# Patient Record
Sex: FEMALE | Race: WHITE | Employment: OTHER | URBAN - METROPOLITAN AREA
[De-identification: names, ages, dates, MRNs, and addresses within clinical notes are randomized per-mention and may not be internally consistent; named-entity substitution may affect disease eponyms.]

---

## 2017-04-25 ENCOUNTER — HOSPITAL ENCOUNTER (OUTPATIENT)
Dept: GENERAL RADIOLOGY | Age: 52
Discharge: HOME | End: 2017-04-25
Attending: FAMILY MEDICINE | Admitting: FAMILY MEDICINE

## 2017-04-25 DIAGNOSIS — R13.10 DYSPHAGIA, UNSPECIFIED TYPE: ICD-10-CM

## 2017-04-25 DIAGNOSIS — R13.10 DYSPHAGIA: ICD-10-CM

## 2017-08-03 ENCOUNTER — HOSPITAL ENCOUNTER (OUTPATIENT)
Dept: SPEECH THERAPY | Age: 52
Discharge: HOME | End: 2017-08-03
Attending: FAMILY MEDICINE | Admitting: FAMILY MEDICINE

## 2017-08-03 DIAGNOSIS — Z00.6 CLINICAL TRIAL EXAM: ICD-10-CM

## 2017-08-03 DIAGNOSIS — R13.10 DYSPHAGIA, UNSPECIFIED TYPE: ICD-10-CM

## 2018-02-26 RX ORDER — PETROLATUM 430 MG/G
OINTMENT TOPICAL
Qty: 1 TUBE | Refills: 5 | Status: ON HOLD | OUTPATIENT
Start: 2018-02-26 | End: 2022-04-08

## 2019-03-31 ENCOUNTER — APPOINTMENT (OUTPATIENT)
Dept: CT IMAGING | Age: 54
End: 2019-03-31
Payer: MEDICARE

## 2019-03-31 ENCOUNTER — HOSPITAL ENCOUNTER (EMERGENCY)
Age: 54
Discharge: HOME | End: 2019-03-31
Attending: EMERGENCY MEDICINE
Payer: MEDICARE

## 2019-03-31 VITALS
SYSTOLIC BLOOD PRESSURE: 93 MMHG | RESPIRATION RATE: 18 BRPM | HEART RATE: 70 BPM | DIASTOLIC BLOOD PRESSURE: 62 MMHG | HEIGHT: 58 IN | TEMPERATURE: 97.4 F | BODY MASS INDEX: 29.39 KG/M2 | WEIGHT: 140 LBS | OXYGEN SATURATION: 99 %

## 2019-03-31 DIAGNOSIS — K62.5 RECTAL BLEEDING: Primary | ICD-10-CM

## 2019-03-31 DIAGNOSIS — K62.3 RECTAL PROLAPSE: ICD-10-CM

## 2019-03-31 LAB
ANION GAP SERPL CALCULATED.3IONS-SCNC: 9 MMOL/L (ref 3–16)
APTT: 28.3 SEC (ref 26–36)
BASOPHILS ABSOLUTE: 0 K/UL (ref 0–0.2)
BASOPHILS RELATIVE PERCENT: 0.4 %
BUN BLDV-MCNC: 24 MG/DL (ref 7–20)
CALCIUM SERPL-MCNC: 9 MG/DL (ref 8.3–10.6)
CHLORIDE BLD-SCNC: 102 MMOL/L (ref 99–110)
CO2: 29 MMOL/L (ref 21–32)
CREAT SERPL-MCNC: 0.8 MG/DL (ref 0.6–1.1)
EOSINOPHILS ABSOLUTE: 0 K/UL (ref 0–0.6)
EOSINOPHILS RELATIVE PERCENT: 0.4 %
GFR AFRICAN AMERICAN: >60
GFR NON-AFRICAN AMERICAN: >60
GLUCOSE BLD-MCNC: 91 MG/DL (ref 70–99)
GONADOTROPIN, CHORIONIC (HCG) QUANT: <5 MIU/ML
HCT VFR BLD CALC: 41.7 % (ref 36–48)
HEMOGLOBIN: 14.1 G/DL (ref 12–16)
INR BLD: 0.93 (ref 0.86–1.14)
LYMPHOCYTES ABSOLUTE: 1.3 K/UL (ref 1–5.1)
LYMPHOCYTES RELATIVE PERCENT: 14.3 %
MCH RBC QN AUTO: 34.8 PG (ref 26–34)
MCHC RBC AUTO-ENTMCNC: 33.9 G/DL (ref 31–36)
MCV RBC AUTO: 102.6 FL (ref 80–100)
MONOCYTES ABSOLUTE: 0.8 K/UL (ref 0–1.3)
MONOCYTES RELATIVE PERCENT: 8.8 %
NEUTROPHILS ABSOLUTE: 6.8 K/UL (ref 1.7–7.7)
NEUTROPHILS RELATIVE PERCENT: 76.1 %
PDW BLD-RTO: 12.2 % (ref 12.4–15.4)
PLATELET # BLD: 162 K/UL (ref 135–450)
PMV BLD AUTO: 10.2 FL (ref 5–10.5)
POTASSIUM SERPL-SCNC: 4.6 MMOL/L (ref 3.5–5.1)
PROTHROMBIN TIME: 10.6 SEC (ref 9.8–13)
RBC # BLD: 4.07 M/UL (ref 4–5.2)
REASON FOR REJECTION: NORMAL
REJECTED TEST: NORMAL
SODIUM BLD-SCNC: 140 MMOL/L (ref 136–145)
WBC # BLD: 9 K/UL (ref 4–11)

## 2019-03-31 PROCEDURE — 99283 EMERGENCY DEPT VISIT LOW MDM: CPT

## 2019-03-31 PROCEDURE — 85610 PROTHROMBIN TIME: CPT

## 2019-03-31 PROCEDURE — 84702 CHORIONIC GONADOTROPIN TEST: CPT

## 2019-03-31 PROCEDURE — 80048 BASIC METABOLIC PNL TOTAL CA: CPT

## 2019-03-31 PROCEDURE — 74176 CT ABD & PELVIS W/O CONTRAST: CPT

## 2019-03-31 PROCEDURE — 85025 COMPLETE CBC W/AUTO DIFF WBC: CPT

## 2019-03-31 PROCEDURE — 85730 THROMBOPLASTIN TIME PARTIAL: CPT

## 2019-03-31 NOTE — ED NOTES
Bed: 25  Expected date:   Expected time:   Means of arrival:   Comments:  [address], RN  03/31/19 1526

## 2019-03-31 NOTE — ED NOTES
Pt alert and oriented to person. Resting quietly in bed. Caregiver at bedside.      Samuel Mark RN  03/31/19 8552

## 2019-03-31 NOTE — ED TRIAGE NOTES
Caregiver forgot pts home medications, allergies, and medical/surgical history. Pt non verbal unable to answer screening questions.

## 2019-04-01 NOTE — ED PROVIDER NOTES
[facility] Emergency Department      Pt Name: Brianna White  MRN: [id number]  [place] 1965  Date of evaluation: 3/31/2019  Provider: Boyd Gilmore MD  CHIEF COMPLAINT  Chief Complaint   Patient presents with    Rectal Bleeding     pt from [facility]. rectal bleeding from her prolapse     HPI  Brianna White is a 52 y.o. female who presents because of rectal bleeding. She is accompanied by caregivers who know her well. She has a rectal prolapse that is chronic and today when one of them went to change her diaper, she noted normal appearing stool with a smear of blood on the diaper. The rectums had prolapsed. This caregiver did not perform reduction but they brought the patient in for evaluation. The patient is nonverbal, lives in a group home, and is unable to provide history. She is at her baseline behavior per caregivers. Appetite is normal without vomiting. REVIEW OF SYSTEMS:  See HPI for further details. Remainder of review of systems limited by patient ability to provide history. Nursing notes reviewed. PAST MEDICAL HISTORY:  Past Medical History:   Diagnosis Date    Anemia     Anorexia     Autistic disorder     Convulsions ([address]     Down syndrome     Dysphagia     Hypothyroid     [facility]     Parkinson disease ([facility]     UTI (urinary tract infection)     Weakness      SURGICAL HISTORY  Past Surgical History:   Procedure Laterality Date    GASTROSTOMY TUBE PLACEMENT      GASTROSTOMY TUBE PLACEMENT  2/18/14     MEDICATIONS:  No current facility-administered medications on file prior to encounter. Current Outpatient Medications on File Prior to Encounter   Medication Sig Dispense Refill    Skin Protectants, Misc. (ALOE VESTA PROTECTIVE) OINT ointment APPLY TO AFFECTED AREA(S) TWICE DAILY AS NEEDED 1 Tube 5    Trihexyphenidyl HCl 0.4 MG/ML ELIX Take 2 mg by mouth      zinc oxide 20 % ointment Apply topically 3 times daily Apply topically as needed.  Nutritional Supplements (ENSURE COMPLETE PO) Take by mouth      levothyroxine (SYNTHROID) 125 MCG tablet Take 125 mcg by mouth daily      docusate sodium (COLACE) 100 MG capsule Take 100 mg by mouth daily      acetaminophen (TYLENOL) 325 MG tablet Take 650 mg by mouth every 6 hours as needed for Pain.  calcium carbonate (TUMS) 500 MG chewable tablet Take 1 tablet by mouth 2 times daily.  magnesium hydroxide (MILK OF MAGNESIA) 400 MG/5ML suspension Take  by mouth daily as needed for Constipation.  carbidopa-levodopa (SINEMET)  MG per tablet Take 1.5 tablets by mouth 3 times daily Indications: Take one and a half tablets by mouth three times per day       sertraline (ZOLOFT) 100 MG tablet Take 100 mg by mouth nightly.  Multiple Vitamins-Minerals (THERAPEUTIC MULTIVITAMIN-MINERALS) tablet Take 1 tablet by mouth daily.  omeprazole (PRILOSEC) 20 MG capsule Take 20 mg by mouth daily.  valproic acid (DEPAKENE) 250 MG capsule Take 250 mg by mouth See Admin Instructions Take 1 cap by mouth in morning, in afternoon, and 2 capsules by mouth at bedtime       ALLERGIES  Caffeine and Iodine  FAMILY HISTORY:  No family history on file.   SOCIAL HISTORY:  Social History     Tobacco Use    Smoking status: Never Smoker    Smokeless tobacco: Never Used   Substance Use Topics    Alcohol use: No    Drug use: No     IMMUNIZATIONS:  Noncontributory    PHYSICAL EXAM  VITAL SIGNS:  BP 93/62   Pulse 70   Temp 97.4 °F (36.3 °C) (Infrared)   Resp 18   Ht 4' 10\" (1.473 m)   Wt 140 lb (63.5 kg)   SpO2 99%   BMI 29.26 kg/m²   Constitutional:  52 y.o. female cooperative, facies consistent with MRDD  HENT:  Atraumatic, mucous membranes moist  Eyes:   Conjunctiva clear, no icterus  Neck:  Supple, no adenopathy  Cardiovascular:  Regular, no rubs, no discernible murmur  Thorax & Lungs:  No accessory muscle usage, clear  Abdomen:  Soft, non distended, bowel sounds present, palpable reducible hernia, no tenderness  Back:  No deformity  Genitalia:  Deferred  Rectal:  Externally appears normal, no bleeding or prolapse present during exam  Extremities:  No cyanosis, no edema  Skin:  Warm, dry  Neurologic:  Alert, nonverbal, uses a wheelchair but can assist with transfer  Psychiatric:  Affect flat, otherwise unassessable    DIAGNOSTIC RESULTS:  Labs resulted at the time of this note reviewed.    Labs Reviewed   CBC WITH AUTO DIFFERENTIAL - Abnormal; Notable for the following components:       Result Value    .6 (*)     MCH 34.8 (*)     RDW 12.2 (*)     All other components within normal limits    Narrative:     CALL  [facility] tel. [phone],  Rejected Test Name BMP, HCGQ/Called to:DESIREE Santos, 03/31/2019 16:38,  by [facility]  Performed at:  [facility] [address], [place], [address] [facility]   Phone [phone]   BASIC METABOLIC PANEL - Abnormal; Notable for the following components:    BUN 24 (*)     All other components within normal limits    Narrative:     Performed at:  [facility] [address], [place], [address]   Phone [phone]   PROTIME-INR    Narrative:     [address],  Rejected Test Name BMP, HCGQ/Called to:DESIREE Santos, 03/31/2019 16:38,  by [facility]  Performed at:  [facility] [address], [place], [address]   Phone [phone]   APTT    Narrative:     Nate Andres  [facility] tel. [phone],  Rejected Test Name BMP, HCGQ/Called to:DESIREE Santos, 03/31/2019 16:38,  by [facility]  Performed at:  [facility] [address], [place], [address]   Phone [phone]    Narrative:     Nate Andres  [place]. [phone],  Rejected Test Name BMP, HCGQ/Called to:DESIREE Santos, 03/31/2019 16:38,  by [facility]  Performed at:  [facility]  [address] [place] [address] [place], [place]   Phone [phone]   HCG, QUANTITATIVE, PREGNANCY    Narrative:     Performed at:  [facility]  [address],  [place], [place]   Phone [phone]     RADIOLOGY:    CT ABDOMEN PELVIS WO CONTRAST Additional Contrast? None   Final Result   Motion limited study. No convincing evidence for acute intra-abdominal or intrapelvic pathology. No bowel obstruction or inflammation. No free intraperitoneal air or fluid. Bilateral pars defects at L4 resulting grade 1-2 anterolisthesis of L4   relative to L5            ED COURSE:  Uneventful     PROCEDURES:  None    CRITICAL CARE:  None    CONSULTATIONS:  None    MEDICAL DECISION MAKING: Brock Mcdaniel is a 52 y.o. female who presented because of rectal bleeding noted when her rectum was prolapsed. No finding of continued bleeding or inflammatory change. She is clinically well in appearance, at her baseline according to caregivers. Caregiver was given appropriate discharge instructions, verbal and written. We encouraged the caregiver to return the patient to the ED promptly if she develops worsening symptoms. Referral given for follow up care. Differential Diagnosis: incarcerated prolapse, hemorrhage, obstruction, perforated viscus, intestinal ischemia, dissection, other    FOLLOW UP:    Lin Burton MD  [address] [address] [address]   [phone]    Schedule an appointment as soon as possible for a visit       [facility] Emergency Department  [address]  Go to   If symptoms worsen    FINAL IMPRESSION:    1. Rectal bleeding    2. Rectal prolapse, self reduced      (Please note that I used voice recognition software to generate this note.   Occasionally words are mistranscribed despite my efforts to edit errors.)        Bryan Rosas MD  03/31/19 2137

## 2019-08-06 ENCOUNTER — HOSPITAL ENCOUNTER (OUTPATIENT)
Dept: WOMENS IMAGING | Age: 54
Discharge: HOME | End: 2019-08-06
Payer: MEDICARE

## 2019-08-06 DIAGNOSIS — Z12.39 BREAST CANCER SCREENING: ICD-10-CM

## 2021-07-01 ENCOUNTER — HOSPITAL ENCOUNTER (EMERGENCY)
Age: 56
Discharge: HOME | End: 2021-07-02
Attending: EMERGENCY MEDICINE
Payer: MEDICARE

## 2021-07-01 ENCOUNTER — APPOINTMENT (OUTPATIENT)
Dept: CT IMAGING | Age: 56
End: 2021-07-01
Payer: MEDICARE

## 2021-07-01 ENCOUNTER — APPOINTMENT (OUTPATIENT)
Dept: GENERAL RADIOLOGY | Age: 56
End: 2021-07-01
Payer: MEDICARE

## 2021-07-01 VITALS
WEIGHT: 76 LBS | OXYGEN SATURATION: 98 % | HEART RATE: 98 BPM | HEIGHT: 59 IN | RESPIRATION RATE: 18 BRPM | TEMPERATURE: 96.9 F | BODY MASS INDEX: 15.32 KG/M2

## 2021-07-01 DIAGNOSIS — R74.8 ELEVATED LIPASE: ICD-10-CM

## 2021-07-01 DIAGNOSIS — R11.2 NAUSEA VOMITING AND DIARRHEA: Primary | ICD-10-CM

## 2021-07-01 DIAGNOSIS — R19.7 NAUSEA VOMITING AND DIARRHEA: Primary | ICD-10-CM

## 2021-07-01 DIAGNOSIS — K31.89 GASTRIC DISTENTION: ICD-10-CM

## 2021-07-01 LAB
A/G RATIO: 1 (ref 1.1–2.2)
ALBUMIN SERPL-MCNC: 4.3 G/DL (ref 3.4–5)
ALP BLD-CCNC: 85 U/L (ref 40–129)
ALT SERPL-CCNC: 19 U/L (ref 10–40)
ANION GAP SERPL CALCULATED.3IONS-SCNC: 9 MMOL/L (ref 3–16)
AST SERPL-CCNC: 35 U/L (ref 15–37)
BASOPHILS ABSOLUTE: 0 K/UL (ref 0–0.2)
BASOPHILS RELATIVE PERCENT: 0.2 %
BILIRUB SERPL-MCNC: <0.2 MG/DL (ref 0–1)
BUN BLDV-MCNC: 38 MG/DL (ref 7–20)
CALCIUM SERPL-MCNC: 10.4 MG/DL (ref 8.3–10.6)
CHLORIDE BLD-SCNC: 103 MMOL/L (ref 99–110)
CO2: 31 MMOL/L (ref 21–32)
CREAT SERPL-MCNC: 0.6 MG/DL (ref 0.6–1.1)
EOSINOPHILS ABSOLUTE: 0 K/UL (ref 0–0.6)
EOSINOPHILS RELATIVE PERCENT: 0.1 %
GFR AFRICAN AMERICAN: >60
GFR NON-AFRICAN AMERICAN: >60
GLOBULIN: 4.1 G/DL
GLUCOSE BLD-MCNC: 80 MG/DL (ref 70–99)
HCT VFR BLD CALC: 47.5 % (ref 36–48)
HEMOGLOBIN: 15.9 G/DL (ref 12–16)
LIPASE: 136 U/L (ref 13–60)
LYMPHOCYTES ABSOLUTE: 1 K/UL (ref 1–5.1)
LYMPHOCYTES RELATIVE PERCENT: 8 %
MCH RBC QN AUTO: 34.6 PG (ref 26–34)
MCHC RBC AUTO-ENTMCNC: 33.5 G/DL (ref 31–36)
MCV RBC AUTO: 103.3 FL (ref 80–100)
MONOCYTES ABSOLUTE: 0.8 K/UL (ref 0–1.3)
MONOCYTES RELATIVE PERCENT: 5.8 %
NEUTROPHILS ABSOLUTE: 11.2 K/UL (ref 1.7–7.7)
NEUTROPHILS RELATIVE PERCENT: 85.9 %
PDW BLD-RTO: 13.1 % (ref 12.4–15.4)
PLATELET # BLD: 178 K/UL (ref 135–450)
PMV BLD AUTO: 9.8 FL (ref 5–10.5)
POTASSIUM SERPL-SCNC: 4.5 MMOL/L (ref 3.5–5.1)
RBC # BLD: 4.6 M/UL (ref 4–5.2)
SODIUM BLD-SCNC: 143 MMOL/L (ref 136–145)
TOTAL PROTEIN: 8.4 G/DL (ref 6.4–8.2)
WBC # BLD: 13 K/UL (ref 4–11)

## 2021-07-01 PROCEDURE — 74176 CT ABD & PELVIS W/O CONTRAST: CPT

## 2021-07-01 PROCEDURE — 36415 COLL VENOUS BLD VENIPUNCTURE: CPT

## 2021-07-01 PROCEDURE — 85025 COMPLETE CBC W/AUTO DIFF WBC: CPT

## 2021-07-01 PROCEDURE — 6360000002 HC RX W HCPCS: Performed by: PHYSICIAN ASSISTANT

## 2021-07-01 PROCEDURE — 96374 THER/PROPH/DIAG INJ IV PUSH: CPT

## 2021-07-01 PROCEDURE — 83690 ASSAY OF LIPASE: CPT

## 2021-07-01 PROCEDURE — 71045 X-RAY EXAM CHEST 1 VIEW: CPT

## 2021-07-01 PROCEDURE — 80053 COMPREHEN METABOLIC PANEL: CPT

## 2021-07-01 PROCEDURE — 99282 EMERGENCY DEPT VISIT SF MDM: CPT

## 2021-07-01 RX ORDER — ONDANSETRON 2 MG/ML
4 INJECTION INTRAMUSCULAR; INTRAVENOUS ONCE
Status: COMPLETED | OUTPATIENT
Start: 2021-07-01 | End: 2021-07-01

## 2021-07-01 RX ADMIN — ONDANSETRON 4 MG: 2 INJECTION INTRAMUSCULAR; INTRAVENOUS at 22:35

## 2021-07-01 ASSESSMENT — ENCOUNTER SYMPTOMS
DIARRHEA: 1
VOMITING: 1

## 2021-07-02 NOTE — ED NOTES
Bed: 08  Expected date:   Expected time:   Means of arrival:   Comments:  carl Bryan RN  07/01/21 2056

## 2021-07-02 NOTE — ED PROVIDER NOTES
[facility]        Pt Name: Christian Smith  MRN: [id number]  [place] 1965  Date of evaluation: 7/1/2021  Provider: Arlene Sawyer PA-C  PCP: Sanchez Hay MD  Note Started: 9:56 PM EDT        I have seen and evaluated this patient with my supervising physician [address] [place] [address].    CHIEF COMPLAINT       No chief complaint on file. HISTORY OF PRESENT ILLNESS   (Location, Timing/Onset, Context/Setting, Quality, Duration, Modifying Factors, Severity, Associated Signs and Symptoms)  Note limiting factors. Chief Complaint: N/V/D     Christian Smith is a 60 y.o. female who presents for evaluation of vomiting and diarrhea that started right after eating dinner this evening. Caregiver at bedside reports 2-3 episodes. States that she has since been acting appropriate at baseline but is wanted to get her checked out. Patient has history of Down syndrome, autism, profound mental retardation and is nonverbal at baseline. No additional information is able to obtained at this time. Nursing Notes were all reviewed and agreed with or any disagreements were addressed in the HPI. REVIEW OF SYSTEMS    (2-9 systems for level 4, 10 or more for level 5)     Review of Systems   Unable to perform ROS: Patient nonverbal   Gastrointestinal: Positive for diarrhea and vomiting. Positives and Pertinent negatives as per HPI. Except as noted above in the ROS, all other systems were reviewed and negative.        PAST MEDICAL HISTORY     Past Medical History:   Diagnosis Date    Anemia     Anorexia     Autistic disorder     Convulsions ([address]     Down syndrome     Dysphagia     Hypothyroid     Malnutrition ([address]     Parkinson disease ([address]     UTI (urinary tract infection)     Weakness          SURGICAL HISTORY     Past Surgical History:   Procedure Laterality Date    GASTROSTOMY TUBE PLACEMENT      GASTROSTOMY TUBE PLACEMENT 2/18/14         CURRENTMEDICATIONS       Previous Medications    ACETAMINOPHEN (TYLENOL) 325 MG TABLET    Take 650 mg by mouth every 6 hours as needed for Pain. CALCIUM CARBONATE (TUMS) 500 MG CHEWABLE TABLET    Take 1 tablet by mouth 2 times daily. CARBIDOPA-LEVODOPA (SINEMET)  MG PER TABLET    Take 1.5 tablets by mouth 3 times daily Indications: Take one and a half tablets by mouth three times per day     DOCUSATE SODIUM (COLACE) 100 MG CAPSULE    Take 100 mg by mouth daily    LEVOTHYROXINE (SYNTHROID) 125 MCG TABLET    Take 125 mcg by mouth daily    MAGNESIUM HYDROXIDE (MILK OF MAGNESIA) 400 MG/5ML SUSPENSION    Take  by mouth daily as needed for Constipation. MULTIPLE VITAMINS-MINERALS (THERAPEUTIC MULTIVITAMIN-MINERALS) TABLET    Take 1 tablet by mouth daily. NUTRITIONAL SUPPLEMENTS (ENSURE COMPLETE PO)    Take by mouth    OMEPRAZOLE (PRILOSEC) 20 MG CAPSULE    Take 20 mg by mouth daily. SERTRALINE (ZOLOFT) 100 MG TABLET    Take 100 mg by mouth nightly. SKIN PROTECTANTS, MISC. (ALOE VESTA PROTECTIVE) OINT OINTMENT    APPLY TO AFFECTED AREA(S) TWICE DAILY AS NEEDED    TRIHEXYPHENIDYL HCL 0.4 MG/ML ELIX    Take 2 mg by mouth    VALPROIC ACID (DEPAKENE) 250 MG CAPSULE    Take 250 mg by mouth See Admin Instructions Take 1 cap by mouth in morning, in afternoon, and 2 capsules by mouth at bedtime    ZINC OXIDE 20 % OINTMENT    Apply topically 3 times daily Apply topically as needed. ALLERGIES     Caffeine and Iodine    FAMILYHISTORY     History reviewed. No pertinent family history.        SOCIAL HISTORY       Social History     Tobacco Use    Smoking status: Never Smoker    Smokeless tobacco: Never Used   Substance Use Topics    Alcohol use: No    Drug use: No       SCREENINGS             PHYSICAL EXAM    (up to 7 for level 4, 8 or more for level 5)     ED Triage Vitals [07/01/21 2105]   BP Temp Temp Source Pulse Resp SpO2 Height Weight   -- 96.9 °F (36.1 °C) Axillary 98 18 100 % 4' 11\" (1.499 m) 76 lb (34.5 kg)       Physical Exam  Vitals and nursing note reviewed. Constitutional:       General: She is not in acute distress. Appearance: She is well-developed. She is not ill-appearing, toxic-appearing or diaphoretic. HENT:      Head: Normocephalic and atraumatic. Right Ear: External ear normal.      Left Ear: External ear normal.      Nose: Nose normal.   Eyes:      General:         Right eye: No discharge. Left eye: No discharge. Cardiovascular:      Rate and Rhythm: Normal rate and regular rhythm. Heart sounds: Normal heart sounds. Pulmonary:      Effort: Pulmonary effort is normal. No respiratory distress. Breath sounds: Normal breath sounds. Abdominal:      General: Abdomen is flat. Bowel sounds are normal. There is no distension. Palpations: Abdomen is soft. Tenderness: There is no abdominal tenderness. There is no guarding or rebound. Musculoskeletal:         General: Normal range of motion. Cervical back: Normal range of motion and neck supple. Skin:     General: Skin is warm and dry. Neurological:      Mental Status: She is alert and oriented to person, place, and time.    Psychiatric:         Behavior: Behavior normal.         DIAGNOSTIC RESULTS   LABS:    Labs Reviewed   CBC WITH AUTO DIFFERENTIAL - Abnormal; Notable for the following components:       Result Value    WBC 13.0 (*)     .3 (*)     MCH 34.6 (*)     Neutrophils Absolute 11.2 (*)     All other components within normal limits    Narrative:     Performed at:  [facility]  [address] [facility], [address]   Phone [phone]   COMPREHENSIVE METABOLIC PANEL - Abnormal; Notable for the following components:    BUN 38 (*)     Total Protein 8.4 (*)     Albumin/Globulin Ratio 1.0 (*)     All other components within normal limits    Narrative:     Performed at:  [facility]  [address] [facility], [place]   Phone [phone]   LIPASE - Abnormal; Notable for the following components:    Lipase 136.0 (*)     All other components within normal limits    Narrative:     Performed at:  [facility] [address] [place] [address]   Phone [phone]       When ordered only abnormal lab results are displayed. All other labs were within normal range or not returned as of this dictation. EKG: When ordered, EKG's are interpreted by the Emergency Department Physician in the absence of a cardiologist.  Please see their note for interpretation of EKG. RADIOLOGY:   Non-plain film images such as CT, Ultrasound and MRI are read by the radiologist. Plain radiographic images are visualized and preliminarily interpreted by the ED Provider with the below findings:        Interpretation per the Radiologist below, if available at the time of this note:    CT ABDOMEN PELVIS WO CONTRAST Additional Contrast? None   Final Result   1. Gastric distention could be due to recent meal, gastroparesis, or outlet   obstruction. 2. Otherwise essentially nondiagnostic study. XR CHEST PORTABLE   Final Result   Clear lungs. No results found. PROCEDURES   Unless otherwise noted below, none     Procedures    CRITICAL CARE TIME   N/A    CONSULTS:  None      EMERGENCY DEPARTMENT COURSE and DIFFERENTIAL DIAGNOSIS/MDM:   Vitals:    Vitals:    07/01/21 2245 07/01/21 2300 07/01/21 2315 07/01/21 2330   Pulse:       Resp:       Temp:       TempSrc:       SpO2: 99% 95% 95% 98%   Weight:       Height:           Patient was given the following medications:  Medications   ondansetron (ZOFRAN) injection 4 mg (4 mg Intravenous Given 7/1/21 2235)           Patient presents for evaluation of vomiting and diarrhea. On exam, she is resting comfortably in bed no acute distress and nontoxic. Vitals are stable and she is afebrile. Lungs are clear to auscultation bilaterally. Abdomen is soft, nontender. Normoactive bowel sounds. No peritoneal signs. She was given Zofran for symptomatic relief and will be reevaluated and p.o. challenged. CBC and CMP are remarkable for leukocytosis of 13.0, likely reactive. Lipase 136. CT the abdomen pelvis was obtained to further evaluate for intra-abdominal pathology. It shows gastric distention which could be due to recent meal, gastroparesis or gastric obstruction. Probably due to recent meal as caregiver does report that she had eaten a large meal and was given Ensure just prior to onset of vomiting. She can follow-up outpatient. Strict return precautions discussed such as any new or worsening symptoms or signs of gastric outlet obstruction with persistent intractable nausea vomiting. I see nothing that would suggest an acute abdomen at this time. Based on history, physical exam, risk factors, and tests my suspicion for bowel obstruction, incarcerated hernia, acute pancreatitis, intra-abdominal abscess, perforated viscus, diverticulitis, cholecystitis, appendicitis, PID, ovarian torsion, ectopic pregnancy and tubo-ovarian abscess is very low. There is no evidence of peritonitis, sepsis or toxicity at this time. I feel the patient can be managed as an outpatient with follow-up with her family doctor in 24-48 hours. Instructions have been given for the patient to return to the ED for worsening of the pain, high fevers, intractable vomiting, or bleeding. Caregiver is agreeable to this plan and the patient is stable for discharge at this time. FINAL IMPRESSION      1. Nausea vomiting and diarrhea    2. Elevated lipase    3.  Gastric distention          DISPOSITION/PLAN   DISPOSITION        PATIENT REFERRED TO:  Dian Leal MD  [address] [address] [address]  [phone]    Call   For a re-check in  2-3  days    [facility] Emergency Department  [address]  Go to   If symptoms worsen      DISCHARGE MEDICATIONS:  New Prescriptions    No medications on file       DISCONTINUED MEDICATIONS:  Discontinued Medications    No medications on file              (Please note that portions of this note were completed with a voice recognition program.  Efforts were made to edit the dictations but occasionally words are mis-transcribed.)    Cammy Zavala PA-C (electronically signed)            Indy Lees PA-C  07/01/21 5301 [place], [place]  07/02/21 0007

## 2021-07-02 NOTE — ED PROVIDER NOTES
I independently performed a history and physical on Miriam Sosa. All diagnostic, treatment, and disposition decisions were made by myself in conjunction with the advanced practice provider. Briefly, this is a 60 y.o. female here for vomiting which occurred after eating a meal.  Patient with history of developmental delay, she is nonverbal at baseline. Per nursing home staff patient ate lunch which is supplemented by Ensure, about 2 hours later she was also fed dinner after which she had 2-3 episodes of nonbloody vomiting. Symptoms resolved spontaneously. Caregiver present at bedside states patient seems to be back to her normal self at this time. .    On exam, patient afebrile and nontoxic. No distress. Heart RRR. Lungs diminished at bases, no wheezes, no crackles. Abdomen soft, nondistended, no distress elicited with deep palpation in all quadrants. Screenings            MDM    Patient afebrile and nontoxic. She exhibits no outward evidence of distress. Abdominal exam is benign, no findings to suggest acute cholecystitis or appendicitis. I have very low suspicion for obstruction. Patient does have mildly elevated lipase, given this abnormal finding did proceed with CT imaging which showed no evidence of acute pancreatitis. There is gastric distention, questionable gastric outlet obstruction versus food distention. Given history food distention is far more likely, patient has had no abdominal tenderness or recurrent episodes of vomiting to suggest obstruction. Patient currently acting normally per caregiver. Patient is felt safe for discharge back to her nursing home setting where she will be closely monitored. She is to return to the emergency department should her symptoms recur.     Patient Referrals:  Gloria Black MD  [address] [place] [address] Drive [address] [phone]    Call   For a re-check in  2-3  days    [facility] Emergency Department  [address] [address] [address] [address] [phone]  Go to   If symptoms worsen      Discharge Medications:  Discharge Medication List as of 7/2/2021  1:49 AM          FINAL IMPRESSION  1. Nausea vomiting and diarrhea    2. Elevated lipase    3. Gastric distention        Pulse 98, temperature 96.9 °F (36.1 °C), temperature source Axillary, resp. rate 18, height 4' 11\" (1.499 m), weight 76 lb (34.5 kg), SpO2 98 %. For further details of [facility] emergency department encounter, please see documentation by advanced practice provider, [facility].     Joey Perez DO (electronically signed)  Attending Emergency Physician       Joey Perez DO  07/02/21 8948

## 2021-07-02 NOTE — ED TRIAGE NOTES
Pt non verbal , autistic , MS and Parkinsons. caretaker w/ pt , pt ate a full lunch plus an Ensure plus  Then 2 hrs later ate dinner and a Ensue plus . Then started vomitting and diarrhea.  ABCs intact

## 2021-07-02 NOTE — ED NOTES
Pt sleeping , no emesis noted , no diarrhea.  All questions from caregiver answered      Ginny Dickey RN  07/02/21 7315

## 2021-10-19 ENCOUNTER — HOSPITAL ENCOUNTER (EMERGENCY)
Age: 56
Discharge: HOME | End: 2021-10-19
Payer: MEDICARE

## 2021-10-19 VITALS
DIASTOLIC BLOOD PRESSURE: 95 MMHG | OXYGEN SATURATION: 100 % | HEIGHT: 59 IN | RESPIRATION RATE: 18 BRPM | WEIGHT: 76 LBS | HEART RATE: 70 BPM | TEMPERATURE: 96.5 F | SYSTOLIC BLOOD PRESSURE: 126 MMHG | BODY MASS INDEX: 15.32 KG/M2

## 2021-10-19 DIAGNOSIS — K62.3 RECTAL PROLAPSE: Primary | ICD-10-CM

## 2021-10-19 PROCEDURE — 99283 EMERGENCY DEPT VISIT LOW MDM: CPT

## 2021-10-19 NOTE — ED PROVIDER NOTES
[facility] [facility]        Pt Name: Kenny Garsia  MRN: [id number]  [place] 1965  Date of evaluation: 10/19/2021  Provider: VICKY Hoffmann  PCP: Julia Shelton MD  Note Started: 9:16 AM EDT       SILVERIO. I have evaluated this patient. My supervising physician was available for consultation. CHIEF COMPLAINT       Chief Complaint   Patient presents with    Other     Pt. in per [facility] with report of pt. coming from some type of group home that has a new Nurse and was unaware the pt. had a known rectal prolapse. HISTORY OF PRESENT ILLNESS   (Location, Timing/Onset, Context/Setting, Quality, Duration, Modifying Factors, Severity, Associated Signs and Symptoms)  Note limiting factors. Chief Complaint: Rectal prolapse. Kenny Garsia is a 60 y.o. female with past medical history of autism, Down syndrome, Parkinson's who presents the ED with complaint of a rectal prolapse. Patient arrived by EMS after 911 was called from group home for rectal prolapse. Patient very poor historian. Apparently has known history of rectal prolapse but has new care provider who did not know patient had this and called 911. Staff did arrive at bedside and staff states patient has had known rectal prolapse for numerous years. States normally when patient has rectal prolapse they normally just push the prolapse back in and patient is fine. Today apparently patient's care provider is new and has never seen her before and became concerned and called 911. Nursing Notes were all reviewed and agreed with or any disagreements were addressed in the HPI. REVIEW OF SYSTEMS    (2-9 systems for level 4, 10 or more for level 5)     Review of Systems   Unable to perform ROS: Other       Positives and Pertinent negatives as per HPI. Except as noted above in the ROS, all other systems were reviewed and negative.        PAST MEDICAL HISTORY     Past Medical History:   Diagnosis Date    Anemia     Anorexia     Autistic disorder     [facility]     Down syndrome     Dysphagia     Hypothyroid     Malnutrition ([facility]     Parkinson disease ([facility]     UTI (urinary tract infection)     Weakness          SURGICAL HISTORY     Past Surgical History:   Procedure Laterality Date    GASTROSTOMY TUBE PLACEMENT      GASTROSTOMY TUBE PLACEMENT  2/18/14         CURRENTMEDICATIONS       Previous Medications    ACETAMINOPHEN (TYLENOL) 325 MG TABLET    Take 650 mg by mouth every 6 hours as needed for Pain. CALCIUM CARBONATE (TUMS) 500 MG CHEWABLE TABLET    Take 1 tablet by mouth 2 times daily. CARBIDOPA-LEVODOPA (SINEMET)  MG PER TABLET    Take 1.5 tablets by mouth 3 times daily Indications: Take one and a half tablets by mouth three times per day     DOCUSATE SODIUM (COLACE) 100 MG CAPSULE    Take 100 mg by mouth daily    LEVOTHYROXINE (SYNTHROID) 125 MCG TABLET    Take 125 mcg by mouth daily    MAGNESIUM HYDROXIDE (MILK OF MAGNESIA) 400 MG/5ML SUSPENSION    Take  by mouth daily as needed for Constipation. MULTIPLE VITAMINS-MINERALS (THERAPEUTIC MULTIVITAMIN-MINERALS) TABLET    Take 1 tablet by mouth daily. NUTRITIONAL SUPPLEMENTS (ENSURE COMPLETE PO)    Take by mouth    OMEPRAZOLE (PRILOSEC) 20 MG CAPSULE    Take 20 mg by mouth daily. SERTRALINE (ZOLOFT) 100 MG TABLET    Take 100 mg by mouth nightly. SKIN PROTECTANTS, MISC. (ALOE VESTA PROTECTIVE) OINT OINTMENT    APPLY TO AFFECTED AREA(S) TWICE DAILY AS NEEDED    TRIHEXYPHENIDYL HCL 0.4 MG/ML ELIX    Take 2 mg by mouth    VALPROIC ACID (DEPAKENE) 250 MG CAPSULE    Take 250 mg by mouth See Admin Instructions Take 1 cap by mouth in morning, in afternoon, and 2 capsules by mouth at bedtime    ZINC OXIDE 20 % OINTMENT    Apply topically 3 times daily Apply topically as needed. ALLERGIES     Caffeine and Iodine    FAMILYHISTORY     History reviewed. No pertinent family history. SOCIAL HISTORY       Social History     Tobacco Use    Smoking status: Never Smoker    Smokeless tobacco: Never Used   Substance Use Topics    Alcohol use: No    Drug use: No       SCREENINGS             PHYSICAL EXAM    (up to 7 for level 4, 8 or more for level 5)     ED Triage Vitals [10/19/21 0836]   BP Temp Temp src Pulse Resp SpO2 Height Weight   (!) 126/95 -- -- 70 18 100 % 4' 11\" (1.499 m) 76 lb (34.5 kg)       Physical Exam  Constitutional:       General: She is not in acute distress. Appearance: Normal appearance. She is well-developed. She is not ill-appearing, toxic-appearing or diaphoretic. HENT:      Head: Normocephalic and atraumatic. Right Ear: External ear normal.      Left Ear: External ear normal.   Eyes:      General:         Right eye: No discharge. Left eye: No discharge. Cardiovascular:      Rate and Rhythm: Normal rate and regular rhythm. Pulses: Normal pulses. Heart sounds: Normal heart sounds. No murmur heard. No friction rub. No gallop. Pulmonary:      Effort: Pulmonary effort is normal. No respiratory distress. Breath sounds: Normal breath sounds. No stridor. No wheezing, rhonchi or rales. Chest:      Chest wall: No tenderness. Abdominal:      General: Abdomen is flat. Bowel sounds are normal. There is no distension. Palpations: Abdomen is soft. There is no mass. Tenderness: There is no abdominal tenderness. There is no right CVA tenderness, left CVA tenderness, guarding or rebound. Hernia: No hernia is present. Genitourinary:     Comments: Rectal exam performed by myself with chaperone at bedside. Rectal prolapse noted easily reduced by myself. No difficulty with reduction of rectal prolapse. Patient tolerated procedure well. Musculoskeletal:         General: Normal range of motion. Cervical back: Normal range of motion and neck supple. Skin:     General: Skin is warm and dry.       Coloration: Skin is not pale.      Findings: No erythema or rash. Neurological:      Mental Status: She is alert and oriented to person, place, and time. Psychiatric:         Behavior: Behavior normal.         DIAGNOSTIC RESULTS   LABS:    Labs Reviewed - No data to display    When ordered only abnormal lab results are displayed. All other labs were within normal range or not returned as of this dictation. EKG: When ordered, EKG's are interpreted by the Emergency Department Physician in the absence of a cardiologist.  Please see their note for interpretation of EKG. RADIOLOGY:   Non-plain film images such as CT, Ultrasound and MRI are read by the radiologist. Plain radiographic images are visualized and preliminarily interpreted by the ED Provider with the below findings:        Interpretation per the Radiologist below, if available at the time of this note:    No orders to display     No results found. PROCEDURES   Unless otherwise noted below, none     Procedures    CRITICAL CARE TIME   N/A    CONSULTS:  None      EMERGENCY DEPARTMENT COURSE and DIFFERENTIAL DIAGNOSIS/MDM:   Vitals:    Vitals:    10/19/21 0836   BP: (!) 126/95   Pulse: 70   Resp: 18   SpO2: 100%   Weight: 76 lb (34.5 kg)   Height: 4' 11\" (1.499 m)       Patient was given the following medications:  Medications - No data to display        Patient is a 27-year-old female who presents to the ED with complaint of rectal prolapse. Known history of rectal prolapse intermittently for numerous years. Apparently has new care provider who is never seen this before and called EMS. Staff at bedside states that they have known about it for quite some time. Normally after bowel movements patient has rectal prolapse and they are able to push it back in. New care provider was concerned about this and called EMS. Rectal prolapse easily reduced by myself. Patient tolerated procedure well. There is no other complaints. Staff states at baseline mental status. Likely suffering from episode of her known rectal prolapse that was easily reduced here in the ED. Discharge home with outpatient follow-up. Return the ED for new or worsening symptoms. Low suspicion for persistent prolapse or inability to reduce. Low suspicion for other acute abnormality at this time. FINAL IMPRESSION      1. Rectal prolapse          DISPOSITION/PLAN   DISPOSITION Decision To Discharge 10/19/2021 09:04:53 AM      PATIENT REFERRED TO:  Piedad Vera MD  [address] [address] [address]   [phone]    Schedule an appointment as soon as possible for a visit   For a Re-check in   5-7   days.     [facility] Emergency Department  [facility]  [address] [address]  [phone]  Go to   As needed, If symptoms worsen      DISCHARGE MEDICATIONS:  New Prescriptions    No medications on file       DISCONTINUED MEDICATIONS:  Discontinued Medications    No medications on file              (Please note that portions of this note were completed with a voice recognition program.  Efforts were made to edit the dictations but occasionally words are mis-transcribed.)    VICKY Ashley (electronically signed)          VICKY Foy  10/19/21 6980

## 2021-10-19 NOTE — ED NOTES
Pt. Care taker assisted with placing pt. In car. Pt. Taken back to group home.      Adria Shirley RN  10/19/21 5759

## 2022-03-24 ENCOUNTER — HOSPITAL ENCOUNTER (EMERGENCY)
Age: 57
Discharge: HOME | End: 2022-03-24
Payer: MEDICARE

## 2022-03-24 ENCOUNTER — APPOINTMENT (OUTPATIENT)
Dept: GENERAL RADIOLOGY | Age: 57
End: 2022-03-24
Payer: MEDICARE

## 2022-03-24 VITALS
WEIGHT: 100 LBS | BODY MASS INDEX: 20.16 KG/M2 | TEMPERATURE: 98.5 F | OXYGEN SATURATION: 94 % | DIASTOLIC BLOOD PRESSURE: 92 MMHG | RESPIRATION RATE: 18 BRPM | SYSTOLIC BLOOD PRESSURE: 160 MMHG | HEART RATE: 68 BPM | HEIGHT: 59 IN

## 2022-03-24 DIAGNOSIS — Z20.822 PERSON UNDER INVESTIGATION FOR COVID-19: ICD-10-CM

## 2022-03-24 DIAGNOSIS — J18.9 PNEUMONIA DUE TO INFECTIOUS ORGANISM, UNSPECIFIED LATERALITY, UNSPECIFIED PART OF LUNG: Primary | ICD-10-CM

## 2022-03-24 PROCEDURE — U0005 INFEC AGEN DETEC AMPLI PROBE: HCPCS

## 2022-03-24 PROCEDURE — 99282 EMERGENCY DEPT VISIT SF MDM: CPT

## 2022-03-24 PROCEDURE — 71045 X-RAY EXAM CHEST 1 VIEW: CPT

## 2022-03-24 PROCEDURE — U0003 INFECTIOUS AGENT DETECTION BY NUCLEIC ACID (DNA OR RNA); SEVERE ACUTE RESPIRATORY SYNDROME CORONAVIRUS 2 (SARS-COV-2) (CORONAVIRUS DISEASE [COVID-19]), AMPLIFIED PROBE TECHNIQUE, MAKING USE OF HIGH THROUGHPUT TECHNOLOGIES AS DESCRIBED BY CMS-2020-01-R: HCPCS

## 2022-03-24 RX ORDER — LEVOFLOXACIN 500 MG/1
500 TABLET, FILM COATED ORAL DAILY
Qty: 10 TABLET | Refills: 0 | Status: SHIPPED | OUTPATIENT
Start: 2022-03-24 | End: 2022-04-03

## 2022-03-24 ASSESSMENT — ENCOUNTER SYMPTOMS
COUGH: 1
ABDOMINAL DISTENTION: 0
DIARRHEA: 0
EYE REDNESS: 0
COLOR CHANGE: 0
VOMITING: 0
CHOKING: 0
WHEEZING: 0

## 2022-03-24 NOTE — ED NOTES
Discharge instructions reviewed with caregiver. No further questions at this time.       Manjula Barreto RN  03/24/22 4646

## 2022-03-24 NOTE — ED PROVIDER NOTES
[facility]        Pt Name: Tarah Ordaz  MRN: [id number]  [place] 1965  Date of evaluation: 3/24/2022  Provider: Elizabet Macias PA-C  PCP: Chacho Pathak MD  Note Started: 5:11 PM EDT       SILVERIO. I have evaluated this patient. My supervising physician was available for consultation. CHIEF COMPLAINT       Chief Complaint   Patient presents with    Cough     cough x2 weeks. Pt MRDD nonverbal; brought in by home care transportation. HISTORY OF PRESENT ILLNESS   (Location, Timing/Onset, Context/Setting, Quality, Duration, Modifying Factors, Severity, Associated Signs and Symptoms)  Note limiting factors. Chief Complaint: Cough    Tarah Ordaz is a 60 y.o. female who presents to the emergency department with a caregiver at bedside who states that patient has had cough for 2 weeks. The group home that she resides at is concerned about potential pneumonia. They would like for her to get a chest x-ray. She has been fully Covid vaccinated. She has not had fever or chills. She is nonverbal MRDD. Therefore, she is not able to provide any history. Caregiver at bedside states that she is acting appropriately to her baseline without any acute mentation or behavioral changes. She is wheelchair bound. Nursing Notes were all reviewed and agreed with or any disagreements were addressed in the HPI. REVIEW OF SYSTEMS    (2-9 systems for level 4, 10 or more for level 5)     Review of Systems   Constitutional: Negative for activity change, appetite change, chills and fever. HENT: Positive for congestion. Negative for drooling. Eyes: Negative for redness. Respiratory: Positive for cough. Negative for choking and wheezing. Cardiovascular: Negative for leg swelling. Gastrointestinal: Negative for abdominal distention, diarrhea and vomiting. Musculoskeletal: Negative for joint swelling.    Skin: Negative for color change, pallor, rash and wound. Neurological: Negative for syncope. Psychiatric/Behavioral: Negative for agitation and behavioral problems. history obtained from caregiver. Patient unable to provide ROS. Positives and Pertinent negatives as per HPI. Except as noted above in the ROS, all other systems were reviewed and negative. PAST MEDICAL HISTORY     Past Medical History:   Diagnosis Date    Anemia     Anorexia     Autistic disorder     Convulsions ([facility] [facility]     Down syndrome     Dysphagia     Hypothyroid     Malnutrition ([facility]     Parkinson disease ([facility] [facility]     UTI (urinary tract infection)     Weakness          SURGICAL HISTORY     Past Surgical History:   Procedure Laterality Date    GASTROSTOMY TUBE PLACEMENT      GASTROSTOMY TUBE PLACEMENT  2/18/14         CURRENTMEDICATIONS       Previous Medications    ACETAMINOPHEN (TYLENOL) 325 MG TABLET    Take 650 mg by mouth every 6 hours as needed for Pain. CALCIUM CARBONATE (TUMS) 500 MG CHEWABLE TABLET    Take 1 tablet by mouth 2 times daily. CARBIDOPA-LEVODOPA (SINEMET)  MG PER TABLET    Take 1.5 tablets by mouth 3 times daily Indications: Take one and a half tablets by mouth three times per day     DOCUSATE SODIUM (COLACE) 100 MG CAPSULE    Take 100 mg by mouth daily    LEVOTHYROXINE (SYNTHROID) 125 MCG TABLET    Take 125 mcg by mouth daily    MAGNESIUM HYDROXIDE (MILK OF MAGNESIA) 400 MG/5ML SUSPENSION    Take  by mouth daily as needed for Constipation. MULTIPLE VITAMINS-MINERALS (THERAPEUTIC MULTIVITAMIN-MINERALS) TABLET    Take 1 tablet by mouth daily. NUTRITIONAL SUPPLEMENTS (ENSURE COMPLETE PO)    Take by mouth    OMEPRAZOLE (PRILOSEC) 20 MG CAPSULE    Take 20 mg by mouth daily. SERTRALINE (ZOLOFT) 100 MG TABLET    Take 100 mg by mouth nightly.     SKIN PROTECTANTS, MISC. (ALOE VESTA PROTECTIVE) OINT OINTMENT    APPLY TO AFFECTED AREA(S) TWICE DAILY AS NEEDED    TRIHEXYPHENIDYL HCL 0.4 MG/ML ELIX    Take 2 mg by mouth    VALPROIC ACID (DEPAKENE) 250 MG CAPSULE    Take 250 mg by mouth See Admin Instructions Take 1 cap by mouth in morning, in afternoon, and 2 capsules by mouth at bedtime    ZINC OXIDE 20 % OINTMENT    Apply topically 3 times daily Apply topically as needed. ALLERGIES     Caffeine and Iodine    FAMILYHISTORY     History reviewed. No pertinent family history. SOCIAL HISTORY       Social History     Tobacco Use    Smoking status: Never Smoker    Smokeless tobacco: Never Used   Substance Use Topics    Alcohol use: No    Drug use: No       SCREENINGS             PHYSICAL EXAM    (up to 7 for level 4, 8 or more for level 5)     ED Triage Vitals [03/24/22 1652]   BP Temp Temp Source Pulse Resp SpO2 Height Weight   (!) 160/92 98.5 °F (36.9 °C) Oral 68 18 94 % 4' 11\" (1.499 m) 100 lb (45.4 kg)       Physical Exam  Vitals and nursing note reviewed. Constitutional:       Appearance: Normal appearance. She is well-developed. She is not toxic-appearing or diaphoretic. HENT:      Head: Normocephalic and atraumatic. Right Ear: Tympanic membrane, ear canal and external ear normal.      Left Ear: Tympanic membrane, ear canal and external ear normal.      Nose: Nose normal.      Mouth/Throat:      Lips: Pink. Mouth: Mucous membranes are moist.      Dentition: No dental tenderness or dental abscesses. Pharynx: Oropharynx is clear. Uvula midline. No uvula swelling. Eyes:      General: No scleral icterus. Right eye: No discharge. Left eye: No discharge. Conjunctiva/sclera: Conjunctivae normal.   Cardiovascular:      Rate and Rhythm: Normal rate. Pulmonary:      Effort: Pulmonary effort is normal.      Breath sounds: No stridor. No rhonchi. Abdominal:      General: Bowel sounds are normal.      Palpations: Abdomen is soft. Tenderness: There is no abdominal tenderness. Musculoskeletal:         General: Normal range of motion.       Cervical back: Normal range of prescription for antibiotic. She is not hypoxic, tachypneic, tachycardic or febrile at this time. Does not appear to be in any acute distress. Caregiver provided with return precautions. Patient advised to follow-up with PCP for recheck. covid isolation precautions provided while swab results are pending. My suspicion is low for ACS, PE, myocarditis, pericarditis, endocarditis, acute pulmonary edema, pleural effusion, pericardial effusion, cardiac tamponade, cardiomyopathy, CHF exacerbation, thoracic aortic dissection, esophageal rupture, other life-threatening arrhythmia, hypertensive urgency or emergency, hemothorax, pulmonary contusion, subcutaneous emphysema, flail chest, pneumo mediastinum, rib fracture,  pneumothorax,  pertussis, RSV, influenza, ARDS, severe asthma exacerbation, severe COPD exacerbation, sepsis,  or other concerning pathology. FINAL IMPRESSION      1. Pneumonia due to infectious organism, unspecified laterality, unspecified part of lung    2.  Person under investigation for COVID-19          DISPOSITION/PLAN   DISPOSITION Decision To Discharge 03/24/2022 06:03:14 PM      PATIENT REFERRED TO:  Tahira Christian MD  [address] [address]   [phone]    In 3 days      [facility] Emergency Department  [address] [facility]  [phone]    If symptoms worsen      DISCHARGE MEDICATIONS:  New Prescriptions    LEVOFLOXACIN (LEVAQUIN) 500 MG TABLET    Take 1 tablet by mouth daily for 10 days       DISCONTINUED MEDICATIONS:  Discontinued Medications    No medications on file              (Please note that portions of this note were completed with a voice recognition program.  Efforts were made to edit the dictations but occasionally words are mis-transcribed.)    Jessie Bacon PA-C (electronically signed)           Jessie Bacon PA-C  03/24/22 3430

## 2022-03-25 LAB — SARS-COV-2, PCR: NOT DETECTED

## 2022-04-05 ENCOUNTER — APPOINTMENT (OUTPATIENT)
Dept: GENERAL RADIOLOGY | Age: 57
DRG: 871 | End: 2022-04-05
Payer: MEDICARE

## 2022-04-05 ENCOUNTER — APPOINTMENT (OUTPATIENT)
Dept: CT IMAGING | Age: 57
DRG: 871 | End: 2022-04-05
Payer: MEDICARE

## 2022-04-05 ENCOUNTER — HOSPITAL ENCOUNTER (INPATIENT)
Age: 57
LOS: 4 days | Discharge: HOME HEALTH | DRG: 871 | End: 2022-04-09
Attending: EMERGENCY MEDICINE | Admitting: FAMILY MEDICINE
Payer: MEDICARE

## 2022-04-05 DIAGNOSIS — A41.9 SEVERE SEPSIS (HCC): Primary | ICD-10-CM

## 2022-04-05 DIAGNOSIS — R65.20 SEVERE SEPSIS (HCC): Primary | ICD-10-CM

## 2022-04-05 DIAGNOSIS — N17.9 AKI (ACUTE KIDNEY INJURY) (HCC): ICD-10-CM

## 2022-04-05 DIAGNOSIS — R09.02 HYPOXIA: ICD-10-CM

## 2022-04-05 DIAGNOSIS — R77.8 ELEVATED TROPONIN: ICD-10-CM

## 2022-04-05 DIAGNOSIS — E87.0 HYPERNATREMIA: ICD-10-CM

## 2022-04-05 DIAGNOSIS — R79.89 ELEVATED BRAIN NATRIURETIC PEPTIDE (BNP) LEVEL: ICD-10-CM

## 2022-04-05 DIAGNOSIS — J69.0 ASPIRATION PNEUMONIA OF LEFT LOWER LOBE, UNSPECIFIED ASPIRATION PNEUMONIA TYPE (HCC): ICD-10-CM

## 2022-04-05 PROBLEM — J96.00 ACUTE RESPIRATORY FAILURE (HCC): Status: ACTIVE | Noted: 2022-04-05

## 2022-04-05 LAB
A/G RATIO: 0.9 (ref 1.1–2.2)
ALBUMIN SERPL-MCNC: 3.5 G/DL (ref 3.4–5)
ALP BLD-CCNC: 71 U/L (ref 40–129)
ALT SERPL-CCNC: 7 U/L (ref 10–40)
ANION GAP SERPL CALCULATED.3IONS-SCNC: 15 MMOL/L (ref 3–16)
AST SERPL-CCNC: 23 U/L (ref 15–37)
BASE EXCESS VENOUS: 4.2 MMOL/L (ref -3–3)
BASOPHILS ABSOLUTE: 0.1 K/UL (ref 0–0.2)
BASOPHILS RELATIVE PERCENT: 0.4 %
BILIRUB SERPL-MCNC: 0.5 MG/DL (ref 0–1)
BILIRUBIN URINE: NEGATIVE
BLOOD, URINE: ABNORMAL
BUN BLDV-MCNC: 60 MG/DL (ref 7–20)
CALCIUM SERPL-MCNC: 9.6 MG/DL (ref 8.3–10.6)
CARBOXYHEMOGLOBIN: 2.1 % (ref 0–1.5)
CHLORIDE BLD-SCNC: 123 MMOL/L (ref 99–110)
CLARITY: ABNORMAL
CO2: 27 MMOL/L (ref 21–32)
COLOR: YELLOW
CREAT SERPL-MCNC: 1.6 MG/DL (ref 0.6–1.1)
EKG ATRIAL RATE: 121 BPM
EKG DIAGNOSIS: NORMAL
EKG P AXIS: 61 DEGREES
EKG P-R INTERVAL: 118 MS
EKG Q-T INTERVAL: 402 MS
EKG QRS DURATION: 52 MS
EKG QTC CALCULATION (BAZETT): 570 MS
EKG R AXIS: 27 DEGREES
EKG T AXIS: 25 DEGREES
EKG VENTRICULAR RATE: 121 BPM
EOSINOPHILS ABSOLUTE: 0 K/UL (ref 0–0.6)
EOSINOPHILS RELATIVE PERCENT: 0 %
EPITHELIAL CELLS, UA: 1 /HPF (ref 0–5)
GFR AFRICAN AMERICAN: 40
GFR NON-AFRICAN AMERICAN: 33
GLUCOSE BLD-MCNC: 102 MG/DL (ref 70–99)
GLUCOSE URINE: NEGATIVE MG/DL
HCO3 VENOUS: 29.8 MMOL/L (ref 23–29)
HCT VFR BLD CALC: 48.9 % (ref 36–48)
HEMOGLOBIN, VEN, REDUCED: 3 %
HEMOGLOBIN: 15.8 G/DL (ref 12–16)
HYALINE CASTS: 6 /LPF (ref 0–8)
KETONES, URINE: ABNORMAL MG/DL
LACTIC ACID: 3 MMOL/L (ref 0.4–2)
LACTIC ACID: 3.2 MMOL/L (ref 0.4–2)
LEUKOCYTE ESTERASE, URINE: NEGATIVE
LYMPHOCYTES ABSOLUTE: 0.8 K/UL (ref 1–5.1)
LYMPHOCYTES RELATIVE PERCENT: 5.6 %
MCH RBC QN AUTO: 34.3 PG (ref 26–34)
MCHC RBC AUTO-ENTMCNC: 32.3 G/DL (ref 31–36)
MCV RBC AUTO: 106 FL (ref 80–100)
METHEMOGLOBIN VENOUS: 0 %
MICROSCOPIC EXAMINATION: YES
MONOCYTES ABSOLUTE: 0.7 K/UL (ref 0–1.3)
MONOCYTES RELATIVE PERCENT: 5 %
NEUTROPHILS ABSOLUTE: 12.7 K/UL (ref 1.7–7.7)
NEUTROPHILS RELATIVE PERCENT: 89 %
NITRITE, URINE: NEGATIVE
O2 CONTENT, VEN: 22 VOL %
O2 SAT, VEN: 97 %
O2 THERAPY: ABNORMAL
PCO2, VEN: 46.3 MMHG (ref 40–50)
PDW BLD-RTO: 13.6 % (ref 12.4–15.4)
PH UA: 5.5 (ref 5–8)
PH VENOUS: 7.42 (ref 7.35–7.45)
PLATELET # BLD: 148 K/UL (ref 135–450)
PMV BLD AUTO: 11.8 FL (ref 5–10.5)
PO2, VEN: 90.7 MMHG (ref 25–40)
POTASSIUM REFLEX MAGNESIUM: 4.1 MMOL/L (ref 3.5–5.1)
PRO-BNP: 1350 PG/ML (ref 0–124)
PROTEIN UA: 30 MG/DL
RAPID INFLUENZA  B AGN: NEGATIVE
RAPID INFLUENZA A AGN: NEGATIVE
RBC # BLD: 4.61 M/UL (ref 4–5.2)
RBC UA: 26 /HPF (ref 0–4)
SODIUM BLD-SCNC: 165 MMOL/L (ref 136–145)
SPECIFIC GRAVITY UA: >=1.03 (ref 1–1.03)
TCO2 CALC VENOUS: 70 MMOL/L
TOTAL PROTEIN: 7.6 G/DL (ref 6.4–8.2)
TROPONIN: 0.14 NG/ML
URINE REFLEX TO CULTURE: YES
URINE TYPE: ABNORMAL
UROBILINOGEN, URINE: 0.2 E.U./DL
WBC # BLD: 14.3 K/UL (ref 4–11)
WBC UA: 17 /HPF (ref 0–5)

## 2022-04-05 PROCEDURE — 85025 COMPLETE CBC W/AUTO DIFF WBC: CPT

## 2022-04-05 PROCEDURE — 81001 URINALYSIS AUTO W/SCOPE: CPT

## 2022-04-05 PROCEDURE — 2580000003 HC RX 258: Performed by: FAMILY MEDICINE

## 2022-04-05 PROCEDURE — 96366 THER/PROPH/DIAG IV INF ADDON: CPT

## 2022-04-05 PROCEDURE — 87804 INFLUENZA ASSAY W/OPTIC: CPT

## 2022-04-05 PROCEDURE — 82803 BLOOD GASES ANY COMBINATION: CPT

## 2022-04-05 PROCEDURE — 96365 THER/PROPH/DIAG IV INF INIT: CPT

## 2022-04-05 PROCEDURE — 93010 ELECTROCARDIOGRAM REPORT: CPT | Performed by: INTERNAL MEDICINE

## 2022-04-05 PROCEDURE — 6360000002 HC RX W HCPCS: Performed by: NURSE PRACTITIONER

## 2022-04-05 PROCEDURE — 83605 ASSAY OF LACTIC ACID: CPT

## 2022-04-05 PROCEDURE — 71250 CT THORAX DX C-: CPT

## 2022-04-05 PROCEDURE — 2580000003 HC RX 258: Performed by: INTERNAL MEDICINE

## 2022-04-05 PROCEDURE — 83880 ASSAY OF NATRIURETIC PEPTIDE: CPT

## 2022-04-05 PROCEDURE — U0005 INFEC AGEN DETEC AMPLI PROBE: HCPCS

## 2022-04-05 PROCEDURE — 2580000003 HC RX 258: Performed by: NURSE PRACTITIONER

## 2022-04-05 PROCEDURE — 2580000003 HC RX 258: Performed by: EMERGENCY MEDICINE

## 2022-04-05 PROCEDURE — 1200000000 HC SEMI PRIVATE

## 2022-04-05 PROCEDURE — 96367 TX/PROPH/DG ADDL SEQ IV INF: CPT

## 2022-04-05 PROCEDURE — 84484 ASSAY OF TROPONIN QUANT: CPT

## 2022-04-05 PROCEDURE — 80053 COMPREHEN METABOLIC PANEL: CPT

## 2022-04-05 PROCEDURE — 87641 MR-STAPH DNA AMP PROBE: CPT

## 2022-04-05 PROCEDURE — 93005 ELECTROCARDIOGRAM TRACING: CPT | Performed by: NURSE PRACTITIONER

## 2022-04-05 PROCEDURE — 99285 EMERGENCY DEPT VISIT HI MDM: CPT

## 2022-04-05 PROCEDURE — 93005 ELECTROCARDIOGRAM TRACING: CPT | Performed by: EMERGENCY MEDICINE

## 2022-04-05 PROCEDURE — U0003 INFECTIOUS AGENT DETECTION BY NUCLEIC ACID (DNA OR RNA); SEVERE ACUTE RESPIRATORY SYNDROME CORONAVIRUS 2 (SARS-COV-2) (CORONAVIRUS DISEASE [COVID-19]), AMPLIFIED PROBE TECHNIQUE, MAKING USE OF HIGH THROUGHPUT TECHNOLOGIES AS DESCRIBED BY CMS-2020-01-R: HCPCS

## 2022-04-05 PROCEDURE — 71045 X-RAY EXAM CHEST 1 VIEW: CPT

## 2022-04-05 PROCEDURE — 87086 URINE CULTURE/COLONY COUNT: CPT

## 2022-04-05 RX ORDER — 0.9 % SODIUM CHLORIDE 0.9 %
400 INTRAVENOUS SOLUTION INTRAVENOUS ONCE
Status: COMPLETED | OUTPATIENT
Start: 2022-04-05 | End: 2022-04-05

## 2022-04-05 RX ORDER — SODIUM CHLORIDE 9 MG/ML
INJECTION, SOLUTION INTRAVENOUS PRN
Status: DISCONTINUED | OUTPATIENT
Start: 2022-04-05 | End: 2022-04-09 | Stop reason: HOSPADM

## 2022-04-05 RX ORDER — ONDANSETRON 2 MG/ML
4 INJECTION INTRAMUSCULAR; INTRAVENOUS EVERY 6 HOURS PRN
Status: DISCONTINUED | OUTPATIENT
Start: 2022-04-05 | End: 2022-04-09 | Stop reason: HOSPADM

## 2022-04-05 RX ORDER — 0.9 % SODIUM CHLORIDE 0.9 %
1000 INTRAVENOUS SOLUTION INTRAVENOUS ONCE
Status: COMPLETED | OUTPATIENT
Start: 2022-04-05 | End: 2022-04-05

## 2022-04-05 RX ORDER — ACETAMINOPHEN 325 MG/1
650 TABLET ORAL EVERY 6 HOURS PRN
Status: DISCONTINUED | OUTPATIENT
Start: 2022-04-05 | End: 2022-04-09 | Stop reason: HOSPADM

## 2022-04-05 RX ORDER — VALPROIC ACID 250 MG/1
250 CAPSULE, LIQUID FILLED ORAL 2 TIMES DAILY
Status: DISCONTINUED | OUTPATIENT
Start: 2022-04-06 | End: 2022-04-06

## 2022-04-05 RX ORDER — SODIUM CHLORIDE 0.9 % (FLUSH) 0.9 %
5-40 SYRINGE (ML) INJECTION PRN
Status: DISCONTINUED | OUTPATIENT
Start: 2022-04-05 | End: 2022-04-09 | Stop reason: HOSPADM

## 2022-04-05 RX ORDER — SODIUM CHLORIDE 450 MG/100ML
INJECTION, SOLUTION INTRAVENOUS CONTINUOUS
Status: DISCONTINUED | OUTPATIENT
Start: 2022-04-05 | End: 2022-04-06

## 2022-04-05 RX ORDER — PANTOPRAZOLE SODIUM 40 MG/1
40 TABLET, DELAYED RELEASE ORAL
Status: DISCONTINUED | OUTPATIENT
Start: 2022-04-06 | End: 2022-04-09 | Stop reason: HOSPADM

## 2022-04-05 RX ORDER — ONDANSETRON 4 MG/1
4 TABLET, ORALLY DISINTEGRATING ORAL EVERY 8 HOURS PRN
Status: DISCONTINUED | OUTPATIENT
Start: 2022-04-05 | End: 2022-04-09 | Stop reason: HOSPADM

## 2022-04-05 RX ORDER — VALPROIC ACID 250 MG/1
500 CAPSULE, LIQUID FILLED ORAL NIGHTLY
Status: DISCONTINUED | OUTPATIENT
Start: 2022-04-05 | End: 2022-04-06

## 2022-04-05 RX ORDER — OMEPRAZOLE 10 MG/1
20 CAPSULE, DELAYED RELEASE ORAL DAILY
Status: DISCONTINUED | OUTPATIENT
Start: 2022-04-06 | End: 2022-04-05 | Stop reason: CLARIF

## 2022-04-05 RX ORDER — ACETAMINOPHEN 650 MG/1
650 SUPPOSITORY RECTAL EVERY 6 HOURS PRN
Status: DISCONTINUED | OUTPATIENT
Start: 2022-04-05 | End: 2022-04-09 | Stop reason: HOSPADM

## 2022-04-05 RX ORDER — POLYETHYLENE GLYCOL 3350 17 G/17G
17 POWDER, FOR SOLUTION ORAL DAILY PRN
Status: DISCONTINUED | OUTPATIENT
Start: 2022-04-05 | End: 2022-04-09 | Stop reason: HOSPADM

## 2022-04-05 RX ORDER — SODIUM CHLORIDE 0.9 % (FLUSH) 0.9 %
5-40 SYRINGE (ML) INJECTION EVERY 12 HOURS SCHEDULED
Status: DISCONTINUED | OUTPATIENT
Start: 2022-04-05 | End: 2022-04-09 | Stop reason: HOSPADM

## 2022-04-05 RX ORDER — LEVOTHYROXINE SODIUM 0.12 MG/1
125 TABLET ORAL DAILY
Status: DISCONTINUED | OUTPATIENT
Start: 2022-04-06 | End: 2022-04-08

## 2022-04-05 RX ADMIN — VANCOMYCIN HYDROCHLORIDE 1000 MG: 1 INJECTION, POWDER, LYOPHILIZED, FOR SOLUTION INTRAVENOUS at 15:57

## 2022-04-05 RX ADMIN — SODIUM CHLORIDE 400 ML: 9 INJECTION, SOLUTION INTRAVENOUS at 15:27

## 2022-04-05 RX ADMIN — CEFEPIME HYDROCHLORIDE 2000 MG: 2 INJECTION, POWDER, FOR SOLUTION INTRAVENOUS at 15:19

## 2022-04-05 RX ADMIN — Medication 10 ML: at 22:40

## 2022-04-05 RX ADMIN — SODIUM CHLORIDE 1000 ML: 9 INJECTION, SOLUTION INTRAVENOUS at 15:22

## 2022-04-05 RX ADMIN — SODIUM CHLORIDE: 4.5 INJECTION, SOLUTION INTRAVENOUS at 22:37

## 2022-04-05 NOTE — ED PROVIDER NOTES
In addition to the advanced practice provider, I personally saw Gary Gosselin and performed a substantive portion of the visit including all aspects of the medical decision making. Medical Decision Making  Patient presented to the emergency department in respiratory distress with hypoxia. She has had a productive cough and has been undergoing treatment for pneumonia with Levaquin. Patient Monica Hogue for sepsis with tachycardia, tachypnea, and leukocytosis. Lactic acid was elevated and she was initially hypotensive. However, after receiving IV fluids, her map stabilized above 65, so she does not meet criteria for septic shock. Her breathing also stabilized with noninvasive respiratory support. We treated with broad-spectrum antibiotics, sent blood cultures, and will admit her to the hospital with severe sepsis. I personally saw the patient and independently provided 20 minutes of non-concurrent critical care out of the total shared critical care time provided. EKG  The Ekg interpreted by me in the absence of a cardiologist shows. sinus tachycardia, hbih=570  Axis is   Normal  QTc is  normal  Intervals and Durations are unremarkable. No specific ST-T wave changes appreciated. No evidence of acute ischemia. No significant change from prior EKG dated 12/24/2016      Screenings  Is this patient to be included in the SEP-1 Core Measure? Yes   SEP-1 CORE MEASURE DATA      Sepsis Criteria   Severe Sepsis Criteria   Septic Shock Criteria     Must be confirmed or suspected to move forward with diagnosis of sepsis. Must meet 2:    [] Temperature > 100.9 F (38.3 C)        or < 96.8 F (36 C)  [x] HR > 90  [] RR > 20  [x] WBC > 12 or < 4 or 10% bands      AND:      [] Infection Confirmed or        Suspected.      Must meet 1:    [x] Lactate > 2       or   [x] Signs of Organ Dysfunction:    - SBP < 90 or MAP < 65  - Altered mental status  - Creatinine > 2 or increased from      baseline  - Urine Output < 0.5 ml/kg/hr  - Bilirubin > 2  - INR > 1.5 (not anticoagulated)  - Platelets < 254,037  - Acute Respiratory Failure as     evidenced by new need for NIPPV     or mechanical ventilation      [] No criteria met for Severe Sepsis. Must meet 1:    [] Lactate > 4        or   [] SBP < 90 or MAP < 65 for at        least two readings in the first        hour after fluid bolus        administration      [] Vasopressors initiated (if hypotension persists after fluid resuscitation)        [x] No criteria met for Septic Shock. Patient Vitals for the past 6 hrs:   BP Temp Pulse Resp SpO2 Height Weight Weight Method Percent Weight Change   04/05/22 1124 (!) 235/181 98.2 °F (36.8 °C) 115 22 94 % 4' 11\" (1.499 m) 100 lb (45.4 kg) Estimated 0   04/05/22 1147 (!) 243/167 -- 118 23 93 % -- -- -- --   04/05/22 1200 (!) 257/217 -- 116 25 92 % -- -- -- --   04/05/22 1245 94/73 -- 108 25 91 % -- -- -- --   04/05/22 1300 88/61 -- 109 25 92 % -- -- -- --      Recent Labs     04/05/22  1220 04/05/22  1230   WBC 14.3*  --    LACTA  --  3.0*   CREATININE 1.6*  --    BILITOT 0.5  --      --          Sepsis Time Identified: 1315    Fluid Resuscitation Rational: at least 30mL/kg based on entered actual weight at time of triage    Infection Source: Unknown    Repeat lactate level: pending    Reassessment Exam:   Not applicable. Patient does not have septic shock. FINAL IMPRESSION  1. Severe sepsis ([facility]    2. Hypernatremia    3. AVELINO (acute kidney injury) ([facility] [address] [facility]    4. Elevated troponin    5. Hypoxia    6. Elevated brain natriuretic peptide (BNP) level        Blood pressure 88/61, pulse 109, temperature 98.2 °F (36.8 °C), temperature source Oral, resp. rate 25, height 4' 11\" (1.499 m), weight 100 lb (45.4 kg), SpO2 92 %. For further details of [facility] emergency department encounter, please see documentation by advanced practice provider, Erendira Lance CNP.         Jaycee Smith MD  04/18/22 0932

## 2022-04-05 NOTE — ED PROVIDER NOTES
[facility]        Pt Name: Jacy Lora  MRN: [id number]  [place] 1965  Date of evaluation: 4/5/2022  Provider: NICHOLE Palmer - CNP  PCP: Anthony Rodriguez MD  Note Started: 11:55 AM EDT        I have seen and evaluated this patient with my supervising physician Annetta Francis MD.    [address] [place] [address]       Chief Complaint   Patient presents with    Shortness of Breath     [facility], hx pneumonia 10 days ago. EMS reports SPO2 in 70s at [address], 99% on 2L in EMS. HISTORY OF PRESENT ILLNESS   (Location, Timing/Onset, Context/Setting, Quality, Duration, Modifying Factors, Severity, Associated Signs and Symptoms)  Note limiting factors. Chief Complaint: hypoxia    Jacy Lora is a 60 y.o. female with history of anemia, anorexia, convulsions, Down syndrome, hypothyroidism, malnutrition, Parkinson's disease, autism, UTI, pneumonia who presents emergency department with complaints of hypoxia. Patient is brought in by caregiver at bedside. She was seen at [address] [place] [address] for an appointment and noticed to have an oxygen level in the 70s. EMS was called and she was placed on 2 L nasal cannula oxygen came up to 99%. Patient usually does not require supplemental oxygen. Caregiver did note a cough with chest congestion. An ED visit on 3/24/2022 was diagnosed with pneumonia and put on Levaquin. [place] [facility] He has received her COVID-19 vaccine series. HPI is limited due to patient nonverbal MRDD. Nursing Notes were all reviewed and agreed with or any disagreements were addressed in the HPI. REVIEW OF SYSTEMS    (2-9 systems for level 4, 10 or more for level 5)     Review of Systems    Positives and Pertinent negatives as per HPI. Except as noted above in the ROS, all other systems were reviewed and negative.        PAST MEDICAL HISTORY     Past Medical History: Diagnosis Date    Anemia     Anorexia     Autistic disorder     [facility]ulsions ([facility]     Down syndrome     Dysphagia     Hypothyroid     Malnutrition ([facility] [facility]     Parkinson disease ([facility] [address] [facility]     UTI (urinary tract infection)     Weakness          SURGICAL HISTORY     Past Surgical History:   Procedure Laterality Date    GASTROSTOMY TUBE PLACEMENT      GASTROSTOMY TUBE PLACEMENT  2/18/14         CURRENTMEDICATIONS       Previous Medications    ACETAMINOPHEN (TYLENOL) 325 MG TABLET    Take 650 mg by mouth every 6 hours as needed for Pain. CALCIUM CARBONATE (TUMS) 500 MG CHEWABLE TABLET    Take 1 tablet by mouth 2 times daily. CARBIDOPA-LEVODOPA (SINEMET)  MG PER TABLET    Take 1.5 tablets by mouth 3 times daily Indications: Take one and a half tablets by mouth three times per day     DOCUSATE SODIUM (COLACE) 100 MG CAPSULE    Take 100 mg by mouth daily    LEVOTHYROXINE (SYNTHROID) 125 MCG TABLET    Take 125 mcg by mouth daily    MAGNESIUM HYDROXIDE (MILK OF MAGNESIA) 400 MG/5ML SUSPENSION    Take  by mouth daily as needed for Constipation. MULTIPLE VITAMINS-MINERALS (THERAPEUTIC MULTIVITAMIN-MINERALS) TABLET    Take 1 tablet by mouth daily. NUTRITIONAL SUPPLEMENTS (ENSURE COMPLETE PO)    Take by mouth    OMEPRAZOLE (PRILOSEC) 20 MG CAPSULE    Take 20 mg by mouth daily. SERTRALINE (ZOLOFT) 100 MG TABLET    Take 100 mg by mouth nightly. SKIN PROTECTANTS, MISC. (ALOE VESTA PROTECTIVE) OINT OINTMENT    APPLY TO AFFECTED AREA(S) TWICE DAILY AS NEEDED    TRIHEXYPHENIDYL HCL 0.4 MG/ML ELIX    Take 2 mg by mouth    VALPROIC ACID (DEPAKENE) 250 MG CAPSULE    Take 250 mg by mouth See Admin Instructions Take 1 cap by mouth in morning, in afternoon, and 2 capsules by mouth at bedtime    ZINC OXIDE 20 % OINTMENT    Apply topically 3 times daily Apply topically as needed. ALLERGIES     Caffeine and Iodine    FAMILYHISTORY     History reviewed. No pertinent family history.        SOCIAL HISTORY Social History     Tobacco Use    Smoking status: Never Smoker    Smokeless tobacco: Never Used   Substance Use Topics    Alcohol use: No    Drug use: No       SCREENINGS             PHYSICAL EXAM    (up to 7 for level 4, 8 or more for level 5)     ED Triage Vitals [04/05/22 1124]   BP Temp Temp Source Pulse Resp SpO2 Height Weight   (S) (!) 235/181 98.2 °F (36.8 °C) Oral 115 22 94 % 4' 11\" (1.499 m) 100 lb (45.4 kg)       Physical Exam  Vitals and nursing note reviewed. Constitutional:       General: She is awake. Appearance: Normal appearance. She is well-developed. She is ill-appearing (chronically). HENT:      Head: Normocephalic and atraumatic. Nose: Nose normal.   Eyes:      General:         Right eye: No discharge. Left eye: No discharge. Cardiovascular:      Rate and Rhythm: Regular rhythm. Tachycardia present. Heart sounds: Normal heart sounds. Pulmonary:      Effort: Pulmonary effort is normal. Tachypnea present. No respiratory distress. Breath sounds: Rhonchi present. Abdominal:      General: Bowel sounds are normal.      Palpations: Abdomen is soft. Tenderness: There is no abdominal tenderness. Musculoskeletal:         General: Normal range of motion. Cervical back: Normal range of motion. Right lower leg: No edema. Left lower leg: No edema. Skin:     General: Skin is warm and dry. Coloration: Skin is not pale. Neurological:      Mental Status: She is alert. Mental status is at baseline. Motor: Tremor and atrophy present. Psychiatric:         Speech: She is noncommunicative. Behavior: Behavior normal. Behavior is cooperative.       Comments: restless         DIAGNOSTIC RESULTS   LABS:    Labs Reviewed   CBC WITH AUTO DIFFERENTIAL - Abnormal; Notable for the following components:       Result Value    WBC 14.3 (*)     Hematocrit 48.9 (*)     .0 (*)     MCH 34.3 (*)     MPV 11.8 (*)     Neutrophils Absolute 12.7 (*)     Lymphocytes Absolute 0.8 (*)     All other components within normal limits   COMPREHENSIVE METABOLIC PANEL W/ REFLEX TO MG FOR LOW K - Abnormal; Notable for the following components:    Sodium 165 (*)     Chloride 123 (*)     Glucose 102 (*)     BUN 60 (*)     CREATININE 1.6 (*)     GFR Non- 33 (*)     GFR African American 40 (*)     Albumin/Globulin Ratio 0.9 (*)     ALT 7 (*)     All other components within normal limits    Narrative:     Wil Carter  [facility] tel. [phone],  Chemistry results called to and read back by antwan longoria, 04/05/2022  12:49, by ALEME   TROPONIN - Abnormal; Notable for the following components:    Troponin 0.14 (*)     All other components within normal limits    Narrative:     Wil Carter  [facility] tel. [phone],  Chemistry results called to and read back by antwan longoria, 04/05/2022  12:49, by [address] - Abnormal; Notable for the following components:    Pro-BNP 1,350 (*)     All other components within normal limits    Narrative:     IRINEO Costello  [facility] tel. [phone],  Chemistry results called to and read back by antwan longoria, 04/05/2022  12:49, by ALEME   LACTIC ACID - Abnormal; Notable for the following components:    Lactic Acid 3.0 (*)     All other components within normal limits   BLOOD GAS, VENOUS - Abnormal; Notable for the following components:    pO2, Dre 90.7 (*)     HCO3, Venous 29.8 (*)     Base Excess, Dre 4.2 (*)     Carboxyhemoglobin 2.1 (*)     All other components within normal limits   URINALYSIS WITH REFLEX TO CULTURE - Abnormal; Notable for the following components:    Ketones, Urine TRACE (*)     Blood, Urine TRACE-INTACT (*)     Protein, UA 30 (*)     All other components within normal limits   MICROSCOPIC URINALYSIS - Abnormal; Notable for the following components:    WBC, UA 17 (*)     RBC, UA 26 (*)     All other components within normal limits   RAPID INFLUENZA A/B ANTIGENS   CULTURE, URINE   COVID-19 When ordered only abnormal lab results are displayed. All other labs were within normal range or not returned as of this dictation. EKG: When ordered, EKG's are interpreted by the Emergency Department Physician in the absence of a cardiologist.  Please see their note for interpretation of EKG. RADIOLOGY:   Non-plain film images such as CT, Ultrasound and MRI are read by the radiologist. Plain radiographic images are visualized and preliminarily interpreted by the ED Provider with the below findings:        Interpretation per the Radiologist below, if available at the time of this note:    CT CHEST WO CONTRAST   Final Result   Tree-in-bud opacities with consolidation in the left lower lobe, consistent   with bronchopneumonia, most likely aspiration etiology. Secretions are noted   in left lower lobe bronchi. XR CHEST PORTABLE   Final Result   No acute process. Previously described right infrahilar opacity not evident. XR CHEST PORTABLE    Result Date: 4/5/2022  EXAMINATION: ONE XRAY VIEW OF THE CHEST 4/5/2022 11:27 am COMPARISON: 03/24/2022 HISTORY: ORDERING SYSTEM PROVIDED HISTORY: soa TECHNOLOGIST PROVIDED HISTORY: Reason for exam:->soa Reason for Exam: Shortness of Breath (from [address] via EMS cc SOB, hx pneumonia 10 days ago. EMS reports SPO2 in 70s at [address], 99% on 2L in EMS.) FINDINGS: The lungs are without acute focal process. There is no effusion or pneumothorax. The cardiomediastinal silhouette is stable. The osseous structures are stable. No acute process. Previously described right infrahilar opacity not evident.            PROCEDURES   Unless otherwise noted below, none     Procedures    CRITICAL CARE TIME       CONSULTS:  IP CONSULT TO HOSPITALIST      EMERGENCY DEPARTMENT COURSE and DIFFERENTIAL DIAGNOSIS/MDM:   Vitals:    Vitals:    04/05/22 1245 04/05/22 1300 04/05/22 1315 04/05/22 1500   BP: 94/73 88/61 (!) 133/98 91/71 Pulse: 108 109 112 100   Resp: 25 25 25 22   Temp:       TempSrc:       SpO2: 91% 92% (!) 89% 91%   Weight:       Height:           Patient was given the following medications:  Medications   0.9 % sodium chloride bolus (1,000 mLs IntraVENous New Bag 4/5/22 1522)   cefepime (MAXIPIME) 2000 mg IVPB minibag (2,000 mg IntraVENous New Bag 4/5/22 1519)   vancomycin 1000 mg IVPB in 250 mL D5W addavial (has no administration in time range)   0.9 % sodium chloride bolus (400 mLs IntraVENous New Bag 4/5/22 1527)           Care of this patient took place during the COVID-19 pandemic emergency. ED COURSE & MEDICAL DECISION MAKING    - The patient presented to the ER with complaints of  Lucila. Vital signs were reviewed. Exam well-developed, well-nourished female who appears uncomfortable. Peripheral IV placed. Labs, Imaging ordered. - Pertinent Labs & Imaging studies reviewed. (See chart for details)   -  Patient seen and evaluated in the emergency department. -  Triage and nursing notes reviewed and incorporated. -  Old chart records reviewed and incorporated.  -  Dr. Shemar Laurent emergency department attending assessed the patient  -  Differential diagnosis includes: ACS, MI, costochondritis, pleurisy, aneurysm, dissection, bronchitis, pneumonia, pleural effusion, CHF, cardiomegaly, esophageal rupture, endocarditis, pericarditis, PE, pneumothorax, tamponade versus COVID-19  -  Work-up included:  See above  -  ED treatment included:  NS, cardene, vanc, cefepime  -  Results discussed with patient. Bard Doshi is a 57-year-old female who presents from a group home with episode of hypoxia. She was noted to have tachycardia and hypotension and brought into the emergency department. Additional HPI is limited. Caretakers at bedside. Patient does appear acutely ill with tachycardia, hypotension, and hypoxia. Rhonchi in the bases. Abdomen soft nontender.   Episodes of hypertension were initially documented due to unable to obtain a good blood pressure reading. It was then noted to be hypotensive and IV fluids are given. Lab work and imaging is obtained. Informed on results to Dr. Marquise Cash hospitalist and agrees to place admission orders. She request nephrology consulted for hyponatremia. Lane Romero and he did evaluate the patient at bedside. Orders are placed and patient will be admitted inpatient at this time    SEP-1 CORE MEASURE DATA      Sepsis Criteria   Severe Sepsis Criteria   Septic Shock Criteria     Must be confirmed or suspected to move forward with diagnosis of sepsis. Must meet 2:    [] Temperature > 100.9 F (38.3 C)        or < 96.8 F (36 C)  [x] HR > 90  [x] RR > 20  [x] WBC > 12 or < 4 or 10% bands    AND:    [x] Infection Confirmed or        Suspected. OR:    [] Exclude from SEP-1 because:    [] No infection present or suspected  [] Does not have 2+ SIRS criteria but may have an incidental infection that requires treatment  [] May have sepsis, but does not meet criteria for severe sepsis or septic shock  [] Alternative explanation for abnormal labs and/or vitals (see MDM)  [] Viral etiology found or highly suspected (including COVID-19) without concomitant bacterial infection   Must meet 1:    [x] Lactate > 2       or   [] Signs of Organ Dysfunction:    - SBP < 90 or MAP < 65  - Altered mental status  - Creatinine > 2 or increased from      baseline  - Urine Output < 0.5 ml/kg/hr  - Bilirubin > 2  - INR > 1.5 (not anticoagulated)  - Platelets < 590,324  - Acute Respiratory Failure as     evidenced by new need for NIPPV     or mechanical ventilation        [] No criteria met for Severe Sepsis. Must meet 1:    [] Lactate > 4        or   [] SBP < 90 or MAP < 65 for at        least two readings in the first        hour after fluid bolus        administration      [] Vasopressors initiated (if hypotension persists after fluid resuscitation)                [x] No criteria met for Septic Shock. Patient Vitals for the past 6 hrs:   BP Temp Pulse Resp SpO2 Height Weight Weight Method Percent Weight Change   04/05/22 1124 (!) 235/181 98.2 °F (36.8 °C) 115 22 94 % 4' 11\" (1.499 m) 100 lb (45.4 kg) Estimated 0   04/05/22 1147 (!) 243/167 -- 118 23 93 % -- -- -- --   04/05/22 1200 (!) 257/217 -- 116 25 92 % -- -- -- --   04/05/22 1245 94/73 -- 108 25 91 % -- -- -- --   04/05/22 1300 88/61 -- 109 25 92 % -- -- -- --   04/05/22 1315 (!) 133/98 -- 112 25 (!) 89 % -- -- -- --   04/05/22 1500 91/71 -- 100 22 91 % -- -- -- --      Recent Labs     04/05/22  1220 04/05/22  1230   WBC 14.3*  --    LACTA  --  3.0*   CREATININE 1.6*  --    BILITOT 0.5  --      --          Sepsis Time Identified: 1300    Fluid Resuscitation Rational: at least 30mL/kg based on entered actual weight at time of triage    Infection Source: Pulmonary - Community Acquired    Repeat lactate level: pending    Reassessment Exam:   pending, to be completed by inpatient team    CRITICAL CARE TIME   Total Critical Care time was 45 minutes, excluding separately reportable procedures. There was a high probability of clinically significant/life threatening deterioration in the patient's condition which required my urgent intervention. FINAL IMPRESSION      1. Severe sepsis ([facility] [address]    2. Hypernatremia    3. AVELINO (acute kidney injury) ([facility] [address]    4. Elevated troponin    5. Hypoxia    6. Elevated brain natriuretic peptide (BNP) level    7.  Aspiration pneumonia of left lower lobe, unspecified aspiration pneumonia type [facility])          DISPOSITION/PLAN   DISPOSITION    Admission       (Please note that portions of this note were completed with a voice recognition program.  Efforts were made to edit the dictations but occasionally words are mis-transcribed.)    NICHOLE Palmer CNP (electronically signed)            NICHOLE Palmer CNP  04/11/22 2003

## 2022-04-06 LAB
ANION GAP SERPL CALCULATED.3IONS-SCNC: 10 MMOL/L (ref 3–16)
BUN BLDV-MCNC: 48 MG/DL (ref 7–20)
CALCIUM SERPL-MCNC: 8.8 MG/DL (ref 8.3–10.6)
CHLORIDE BLD-SCNC: 128 MMOL/L (ref 99–110)
CO2: 28 MMOL/L (ref 21–32)
CREAT SERPL-MCNC: 0.9 MG/DL (ref 0.6–1.1)
EKG ATRIAL RATE: 109 BPM
EKG DIAGNOSIS: NORMAL
EKG P AXIS: 20 DEGREES
EKG P-R INTERVAL: 118 MS
EKG Q-T INTERVAL: 328 MS
EKG QRS DURATION: 68 MS
EKG QTC CALCULATION (BAZETT): 441 MS
EKG R AXIS: 51 DEGREES
EKG T AXIS: 44 DEGREES
EKG VENTRICULAR RATE: 109 BPM
GFR AFRICAN AMERICAN: >60
GFR NON-AFRICAN AMERICAN: >60
GLUCOSE BLD-MCNC: 81 MG/DL (ref 70–99)
HCT VFR BLD CALC: 41.2 % (ref 36–48)
HEMOGLOBIN: 13.3 G/DL (ref 12–16)
MCH RBC QN AUTO: 34.7 PG (ref 26–34)
MCHC RBC AUTO-ENTMCNC: 32.4 G/DL (ref 31–36)
MCV RBC AUTO: 107.1 FL (ref 80–100)
PDW BLD-RTO: 13.8 % (ref 12.4–15.4)
PLATELET # BLD: 97 K/UL (ref 135–450)
PMV BLD AUTO: 12.2 FL (ref 5–10.5)
POTASSIUM SERPL-SCNC: 3.7 MMOL/L (ref 3.5–5.1)
RBC # BLD: 3.84 M/UL (ref 4–5.2)
SARS-COV-2: NOT DETECTED
SODIUM BLD-SCNC: 152 MMOL/L (ref 136–145)
SODIUM BLD-SCNC: 157 MMOL/L (ref 136–145)
SODIUM BLD-SCNC: 159 MMOL/L (ref 136–145)
SODIUM BLD-SCNC: 159 MMOL/L (ref 136–145)
SODIUM BLD-SCNC: 166 MMOL/L (ref 136–145)
URINE CULTURE, ROUTINE: NORMAL
VANCOMYCIN RANDOM: 8.2 UG/ML
WBC # BLD: 13.9 K/UL (ref 4–11)

## 2022-04-06 PROCEDURE — 80202 ASSAY OF VANCOMYCIN: CPT

## 2022-04-06 PROCEDURE — 84295 ASSAY OF SERUM SODIUM: CPT

## 2022-04-06 PROCEDURE — 6370000000 HC RX 637 (ALT 250 FOR IP): Performed by: FAMILY MEDICINE

## 2022-04-06 PROCEDURE — 2580000003 HC RX 258: Performed by: INTERNAL MEDICINE

## 2022-04-06 PROCEDURE — 93010 ELECTROCARDIOGRAM REPORT: CPT | Performed by: INTERNAL MEDICINE

## 2022-04-06 PROCEDURE — 92610 EVALUATE SWALLOWING FUNCTION: CPT

## 2022-04-06 PROCEDURE — 80048 BASIC METABOLIC PNL TOTAL CA: CPT

## 2022-04-06 PROCEDURE — 85027 COMPLETE CBC AUTOMATED: CPT

## 2022-04-06 PROCEDURE — 1200000000 HC SEMI PRIVATE

## 2022-04-06 PROCEDURE — 6360000002 HC RX W HCPCS: Performed by: FAMILY MEDICINE

## 2022-04-06 PROCEDURE — 92526 ORAL FUNCTION THERAPY: CPT

## 2022-04-06 PROCEDURE — 36415 COLL VENOUS BLD VENIPUNCTURE: CPT

## 2022-04-06 PROCEDURE — 6370000000 HC RX 637 (ALT 250 FOR IP): Performed by: INTERNAL MEDICINE

## 2022-04-06 PROCEDURE — 6360000002 HC RX W HCPCS: Performed by: INTERNAL MEDICINE

## 2022-04-06 PROCEDURE — 2580000003 HC RX 258: Performed by: FAMILY MEDICINE

## 2022-04-06 RX ORDER — LISINOPRIL 10 MG/1
10 TABLET ORAL DAILY
Status: DISCONTINUED | OUTPATIENT
Start: 2022-04-06 | End: 2022-04-08

## 2022-04-06 RX ORDER — VALPROIC ACID 250 MG/5ML
250 SOLUTION ORAL 2 TIMES DAILY
Status: DISCONTINUED | OUTPATIENT
Start: 2022-04-06 | End: 2022-04-09 | Stop reason: HOSPADM

## 2022-04-06 RX ORDER — DEXTROSE MONOHYDRATE 50 MG/ML
INJECTION, SOLUTION INTRAVENOUS CONTINUOUS
Status: DISCONTINUED | OUTPATIENT
Start: 2022-04-06 | End: 2022-04-07

## 2022-04-06 RX ORDER — VALPROIC ACID 250 MG/5ML
500 SOLUTION ORAL NIGHTLY
Status: DISCONTINUED | OUTPATIENT
Start: 2022-04-06 | End: 2022-04-09 | Stop reason: HOSPADM

## 2022-04-06 RX ORDER — HYDRALAZINE HYDROCHLORIDE 20 MG/ML
10 INJECTION INTRAMUSCULAR; INTRAVENOUS EVERY 4 HOURS PRN
Status: DISCONTINUED | OUTPATIENT
Start: 2022-04-06 | End: 2022-04-09 | Stop reason: HOSPADM

## 2022-04-06 RX ADMIN — DEXTROSE MONOHYDRATE: 50 INJECTION, SOLUTION INTRAVENOUS at 10:12

## 2022-04-06 RX ADMIN — HYDRALAZINE HYDROCHLORIDE 10 MG: 20 INJECTION, SOLUTION INTRAMUSCULAR; INTRAVENOUS at 14:43

## 2022-04-06 RX ADMIN — DEXTROSE MONOHYDRATE: 50 INJECTION, SOLUTION INTRAVENOUS at 20:53

## 2022-04-06 RX ADMIN — LISINOPRIL 10 MG: 10 TABLET ORAL at 17:58

## 2022-04-06 RX ADMIN — VANCOMYCIN HYDROCHLORIDE 500 MG: 500 INJECTION, POWDER, LYOPHILIZED, FOR SOLUTION INTRAVENOUS at 20:51

## 2022-04-06 RX ADMIN — SERTRALINE 100 MG: 50 TABLET, FILM COATED ORAL at 20:41

## 2022-04-06 RX ADMIN — HYDRALAZINE HYDROCHLORIDE 10 MG: 20 INJECTION, SOLUTION INTRAMUSCULAR; INTRAVENOUS at 10:07

## 2022-04-06 RX ADMIN — CARBIDOPA AND LEVODOPA 1.5 TABLET: 25; 100 TABLET ORAL at 09:45

## 2022-04-06 RX ADMIN — CARBIDOPA AND LEVODOPA 1.5 TABLET: 25; 100 TABLET ORAL at 20:41

## 2022-04-06 RX ADMIN — Medication 10 ML: at 10:15

## 2022-04-06 RX ADMIN — CARBIDOPA AND LEVODOPA 1.5 TABLET: 25; 100 TABLET ORAL at 15:36

## 2022-04-06 RX ADMIN — VALPROIC ACID 500 MG: 250 SOLUTION ORAL at 20:41

## 2022-04-06 RX ADMIN — CEFEPIME HYDROCHLORIDE 2000 MG: 2 INJECTION, POWDER, FOR SOLUTION INTRAVENOUS at 03:04

## 2022-04-06 RX ADMIN — CEFEPIME HYDROCHLORIDE 2000 MG: 2 INJECTION, POWDER, FOR SOLUTION INTRAVENOUS at 15:37

## 2022-04-06 RX ADMIN — ENOXAPARIN SODIUM 30 MG: 30 INJECTION SUBCUTANEOUS at 09:49

## 2022-04-06 RX ADMIN — VALPROIC ACID 250 MG: 250 SOLUTION ORAL at 15:36

## 2022-04-06 RX ADMIN — VANCOMYCIN HYDROCHLORIDE 500 MG: 500 INJECTION, POWDER, LYOPHILIZED, FOR SOLUTION INTRAVENOUS at 10:11

## 2022-04-06 RX ADMIN — SODIUM CHLORIDE: 9 INJECTION, SOLUTION INTRAVENOUS at 20:49

## 2022-04-06 RX ADMIN — Medication 10 ML: at 20:47

## 2022-04-06 ASSESSMENT — PAIN SCALES - GENERAL
PAINLEVEL_OUTOF10: 0

## 2022-04-06 NOTE — PLAN OF CARE
Nutrition Problem #1: Swallowing difficulty  Intervention: Food and/or Nutrient Delivery: Continue Current Diet,Start Oral Nutrition Supplement  Nutritional Goals: Pt will consume at least 50% of meals and supplements

## 2022-04-06 NOTE — H&P
HOSPITALISTS HISTORY AND PHYSICAL    4/5/2022 10:39 PM    Patient Information:  Tasha Garcia is a 60 y.o. female [id number]  PCP:  Shirin Gomes MD (Tel: [phone] )    Chief complaint:    Chief Complaint   Patient presents with    Shortness of Breath     from [address] via EMS cc SOB, hx pneumonia 10 days ago. EMS reports SPO2 in 70s at [address], 99% on 2L in EMS. History of Present Illness:  Silvia Velazquez is a 60 y.o. female with h/o Down syndrome, Autism, Parkinson's, Seizures, Dysphagia was brought to the ED d/t hypoxia . She was noted by her caregiver sating at hihg 70's . Also was noted to have cough and congestion  She was placed on 2 L o2 . Doesn't use O2 at baseline. She was recently seen at [facility] ED diagnosed with Pneumonia and has been started on Levaquin . She is up todate with COVID vaccination . Labs revealed elevated WBC count 14.3 K . Also showed renal failure with cr 1.6 and hypernatremia  165. Chest CT showed  bronchopneumonia concern for aspiration         History obtained and   Old medical records reviewed        REVIEW OF SYSTEMS:   Unable to assess  Past Medical History:   has a past medical history of Anemia, Anorexia, Autistic disorder, Convulsions ([address] Down syndrome, Dysphagia, Hypothyroid, Malnutrition ([address] Parkinson disease ([address] UTI (urinary tract infection), and Weakness. Past Surgical History:   has a past surgical history that includes Gastrostomy tube placement and Gastrostomy tube placement (2/18/14). Medications:  No current facility-administered medications on file prior to encounter. Current Outpatient Medications on File Prior to Encounter   Medication Sig Dispense Refill    Skin Protectants, Misc.  (ALOE VESTA PROTECTIVE) OINT ointment APPLY TO AFFECTED AREA(S) TWICE DAILY AS NEEDED 1 Tube 5    Trihexyphenidyl HCl 0.4 MG/ML ELIX Take 2 mg by mouth      zinc oxide 20 % ointment Apply topically 3 times daily Apply topically as needed.  Nutritional Supplements (ENSURE COMPLETE PO) Take by mouth      levothyroxine (SYNTHROID) 125 MCG tablet Take 125 mcg by mouth daily      docusate sodium (COLACE) 100 MG capsule Take 100 mg by mouth daily      acetaminophen (TYLENOL) 325 MG tablet Take 650 mg by mouth every 6 hours as needed for Pain.  calcium carbonate (TUMS) 500 MG chewable tablet Take 1 tablet by mouth 2 times daily. (Patient not taking: Reported on 4/5/2022)      magnesium hydroxide (MILK OF MAGNESIA) 400 MG/5ML suspension Take  by mouth daily as needed for Constipation. (Patient not taking: Reported on 4/5/2022)      carbidopa-levodopa (SINEMET)  MG per tablet Take 1.5 tablets by mouth 3 times daily Indications: Take one and a half tablets by mouth three times per day       sertraline (ZOLOFT) 100 MG tablet Take 100 mg by mouth nightly.  Multiple Vitamins-Minerals (THERAPEUTIC MULTIVITAMIN-MINERALS) tablet Take 1 tablet by mouth daily.  omeprazole (PRILOSEC) 20 MG capsule Take 20 mg by mouth daily.       valproic acid (DEPAKENE) 250 MG capsule Take 250 mg by mouth See Admin Instructions Take 1 cap by mouth in morning, in afternoon, and 2 capsules by mouth at bedtime       Current Facility-Administered Medications   Medication Dose Route Frequency Provider Last Rate Last Admin    0.45 % sodium chloride infusion   IntraVENous Continuous Ritche Wesley Baltazar  mL/hr at 04/05/22 2237 New Bag at 04/05/22 2237    [START ON 4/6/2022] cefepime (MAXIPIME) 2000 mg IVPB minibag  2,000 mg IntraVENous Q12H Hiram Cai MD        carbidopa-levodopa (SINEMET)  MG per tablet 1.5 tablet  1.5 tablet Oral TID Hiram Cai MD        sertraline (ZOLOFT) tablet 100 mg  100 mg Oral Nightly Hiram Cai MD        [START ON 4/6/2022] valproic acid (DEPAKENE) capsule 250 mg  250 mg Oral BID Tank Norris MD       [facility] ON 4/6/2022] levothyroxine (SYNTHROID) tablet 125 mcg  125 mcg Oral Daily Tank Norris MD        sodium chloride flush 0.9 % injection 5-40 mL  5-40 mL IntraVENous 2 times per day Tank Norris MD   10 mL at 04/05/22 2240    sodium chloride flush 0.9 % injection 5-40 mL  5-40 mL IntraVENous PRN Tank Norris MD        0.9 % sodium chloride infusion   IntraVENous PRN Tank Norris MD       [facility] ON 4/6/2022] enoxaparin (LOVENOX) injection 30 mg  30 mg SubCUTAneous Daily Tank Norrsi MD        ondansetron (ZOFRAN-ODT) disintegrating tablet 4 mg  4 mg Oral Q8H PRN Tank Norris MD        Or    ondansetron (ZOFRAN) injection 4 mg  4 mg IntraVENous Q6H PRN Tank Norris MD        polyethylene glycol (GLYCOLAX) packet 17 g  17 g Oral Daily PRN Tank Norris MD        acetaminophen (TYLENOL) tablet 650 mg  650 mg Oral Q6H PRN Tank Norris MD        Or   [facility] acetaminophen (TYLENOL) suppository 650 mg  650 mg Rectal Q6H PRN Tank Norris MD       [facility] ON 4/6/2022] vancomycin (VANCOCIN) 750 mg in dextrose 5 % 250 mL IVPB  750 mg IntraVENous Q24H Tank Norris MD       [facility] ON 4/6/2022] pantoprazole (PROTONIX) tablet 40 mg  40 mg Oral QAM AC Erna Shannon MD        valproic acid (DEPAKENE) capsule 500 mg  500 mg Oral Nightly Tank Norris MD           Allergies: Allergies   Allergen Reactions    Caffeine     Iodine         Social History:  Patient Lives    reports that she has never smoked. She has never used smokeless tobacco. She reports that she does not drink alcohol and does not use drugs. Family History:  family history is not on file. , *    Physical Exam:  BP (!) 150/105   Pulse 80   Temp 96.8 °F (36 °C) (Temporal)   Resp 24   Ht 4' 11\" (1.499 m)   Wt 72 lb 6.4 oz (32.8 kg)   SpO2 95%   BMI 14.62 kg/m²     General appearance:  Appears comfortable. Well nourished  Eyes: Sclera clear, pupils equal  ENT: Moist mucus membranes, no thrush.  Trachea poa  With Tachycardia, leucocytosis and lactic acidosis   Cultures pending   Cont on IV Vanc and cefepime  Will keep NPO, SLP eval    MRDD  HTN     DVT prophylaxis   Code status   Diet   IV access   Yuan Catheter    Admit as inpatient. I anticipate hospitalization spanning more than two midnights for investigation and treatment of the above medically necessary diagnoses. Please note that some part of this chart was generated using Dragon dictation software. Although every effort was made to ensure the accuracy of this automated transcription, some errors in transcription may have occurred inadvertently. If you may need any clarification, please do not hesitate to contact me through [facility].        Ben Orellana MD    4/5/2022 10:39 PM

## 2022-04-06 NOTE — PROGRESS NOTES
Perfect Serve sent to MD at [phone]: Eusebiaviktoria Rodriguez 1965 RM: [id number] Patient sodium was critical with morning labs at 166. Response from MD at 0629: defer to renal . Pls call them for orders     Perfect Serve sent to Nephrology at 0558: Patients sodium came back critical at 166. She is on 1/2NS at 50    Response from Nephrology at [phone]: Ok, no change for now.  Thanks

## 2022-04-06 NOTE — PROGRESS NOTES
Hospitalist Progress Note      PCP: April Prater MD    Date of Admission: 4/5/2022    Chief Complaint:   SOB,MRDD,    Hospital Course: George Milan is a 60 y.o. female with h/o Down syndrome, Autism, Parkinson's, Seizures, Dysphagia was brought to the ED d/t hypoxia . She was noted by her caregiver sating at hihg 70's . Also was noted to have cough and congestion  She was placed on 2 L o2 . Doesn't use O2 at baseline. She was recently seen at [facility] ED diagnosed with Pneumonia and has been started on Levaquin . She is up todate with COVID vaccination . Labs revealed elevated WBC count 14.3 K . Also showed renal failure with cr 1.6 and hypernatremia  165. Chest CT showed  bronchopneumonia concern for aspiration        Subjective:   Blood pressure high today.   She keeps moving when trying to get a reading,         Medications:  Reviewed    Infusion Medications    dextrose 80 mL/hr at 04/06/22 1253    sodium chloride       Scheduled Medications    vancomycin  500 mg IntraVENous Q12H    valproic acid  500 mg Oral Nightly    valproic acid  250 mg Oral BID    cefepime  2,000 mg IntraVENous Q12H    carbidopa-levodopa  1.5 tablet Oral TID    sertraline  100 mg Oral Nightly    levothyroxine  125 mcg Oral Daily    sodium chloride flush  5-40 mL IntraVENous 2 times per day    enoxaparin  30 mg SubCUTAneous Daily    pantoprazole  40 mg Oral QAM AC     PRN Meds: hydrALAZINE, sodium chloride flush, sodium chloride, ondansetron **OR** ondansetron, polyethylene glycol, acetaminophen **OR** acetaminophen      Intake/Output Summary (Last 24 hours) at 4/6/2022 1658  Last data filed at 4/6/2022 1209  Gross per 24 hour   Intake 409.25 ml   Output 150 ml   Net 259.25 ml       Physical Exam Performed:    BP (!) 227/188   Pulse 104   Temp 97.9 °F (36.6 °C) (Temporal)   Resp 17   Ht 4' 11\" (1.499 m)   Wt 72 lb 5 oz (32.8 kg)   SpO2 (!) 87%   BMI 14.60 kg/m²     General appearance: No apparent distress, appears stated age and cooperative. She is agitated  HEENT: Pupils equal, round, and reactive to light. Conjunctivae/corneas clear. Neck: Supple, with full range of motion. No jugular venous distention. Trachea midline. Respiratory:  Shallow breaths and not a lot of air movement . Cardiovascular: Regular rate and rhythm with normal S1/S2 without murmurs, rubs or gallops. Abdomen: Soft, non-tender, non-distended with normal bowel sounds. Musculoskeletal: No clubbing, cyanosis or edema bilaterally. Full range of motion without deformity. Skin: Skin color, texture, turgor normal.  No rashes or lesions. Neurologic:  She moves but is somewhat contracted in the upper extremities. [facility]: MRDD  Capillary Refill: Brisk,3 seconds, normal   Peripheral Pulses: +2 palpable, equal bilaterally       Labs:   Recent Labs     04/05/22  1220 04/06/22  0456   WBC 14.3* 13.9*   HGB 15.8 13.3   HCT 48.9* 41.2    97*     Recent Labs     04/05/22  1220 04/06/22  0011 04/06/22  0456 04/06/22  0851 04/06/22  1244   *   < > 166* 159* 157*   K 4.1  --  3.7  --   --    *  --  128*  --   --    CO2 27  --  28  --   --    BUN 60*  --  48*  --   --    CREATININE 1.6*  --  0.9  --   --    CALCIUM 9.6  --  8.8  --   --     < > = values in this interval not displayed. Recent Labs     04/05/22  1220   AST 23   ALT 7*   BILITOT 0.5   ALKPHOS 71     No results for input(s): INR in the last 72 hours. Recent Labs     04/05/22  1220   TROPONINI 0.14*       Urinalysis:      Lab Results   Component Value Date    NITRU Negative 04/05/2022    WBCUA 17 04/05/2022    BACTERIA 4+ 01/23/2018    RBCUA 26 04/05/2022    BLOODU TRACE-INTACT 04/05/2022    SPECGRAV >=1.030 04/05/2022    GLUCOSEU Negative 04/05/2022       Radiology:  CT CHEST WO CONTRAST   Final Result   Tree-in-bud opacities with consolidation in the left lower lobe, consistent   with bronchopneumonia, most likely aspiration etiology.   Secretions are noted   in left lower lobe bronchi. XR CHEST PORTABLE   Final Result   No acute process. Previously described right infrahilar opacity not evident. Assessment/Plan:    Active Hospital Problems    Diagnosis     Acute respiratory failure ([facility] [J96.00]      Hypernatremia   Started on d5 today and nephro is following  Cont to monitor sodium every 4 hrs  Goal is for slow correction    Nephrology has been consulted      Sepsis and aspiration Pneumonia poa  With Tachycardia, leucocytosis and lactic acidosis   Cultures pending   Cont on IV Vanc and cefepime  Will keep NPO, SLP eval     HTN   - I have added     DVT Prophylaxis: lovenox  Diet: ADULT DIET;  Dysphagia - Pureed; Mildly Thick (Nectar)  ADULT ORAL NUTRITION SUPPLEMENT; Lunch, Dinner; Fortified Pudding Oral Supplement  Code Status: Full Code    PT/OT Eval Status:  eval and treat    Dispo - ccc    Barry Kahn MD

## 2022-04-06 NOTE — CONSULTS
[facility]  Consultation Note    Reason for Consult:  AVELINO and Hypernatremia  Requesting Physician:  Dr. Luis Miller:  Shortness of breath    History obtained from records and patient. HISTORY OF PRESENT ILLNESS:                Rosemary Stephenson  is 60 y.o. y.o. female with significant past medical history of Autism, Down Syndrome, Parkinson's Disease, who presents with shortness of breath. Most of the information is from records since unable to verbalize effectively. I am asked to see the patient since crea increased to 1.6 with Na of 165. SBP initially high then decreased. On 7/2021, Na was 143 with crea of 0.6. Not on diuretics. Past Medical History:     has a past medical history of Anemia, Anorexia, Autistic disorder, Convulsions ([address] Down syndrome, Dysphagia, Hypothyroid, Malnutrition ([address] Parkinson disease ([address] UTI (urinary tract infection), and Weakness. Past Surgical History:     has a past surgical history that includes Gastrostomy tube placement and Gastrostomy tube placement (2/18/14).    Current Medications:    Current Facility-Administered Medications: vancomycin (VANCOCIN) 500 mg in dextrose 5 % 100 mL IVPB (mini-bag), 500 mg, IntraVENous, Q12H  dextrose 5 % solution, , IntraVENous, Continuous  hydrALAZINE (APRESOLINE) injection 10 mg, 10 mg, IntraVENous, Q4H PRN  valproic acid (DEPAKENE) 250 MG/5ML oral solution 500 mg, 500 mg, Oral, Nightly  valproic acid (DEPAKENE) 250 MG/5ML oral solution 250 mg, 250 mg, Oral, BID  cefepime (MAXIPIME) 2000 mg IVPB minibag, 2,000 mg, IntraVENous, Q12H  carbidopa-levodopa (SINEMET)  MG per tablet 1.5 tablet, 1.5 tablet, Oral, TID  sertraline (ZOLOFT) tablet 100 mg, 100 mg, Oral, Nightly  levothyroxine (SYNTHROID) tablet 125 mcg, 125 mcg, Oral, Daily  sodium chloride flush 0.9 % injection 5-40 mL, 5-40 mL, IntraVENous, 2 times per day  sodium chloride flush 0.9 % injection 5-40 mL, 5-40 mL, IntraVENous, PRN  0.9 % sodium chloride infusion, , IntraVENous, PRN  enoxaparin (LOVENOX) injection 30 mg, 30 mg, SubCUTAneous, Daily  ondansetron (ZOFRAN-ODT) disintegrating tablet 4 mg, 4 mg, Oral, Q8H PRN **OR** ondansetron (ZOFRAN) injection 4 mg, 4 mg, IntraVENous, Q6H PRN  polyethylene glycol (GLYCOLAX) packet 17 g, 17 g, Oral, Daily PRN  acetaminophen (TYLENOL) tablet 650 mg, 650 mg, Oral, Q6H PRN **OR** acetaminophen (TYLENOL) suppository 650 mg, 650 mg, Rectal, Q6H PRN  pantoprazole (PROTONIX) tablet 40 mg, 40 mg, Oral, QAM AC  Allergies:    Caffeine and Iodine   Social History:     reports that she has never smoked. She has never used smokeless tobacco. She reports that she does not drink alcohol and does not use drugs. Family History:   family history is not on file. REVIEW OF SYSTEMS:    Unable to verbalize effectively    PHYSICAL EXAM:    Vitals:  BP (!) 191/170   Pulse 77   Temp 97 °F (36.1 °C) (Temporal)   Resp 15   Ht 4' 11\" (1.499 m)   Wt 72 lb 5 oz (32.8 kg)   SpO2 94%   BMI 14.60 kg/m²   CONSTITUTIONAL:  Very thin, ,not able to verbalize well. EYES:  Lids and lashes normal, pupils equal, round   NECK:  Supple, symmetrical, trachea midline, no adenopathy, thyroid symmetric, not enlarged and no tenderness, skin normal  HEMATOLOGIC/LYMPHATICS:  no cervical lymphadenopathy  LUNGS:  No increased work of breathing, good air exchange, clear to auscultation bilaterally, no crackles or wheezing  CARDIOVASCULAR:  Normal apical impulse, regular rate and rhythm, normal S1 and S2, no S3 or S4, and no murmur noted  ABDOMEN:  No scars, normal bowel sounds, soft, non-distended, non-tender, no masses palpated, no hepatosplenomegaly  MUSCULOSKELETAL:  There is no redness, warmth, or swelling of the joints. No edema  NEUROLOGIC:  Unable to verbalize well.    SKIN:  no bruising or bleeding    DATA:    CBC:   Lab Results   Component Value Date    WBC 13.9 04/06/2022    RBC 3.84 04/06/2022    HGB 13.3

## 2022-04-06 NOTE — PROGRESS NOTES
Facility/Department: [address]  Initial Assessment  DYSPHAGIA BEDSIDE SWALLOW EVALUATION     Patient: Dennis Hogan   : 1965   MRN: [id number]      Evaluation Date: 2022   Admitting Diagnosis: Acute respiratory failure ([facility] [address] [J96.00]  Hypernatremia [E87.0]  Hypoxia [R09.02]  Elevated troponin [R77.8]  AVELINO (acute kidney injury) ([facility] [address] [N17.9]  Elevated brain natriuretic peptide (BNP) level [R79.89]  Severe sepsis (HCC) [A41.9, R65.20]  Aspiration pneumonia of left lower lobe, unspecified aspiration pneumonia type ([facility] [address] [J69.0]  Pain: Unable to report. No nonverbal indicators of pain this date. H&P: Dennis Hogan is a 60 y.o. female with h/o Down syndrome, Autism, Parkinson's, Seizures, Dysphagia was brought to the ED d/t hypoxia. She was noted by her caregiver sating at hihg 70's . Also was noted to have cough and congestion. She was placed on 2L O2. Doesn't use O2 at baseline. She was recently seen at [facility] ED diagnosed with Pneumonia and has been started on Levaquin. She is up todate with COVID vaccination. Labs revealed elevated WBC count 14.3 K. Also showed renal failure with cr 1.6 and hypernatremia  165. Chest CT showed bronchopneumonia concern for aspiration. Imaging:  Chest X-ray (2022): Impression   No acute process. Previously described right infrahilar opacity not evident. Chest CT Without Contrast (2022): Impression   Tree-in-bud opacities with consolidation in the left lower lobe, consistent   with bronchopneumonia, most likely aspiration etiology.  Secretions are noted   in left lower lobe bronchi. Modified Barium Swallow Study (2017): Modified Barium Swallow evaluation completed on 8/3/2017. Results indicate moderate oropharyngeal dysphagia with x1 episode of trace laryngeal aspiration of thin liquids via straw during the swallow.  Pt assessed with body movements throughout assessment putting pt at further risk of laryngeal aspiration/penetration. Oral phase dysphagia characterized by reduced bolus control, reduced mastication, intermittent oral holding, lingual pumping/rocking, reduced tongue base retraction, and premature loss of bolus to pharynx. Pt with moderately reduced mastication with textures greater than puree resulting in prolonged mastication, oral holding, and multiple swallows to clear. Regular texture solids revealed excessive mastication and piecemeal swallows. Oral holding of nectar thick liquids assessed versus thin liquids. Pharyngeal phase dysphagia characterized by delayed swallow onset, decreased hyolaryngeal excursion, trace pharyngeal residue after the swallow, and overall decreased airway protection. Pt with pooling to the underside of the epiglottis with solid textures. Thin and nectar thick liquids pooled to the pyriforms. Pt with reduced pharyngeal clearing resulting in trace pharyngeal residue with mechanical soft and regular texture solids. Pt with x1 episode of trace laryngeal aspiration of thin liquids via straw during the swallow due to premature bolus loss and delayed swallow initiation. No other episodes of laryngeal aspiration/penetration assessed during this assessment with any textures. Pt is at increased risk for aspiration/penetration if aspiration precautions are not followed (i.e. Small bites/sips, slow rate, and ensuring oral cavity is clear prior to next bite/sip). History/Prior Level of Function:   Living Status: Group home with caregiver   Prior Dysphagia History: Had a choking episode in 2017 and MBSS following that incident (see above). Reason for referral: SLP evaluation orders received due to prior hx of dysphagia, suspected aspiration event, new diagnosis of PNA, and respiratory status. Dysphagia Impressions/Diagnosis: Oropharyngeal Dysphagia   Pt was positioned upright in bed with a slight lean to the left and contracted/crossed legs. Pt was awake and alert.  No verbal communication was produced this date. Pt was unable to follow verbal directions; therefore, an oral mechanism exam was not fully completed. Per observation, pt has reduced lingual, labial, and buccal strength, ROM, and coordination. The pt is currently on room air. PO trials of ice chips, thin liquids via cup/straw, mildly (nectar) thick liquids via cup/straw, and puree were provided to assess swallow function. Oral phase characterized by adequate oral acceptance, poor labial seal with spoon/cup, adequate labial seal with straw, lingual cupping, with no anterior loss. Pt presented with a delayed throat clear and wet breath quality following PO trials of ice chips. O2 saturation dropped to 85% with successive sips of thins via straw with a delayed weak cough reflex. No overt clinical s/s of aspiration were observed with thins via cup, mildly (nectar) thick via cup/straw, or puree. Pt presents with a holding pattern with nectar thick liquids (3-5 seconds). Manual palpation revealed moderately reduced laryngeal elevation and poor timing/coordination. Suspected premature bolus loss with a moderately delayed swallow initiation, pharyngeal pooling, and delayed pharyngeal clearing. Pt demonstrates increased risk for aspiration due to cognitive state, co morbidities, respiratory status, inability to self feed, and her prior hx of dysphagia. Based on today's assessment recommend Dysphagia I Puree with Mildly (nectar) thick liquids (straws okay), and medications crushed as able in puree with use of compensatory swallow strategies (see below). IF a decline in respiratory status occurs, downgrade to NPO and consult SLP for re-evaluation. Recommend completion of Modified Barium Swallow study to further assess pharyngeal phase of swallow. Recommended Diet and Intervention 4/6/2022:  Diet Solids Recommendation:  Dysphagia I Puree  Liquid Consistency Recommendation:  Mildly (nectar) thick liquids.  Straws okay Recommended form of Meds: Crushed as able in puree    **1:1 meal assistance, feed slowly as pt holds food/drink for several seconds before swallowing. Instrumental assessment: Recommend completion of Modified Barium Swallow study to further assess pharyngeal phase of swallow     Compensatory Swallowing Strategies: Alternate solids/liquids, Upright as possible with all PO intake, Small bites/sips, Eat/feed slowly, Total Feed     SHORT TERM DYSPHAGIA GOALS/PLAN OF CARE: Speech therapy for dysphagia tx 3-5 times per week during acute care stay. 1) Pt will functionally tolerate recommended diet with no overt clinical s/s of aspiration. 2) Pt will functionally tolerate ongoing assessment of swallow function with diet to be determined as indicated. 3) Pt/caregiver will demonstrate understanding of aspiration risk and precautions via education/demonstration with occasional prompting. 4) Pt will advance to least restrictive diet as indicated. 5) Pt will participate in Modified Barium swallow study to further assess pharyngeal phase of swallow, assist with diet recommendations and to further direct plan of care. Dysphagia Therapeutic Intervention:  Oral Care, Diet Tolerance Monitoring , Patient/Family Education , Therapeutic Trials with SLP , Instrumental assessment of swallow function (Modified Barium Swallow Study)     Discharge Recommendations: Discharge recommendations to be determined pending ongoing follow-up during acute care stay    Patient Positioning: Upright in bed, slight lean to the left, legs crossed/contracted    Current Diet Level (prior to evaluation): NPO     Respiratory Status: Room Air    Dentition: Edentulous    Baseline Vocal Quality: No speech or vocalizations produced this date. Volitional Cough: Unable to assess due to cognition. Suspect weak/congested. Volitional Swallow: Unable to assess due to cognition. Oral Mechanism Exam: Unable to assess due to cognition.  Suspect reduced buccal, lingual, and labial strength, ROM, and coordination. Oral Phase: []WFL []Mild   [x] Moderate  [x]Severe  []To be assessed   [x]Impaired/Prolonged Mastication:   []Spillage Left:   []Spillage Right:  []Pocketing Left:   []Pocketing Right:   [x]Decreased Anterior to Posterior Transit:   [x]Suspected Premature Bolus Loss:   []Lingual/Palatal Residue:   [x]Other: lingual cupping    Pharyngeal Phase: []WFL []Mild   [x] Moderate  [x]Severe  []To be assessed   [x]Delayed Swallow:   [x]Suspected Pharyngeal Pooling:   [x]Decreased Laryngeal Elevation:   []Absent Swallow:  [x]Wet Vocal Quality:   []Throat Clearing-Immediate:   [x]Throat Clearing-Delayed:    []Cough-Immediate:   [x]Cough-Delayed:  [x]Change in Vital Signs:  [x]Suspected Delayed Pharyngeal Clearing:  []Other:     Eating Assistance:  []Independent  []Setup or clean-up assistance   [] Supervision or touching assistance   [] Partial or moderate assistance   [] Substantial or maximal assistance  [x] Dependent       EDUCATION:   Provided education regarding role of SLP, results of assessment, recommendations and general speech pathology plan of care. [] Pt verbalized understanding and agreement   [] Pt requires ongoing learning   [x] No evidence of comprehension     If patient discharges prior to next visit, this note will serve as discharge. Timed Code Minutes: 0 minutes  Total Treatment Minutes: 29 minutes    Electronically signed by:  Niru Watson M.A., [address] [place] [address]  Speech-Language Pathologist    The speech-language pathologist was present, directed the patient's care, made skilled judgment and was responsible for assessment and treatment.     DEVANG Harrison.  Clinician

## 2022-04-06 NOTE — PROGRESS NOTES
Clinical Pharmacy Note: Pharmacy to Dose Vancomycin    Rosemary Stephenson is a 60 y.o. female started on Vancomycin for pneumonia; consult received from Dr. Mahi Williamson to manage therapy. Also receiving the following antibiotics: cefepime. Goal AUC: 400-600 mg/L*hr    Assessment/Plan:  A 1000 mg loading dose was given on 4/5/22 at ~1600  Initiate vancomycin 750 mg IV every 24 hours. Bayesian modeling predicts an AUC of 516 mg/L*hr and a trough of 16.5 mcg/mL at steady state concentration. A vancomycin random level has been ordered for tomorrow morning  Changes in regimen will be determined based on culture results, renal function, and clinical response. Pharmacy will continue to monitor and adjust regimen as necessary. Allergies:  Caffeine and Iodine     Recent Labs     04/05/22  1220   CREATININE 1.6*       Recent Labs     04/05/22  1220   WBC 14.3*       Ht Readings from Last 1 Encounters:   04/05/22 4' 11\" (1.499 m)        Wt Readings from Last 1 Encounters:   04/05/22 100 lb (45.4 kg)         CrCl cannot be calculated (Unknown ideal weight.).       Thank you for the consult,    Tomasz Batista, PharmD  4/5/2022 9:08 PM

## 2022-04-06 NOTE — PROGRESS NOTES
Renal consult received    Notes and labs reviewed  Full note to follow  Baseline crea of 0.6.   1/2NSS. Goal Na not less than 155 for the next 24 hours. Free water deficit around 1L.        Thank you

## 2022-04-06 NOTE — PROGRESS NOTES
4 Eyes Skin Assessment     NAME:  David Moore  YOB: 1965  MEDICAL RECORD NUMBER:  [id number]    The patient is being assess for  Admission    I agree that 2 RN's have performed a thorough Head to Toe Skin Assessment on the patient. ALL assessment sites listed below have been assessed. Areas assessed by both nurses:    Head, Face, Ears, Shoulders, Back, Chest, Arms, Elbows, Hands, Sacrum. Buttock, Coccyx, Ischium and Legs. Feet and Heels        Does the Patient have a Wound?  No noted wound(s)       Abdirizak Prevention initiated:  Yes   Wound Care Orders initiated:  No    Pressure Injury (Stage 3,4, Unstageable, DTI, NWPT, and Complex wounds) if present place consult order under [de-identified] No    New and Established Ostomies if present place consult order under : No      Nurse 1 eSignature: Electronically signed by Linda Putnam RN on 4/5/22 at 11:03 PM EDT    **SHARE this note so that the co-signing nurse is able to place an eSignature**    Nurse 2 eSignature: Electronically signed by Petra Hein RN on 4/6/22 at 1:01 AM EDT

## 2022-04-06 NOTE — PROGRESS NOTES
Patient up to unit from ED by bed. Patient bathed and purwick placed to help keep skin dry. Admission completed with help from patients caregiver, [facility]. All questions answered at this time. Assessment and 4 eyes completed. MRSA swab completed and sent to lab. VSS. Patient awake, alert, and in bed. Fluids initiated. Safety and isolation precautions maintained. Will continue to monitor.

## 2022-04-06 NOTE — CONSULTS
Pharmacy to change medications to crushable formulations due to aspiration risk:     Patient was receiving the following medications as inpatient:   - Sinemet 25/100 - 1.5 tablets TID - can be crushed  - levothyroxine 125 mcg - 1 tablet daily - can be crushed  - sertraline 100 mg - 1 tablet nightly - can be crushed  - valproic acid capsules - 250 mg BID + 500 mg nightly - product changed to oral solution.        Brigette Dockery, PharmD, [facility]

## 2022-04-06 NOTE — PROGRESS NOTES
Shift assessment completed. Unable to assessment mental status as patient us nonverbal at baseline but is alert at this time. BP is consistently high, as documented in flowsheets. Dr. Sharri Smalls notified. Awaiting new orders. Bed alarm engaged. See flowsheets for further details.

## 2022-04-06 NOTE — PROGRESS NOTES
Clinical Pharmacy Note: Pharmacy to Dose Vancomcyin    Vancomycin Day: 2  Current Dosing Regimen: 750 mg q24  Dosing Method: Bayesian Modeling    Random: 8.2    Recent Labs     04/05/22  1220 04/06/22  0456   BUN 60* 48*       Recent Labs     04/05/22  1220 04/06/22  0456   CREATININE 1.6* 0.9       Recent Labs     04/05/22  1220 04/06/22  0456   WBC 14.3* 13.9*         Intake/Output Summary (Last 24 hours) at 4/6/2022 0723  Last data filed at 4/6/2022 0400  Gross per 24 hour   Intake 10 ml   Output 150 ml   Net -140 ml         Ht Readings from Last 1 Encounters:   04/05/22 4' 11\" (1.499 m)        Wt Readings from Last 1 Encounters:   04/06/22 72 lb 5 oz (32.8 kg)         Body mass index is 14.6 kg/m². CrCl cannot be calculated (Unknown ideal weight. ). Assessment/Plan:  Vancomycin level is subtherapeutic. Level was drawn appropriately in respect to last dose given. Increase vancomycin regimen to 500 mg every 12 hours. Bayesian Modeling predicts an AUC of 429 mg/L*hr and trough of 14.2 mg/L. A vancomycin random level has been ordered on 4/7 at 0600 for follow-up. Changes in regimen will be determined based on culture results, renal function, and clinical response. Pharmacy will continue to monitor and adjust regimen as necessary.     Thank you for the consult,    Aaron Atkins, PharmD, [facility]

## 2022-04-06 NOTE — PROGRESS NOTES
Nutrition Note    RECOMMENDATIONS  1. PO Diet: Diet per SLP  2. ONS: Offer Boost pudding BID  3. Nutrition Support: None      NUTRITION ASSESSMENT   RD triggered to see pt d/t BMI 14.60. Pt with hx Down syndrome and autism and is unable to provide hx. RD unable to see pt in person d/t droplet plus isolation. CBW 72 lbs. No weight hx available for review; unsure if this is a usual weight or if pt has lost weight. Pt was NPO this morning but has been started on a dysphagia pureed diet with mildly (nectar) thick liquids per SLP. Will offer Boost pudding BID given low BMI and monitor for adequate PO intake.  Nutrition Related Findings: Na+ 166. LBM 4/5.  Wounds: None   Nutrition Education:  Education not indicated    Nutrition Goals: Pt will consume at least 50% of meals and supplements     MALNUTRITION ASSESSMENT   Malnutrition Status: Insufficient data    NUTRITION DIAGNOSIS   · Swallowing difficulty related to swallowing difficulty as evidenced by swallow study results    CURRENT NUTRITION THERAPIES  ADULT DIET; Dysphagia - Pureed; Mildly Thick (Nectar)     PO Intake: Unable to assess   PO Supplement Intake:None Ordered    ANTHROPOMETRICS   Current Height: 4' 11\" (149.9 cm)   Current Weight: 72 lb 5 oz (32.8 kg)     Ideal Body Weight (IBW): 95 lbs  (43 kg)        BMI: 14.6      COMPARATIVE STANDARDS  Energy (kcal):  990-1155     Protein (g):  50-66 grams       Fluid (ml/day):  1155 mL    The patient will be monitored per nutrition standards of care. Consult dietitian if additional nutrition interventions are needed prior to RD reassessment.      Courtney Naidu, [address],     Contact: [phone]

## 2022-04-07 ENCOUNTER — APPOINTMENT (OUTPATIENT)
Dept: GENERAL RADIOLOGY | Age: 57
DRG: 871 | End: 2022-04-07
Payer: MEDICARE

## 2022-04-07 LAB
ANION GAP SERPL CALCULATED.3IONS-SCNC: 7 MMOL/L (ref 3–16)
BUN BLDV-MCNC: 33 MG/DL (ref 7–20)
CALCIUM SERPL-MCNC: 8.2 MG/DL (ref 8.3–10.6)
CHLORIDE BLD-SCNC: 115 MMOL/L (ref 99–110)
CO2: 27 MMOL/L (ref 21–32)
CREAT SERPL-MCNC: 0.6 MG/DL (ref 0.6–1.1)
GFR AFRICAN AMERICAN: >60
GFR NON-AFRICAN AMERICAN: >60
GLUCOSE BLD-MCNC: 91 MG/DL (ref 70–99)
HCT VFR BLD CALC: 32.3 % (ref 36–48)
HCT VFR BLD CALC: 35.9 % (ref 36–48)
HEMOGLOBIN: 10.3 G/DL (ref 12–16)
HEMOGLOBIN: 11.7 G/DL (ref 12–16)
MCH RBC QN AUTO: 34.4 PG (ref 26–34)
MCHC RBC AUTO-ENTMCNC: 32.7 G/DL (ref 31–36)
MCV RBC AUTO: 105.2 FL (ref 80–100)
MRSA SCREEN RT-PCR: NORMAL
PDW BLD-RTO: 13.3 % (ref 12.4–15.4)
PLATELET # BLD: 104 K/UL (ref 135–450)
PMV BLD AUTO: 11.3 FL (ref 5–10.5)
POTASSIUM SERPL-SCNC: 3.2 MMOL/L (ref 3.5–5.1)
RBC # BLD: 3.41 M/UL (ref 4–5.2)
SODIUM BLD-SCNC: 143 MMOL/L (ref 136–145)
SODIUM BLD-SCNC: 145 MMOL/L (ref 136–145)
SODIUM BLD-SCNC: 147 MMOL/L (ref 136–145)
SODIUM BLD-SCNC: 147 MMOL/L (ref 136–145)
SODIUM BLD-SCNC: 149 MMOL/L (ref 136–145)
SODIUM BLD-SCNC: 151 MMOL/L (ref 136–145)
VANCOMYCIN RANDOM: 12.5 UG/ML
WBC # BLD: 9.9 K/UL (ref 4–11)

## 2022-04-07 PROCEDURE — 85018 HEMOGLOBIN: CPT

## 2022-04-07 PROCEDURE — 1200000000 HC SEMI PRIVATE

## 2022-04-07 PROCEDURE — 6360000002 HC RX W HCPCS: Performed by: FAMILY MEDICINE

## 2022-04-07 PROCEDURE — 2580000003 HC RX 258: Performed by: FAMILY MEDICINE

## 2022-04-07 PROCEDURE — 74230 X-RAY XM SWLNG FUNCJ C+: CPT

## 2022-04-07 PROCEDURE — 92526 ORAL FUNCTION THERAPY: CPT

## 2022-04-07 PROCEDURE — 84295 ASSAY OF SERUM SODIUM: CPT

## 2022-04-07 PROCEDURE — 36415 COLL VENOUS BLD VENIPUNCTURE: CPT

## 2022-04-07 PROCEDURE — P9047 ALBUMIN (HUMAN), 25%, 50ML: HCPCS | Performed by: NURSE PRACTITIONER

## 2022-04-07 PROCEDURE — 6360000002 HC RX W HCPCS: Performed by: INTERNAL MEDICINE

## 2022-04-07 PROCEDURE — 6370000000 HC RX 637 (ALT 250 FOR IP): Performed by: INTERNAL MEDICINE

## 2022-04-07 PROCEDURE — 92611 MOTION FLUOROSCOPY/SWALLOW: CPT

## 2022-04-07 PROCEDURE — 85014 HEMATOCRIT: CPT

## 2022-04-07 PROCEDURE — 80048 BASIC METABOLIC PNL TOTAL CA: CPT

## 2022-04-07 PROCEDURE — 2580000003 HC RX 258: Performed by: INTERNAL MEDICINE

## 2022-04-07 PROCEDURE — 6370000000 HC RX 637 (ALT 250 FOR IP): Performed by: FAMILY MEDICINE

## 2022-04-07 PROCEDURE — 6360000002 HC RX W HCPCS: Performed by: NURSE PRACTITIONER

## 2022-04-07 PROCEDURE — 80202 ASSAY OF VANCOMYCIN: CPT

## 2022-04-07 PROCEDURE — 2580000003 HC RX 258: Performed by: NURSE PRACTITIONER

## 2022-04-07 PROCEDURE — 85027 COMPLETE CBC AUTOMATED: CPT

## 2022-04-07 RX ORDER — ALBUMIN (HUMAN) 12.5 G/50ML
25 SOLUTION INTRAVENOUS ONCE
Status: COMPLETED | OUTPATIENT
Start: 2022-04-07 | End: 2022-04-08

## 2022-04-07 RX ORDER — SODIUM CHLORIDE, SODIUM LACTATE, POTASSIUM CHLORIDE, CALCIUM CHLORIDE 600; 310; 30; 20 MG/100ML; MG/100ML; MG/100ML; MG/100ML
INJECTION, SOLUTION INTRAVENOUS CONTINUOUS
Status: DISCONTINUED | OUTPATIENT
Start: 2022-04-07 | End: 2022-04-08

## 2022-04-07 RX ORDER — SODIUM CHLORIDE, SODIUM LACTATE, POTASSIUM CHLORIDE, CALCIUM CHLORIDE 600; 310; 30; 20 MG/100ML; MG/100ML; MG/100ML; MG/100ML
500 INJECTION, SOLUTION INTRAVENOUS ONCE
Status: COMPLETED | OUTPATIENT
Start: 2022-04-07 | End: 2022-04-07

## 2022-04-07 RX ORDER — POTASSIUM CHLORIDE 7.45 MG/ML
10 INJECTION INTRAVENOUS ONCE
Status: COMPLETED | OUTPATIENT
Start: 2022-04-07 | End: 2022-04-07

## 2022-04-07 RX ORDER — POTASSIUM CHLORIDE 7.45 MG/ML
10 INJECTION INTRAVENOUS
Status: DISPENSED | OUTPATIENT
Start: 2022-04-07 | End: 2022-04-07

## 2022-04-07 RX ADMIN — POTASSIUM CHLORIDE 10 MEQ: 7.46 INJECTION, SOLUTION INTRAVENOUS at 20:29

## 2022-04-07 RX ADMIN — SERTRALINE 100 MG: 50 TABLET, FILM COATED ORAL at 20:43

## 2022-04-07 RX ADMIN — SODIUM CHLORIDE, POTASSIUM CHLORIDE, SODIUM LACTATE AND CALCIUM CHLORIDE: 600; 310; 30; 20 INJECTION, SOLUTION INTRAVENOUS at 21:40

## 2022-04-07 RX ADMIN — Medication 10 MEQ: at 17:55

## 2022-04-07 RX ADMIN — ENOXAPARIN SODIUM 30 MG: 30 INJECTION SUBCUTANEOUS at 13:38

## 2022-04-07 RX ADMIN — VALPROIC ACID 500 MG: 250 SOLUTION ORAL at 20:43

## 2022-04-07 RX ADMIN — Medication 10 MEQ: at 13:47

## 2022-04-07 RX ADMIN — ALBUMIN (HUMAN) 25 G: 0.25 INJECTION, SOLUTION INTRAVENOUS at 23:33

## 2022-04-07 RX ADMIN — CEFEPIME HYDROCHLORIDE 2000 MG: 2 INJECTION, POWDER, FOR SOLUTION INTRAVENOUS at 15:25

## 2022-04-07 RX ADMIN — CARBIDOPA AND LEVODOPA 1.5 TABLET: 25; 100 TABLET ORAL at 20:43

## 2022-04-07 RX ADMIN — VALPROIC ACID 250 MG: 250 SOLUTION ORAL at 15:31

## 2022-04-07 RX ADMIN — VANCOMYCIN HYDROCHLORIDE 750 MG: 750 INJECTION, POWDER, LYOPHILIZED, FOR SOLUTION INTRAVENOUS at 13:38

## 2022-04-07 RX ADMIN — SODIUM CHLORIDE, POTASSIUM CHLORIDE, SODIUM LACTATE AND CALCIUM CHLORIDE 500 ML: 600; 310; 30; 20 INJECTION, SOLUTION INTRAVENOUS at 20:17

## 2022-04-07 RX ADMIN — CEFEPIME HYDROCHLORIDE 2000 MG: 2 INJECTION, POWDER, FOR SOLUTION INTRAVENOUS at 03:32

## 2022-04-07 RX ADMIN — CARBIDOPA AND LEVODOPA 1.5 TABLET: 25; 100 TABLET ORAL at 15:24

## 2022-04-07 ASSESSMENT — PAIN SCALES - PAIN ASSESSMENT IN ADVANCED DEMENTIA (PAINAD)
CONSOLABILITY: 0
BODYLANGUAGE: 0
TOTALSCORE: 0
NEGVOCALIZATION: 0
FACIALEXPRESSION: 0
BREATHING: 0

## 2022-04-07 ASSESSMENT — PAIN SCALES - GENERAL
PAINLEVEL_OUTOF10: 0

## 2022-04-07 NOTE — PLAN OF CARE
Problem: Skin Integrity:  Goal: Will show no infection signs and symptoms  Description: Will show no infection signs and symptoms  Outcome: Met This Shift  Goal: Absence of new skin breakdown  Description: Absence of new skin breakdown  Outcome: Met This Shift     Problem: Nutrition  Goal: Optimal nutrition therapy  Outcome: Ongoing

## 2022-04-07 NOTE — PROGRESS NOTES
[facility] Note    Patient Active Problem List   Diagnosis    Down syndrome    Down syndrome    Down syndrome    Dysphagia    Autistic disorder    PEG adjustment, replacement, or removal    Hypotension    Sepsis ([facility] [address]    Acute respiratory failure (HCC)       Past Medical History:   has a past medical history of Anemia, Anorexia, Autistic disorder, Convulsions ([address] Down syndrome, Dysphagia, Hypothyroid, Malnutrition ([address] Parkinson disease ([address] UTI (urinary tract infection), and Weakness. Past Social History:   reports that she has never smoked. She has never used smokeless tobacco. She reports that she does not drink alcohol and does not use drugs. Subjective:  Not able to verbalize effectively, appears baseline    Review of Systems not able to cooperate    Objective:      BP (!) 115/91   Pulse 63   Temp 98.1 °F (36.7 °C) (Temporal)   Resp 17   Ht 4' 11\" (1.499 m)   Wt 82 lb 3.2 oz (37.3 kg)   SpO2 92%   BMI 16.60 kg/m²     Wt Readings from Last 3 Encounters:   04/07/22 82 lb 3.2 oz (37.3 kg)   03/24/22 100 lb (45.4 kg)   10/19/21 76 lb (34.5 kg)       BP Readings from Last 3 Encounters:   04/07/22 (!) 115/91   03/24/22 (!) 160/92   10/19/21 (!) 126/95       Chest- clear  Heart-regular  Abd-soft  Ext- no edema    Labs  Hemoglobin   Date Value Ref Range Status   04/07/2022 11.7 (L) 12.0 - 16.0 g/dL Final     Hematocrit   Date Value Ref Range Status   04/07/2022 35.9 (L) 36.0 - 48.0 % Final     WBC   Date Value Ref Range Status   04/07/2022 9.9 4.0 - 11.0 K/uL Final     Platelets   Date Value Ref Range Status   04/07/2022 104 (L) 135 - 450 K/uL Final     Lab Results   Component Value Date    CREATININE 0.6 04/07/2022    BUN 33 (H) 04/07/2022     (H) 04/07/2022    K 3.2 (L) 04/07/2022     (H) 04/07/2022    CO2 27 04/07/2022       Assessment/Plan:  1. Hypernatremia - unclear onset.   May be due to decrease access to free water.  Highest Na 166 4/6/22. Was 165 4/5. Higher rate of correction. Stop D5W. Give K. Currently NPO, which should increase serum Na. No new neurologic symptoms. 2.  AVELINO due to prerenal azotemia. Better. 3.  BP variable-follow for now. Continue with Hydralazine PRN. Lisinopril started  4. H/O Autism  5. H/O Down Syndrome     High risk. Discussed with Pharmacy and nurse.    Chevy Velazquez MD

## 2022-04-07 NOTE — PROGRESS NOTES
Met with Cam RAMÍREZ in [facility] room 15 as requested by Tracie Chandler for bedside report as Cam RAMÍREZ and other documented staff have been unable to obtain a BP on this pt. This RN unable to establish BP on pt other than a reading of 555 systolic right upper arm. It is noted that BP could not be established by multiple RNs on multiple limbs. Discussed with [address] and was also unable to establish BP (right leg) other than a very high BP that is possibly due to pt continuous tremor. Spoke with [place], Clinical admin and discussed events. Unsure how to proceed with Full code patient with elevated BP numbers as listed or unable to be obtained and determine that pt is stable. Clinical states she will contact with further instruction, pt to remain for now. Left contact number with Sridevi RAMÍREZ and returned to 4t.

## 2022-04-07 NOTE — CARE COORDINATION
Discharge Planning:     (CM) attempted to call patient's Caregiver, Rock Tran, but the phone number listed in chart was not a working number. CM called patient's home phone number ([phone]) and spoke with group home staff, [facility]. Aminata Linder stated that she is also the patient's caregiver and that the patient lives at the following group home:    [facility]. [address] [facility], [address] [facility]      Aminata Linder stated that the patient will be returning back to the group home upon discharge. Aminata Linder reported that patient's other caregiver's contact information is as follows:    Rock Tran  [phone]    CM updated this information in chart. Aminata Linder stated for CM Team to Call [facility] upon discharge to transport patient back to group [place].       Jerald GIRON, [place] [address]  Social Work -   [phone]    Electronically signed by MACK Avendano on 4/7/2022 at 11:48 AM

## 2022-04-07 NOTE — PROGRESS NOTES
Clinical Pharmacy Note: Pharmacy to Dose Vancomcyin    Vancomycin Day: 3  Current Dosing Regimen: 500 mg q12  Dosing Method: Bayesian Modeling    Random: 12.5    Recent Labs     04/06/22  0456 04/07/22  0448   BUN 48* 33*       Recent Labs     04/06/22  0456 04/07/22  0448   CREATININE 0.9 0.6       Recent Labs     04/06/22  0456 04/07/22  0448   WBC 13.9* 9.9         Intake/Output Summary (Last 24 hours) at 4/7/2022 1006  Last data filed at 4/6/2022 2000  Gross per 24 hour   Intake 409.25 ml   Output 100 ml   Net 309.25 ml         Ht Readings from Last 1 Encounters:   04/06/22 4' 11\" (1.499 m)        Wt Readings from Last 1 Encounters:   04/07/22 82 lb 3.2 oz (37.3 kg)         Body mass index is 16.6 kg/m². CrCl cannot be calculated (Unknown ideal weight. ). Assessment/Plan:  Vancomycin level is subtherapeutic. Level was drawn appropriately in respect to last dose given. Increase vancomycin regimen to 750 mg every 12 hours. Bayesian Modeling predicts an AUC of 468 mg/L*hr and trough of 14.2 mg/L. A vancomycin random level has been ordered on 4/8 at 0600 for follow-up. Changes in regimen will be determined based on culture results, renal function, and clinical response. Pharmacy will continue to monitor and adjust regimen as necessary.     Thank you for the consult,    Sunny Mclaughlin, PharmD, [facility]

## 2022-04-07 NOTE — PROGRESS NOTES
Message sent to Joanna Sun   \"pt is here for PNA. pt BP is 83/73 with MAP of 78. Does she need any fluids for that BP? Please advise! Thank you!  Need Callback: NO CALLBACK REQ 5C STEP \"

## 2022-04-07 NOTE — PROGRESS NOTES
Cannot obtain patient blood pressure due to tremors and patient having continual decorticate posturing from [facility]. All 4 extremities were attempted by multiple nurses, including manual and automatic methods.

## 2022-04-07 NOTE — PROCEDURES
Facility/Department: [address]  MODIFIED BARIUM SWALLOW EVALUATION    Patient: Damian Villa   : 1965   MRN: [id number]      Evaluation Date: 2022   Admitting Diagnosis: Acute respiratory failure (HCC) [J96.00]  Hypernatremia [E87.0]  Hypoxia [R09.02]  Elevated troponin [R77.8]  AVELINO (acute kidney injury) ([facility] [address] [N17.9]  Elevated brain natriuretic peptide (BNP) level [R79.89]  Severe sepsis (HCC) [A41.9, R65.20]  Aspiration pneumonia of left lower lobe, unspecified aspiration pneumonia type ([facility] [address] [J69.0]  Treatment Diagnosis: Dysphagia   Pain: Pt did not report pain                      Radiologist: Dr. Inocencia Nelson    Date of Evaluation: 2022  Type of Study: Modified Barium Swallowing Study (MBS)  Diet Prior to Study:  Dysphagia I Pureed with Mildly Thick (Nectar) Liquids      Impression:  Modified Barium Swallow evaluation completed on 2022. Patient presents with moderate-severe oropharyngeal dysphagia secondary to reduced bolus control, oral holding, decreased A-P propulsion, lingual pumping, reduced tongue base retraction, premature bolus loss to pharynx, significantly delayed swallow initiation, and reduced hyolaryngeal excursion resulting in observed aspiration during the swallow with thin liquids and mildly thick liquids. A delayed congested cough was noted post aspiration episodes with mildly thick liquids, difficult to determine if cough was effective in clearing material from trachea. Significant oral holding with reduced A-P propulsion noted with puree, mildly thick liquids, and moderately thick liquids. Pt with head and body tremors/movements during study with Pt turing head from side-to-side partially obstructing view during study and further increasing risk of aspiration. Thin and mildly thick liquids revealed pooling to pyriforms with delayed UES opening noted, improved bolus control assessed with moderately thick liquids.  No aspiration/penetration noted with puree and moderately thick liquid trials. Soft solid trials were not provided due to concern for Pt's ability to masticate and inability to follow commands. Overall, Pt is at risk for aspiration as well as nutritional compromise due to delayed swallow initiation, intermittent reduced oral opening to accept bolus, and need for total feeding assistance. Recommend strict use of aspiration precautions. Aspiration/Penetration Risk:  Increased risk with thin and mildly thick liquids     Recommendations:    Diet Level:   - Dysphagia I Puree  with Moderately (honey) thick liquids    - Recommend calorie count to ensure adequate hydration/nutrition, Pt may need consideration of alternative means of hydration/nutrition  - Medication: Crushed as able in puree     Strategies:  Alternate solids/liquids , Upright as possible with all PO intake , Eat/feed slowly, Total Feed     Treatments: Ongoing dysphagia therapy with SLP to ensure diet tolerance (1-2x)  Goals:  Pt will functionally tolerate recommended diet level with use of recommended compensatory strategies with no s/s of aspiration/penetration     Consistencies given: thin liquids, mildly (nectar) thick liquids , moderately (honey) thick liquids  and puree     Oral Phase  Oral holding  Reduced oral opening  Decreased bolus control   Premature bolus loss   Impaired A-P bolus transport     Pharyngeal Phase  Pharyngeal pooling prior to swallow initiation   Delayed swallow initiation   Decreased hyolaryngeal excursion   Decreased pharyngeal contraction   Decreased pharyngoesophageal segment opening   Decreased tongue base retraction   Mildly reduced pharyngeal clearing  Aspiration with thin and mildly thick liquids, delayed congested cough noted     Penetration/Aspiration Scores across consistencies   CONSISTENCY  Pen/Asp rating Description    Thin   8) Material enters the airway, passes below the vocal folds, and no effort is made to eject     Mildly (nectar) thick   7) Material enters the airway, passes below the vocal folds, and is not ejected from the trachea despite effort    Moderately (honey) thick   1) Material does not enter the airway     Puree   1) Material does not enter the airway     Soft Solid   N/A - consistency not provided due to safety concerns     Regular Solid   N/A - consistency not provided due to safety concerns            Esophageal Phase  Unremarkable    Following Evaluation:  Provided education regarding role of SLP, results of assessment, recommendations and general speech pathology plan of care.    [] Pt verbalized understanding and agreement   [] Pt requires ongoing learning   [x] No evidence of comprehension     Timed Code Treatment: 0 minutes    Total Treatment Time: 30 minutes     Signature:  Donna Lopez M.A., [address] [facility]  Speech-Language Pathologist

## 2022-04-07 NOTE — PROGRESS NOTES
Hospitalist Progress Note      PCP: Murphy Partida MD    Date of Admission: 4/5/2022    Chief Complaint:   SOB,MRDD,    Hospital Course: Marquita Platt is a 60 y.o. female with h/o Down syndrome, Autism, Parkinson's, Seizures, Dysphagia was brought to the ED d/t hypoxia . She was noted by her caregiver sating at hihg 70's . Also was noted to have cough and congestion  She was placed on 2 L o2 . Doesn't use O2 at baseline. She was recently seen at [facility] ED diagnosed with Pneumonia and has been started on Levaquin . She is up todate with COVID vaccination . Labs revealed elevated WBC count 14.3 K . Also showed renal failure with cr 1.6 and hypernatremia  165. Chest CT showed  bronchopneumonia concern for aspiration        Subjective:   Blood pressure high today.   She keeps moving when trying to get a reading,         Medications:  Reviewed    Infusion Medications    sodium chloride 10 mL/hr at 04/06/22 2049     Scheduled Medications    vancomycin  750 mg IntraVENous Q12H    potassium bicarb-citric acid  40 mEq Oral Once    valproic acid  500 mg Oral Nightly    valproic acid  250 mg Oral BID    lisinopril  10 mg Oral Daily    cefepime  2,000 mg IntraVENous Q12H    carbidopa-levodopa  1.5 tablet Oral TID    sertraline  100 mg Oral Nightly    levothyroxine  125 mcg Oral Daily    sodium chloride flush  5-40 mL IntraVENous 2 times per day    enoxaparin  30 mg SubCUTAneous Daily    pantoprazole  40 mg Oral QAM AC     PRN Meds: hydrALAZINE, sodium chloride flush, sodium chloride, ondansetron **OR** ondansetron, polyethylene glycol, acetaminophen **OR** acetaminophen      Intake/Output Summary (Last 24 hours) at 4/7/2022 1838  Last data filed at 4/6/2022 2000  Gross per 24 hour   Intake --   Output 100 ml   Net -100 ml       Physical Exam Performed:    BP (!) 115/91   Pulse 60   Temp 98.4 °F (36.9 °C) (Temporal)   Resp 16   Ht 4' 11\" (1.499 m)   Wt 82 lb 3.2 oz (37.3 kg)   SpO2 98%   BMI 16.60 kg/m²     General appearance: No apparent distress, appears stated age and cooperative. She is agitated  HEENT: Pupils equal, round, and reactive to light. Conjunctivae/corneas clear. Neck: Supple, with full range of motion. No jugular venous distention. Trachea midline. Respiratory:  Shallow breaths and not a lot of air movement . Cardiovascular: Regular rate and rhythm with normal S1/S2 without murmurs, rubs or gallops. Abdomen: Soft, non-tender, non-distended with normal bowel sounds. Musculoskeletal: No clubbing, cyanosis or edema bilaterally. Full range of motion without deformity. Skin: Skin color, texture, turgor normal.  No rashes or lesions. Neurologic:  She moves but is somewhat contracted in the upper extremities. [facility]: MRDD  Capillary Refill: Brisk,3 seconds, normal   Peripheral Pulses: +2 palpable, equal bilaterally       Labs:   Recent Labs     04/05/22  1220 04/06/22  0456 04/07/22  0448   WBC 14.3* 13.9* 9.9   HGB 15.8 13.3 11.7*   HCT 48.9* 41.2 35.9*    97* 104*     Recent Labs     04/05/22  1220 04/06/22  0011 04/06/22  0456 04/06/22  0851 04/07/22  0448 04/07/22  0835 04/07/22  1338   *   < > 166*   < > 149* 147* 147*   K 4.1  --  3.7  --  3.2*  --   --    *  --  128*  --  115*  --   --    CO2 27  --  28  --  27  --   --    BUN 60*  --  48*  --  33*  --   --    CREATININE 1.6*  --  0.9  --  0.6  --   --    CALCIUM 9.6  --  8.8  --  8.2*  --   --     < > = values in this interval not displayed. Recent Labs     04/05/22  1220   AST 23   ALT 7*   BILITOT 0.5   ALKPHOS 71     No results for input(s): INR in the last 72 hours.   Recent Labs     04/05/22  1220   TROPONINI 0.14*       Urinalysis:      Lab Results   Component Value Date    NITRU Negative 04/05/2022    WBCUA 17 04/05/2022    BACTERIA 4+ 01/23/2018    RBCUA 26 04/05/2022    BLOODU TRACE-INTACT 04/05/2022    SPECGRAV >=1.030 04/05/2022    GLUCOSEU Negative 04/05/2022       Radiology:  Fluoroscopy modified barium swallow with video   Final Result   Kyle aspiration with the nectar barium. Deep laryngeal penetration with the thin barium. Please see separate speech pathology report for full discussion of findings   and recommendations. CT CHEST WO CONTRAST   Final Result   Tree-in-bud opacities with consolidation in the left lower lobe, consistent   with bronchopneumonia, most likely aspiration etiology. Secretions are noted   in left lower lobe bronchi. XR CHEST PORTABLE   Final Result   No acute process. Previously described right infrahilar opacity not evident. Assessment/Plan:    Active Hospital Problems    Diagnosis     Acute respiratory failure ([facility] [address] [J96.00]      Hypernatremia   Started on d5 today and nephro is following  Cont to monitor sodium every 4 hrs  Goal is for slow correction    Nephrology has been consulted   Sodium continues to improve.     Sepsis and aspiration Pneumonia poa  With Tachycardia, leucocytosis and lactic acidosis   Cultures pending   Cont on IV Vanc and cefepime  Will keep NPO, SLP eval     HTN   - I have added some medications for BP    DVT Prophylaxis: lovenox  Diet: ADULT ORAL NUTRITION SUPPLEMENT; Lunch, Dinner; Fortified Pudding Oral Supplement  ADULT DIET; Dysphagia - Pureed; Moderately Thick (Honey)  Code Status: Full Code    PT/OT Eval Status:  eval and treat    Dispo - she is nearing her baseline and can likely d/c in the next 24-48 hours.     Cori Noel MD

## 2022-04-08 LAB
ANION GAP SERPL CALCULATED.3IONS-SCNC: 10 MMOL/L (ref 3–16)
ANION GAP SERPL CALCULATED.3IONS-SCNC: 10 MMOL/L (ref 3–16)
ANION GAP SERPL CALCULATED.3IONS-SCNC: 11 MMOL/L (ref 3–16)
BUN BLDV-MCNC: 17 MG/DL (ref 7–20)
BUN BLDV-MCNC: 19 MG/DL (ref 7–20)
BUN BLDV-MCNC: 23 MG/DL (ref 7–20)
CALCIUM SERPL-MCNC: 7.9 MG/DL (ref 8.3–10.6)
CALCIUM SERPL-MCNC: 8.4 MG/DL (ref 8.3–10.6)
CALCIUM SERPL-MCNC: 8.7 MG/DL (ref 8.3–10.6)
CHLORIDE BLD-SCNC: 110 MMOL/L (ref 99–110)
CHLORIDE BLD-SCNC: 112 MMOL/L (ref 99–110)
CHLORIDE BLD-SCNC: 114 MMOL/L (ref 99–110)
CO2: 20 MMOL/L (ref 21–32)
CO2: 22 MMOL/L (ref 21–32)
CO2: 23 MMOL/L (ref 21–32)
CREAT SERPL-MCNC: 0.5 MG/DL (ref 0.6–1.1)
CREAT SERPL-MCNC: <0.5 MG/DL (ref 0.6–1.1)
CREAT SERPL-MCNC: <0.5 MG/DL (ref 0.6–1.1)
GFR AFRICAN AMERICAN: >60
GFR NON-AFRICAN AMERICAN: >60
GLUCOSE BLD-MCNC: 64 MG/DL (ref 70–99)
GLUCOSE BLD-MCNC: 77 MG/DL (ref 70–99)
GLUCOSE BLD-MCNC: 79 MG/DL (ref 70–99)
GLUCOSE BLD-MCNC: 85 MG/DL (ref 70–99)
HCT VFR BLD CALC: 27.2 % (ref 36–48)
HEMOGLOBIN: 8.5 G/DL (ref 12–16)
LACTIC ACID: 1.3 MMOL/L (ref 0.4–2)
MCH RBC QN AUTO: 34.1 PG (ref 26–34)
MCHC RBC AUTO-ENTMCNC: 31.4 G/DL (ref 31–36)
MCV RBC AUTO: 108.6 FL (ref 80–100)
PDW BLD-RTO: 13.3 % (ref 12.4–15.4)
PERFORMED ON: NORMAL
PLATELET # BLD: 81 K/UL (ref 135–450)
PMV BLD AUTO: 11.6 FL (ref 5–10.5)
POTASSIUM REFLEX MAGNESIUM: 3.6 MMOL/L (ref 3.5–5.1)
POTASSIUM SERPL-SCNC: 3.4 MMOL/L (ref 3.5–5.1)
POTASSIUM SERPL-SCNC: 3.9 MMOL/L (ref 3.5–5.1)
RBC # BLD: 2.5 M/UL (ref 4–5.2)
SODIUM BLD-SCNC: 142 MMOL/L (ref 136–145)
SODIUM BLD-SCNC: 143 MMOL/L (ref 136–145)
SODIUM BLD-SCNC: 145 MMOL/L (ref 136–145)
SODIUM BLD-SCNC: 145 MMOL/L (ref 136–145)
VANCOMYCIN RANDOM: 18.1 UG/ML
WBC # BLD: 5.5 K/UL (ref 4–11)

## 2022-04-08 PROCEDURE — P9047 ALBUMIN (HUMAN), 25%, 50ML: HCPCS | Performed by: HOSPITALIST

## 2022-04-08 PROCEDURE — 6360000002 HC RX W HCPCS: Performed by: HOSPITALIST

## 2022-04-08 PROCEDURE — 36415 COLL VENOUS BLD VENIPUNCTURE: CPT

## 2022-04-08 PROCEDURE — 6360000002 HC RX W HCPCS: Performed by: INTERNAL MEDICINE

## 2022-04-08 PROCEDURE — 2580000003 HC RX 258: Performed by: FAMILY MEDICINE

## 2022-04-08 PROCEDURE — 6370000000 HC RX 637 (ALT 250 FOR IP): Performed by: INTERNAL MEDICINE

## 2022-04-08 PROCEDURE — 83605 ASSAY OF LACTIC ACID: CPT

## 2022-04-08 PROCEDURE — 6370000000 HC RX 637 (ALT 250 FOR IP): Performed by: FAMILY MEDICINE

## 2022-04-08 PROCEDURE — 2580000003 HC RX 258: Performed by: INTERNAL MEDICINE

## 2022-04-08 PROCEDURE — 80048 BASIC METABOLIC PNL TOTAL CA: CPT

## 2022-04-08 PROCEDURE — 85027 COMPLETE CBC AUTOMATED: CPT

## 2022-04-08 PROCEDURE — 6370000000 HC RX 637 (ALT 250 FOR IP): Performed by: HOSPITALIST

## 2022-04-08 PROCEDURE — 6360000002 HC RX W HCPCS: Performed by: FAMILY MEDICINE

## 2022-04-08 PROCEDURE — 6360000002 HC RX W HCPCS: Performed by: NURSE PRACTITIONER

## 2022-04-08 PROCEDURE — 84295 ASSAY OF SERUM SODIUM: CPT

## 2022-04-08 PROCEDURE — 1200000000 HC SEMI PRIVATE

## 2022-04-08 PROCEDURE — P9047 ALBUMIN (HUMAN), 25%, 50ML: HCPCS | Performed by: NURSE PRACTITIONER

## 2022-04-08 PROCEDURE — 2580000003 HC RX 258: Performed by: HOSPITALIST

## 2022-04-08 PROCEDURE — 92526 ORAL FUNCTION THERAPY: CPT

## 2022-04-08 PROCEDURE — 80202 ASSAY OF VANCOMYCIN: CPT

## 2022-04-08 RX ORDER — VALPROIC ACID 250 MG/5ML
250 SOLUTION ORAL EVERY 8 HOURS SCHEDULED
COMMUNITY

## 2022-04-08 RX ORDER — 0.9 % SODIUM CHLORIDE 0.9 %
500 INTRAVENOUS SOLUTION INTRAVENOUS ONCE
Status: COMPLETED | OUTPATIENT
Start: 2022-04-08 | End: 2022-04-08

## 2022-04-08 RX ORDER — MIDODRINE HYDROCHLORIDE 5 MG/1
5 TABLET ORAL
Status: DISCONTINUED | OUTPATIENT
Start: 2022-04-08 | End: 2022-04-08

## 2022-04-08 RX ORDER — CALCIUM GLUCONATE 20 MG/ML
2000 INJECTION, SOLUTION INTRAVENOUS ONCE
Status: COMPLETED | OUTPATIENT
Start: 2022-04-08 | End: 2022-04-08

## 2022-04-08 RX ORDER — ALBUMIN (HUMAN) 12.5 G/50ML
25 SOLUTION INTRAVENOUS ONCE
Status: COMPLETED | OUTPATIENT
Start: 2022-04-08 | End: 2022-04-08

## 2022-04-08 RX ORDER — POTASSIUM CHLORIDE 7.45 MG/ML
10 INJECTION INTRAVENOUS
Status: COMPLETED | OUTPATIENT
Start: 2022-04-08 | End: 2022-04-08

## 2022-04-08 RX ORDER — LEVOTHYROXINE SODIUM 0.1 MG/1
100 TABLET ORAL DAILY
Status: DISCONTINUED | OUTPATIENT
Start: 2022-04-09 | End: 2022-04-09 | Stop reason: HOSPADM

## 2022-04-08 RX ORDER — MIDODRINE HYDROCHLORIDE 5 MG/1
5 TABLET ORAL ONCE
Status: COMPLETED | OUTPATIENT
Start: 2022-04-08 | End: 2022-04-08

## 2022-04-08 RX ORDER — MIDODRINE HYDROCHLORIDE 5 MG/1
2.5 TABLET ORAL
Status: DISCONTINUED | OUTPATIENT
Start: 2022-04-08 | End: 2022-04-09 | Stop reason: HOSPADM

## 2022-04-08 RX ADMIN — SODIUM CHLORIDE 500 ML: 9 INJECTION, SOLUTION INTRAVENOUS at 04:37

## 2022-04-08 RX ADMIN — VANCOMYCIN HYDROCHLORIDE 750 MG: 750 INJECTION, POWDER, LYOPHILIZED, FOR SOLUTION INTRAVENOUS at 00:08

## 2022-04-08 RX ADMIN — CARBIDOPA AND LEVODOPA 1.5 TABLET: 25; 100 TABLET ORAL at 21:03

## 2022-04-08 RX ADMIN — CALCIUM GLUCONATE 2000 MG: 20 INJECTION, SOLUTION INTRAVENOUS at 00:33

## 2022-04-08 RX ADMIN — CEFEPIME HYDROCHLORIDE 2000 MG: 2 INJECTION, POWDER, FOR SOLUTION INTRAVENOUS at 15:27

## 2022-04-08 RX ADMIN — POTASSIUM BICARBONATE 40 MEQ: 782 TABLET, EFFERVESCENT ORAL at 11:25

## 2022-04-08 RX ADMIN — Medication 10 ML: at 08:24

## 2022-04-08 RX ADMIN — ALBUMIN (HUMAN) 25 G: 0.25 INJECTION, SOLUTION INTRAVENOUS at 04:34

## 2022-04-08 RX ADMIN — MIDODRINE HYDROCHLORIDE 2.5 MG: 5 TABLET ORAL at 17:02

## 2022-04-08 RX ADMIN — MIDODRINE HYDROCHLORIDE 2.5 MG: 5 TABLET ORAL at 11:26

## 2022-04-08 RX ADMIN — VALPROIC ACID 250 MG: 250 SOLUTION ORAL at 08:23

## 2022-04-08 RX ADMIN — SERTRALINE 100 MG: 50 TABLET, FILM COATED ORAL at 21:03

## 2022-04-08 RX ADMIN — CEFEPIME HYDROCHLORIDE 2000 MG: 2 INJECTION, POWDER, FOR SOLUTION INTRAVENOUS at 02:41

## 2022-04-08 RX ADMIN — MIDODRINE HYDROCHLORIDE 5 MG: 5 TABLET ORAL at 04:28

## 2022-04-08 RX ADMIN — VALPROIC ACID 500 MG: 250 SOLUTION ORAL at 21:03

## 2022-04-08 RX ADMIN — ENOXAPARIN SODIUM 30 MG: 30 INJECTION SUBCUTANEOUS at 08:23

## 2022-04-08 RX ADMIN — CARBIDOPA AND LEVODOPA 1.5 TABLET: 25; 100 TABLET ORAL at 08:23

## 2022-04-08 RX ADMIN — POTASSIUM CHLORIDE 10 MEQ: 7.46 INJECTION, SOLUTION INTRAVENOUS at 13:01

## 2022-04-08 RX ADMIN — VALPROIC ACID 250 MG: 250 SOLUTION ORAL at 13:00

## 2022-04-08 RX ADMIN — LEVOTHYROXINE SODIUM 125 MCG: 0.12 TABLET ORAL at 06:57

## 2022-04-08 RX ADMIN — CARBIDOPA AND LEVODOPA 1.5 TABLET: 25; 100 TABLET ORAL at 12:59

## 2022-04-08 RX ADMIN — ALBUMIN (HUMAN) 25 G: 0.25 INJECTION, SOLUTION INTRAVENOUS at 02:39

## 2022-04-08 RX ADMIN — PANTOPRAZOLE SODIUM 40 MG: 40 TABLET, DELAYED RELEASE ORAL at 06:57

## 2022-04-08 RX ADMIN — POTASSIUM CHLORIDE 10 MEQ: 7.46 INJECTION, SOLUTION INTRAVENOUS at 11:44

## 2022-04-08 ASSESSMENT — PAIN SCALES - PAIN ASSESSMENT IN ADVANCED DEMENTIA (PAINAD)
BREATHING: 0
TOTALSCORE: 0
NEGVOCALIZATION: 0
TOTALSCORE: 0
BODYLANGUAGE: 0
BREATHING: 0
NEGVOCALIZATION: 0
CONSOLABILITY: 0
CONSOLABILITY: 0
NEGVOCALIZATION: 0
BODYLANGUAGE: 0
FACIALEXPRESSION: 0
FACIALEXPRESSION: 0
CONSOLABILITY: 0
FACIALEXPRESSION: 0
TOTALSCORE: 0
BREATHING: 0
BODYLANGUAGE: 0

## 2022-04-08 ASSESSMENT — PAIN SCALES - GENERAL
PAINLEVEL_OUTOF10: 0
PAINLEVEL_OUTOF10: 0

## 2022-04-08 NOTE — PROGRESS NOTES
Report given to Chadd Shen RN. Will gather patient belongings and have patient transferred to [phone].

## 2022-04-08 NOTE — PLAN OF CARE
Problem: Skin Integrity:  Goal: Will show no infection signs and symptoms  Description: Will show no infection signs and symptoms  4/8/2022 0913 by Cedrick Beckham RN  Outcome: Ongoing  4/7/2022 2125 by Nicolasa Habermann, RN  Outcome: Ongoing     Problem: Nutrition  Goal: Optimal nutrition therapy  4/8/2022 0913 by Cedrick Beckham RN  Outcome: Ongoing  4/8/2022 0849 by Dc Pedroza RD, LD  Outcome: Ongoing  4/7/2022 2125 by Nicolasa Habermann, RN  Outcome: Ongoing

## 2022-04-08 NOTE — PROGRESS NOTES
Message sent to APRN: \"her lab results are back. pt recent BP is 86/56 map of 65. Please check! Thank you! \"

## 2022-04-08 NOTE — PLAN OF CARE
Problem: Skin Integrity:  Goal: Will show no infection signs and symptoms  Description: Will show no infection signs and symptoms  4/7/2022 2125 by Claude Lopez RN  Outcome: Ongoing    Goal: Absence of new skin breakdown  Description: Absence of new skin breakdown  4/7/2022 2125 by Claude Lopez RN  Outcome: Ongoing       Problem: Nutrition  Goal: Optimal nutrition therapy  4/7/2022 2125 by Claude Lopez RN  Outcome: Ongoing

## 2022-04-08 NOTE — PLAN OF CARE
Nutrition Problem #1: Inadequate protein-energy intake  Intervention: Food and/or Nutrient Delivery: Continue Current Diet,Continue Oral Nutrition Supplement  Nutritional Goals: Pt will consume at least 50% of meals and supplements

## 2022-04-08 NOTE — PROGRESS NOTES
Assessment completed, see doc flowsheets. Pt is A&Ox4. Lung sounds are crackles, rhonchi. VSS. Tele on. Medication given per [facility]. Patient has no needs at this time. Call light within in reach, will continue to monitor.

## 2022-04-08 NOTE — PROGRESS NOTES
Pharmacy Medication History Note      List of current medications patient is taking is complete. Source of information: fill hx    Changes made to medication list:    Medications removed (include reason, ex. therapy complete or physician discontinued):  Milk of magnesium  Trihexyphenidyl  Calcium carbonate    Medications added/doses adjusted:  Levothyroxine dose adjusted to 100 mcg QD  Outpatient sertraline formulation changed to concentrated solution  Outpatient valproic acid acid changed to oral solution    Last dose times updated.      Brigette Dockery, PharmD, [facility]

## 2022-04-08 NOTE — PROGRESS NOTES
Cross cover notified that patient remains hypotensive after fluid bolus and calcium replacement. Additional albumin ordered. If patient remains hypotensive after albumin, patient may need transferred to ICU for pressor support.      Marissa Kinney, APRN - CNP

## 2022-04-08 NOTE — PROGRESS NOTES
Clinical Pharmacy Note: Pharmacy to Dose Vancomcyin    Vancomycin Day: 4  Current Dosing Regimen: 750 mg q 12  Dosing Method: Bayesian Modeling    Random: 18.1    Recent Labs     04/07/22  2328 04/08/22  0514   BUN 23* 19       Recent Labs     04/07/22  2328 04/08/22  0514   CREATININE <0.5* 0.5*       Recent Labs     04/07/22  0448 04/08/22  0514   WBC 9.9 5.5         Intake/Output Summary (Last 24 hours) at 4/8/2022 0749  Last data filed at 4/7/2022 1800  Gross per 24 hour   Intake 60 ml   Output --   Net 60 ml         Ht Readings from Last 1 Encounters:   04/06/22 4' 11\" (1.499 m)        Wt Readings from Last 1 Encounters:   04/08/22 81 lb (36.7 kg)         Body mass index is 16.36 kg/m². CrCl cannot be calculated (Unknown ideal weight. ). Assessment/Plan:  Vancomycin level is therapeutic. Level was drawn appropriately in respect to last dose given. Continue vancomycin regimen to 750 mg every 12 hours. Bayesian Modeling predicts an AUC of 409 mg/L*hr and trough of 11.8 mg/L. No follow up levels ordered. MRSA nasal resulted as negative. Consider de-escalation. Changes in regimen will be determined based on culture results, renal function, and clinical response. Pharmacy will continue to monitor and adjust regimen as necessary.     Thank you for the consult,    Orville Anne, PharmD, [facility]

## 2022-04-08 NOTE — PROGRESS NOTES
Shift assessment completed. VSS. NSB on telemetry and blood pressures still running low. Will notify hospitalist. Patient tolerating current diet and tolerated medications crushed in applesauce well. Patient turned and repositioned in bed. Will continue to monitor.

## 2022-04-08 NOTE — PROGRESS NOTES
Nutrition Note    RECOMMENDATIONS  1. PO Diet: PO diet as per SLP  2. ONS: Continue to offer Boost pudding BID  3. Nutrition Support: If aggressive care is desired, may need to consider initiating long-term nutrition support. NUTRITION ASSESSMENT   Pt with poor PO intake on a dysphagia pureed diet with moderately (honey) thick liquids, consuming 1-25% of most meals. Receiving Boost pudding BID for additional nutrition. Given low BMI 16.36, suspect pt's appetite and PO intake may be chronically poor. May need to consider access for long term nutrition support if aggressive care is desired.  Nutrition Related Findings: Na+ 145. K+ 3.4. LR at 75 mL per hour. LBM 4/5. No edema noted. Total feed.  Wounds: None   Nutrition Education:  Education not indicated    Nutrition Goals: Pt will consume at least 50% of meals and supplements     MALNUTRITION ASSESSMENT   Malnutrition Status: Insufficient data    NUTRITION DIAGNOSIS   · Inadequate protein-energy intake related to inadequate protein-energy intake as evidenced by intake 0-25%    CURRENT NUTRITION THERAPIES  ADULT ORAL NUTRITION SUPPLEMENT; Lunch, Dinner; Fortified Pudding Oral Supplement  ADULT DIET; Dysphagia - Pureed; Moderately Thick (Honey)     PO Intake: 1-25%   PO Supplement Intake:Unable to assess    ANTHROPOMETRICS   Current Height: 4' 11\" (149.9 cm)   Current Weight: 81 lb (36.7 kg)     Admission weight: 72 lb 5 oz (32.8 kg)   Ideal Body Weight (IBW): 95 lbs  (43 kg)        BMI: 16.3    COMPARATIVE STANDARDS  Energy (kcal):  990-1155     Protein (g):  50-66 grams       Fluid (ml/day):  1155 mL    The patient will be monitored per nutrition standards of care. Consult dietitian if additional nutrition interventions are needed prior to RD reassessment.      Amber Jc, [address] [place] [address],     Contact: [phone]

## 2022-04-08 NOTE — PROGRESS NOTES
Hospitalist Progress Note      PCP: Alena Magaña MD    Date of Admission: 4/5/2022    Chief Complaint:   SOB,MRDD,    Hospital Course: Fei Morejon is a 60 y.o. female with h/o Down syndrome, Autism, Parkinson's, Seizures, Dysphagia was brought to the ED d/t hypoxia . She was noted by her caregiver sating at hihg 70's . Also was noted to have cough and congestion  She was placed on 2 L o2 . Doesn't use O2 at baseline. She was recently seen at [facility] ED diagnosed with Pneumonia and has been started on Levaquin . She is up todate with COVID vaccination . Labs revealed elevated WBC count 14.3 K . Also showed renal failure with cr 1.6 and hypernatremia  165. Chest CT showed  bronchopneumonia concern for aspiration        Subjective:   Blood pressure lower today        Medications:  Reviewed    Infusion Medications    sodium chloride 10 mL/hr at 04/06/22 2049     Scheduled Medications    midodrine  2.5 mg Oral TID WC    [START ON 4/9/2022] levothyroxine  100 mcg Oral Daily    valproic acid  500 mg Oral Nightly    valproic acid  250 mg Oral BID    cefepime  2,000 mg IntraVENous Q12H    carbidopa-levodopa  1.5 tablet Oral TID    sertraline  100 mg Oral Nightly    sodium chloride flush  5-40 mL IntraVENous 2 times per day    enoxaparin  30 mg SubCUTAneous Daily    pantoprazole  40 mg Oral QAM AC     PRN Meds: hydrALAZINE, sodium chloride flush, sodium chloride, ondansetron **OR** ondansetron, polyethylene glycol, acetaminophen **OR** acetaminophen    No intake or output data in the 24 hours ending 04/08/22 1826    Physical Exam Performed:    BP (!) 98/50   Pulse 58   Temp 97.4 °F (36.3 °C) (Axillary)   Resp 18   Ht 4' 11\" (1.499 m)   Wt 81 lb (36.7 kg)   SpO2 92%   BMI 16.36 kg/m²     General appearance: No apparent distress, appears stated age and cooperative. She is agitated  HEENT: Pupils equal, round, and reactive to light. Conjunctivae/corneas clear.   Neck: Supple, with full range of motion. No jugular venous distention. Trachea midline. Respiratory:  Shallow breaths and not a lot of air movement . Cardiovascular: Regular rate and rhythm with normal S1/S2 without murmurs, rubs or gallops. Abdomen: Soft, non-tender, non-distended with normal bowel sounds. Musculoskeletal: No clubbing, cyanosis or edema bilaterally. Full range of motion without deformity. Skin: Skin color, texture, turgor normal.  No rashes or lesions. Neurologic:  She moves but is somewhat contracted in the upper extremities. [facility] MR[facility]  Capillary Refill: Brisk,3 seconds, normal   Peripheral Pulses: +2 palpable, equal bilaterally       Labs:   Recent Labs     04/06/22  0456 04/06/22  0456 04/07/22 0448 04/07/22 2328 04/08/22  0514   WBC 13.9*  --  9.9  --  5.5   HGB 13.3   < > 11.7* 10.3* 8.5*   HCT 41.2   < > 35.9* 32.3* 27.2*   PLT 97*  --  104*  --  81*    < > = values in this interval not displayed. Recent Labs     04/07/22 0448 04/07/22  0835 04/07/22 2328 04/08/22  0514 04/08/22  0910   *   < > 142 145 143   K 3.2*  --  3.6 3.4*  --    *  --  110 114*  --    CO2 27  --  22 20*  --    BUN 33*  --  23* 19  --    CREATININE 0.6  --  <0.5* 0.5*  --    CALCIUM 8.2*  --  7.9* 8.4  --     < > = values in this interval not displayed. No results for input(s): AST, ALT, BILIDIR, BILITOT, ALKPHOS in the last 72 hours. No results for input(s): INR in the last 72 hours. No results for input(s): Marlen Padmini in the last 72 hours. Urinalysis:      Lab Results   Component Value Date    NITRU Negative 04/05/2022    WBCUA 17 04/05/2022    BACTERIA 4+ 01/23/2018    RBCUA 26 04/05/2022    BLOODU TRACE-INTACT 04/05/2022    SPECGRAV >=1.030 04/05/2022    GLUCOSEU Negative 04/05/2022       Radiology:  Fluoroscopy modified barium swallow with video   Final Result   Kyle aspiration with the nectar barium. Deep laryngeal penetration with the thin barium.       Please see separate speech

## 2022-04-08 NOTE — PROGRESS NOTES
[facility] Note    Patient Active Problem List   Diagnosis    Down syndrome    Down syndrome    Down syndrome    Dysphagia    Autistic disorder    PEG adjustment, replacement, or removal    Hypotension    Sepsis ([facility]    Acute respiratory failure (HCC)       Past Medical History:   has a past medical history of Anemia, Anorexia, Autistic disorder, Convulsions ([address] Down syndrome, Dysphagia, Hypothyroid, Malnutrition ([address] Parkinson disease ([address] UTI (urinary tract infection), and Weakness. Past Social History:   reports that she has never smoked. She has never used smokeless tobacco. She reports that she does not drink alcohol and does not use drugs. Subjective:  Not able to verbalize effectively, appears baseline    Review of Systems not able to cooperate    Objective:      BP (!) 120/108   Pulse 50   Temp 96.8 °F (36 °C) (Temporal)   Resp 19   Ht 4' 11\" (1.499 m)   Wt 81 lb (36.7 kg)   SpO2 94%   BMI 16.36 kg/m²     Wt Readings from Last 3 Encounters:   04/08/22 81 lb (36.7 kg)   03/24/22 100 lb (45.4 kg)   10/19/21 76 lb (34.5 kg)       BP Readings from Last 3 Encounters:   04/08/22 (!) 120/108   03/24/22 (!) 160/92   10/19/21 (!) 126/95       Chest- clear  Heart-regular  Abd-soft  Ext- no edema    Labs  Hemoglobin   Date Value Ref Range Status   04/08/2022 8.5 (L) 12.0 - 16.0 g/dL Final     Hematocrit   Date Value Ref Range Status   04/08/2022 27.2 (L) 36.0 - 48.0 % Final     WBC   Date Value Ref Range Status   04/08/2022 5.5 4.0 - 11.0 K/uL Final     Platelets   Date Value Ref Range Status   04/08/2022 81 (L) 135 - 450 K/uL Final     Lab Results   Component Value Date    CREATININE 0.5 (L) 04/08/2022    BUN 19 04/08/2022     04/08/2022    K 3.4 (L) 04/08/2022     (H) 04/08/2022    CO2 20 (L) 04/08/2022       Assessment/Plan:  1. Hypernatremia - unclear onset. May be due to decrease access to free water.   Highest Na 166 4/6/22. Was 165 4/5. Now past 48 hours. Higher rate of correction but has slowed. Give K, total of 60meq, should increase Na too. No new neurologic symptoms. Goal Na around 145 to 150 for the next 24 hours. No 3% saline for now. 2.  AVELINO due to prerenal azotemia. Better. 3.  BP variable-follow for now. Midodrine started. 4.  H/O Autism  5. H/O Down Syndrome   6. Hypokalemia -replace aggressively with low serum HCO3 and Na. Repeat bmp at 1800. High risk.   Discussed with Pharmacy    Miguelina Batista MD

## 2022-04-08 NOTE — PROGRESS NOTES
Facility/Department: [address]  Speech Language Pathology   Dysphagia Treatment Note    Patient: An Teague   : 1965   MRN: [id number]      Evaluation Date: 2022      Admitting Dx: Acute respiratory failure ([facility] [J96.00]  Hypernatremia [E87.0]  Hypoxia [R09.02]  Elevated troponin [R77.8]  AVELINO (acute kidney injury) ([facility] [address] [N17.9]  Elevated brain natriuretic peptide (BNP) level [R79.89]  Severe sepsis (HCC) [A41.9, R65.20]  Aspiration pneumonia of left lower lobe, unspecified aspiration pneumonia type ([facility] [address] [J69.0]  Treatment Diagnosis: Oropharyngeal Dysphagia   Pain: Unable to report. No nonverbal indicators of pain or discomfort. Modified Barium Swallow Study (2022): Modified Barium Swallow evaluation completed on 2022. Patient presents with moderate-severe oropharyngeal dysphagia secondary to reduced bolus control, oral holding, decreased A-P propulsion, lingual pumping, reduced tongue base retraction, premature bolus loss to pharynx, significantly delayed swallow initiation, and reduced hyolaryngeal excursion resulting in observed aspiration during the swallow with thin liquids and mildly thick liquids. A delayed congested cough was noted post aspiration episodes with mildly thick liquids, difficult to determine if cough was effective in clearing material from trachea. Significant oral holding with reduced A-P propulsion noted with puree, mildly thick liquids, and moderately thick liquids. Pt with head and body tremors/movements during study with Pt turing head from side-to-side partially obstructing view during study and further increasing risk of aspiration. Thin and mildly thick liquids revealed pooling to pyriforms with delayed UES opening noted, improved bolus control assessed with moderately thick liquids. No aspiration/penetration noted with puree and moderately thick liquid trials.  Soft solid trials were not provided due to concern for Pt's ability to masticate and inability to follow commands. Overall, Pt is at risk for aspiration as well as nutritional compromise due to delayed swallow initiation, intermittent reduced oral opening to accept bolus, and need for total feeding assistance. Recommend strict use of aspiration precautions. Diet and Treatment Recommendations 4/8/2022:  Diet Solids Recommendation:  Dysphagia I Puree  Liquid Consistency Recommendation:  Moderately (honey) thick liquids    Straws okay Recommended form of Meds:  Crushed as able in puree    *IF a decline in respiratory status occurs, downgrade to NPO and consult SLP for re-evaluation. Compensatory strategies: Alternate solids/liquids, Upright as possible with all PO intake, Small bites/sips, Eat/feed slowly, Total Feed    Assessment of Texture Tolerance:  Diet level prior to treatment: Dysphagia I Puree  with Moderately (honey) thick liquids   Tolerance of Current Diet Level: Per chart, no noted difficulty with current diet level. RN reported pt appears to be tolerating current diet level. Impressions: Pt was positioned upright in bed, awake and alert. Currently on room air. Trials of moderately (honey) thick liquids via cup/straw and puree were provided to assess swallow function. Oral phase characterized by limited oral acceptance, negatively impacted by lingual cupping, with no anterior loss. Pt adequately clearing the oral cavity. No overt clinical s/s of aspiration noted. Pt with improved oral intake from straw, demonstrating oral holding prior to swallow initiation. Pt overall appears to be tolerating the current diet level. Pt demonstrates increased risk for aspiration due to cognitive state, co morbidities, respiratory status, inability to self feed, and her prior hx of dysphagia. Based on today's assessment recommend Dysphagia I Puree with Moderately (honey) thick liquids (straws okay), and medications crushed as able in puree with use of compensatory swallow strategies (see below).  IF a decline in respiratory status occurs, downgrade to NPO and consult SLP for re-evaluation. Dysphagia Goals: Speech therapy for dysphagia tx 3-5 times per week during acute care stay. 1) Pt will functionally tolerate recommended diet with no overt clinical s/s of aspiration.   (ongoing 4/8/2022)   2) Pt will functionally tolerate ongoing assessment of swallow function with diet to be determined as indicated. (ongoing 4/8/2022)   3) Pt/caregiver will demonstrate understanding of aspiration risk and precautions via education/demonstration with occasional prompting.   (ongoing 4/8/2022)   4) Pt will advance to least restrictive diet as indicated. (ongoing 4/8/2022)   5) Pt will participate in Modified Barium swallow study to further assess pharyngeal phase of swallow, assist with diet recommendations and to further direct plan of care. GOAL MET 04/07/2022    Patient/Family Education:  Provided education regarding role of SLP, recommendations and general speech pathology plan of care. [] Pt verbalized understanding and agreement   [] Pt requires ongoing learning   [x] No evidence of comprehension     Discharge Recommendations: Discharge recommendations to be determined pending ongoing follow-up during acute care stay    Timed Code Treatment: 0 minutes    Total Treatment Time: 16 minutes    If patient discharges prior to next session this note will serve as a discharge summary. Signature:  Mikey Gonsalez, [facility]  Speech-Language Pathologist    The speech-language pathologist was present, directed the patient's care, made skilled judgment and was responsible for assessment and treatment.     Clearance JEANETTE Fan  Clinician

## 2022-04-09 VITALS
HEIGHT: 59 IN | RESPIRATION RATE: 19 BRPM | DIASTOLIC BLOOD PRESSURE: 58 MMHG | HEART RATE: 96 BPM | SYSTOLIC BLOOD PRESSURE: 93 MMHG | TEMPERATURE: 98.5 F | OXYGEN SATURATION: 90 % | WEIGHT: 77.4 LBS | BODY MASS INDEX: 15.6 KG/M2

## 2022-04-09 LAB
ANION GAP SERPL CALCULATED.3IONS-SCNC: 11 MMOL/L (ref 3–16)
BUN BLDV-MCNC: 16 MG/DL (ref 7–20)
CALCIUM SERPL-MCNC: 8.5 MG/DL (ref 8.3–10.6)
CHLORIDE BLD-SCNC: 111 MMOL/L (ref 99–110)
CO2: 21 MMOL/L (ref 21–32)
CREAT SERPL-MCNC: 0.5 MG/DL (ref 0.6–1.1)
GFR AFRICAN AMERICAN: >60
GFR NON-AFRICAN AMERICAN: >60
GLUCOSE BLD-MCNC: 77 MG/DL (ref 70–99)
GLUCOSE BLD-MCNC: 78 MG/DL (ref 70–99)
HCT VFR BLD CALC: 33.5 % (ref 36–48)
HEMOGLOBIN: 11.4 G/DL (ref 12–16)
MCH RBC QN AUTO: 35.1 PG (ref 26–34)
MCHC RBC AUTO-ENTMCNC: 33.9 G/DL (ref 31–36)
MCV RBC AUTO: 103.6 FL (ref 80–100)
PDW BLD-RTO: 12.7 % (ref 12.4–15.4)
PERFORMED ON: NORMAL
PLATELET # BLD: 116 K/UL (ref 135–450)
PMV BLD AUTO: 11.5 FL (ref 5–10.5)
POTASSIUM SERPL-SCNC: 3.8 MMOL/L (ref 3.5–5.1)
RBC # BLD: 3.24 M/UL (ref 4–5.2)
SODIUM BLD-SCNC: 142 MMOL/L (ref 136–145)
SODIUM BLD-SCNC: 143 MMOL/L (ref 136–145)
WBC # BLD: 6.1 K/UL (ref 4–11)

## 2022-04-09 PROCEDURE — 85027 COMPLETE CBC AUTOMATED: CPT

## 2022-04-09 PROCEDURE — 94760 N-INVAS EAR/PLS OXIMETRY 1: CPT

## 2022-04-09 PROCEDURE — 6370000000 HC RX 637 (ALT 250 FOR IP): Performed by: PHYSICIAN ASSISTANT

## 2022-04-09 PROCEDURE — 6370000000 HC RX 637 (ALT 250 FOR IP): Performed by: FAMILY MEDICINE

## 2022-04-09 PROCEDURE — 84295 ASSAY OF SERUM SODIUM: CPT

## 2022-04-09 PROCEDURE — 2580000003 HC RX 258: Performed by: FAMILY MEDICINE

## 2022-04-09 PROCEDURE — 6370000000 HC RX 637 (ALT 250 FOR IP): Performed by: INTERNAL MEDICINE

## 2022-04-09 PROCEDURE — 36415 COLL VENOUS BLD VENIPUNCTURE: CPT

## 2022-04-09 PROCEDURE — 6360000002 HC RX W HCPCS: Performed by: FAMILY MEDICINE

## 2022-04-09 PROCEDURE — 80048 BASIC METABOLIC PNL TOTAL CA: CPT

## 2022-04-09 RX ORDER — AMOXICILLIN AND CLAVULANATE POTASSIUM 875; 125 MG/1; MG/1
1 TABLET, FILM COATED ORAL EVERY 12 HOURS SCHEDULED
Qty: 8 TABLET | Refills: 0 | Status: SHIPPED | OUTPATIENT
Start: 2022-04-09 | End: 2022-04-13

## 2022-04-09 RX ORDER — AMOXICILLIN AND CLAVULANATE POTASSIUM 875; 125 MG/1; MG/1
1 TABLET, FILM COATED ORAL EVERY 12 HOURS SCHEDULED
Status: DISCONTINUED | OUTPATIENT
Start: 2022-04-09 | End: 2022-04-09 | Stop reason: HOSPADM

## 2022-04-09 RX ORDER — MIDODRINE HYDROCHLORIDE 2.5 MG/1
2.5 TABLET ORAL
Qty: 90 TABLET | Refills: 1 | Status: SHIPPED | OUTPATIENT
Start: 2022-04-09

## 2022-04-09 RX ADMIN — AMOXICILLIN AND CLAVULANATE POTASSIUM 1 TABLET: 875; 125 TABLET, FILM COATED ORAL at 12:40

## 2022-04-09 RX ADMIN — MIDODRINE HYDROCHLORIDE 2.5 MG: 5 TABLET ORAL at 12:39

## 2022-04-09 RX ADMIN — ENOXAPARIN SODIUM 30 MG: 30 INJECTION SUBCUTANEOUS at 12:41

## 2022-04-09 RX ADMIN — Medication 10 ML: at 01:58

## 2022-04-09 RX ADMIN — PANTOPRAZOLE SODIUM 40 MG: 40 TABLET, DELAYED RELEASE ORAL at 06:05

## 2022-04-09 RX ADMIN — CEFEPIME HYDROCHLORIDE 2000 MG: 2 INJECTION, POWDER, FOR SOLUTION INTRAVENOUS at 01:58

## 2022-04-09 RX ADMIN — LEVOTHYROXINE SODIUM 100 MCG: 0.1 TABLET ORAL at 06:04

## 2022-04-09 RX ADMIN — Medication 10 ML: at 09:00

## 2022-04-09 RX ADMIN — VALPROIC ACID 250 MG: 250 SOLUTION ORAL at 12:39

## 2022-04-09 RX ADMIN — CARBIDOPA AND LEVODOPA 1.5 TABLET: 25; 100 TABLET ORAL at 12:39

## 2022-04-09 RX ADMIN — MIDODRINE HYDROCHLORIDE 2.5 MG: 5 TABLET ORAL at 13:54

## 2022-04-09 ASSESSMENT — PAIN SCALES - GENERAL: PAINLEVEL_OUTOF10: 0

## 2022-04-09 NOTE — PLAN OF CARE
Problem: Skin Integrity:  Goal: Will show no infection signs and symptoms  Description: Will show no infection signs and symptoms  Outcome: Ongoing  Goal: Absence of new skin breakdown  Description: Absence of new skin breakdown  Outcome: Ongoing     Problem: Nutrition  Goal: Optimal nutrition therapy  Outcome: Ongoing     Problem: Fluid Volume:  Goal: Ability to achieve a balanced intake and output will improve  Description: Ability to achieve a balanced intake and output will improve  Outcome: Ongoing

## 2022-04-09 NOTE — CARE COORDINATION
Patient will need transportation back to [address] [place] [address]. Will arrange and call Group Home Staff with details.   Ary Barbour RN

## 2022-04-09 NOTE — PROGRESS NOTES
[facility] Note    Patient Active Problem List   Diagnosis    Down syndrome    Down syndrome    Down syndrome    Dysphagia    Autistic disorder    PEG adjustment, replacement, or removal    Hypotension    Sepsis ([facility] [address]    Acute respiratory failure (HCC)       Past Medical History:   has a past medical history of Anemia, Anorexia, Autistic disorder, Convulsions ([address] Down syndrome, Dysphagia, Hypothyroid, Malnutrition ([address] Parkinson disease ([address] UTI (urinary tract infection), and Weakness. Past Social History:   reports that she has never smoked. She has never used smokeless tobacco. She reports that she does not drink alcohol and does not use drugs. Subjective:  Not able to verbalize effectively, appears baseline  Na improving      Review of Systems not able to cooperate    Objective:      /63   Pulse 82   Temp 97.7 °F (36.5 °C) (Axillary)   Resp 18   Ht 4' 11\" (1.499 m)   Wt 77 lb 6.4 oz (35.1 kg)   SpO2 94%   BMI 15.63 kg/m²     Wt Readings from Last 3 Encounters:   04/09/22 77 lb 6.4 oz (35.1 kg)   03/24/22 100 lb (45.4 kg)   10/19/21 76 lb (34.5 kg)       BP Readings from Last 3 Encounters:   04/09/22 114/63   03/24/22 (!) 160/92   10/19/21 (!) 126/95       Chest- clear  Heart-regular  Abd-soft  Ext- no edema    Labs  Hemoglobin   Date Value Ref Range Status   04/09/2022 11.4 (L) 12.0 - 16.0 g/dL Final     Hematocrit   Date Value Ref Range Status   04/09/2022 33.5 (L) 36.0 - 48.0 % Final     WBC   Date Value Ref Range Status   04/09/2022 6.1 4.0 - 11.0 K/uL Final     Platelets   Date Value Ref Range Status   04/09/2022 116 (L) 135 - 450 K/uL Final     Lab Results   Component Value Date    CREATININE 0.5 (L) 04/09/2022    BUN 16 04/09/2022     04/09/2022    K 3.8 04/09/2022     (H) 04/09/2022    CO2 21 04/09/2022       Assessment/Plan:  1. Hypernatremia - unclear onset.   May be due to decrease access to free water.  Highest Na 166 4/6/22. Was 165 4/5. Now past 48 hours. Higher rate of correction but has slowed. Give K, total of 60meq, should increase Na too. No new neurologic symptoms. Goal Na around 145 to 150 for the next 24 hours. On dysphagia diet - so at risk for worsening hypernatremia again   Needs monitoring     2. AVELINO due to prerenal azotemia. Better. 3.  BP variable-follow for now. Midodrine started. 4.  H/O Autism  5. H/O Down Syndrome   6. Hypokalemia -replace aggressively with low serum HCO3 and Na. Repeat bmp in am     High risk.      Con Ureña MD

## 2022-04-09 NOTE — PROGRESS NOTES
Patient discharged back to [address] [place] [address] per order and Social Work. Report called to group home and given to ambulance transport. IV'S and tele removed, patient changed and packed for transport. VSS.

## 2022-04-09 NOTE — PROGRESS NOTES
Shift assessment completed. VSS. Patient to be NPO at this time due to suspicion of aspiration. Per Dr. Kathy Vargas and SPT, patient to go for modified barium. Will continue to assess. Bed alarm engaged. Patient unable to use call light.

## 2022-04-09 NOTE — DISCHARGE SUMMARY
[facility]    Patient Demographics    Patient. Holly Stoll  Date of Birth. 1965  MRN. [id number]     Primary care provider. Ashley Bass MD  (Tel: [phone])    Admit date: 4/5/2022    Discharge date (blank if same as Note Date): Note Date: 4/9/2022     Reason for Hospitalization. Chief Complaint   Patient presents with    Shortness of Breath     from [address] via EMS cc SOB, hx pneumonia 10 days ago. EMS reports SPO2 in 70s at [address], 99% on 2L in EMS. Significant Findings. Principal Problem:    Acute respiratory failure ([address]  Resolved Problems:    * No resolved hospital problems. *       Problems and results from this hospitalization that need follow up. 1. Aspiration pneumonia  2. Hypernatremia. Significant test results and incidental findings. 1. CT chest  Tree-in-bud opacities with consolidation in the left lower lobe, consistent   with bronchopneumonia, most likely aspiration etiology.  Secretions are noted   in left lower lobe bronchi. CXR  No acute process.       Previously described right infrahilar opacity not evident. Invasive procedures and treatments. 1. None     Problem-based Hospital Course. Patient is a 49-year-old nonverbal female with Down syndrome, Parkinson's, seizures, dysphagia who was brought into the hospital with hypoxia. Her sats were in the 70s. She was also noted to have a cough. CT of the chest in the emergency room revealed consolidation left lower lobe consistent with bronchopneumonia. She also a creatinine of 1.6. Sodium was 165. Patient was admitted with hypernatremia and sepsis secondary to pneumonia. She was given IV fluids, IV antibiotics, and was seen by nephrology. She did have some hypotension and was started on low-dose midodrine.   Appears outpatient blood pressures run on the low side as well. Patient was seen by speech was recommended a puréed diet. Patient was transitioned to oral antibiotics and was discharged back to group home in stable condition. Consults. IP CONSULT TO HOSPITALIST  IP CONSULT TO NEPHROLOGY  IP CONSULT TO PHARMACY    Physical examination on discharge day. /63   Pulse 82   Temp 97.7 °F (36.5 °C) (Axillary)   Resp 18   Ht 4' 11\" (1.499 m)   Wt 77 lb 6.4 oz (35.1 kg)   SpO2 94%   BMI 15.63 kg/m²   General appearance. Looks comfortable. Nonverbal, Down syndrome  HEENT. Sclera clear. Moist mucus membranes. Cardiovascular. Regular rate and rhythm, normal S1, S2. No murmur. Respiratory. Not using accessory muscles. Clear to auscultation bilaterally, no wheeze. Gastrointestinal. Abdomen soft, non-tender, not distended, normal bowel sounds  Neurology. Facial symmetry. No speech deficits. Moving all extremities equally. Extremities. No edema in lower extremities. Skin. Warm, dry, normal turgor    Condition at time of discharge stable    Medication instructions provided to patient at discharge.      Medication List      START taking these medications    amoxicillin-clavulanate 875-125 MG per tablet  Commonly known as: AUGMENTIN  Take 1 tablet by mouth every 12 hours for 4 days     midodrine 2.5 MG tablet  Commonly known as: PROAMATINE  Take 1 tablet by mouth 3 times daily (with meals)        CONTINUE taking these medications    acetaminophen 325 MG tablet  Commonly known as: TYLENOL     carbidopa-levodopa  MG per tablet  Commonly known as: SINEMET     docusate sodium 100 MG capsule  Commonly known as: COLACE     levothyroxine 100 MCG tablet  Commonly known as: SYNTHROID     omeprazole 20 MG delayed release capsule  Commonly known as: PRILOSEC     sertraline 20 MG/ML concentrated solution  Commonly known as: ZOLOFT     therapeutic multivitamin-minerals tablet     valproic acid 250 MG/5ML Soln oral solution  Commonly known as: Peyman Newton           Where to Get Your Medications      You can get these medications from any pharmacy    Bring a paper prescription for each of these medications  · amoxicillin-clavulanate 875-125 MG per tablet  · midodrine 2.5 MG tablet         Discharge recommendations given to patient. Follow Up. PCP in 1 week   Disposition. Long term care facility  Activity. activity as tolerated  Diet: ADULT ORAL NUTRITION SUPPLEMENT; Lunch, Dinner; Fortified Pudding Oral Supplement  ADULT DIET; Dysphagia - Pureed; Moderately Thick (Honey)      Spent 32 minutes in discharge process. Signed:   Nneka Kennedy PA-C     4/9/2022 9:18 AM

## 2022-04-09 NOTE — CARE COORDINATION
Patient discharged 04/09/2022 to  [facility].  [address] [facility], [facility] [address]  Transport by [facility];  [phone]  All discharge needs met per case management  Quique Darden RN

## 2022-04-09 NOTE — CARE COORDINATION
[address] will arrive at hospital to pick pt up between 345-4 pm today. Assigned RN and Group Home Staff both informed via direct phone call. [facility] would like another phone call when pt and Transport are actually leaving the facility so they are prepared to receive her at [phone].    Herman So RN

## 2022-04-09 NOTE — CARE COORDINATION
RN CM placed call to [facility] at [phone] to inform her of discharge order in placed and needing trasport back to [address] [place] [address]. Ms Candance Hint stated\"I'm off work today; I will call the Manager at [employer] to see if they can come and pick her up. \" Provided Ms. Tian with CM phone number; awaiting return call.   Gilford Scripture, RN

## 2022-04-09 NOTE — DISCHARGE INSTR - COC
Continuity of Care Form    Patient Name: Silvia Velazquez   :  1965  MRN:  [id number]    Admit date:  2022  Discharge date:  2022    Code Status Order: Full Code   Advance Directives:      Admitting Physician:  Alka Solo MD  PCP: Shirin Gomes MD    Discharging Nurse: MARGUERITE [address] Unit/Room#: 3TN-3361/3361-01  Discharging Unit Phone Number: [phone]    Emergency Contact:   Extended Emergency Contact Information  Primary Emergency Contact: Harshil Frederick Phone: [phone]  Relation: Legal Guardian  Secondary Emergency Contact: Che Saravia  Mobile Phone: [phone]  Relation: Lay Caregiver    Past Surgical History:  Past Surgical History:   Procedure Laterality Date    GASTROSTOMY TUBE PLACEMENT      GASTROSTOMY TUBE PLACEMENT  14       Immunization History: There is no immunization history on file for this patient.     Active Problems:  Patient Active Problem List   Diagnosis Code    Down syndrome Q90.9    Down syndrome Q90.9    Down syndrome Q90.9    Dysphagia R13.10    Autistic disorder F84.0    PEG adjustment, replacement, or removal Z43.1    Hypotension I95.9    Sepsis ([address] A41.9    Acute respiratory failure (HCC) J96.00       Isolation/Infection:   Isolation            No Isolation          Patient Infection Status       Infection Onset Added Last Indicated Last Indicated By Review Planned Expiration Resolved Resolved By    None active    Resolved    COVID-19 (Rule Out) 22 COVID-19 (Ordered)   22 Rule-Out Test Resulted    COVID-19 (Rule Out) 22 COVID-19 (Ordered)   22 Rule-Out Test Resulted            Nurse Assessment:  Last Vital Signs: /63   Pulse 82   Temp 97.7 °F (36.5 °C) (Axillary)   Resp 18   Ht 4' 11\" (1.499 m)   Wt 77 lb 6.4 oz (35.1 kg)   SpO2 94%   BMI 15.63 kg/m²     Last documented pain score (0-10 scale): Pain Level: 0  Last Weight:   Wt Readings from Last 1 Encounters: 04/09/22 77 lb 6.4 oz (35.1 kg)     Mental Status:  alert    IV Access:  - None    Nursing Mobility/ADLs:  Walking   Dependent  Transfer  Dependent  Bathing  Assisted  Dressing  Dependent  Toileting  Dependent  Feeding  Dependent  Med Admin  Dependent  Med Delivery   crushed    Wound Care Documentation and Therapy:        Elimination:  Continence: Bowel: No  Bladder: No  Urinary Catheter: None   Colostomy/Ileostomy/Ileal Conduit: No       Date of Last BM: ***    Intake/Output Summary (Last 24 hours) at 4/9/2022 0931  Last data filed at 4/9/2022 0612  Gross per 24 hour   Intake 2072.69 ml   Output --   Net 2072.69 ml     I/O last 3 completed shifts: In: 2072.7 [I.V.:976; IV Piggyback:1096.7]  Out: -     Safety Concerns:     History of Seizures and Aspiration Risk    Impairments/Disabilities:      Speech    Nutrition Therapy:  Current Nutrition Therapy:   - Oral Diet:  Dysphagia 1 pureed    Routes of Feeding: Oral  Liquids: Honey Thick Liquids  Daily Fluid Restriction: no  Last Modified Barium Swallow with Video (Video Swallowing Test): not done    Treatments at the Time of Hospital Discharge:   Respiratory Treatments:   Oxygen Therapy:  is not on home oxygen therapy. Ventilator:    - No ventilator support    Rehab Therapies:   Weight Bearing Status/Restrictions: Other Medical Equipment (for information only, NOT a DME order):  wheelchair and hospital bed  Other Treatments:     Patient's personal belongings (please select all that are sent with patient):  None    RN SIGNATURE:  Electronically signed by Belen Euceda RN on 4/9/22 at 11:50 AM EDT    CASE MANAGEMENT/SOCIAL WORK SECTION    Inpatient Status Date: 04/05/2022    Readmission Risk Assessment Score:  Readmission Risk              Risk of Unplanned Readmission:  15           Discharging to Facility/ [facility].  [address] [facility], [facility]  [phone]      / signature: Electronically signed by Nathan Silveira RN on 4/9/22 at 9:33 AM EDT    PHYSICIAN SECTION    Prognosis: {Prognosis:3882325923}    Condition at Discharge: [address] Patient Condition:[id number]}    Rehab Potential (if transferring to Rehab): {Prognosis:6370743302}    Recommended Labs or Other Treatments After Discharge: ***    Physician Certification: I certify the above information and transfer of Damian Allen  is necessary for the continuing treatment of the diagnosis listed and that she requires {Admit to Appropriate Level of Care:18290} for {GREATER/LESS:930952276} 30 days.      Update Admission H&P: {CHP DME Changes in VIMDO:885339063}    PHYSICIAN SIGNATURE:  {Esignature:910564694}

## 2022-04-13 ENCOUNTER — APPOINTMENT (OUTPATIENT)
Dept: GENERAL RADIOLOGY | Age: 57
End: 2022-04-13
Payer: MEDICARE

## 2022-04-13 ENCOUNTER — HOSPITAL ENCOUNTER (EMERGENCY)
Age: 57
Discharge: HOME | End: 2022-04-13
Attending: EMERGENCY MEDICINE
Payer: MEDICARE

## 2022-04-13 ENCOUNTER — TELEPHONE (OUTPATIENT)
Dept: OTHER | Facility: CLINIC | Age: 57
End: 2022-04-13

## 2022-04-13 VITALS
TEMPERATURE: 97.6 F | RESPIRATION RATE: 15 BRPM | DIASTOLIC BLOOD PRESSURE: 60 MMHG | OXYGEN SATURATION: 96 % | SYSTOLIC BLOOD PRESSURE: 111 MMHG | WEIGHT: 75 LBS | HEART RATE: 48 BPM | BODY MASS INDEX: 15.15 KG/M2

## 2022-04-13 DIAGNOSIS — Z71.1 MEDICAL CONDITION NOT DEMONSTRATED: Primary | ICD-10-CM

## 2022-04-13 LAB
A/G RATIO: 1.1 (ref 1.1–2.2)
ALBUMIN SERPL-MCNC: 3.7 G/DL (ref 3.4–5)
ALP BLD-CCNC: 68 U/L (ref 40–129)
ALT SERPL-CCNC: <5 U/L (ref 10–40)
ANION GAP SERPL CALCULATED.3IONS-SCNC: 8 MMOL/L (ref 3–16)
AST SERPL-CCNC: 24 U/L (ref 15–37)
BASE EXCESS VENOUS: 7.7 MMOL/L (ref -3–3)
BASOPHILS ABSOLUTE: 0.1 K/UL (ref 0–0.2)
BASOPHILS RELATIVE PERCENT: 1.8 %
BILIRUB SERPL-MCNC: 0.3 MG/DL (ref 0–1)
BUN BLDV-MCNC: 17 MG/DL (ref 7–20)
CALCIUM SERPL-MCNC: 9.3 MG/DL (ref 8.3–10.6)
CARBOXYHEMOGLOBIN: 2.7 % (ref 0–1.5)
CHLORIDE BLD-SCNC: 108 MMOL/L (ref 99–110)
CO2: 31 MMOL/L (ref 21–32)
CREAT SERPL-MCNC: <0.5 MG/DL (ref 0.6–1.1)
EOSINOPHILS ABSOLUTE: 0.1 K/UL (ref 0–0.6)
EOSINOPHILS RELATIVE PERCENT: 1.5 %
GFR AFRICAN AMERICAN: >60
GFR NON-AFRICAN AMERICAN: >60
GLUCOSE BLD-MCNC: 86 MG/DL (ref 70–99)
HCO3 VENOUS: 34.7 MMOL/L (ref 23–29)
HCT VFR BLD CALC: 39.8 % (ref 36–48)
HEMOGLOBIN, VEN, REDUCED: 8 %
HEMOGLOBIN: 13.2 G/DL (ref 12–16)
LYMPHOCYTES ABSOLUTE: 1.5 K/UL (ref 1–5.1)
LYMPHOCYTES RELATIVE PERCENT: 19.3 %
MCH RBC QN AUTO: 34.3 PG (ref 26–34)
MCHC RBC AUTO-ENTMCNC: 33.1 G/DL (ref 31–36)
MCV RBC AUTO: 103.8 FL (ref 80–100)
METHEMOGLOBIN VENOUS: 0.2 %
MONOCYTES ABSOLUTE: 0.5 K/UL (ref 0–1.3)
MONOCYTES RELATIVE PERCENT: 6.8 %
NEUTROPHILS ABSOLUTE: 5.4 K/UL (ref 1.7–7.7)
NEUTROPHILS RELATIVE PERCENT: 70.6 %
O2 CONTENT, VEN: 17 VOL %
O2 SAT, VEN: 92 %
O2 THERAPY: ABNORMAL
PCO2, VEN: 58 MMHG (ref 40–50)
PDW BLD-RTO: 13.3 % (ref 12.4–15.4)
PH VENOUS: 7.38 (ref 7.35–7.45)
PLATELET # BLD: 207 K/UL (ref 135–450)
PMV BLD AUTO: 10.5 FL (ref 5–10.5)
PO2, VEN: 64.4 MMHG (ref 25–40)
POTASSIUM REFLEX MAGNESIUM: 3.7 MMOL/L (ref 3.5–5.1)
RBC # BLD: 3.83 M/UL (ref 4–5.2)
SODIUM BLD-SCNC: 147 MMOL/L (ref 136–145)
TCO2 CALC VENOUS: 82 MMOL/L
TOTAL PROTEIN: 7 G/DL (ref 6.4–8.2)
WBC # BLD: 7.6 K/UL (ref 4–11)

## 2022-04-13 PROCEDURE — 99284 EMERGENCY DEPT VISIT MOD MDM: CPT

## 2022-04-13 PROCEDURE — 36415 COLL VENOUS BLD VENIPUNCTURE: CPT

## 2022-04-13 PROCEDURE — 71045 X-RAY EXAM CHEST 1 VIEW: CPT

## 2022-04-13 PROCEDURE — 82803 BLOOD GASES ANY COMBINATION: CPT

## 2022-04-13 PROCEDURE — 85025 COMPLETE CBC W/AUTO DIFF WBC: CPT

## 2022-04-13 PROCEDURE — 80053 COMPREHEN METABOLIC PANEL: CPT

## 2022-04-13 NOTE — ED NOTES
patient resting in bed, vss, call light with in reach. denies any needs or wants at this time.  Will continue to moniotor closely     Murray Lama RN  04/13/22 9592

## 2022-04-13 NOTE — ED NOTES
patient resting in bed, vss, call light with in reach. denies any needs or wants at this time.  Will continue to moniotor closely     Dwain Milton RN  04/13/22 4264

## 2022-04-13 NOTE — ED PROVIDER NOTES
[address] [facility]  [facility]      Pt Name: Rosemary Stephenson  MRN: [id number]  [place] 1965  Date ofevaluation: 4/13/2022  Provider: Ryan San MD    CHIEF COMPLAINT       Chief Complaint   Patient presents with    Other     recent dx with Pna, was at PCP office for follow up but they were unable to get a reliable SPO2. upon ED arrival SPO2 94%       HPI    HISTORY OF PRESENT ILLNESS   (Location/Symptom, Timing/Onset,Context/Setting, Quality, Duration, Modifying Factors, Severity)  Note limiting factors. Rosemary Stephenson is a 60 y.o. female who presents to the emergency department for possible respiratory distress. This is a patient with MRDD who presents because her primary care physician's office had a difficult time getting an oxygenation. When the patient arrived here however, her oxygenation was 94% on room air. Patient has a history of recent pneumonia and sepsis. The patient has a history of Down syndrome and MRDD and is difficult to get a good history otherwise. NursingNotes were reviewed. Review of Systems    REVIEW OF SYSTEMS    (2-9 systems for level 4, 10 or more for level 5)     Review of Systems   Constitutional: Negative for fever. HENT: Negative for rhinorrhea and sore throat. Eyes: Negative for redness. Respiratory: Negative for shortness of breath. Cardiovascular: Negative for chest pain. Gastrointestinal: Negative for abdominal pain. Genitourinary: Negative for flank pain. Neurological: Negative for headaches. Hematological: Negative for adenopathy. Psychiatric/Behavioral: Negative for confusion. Except as noted above the remainder of the review of systems was reviewed and negative.        PAST MEDICAL HISTORY     Past Medical History:   Diagnosis Date    Anemia     Anorexia     Autistic disorder     [address] ([facility]     Down syndrome     Dysphagia     Hypothyroid     Malnutrition ([facility]     Parkinson disease ([facility]     UTI (urinary tract infection)     Weakness          SURGICALHISTORY       Past Surgical History:   Procedure Laterality Date    GASTROSTOMY TUBE PLACEMENT      GASTROSTOMY TUBE PLACEMENT  2/18/14         CURRENT MEDICATIONS       Previous Medications    ACETAMINOPHEN (TYLENOL) 325 MG TABLET    Take 650 mg by mouth every 6 hours as needed for Pain. AMOXICILLIN-CLAVULANATE (AUGMENTIN) 875-125 MG PER TABLET    Take 1 tablet by mouth every 12 hours for 4 days    CARBIDOPA-LEVODOPA (SINEMET)  MG PER TABLET    Take 1.5 tablets by mouth 3 times daily Indications: Take one and a half tablets by mouth three times per day     DOCUSATE SODIUM (COLACE) 100 MG CAPSULE    Take 100 mg by mouth daily    LEVOTHYROXINE (SYNTHROID) 100 MCG TABLET    Take 100 mcg by mouth daily     MIDODRINE (PROAMATINE) 2.5 MG TABLET    Take 1 tablet by mouth 3 times daily (with meals)    MULTIPLE VITAMINS-MINERALS (THERAPEUTIC MULTIVITAMIN-MINERALS) TABLET    Take 1 tablet by mouth daily. OMEPRAZOLE (PRILOSEC) 20 MG CAPSULE    Take 20 mg by mouth daily. SERTRALINE (ZOLOFT) 20 MG/ML CONCENTRATED SOLUTION    Take 100 mg by mouth nightly     VALPROIC ACID (DEPAKENE) 250 MG/5ML SOLN ORAL SOLUTION    Take 250 mg by mouth every 8 hours 250 mg in the AM, 250 mg in the afternoon, 500 mg nightly. ALLERGIES     Caffeine and Iodine    FAMILY HISTORY     History reviewed. No pertinent family history.        SOCIAL HISTORY       Social History     Socioeconomic History    Marital status: Single     Spouse name: None    Number of children: None    Years of education: None    Highest education level: None   Occupational History    None   Tobacco Use    Smoking status: Never Smoker    Smokeless tobacco: Never Used   Substance and Sexual Activity    Alcohol use: No    Drug use: No    Sexual activity: Never   Other Topics Concern    None   Social History Narrative    None     Social Determinants of Health     Financial Resource Strain:     Difficulty of Paying Living Expenses: Not on file   Food Insecurity:     Worried About Running Out of Food in the Last Year: Not on file    Nghia of Food in the Last Year: Not on file   Transportation Needs:     Lack of Transportation (Medical): Not on file    Lack of Transportation (Non-Medical): Not on file   Physical Activity:     Days of Exercise per Week: Not on file    Minutes of Exercise per Session: Not on file   Stress:     Feeling of Stress : Not on file   Social Connections:     Frequency of Communication with Friends and Family: Not on file    Frequency of Social Gatherings with Friends and Family: Not on file    Attends Yarsanism Services: Not on file    Active Member of [address] [place] [address] [employer] or Organizations: Not on file    Attends Club or Organization Meetings: Not on file    Marital Status: Not on file   Intimate Partner Violence:     Fear of Current or Ex-Partner: Not on file    Emotionally Abused: Not on file    Physically Abused: Not on file    Sexually Abused: Not on file   Housing Stability:     Unable to Pay for Housing in the Last Year: Not on file    Number of [place] in the Last Year: Not on file    Unstable Housing in the Last Year: Not on file       SCREENINGS             PHYSICAL EXAM    (up to 7 for level 4, 8 or more for level 5)     ED Triage Vitals   BP Temp Temp Source Pulse Resp SpO2 Height Weight   04/13/22 1256 04/13/22 1205 04/13/22 1205 04/13/22 1205 04/13/22 1205 04/13/22 1205 -- 04/13/22 1205   105/69 97.9 °F (36.6 °C) Oral 105 22 94 %  75 lb (34 kg)       Physical Exam:      General Appearance:  Alert, cooperative, appears stated age. Head:   Down syndrome appearing, without obvious abnormality, atraumatic. Eyes:  conjunctiva/corneas clear, EOM's intact. Sclera anicteric. ENT:  Mucous membranes are moist and pink   Neck: Supple, symmetrical, trachea midline, no adenopathy.   No jugular venous distention. Lungs:    Clear to auscultation bilaterally   Chest Wall:   No pain to palpation   Heart:   Genitourinary:  Regular rate rhythm with no murmurs rubs gallops  Deferred   Abdomen:    Soft and benign. No guarding rebound. Extremities:  No clubbing cyanosis or edema   Pulses:  Good throughout   Skin:  No rashes or lesions to exposed skin. Neurologic: Alert and she would look around the room. She would moan. She no obvious motor or sensory deficits. This appears to be her normal mental status per caregivers. Tomas Veras DIAGNOSTIC RESULTS         RADIOLOGY:   Non-plain filmimages such as CT, Ultrasound and MRI are read by the radiologist. Plain radiographic images are visualized and preliminarily interpreted by the emergency physician with the below findings:    See below    Interpretation per the Radiologist below, if available at the time ofthis note: All incidental findings were discussed with the patient. XR CHEST PORTABLE   Final Result   No acute abnormality identified. ED BEDSIDE ULTRASOUND:   Performed by ED Physician - none    LABS:  Labs Reviewed   BLOOD GAS, VENOUS - Abnormal; Notable for the following components:       Result Value    pCO2, Dre 58.0 (*)     pO2, Dre 64.4 (*)     HCO3, Venous 34.7 (*)     Base Excess, Dre 7.7 (*)     Carboxyhemoglobin 2.7 (*)     All other components within normal limits   CBC WITH AUTO DIFFERENTIAL - Abnormal; Notable for the following components:    RBC 3.83 (*)     .8 (*)     MCH 34.3 (*)     All other components within normal limits   COMPREHENSIVE METABOLIC PANEL W/ REFLEX TO MG FOR LOW K - Abnormal; Notable for the following components:    Sodium 147 (*)     CREATININE <0.5 (*)     ALT <5 (*)     All other components within normal limits       All other labs were within normal range or not returned as of this dictation.     EMERGENCY DEPARTMENT COURSE and DIFFERENTIAL DIAGNOSIS/MDM:   Vitals:    Vitals:    04/13/22 1205 04/13/22 1256   BP:  105/69   Pulse: 105 65   Resp: 22 22   Temp: 97.9 °F (36.6 °C) 97.6 °F (36.4 °C)   TempSrc: Oral Oral   SpO2: 94% 98%   Weight: 75 lb (34 kg)            MDM    The patient has remained stable throughout her hospital course. On our examination here today she is not hypoxic. A venous blood gas was unremarkable and normal and a chest x-ray shows no acute findings. Patient is lab work also shows a mild elevation of her blood sodium. The patient be discharged back to her facility. They are to increase her water intake. They are to return for any other problems or worsening of symptoms. REASSESSMENT              CONSULTS:  None    PROCEDURES:  Unless otherwise noted below, none     Procedures    FINAL IMPRESSION      1. Medical condition not demonstrated          DISPOSITION/PLAN   DISPOSITION Decision To Discharge 04/13/2022 01:50:23 PM      PATIENT REFERREDTO:  Celina Chong MD  [address] [address] [address] [phone]    Call in 1 day  As needed      DISCHARGEMEDICATIONS:  New Prescriptions    No medications on file     Controlled Substances Monitoring:     No flowsheet data found.     (Please note that portions of this note were completed with a voice recognition program.  Efforts were made to edit the dictations but occasionally words are mis-transcribed.)    Tila Miller MD (electronically signed)  Attending Emergency Physician          Tila Miller MD  04/13/22 1400

## 2022-04-13 NOTE — TELEPHONE ENCOUNTER
Writer contacted Dr Loretta Farr to inform of 30 day readmission risk. Per provider, No Decision on disposition at this time.

## 2022-04-14 ENCOUNTER — TELEPHONE (OUTPATIENT)
Dept: OTHER | Facility: CLINIC | Age: 57
End: 2022-04-14

## 2022-05-10 ENCOUNTER — APPOINTMENT (OUTPATIENT)
Dept: GENERAL RADIOLOGY | Age: 57
DRG: 871 | End: 2022-05-10
Payer: MEDICARE

## 2022-05-10 ENCOUNTER — HOSPITAL ENCOUNTER (INPATIENT)
Age: 57
LOS: 16 days | Discharge: NURSING, REHAB OR LONG-TERM CARE | DRG: 871 | End: 2022-05-26
Attending: EMERGENCY MEDICINE | Admitting: INTERNAL MEDICINE
Payer: MEDICARE

## 2022-05-10 DIAGNOSIS — R65.21 SEPTIC SHOCK (HCC): Primary | ICD-10-CM

## 2022-05-10 DIAGNOSIS — A41.9 SEPTIC SHOCK (HCC): Primary | ICD-10-CM

## 2022-05-10 DIAGNOSIS — J96.01 ACUTE HYPOXEMIC RESPIRATORY FAILURE (HCC): ICD-10-CM

## 2022-05-10 DIAGNOSIS — E87.0 HYPERNATREMIA: ICD-10-CM

## 2022-05-10 DIAGNOSIS — J69.0 ASPIRATION PNEUMONIA, UNSPECIFIED ASPIRATION PNEUMONIA TYPE, UNSPECIFIED LATERALITY, UNSPECIFIED PART OF LUNG (HCC): ICD-10-CM

## 2022-05-10 PROBLEM — N17.9 AKI (ACUTE KIDNEY INJURY) (HCC): Status: ACTIVE | Noted: 2022-05-10

## 2022-05-10 PROBLEM — R65.20 SEPSIS WITH ACUTE ORGAN DYSFUNCTION WITHOUT SEPTIC SHOCK (HCC): Status: ACTIVE | Noted: 2022-05-10

## 2022-05-10 LAB
A/G RATIO: 0.8 (ref 1.1–2.2)
ALBUMIN SERPL-MCNC: 3.3 G/DL (ref 3.4–5)
ALP BLD-CCNC: 104 U/L (ref 40–129)
ALT SERPL-CCNC: <5 U/L (ref 10–40)
ANION GAP SERPL CALCULATED.3IONS-SCNC: 21 MMOL/L (ref 3–16)
AST SERPL-CCNC: 19 U/L (ref 15–37)
BACTERIA: ABNORMAL /HPF
BASE EXCESS ARTERIAL: -1.4 MMOL/L (ref -3–3)
BASE EXCESS ARTERIAL: 3 MMOL/L (ref -3–3)
BASOPHILS ABSOLUTE: 0 K/UL (ref 0–0.2)
BASOPHILS RELATIVE PERCENT: 0.1 %
BILIRUB SERPL-MCNC: 0.3 MG/DL (ref 0–1)
BILIRUBIN URINE: ABNORMAL
BLOOD, URINE: NEGATIVE
BUN BLDV-MCNC: 33 MG/DL (ref 7–20)
CALCIUM SERPL-MCNC: 10.2 MG/DL (ref 8.3–10.6)
CARBOXYHEMOGLOBIN ARTERIAL: 0.3 % (ref 0–1.5)
CARBOXYHEMOGLOBIN ARTERIAL: 0.4 % (ref 0–1.5)
CHLORIDE BLD-SCNC: 105 MMOL/L (ref 99–110)
CLARITY: CLEAR
CO2: 23 MMOL/L (ref 21–32)
COLOR: ABNORMAL
COMMENT UA: ABNORMAL
CREAT SERPL-MCNC: 1.2 MG/DL (ref 0.6–1.1)
CRYSTALS, UA: ABNORMAL /HPF
EOSINOPHILS ABSOLUTE: 0 K/UL (ref 0–0.6)
EOSINOPHILS RELATIVE PERCENT: 0.1 %
EPITHELIAL CELLS, UA: 2 /HPF (ref 0–5)
GFR AFRICAN AMERICAN: 56
GFR NON-AFRICAN AMERICAN: 46
GLUCOSE BLD-MCNC: 93 MG/DL (ref 70–99)
GLUCOSE URINE: NEGATIVE MG/DL
HCO3 ARTERIAL: 27.2 MMOL/L (ref 21–29)
HCO3 ARTERIAL: 29.6 MMOL/L (ref 21–29)
HCT VFR BLD CALC: 42 % (ref 36–48)
HEMOGLOBIN, ART, EXTENDED: 12.2 G/DL (ref 12–16)
HEMOGLOBIN, ART, EXTENDED: 13 G/DL (ref 12–16)
HEMOGLOBIN: 13 G/DL (ref 12–16)
HYALINE CASTS: 1 /LPF (ref 0–8)
INFLUENZA A: NOT DETECTED
INFLUENZA B: NOT DETECTED
INR BLD: 1.35 (ref 0.88–1.12)
KETONES, URINE: ABNORMAL MG/DL
LACTIC ACID: 12.1 MMOL/L (ref 0.4–2)
LACTIC ACID: 4.8 MMOL/L (ref 0.4–2)
LEUKOCYTE ESTERASE, URINE: ABNORMAL
LYMPHOCYTES ABSOLUTE: 1 K/UL (ref 1–5.1)
LYMPHOCYTES RELATIVE PERCENT: 10.8 %
MCH RBC QN AUTO: 33.3 PG (ref 26–34)
MCHC RBC AUTO-ENTMCNC: 31 G/DL (ref 31–36)
MCV RBC AUTO: 107.5 FL (ref 80–100)
METHEMOGLOBIN ARTERIAL: 0.4 %
METHEMOGLOBIN ARTERIAL: 0.5 %
MICROSCOPIC EXAMINATION: YES
MONOCYTES ABSOLUTE: 0.8 K/UL (ref 0–1.3)
MONOCYTES RELATIVE PERCENT: 8.3 %
NEUTROPHILS ABSOLUTE: 7.5 K/UL (ref 1.7–7.7)
NEUTROPHILS RELATIVE PERCENT: 80.7 %
NITRITE, URINE: NEGATIVE
O2 SAT, ARTERIAL: 97.9 %
O2 SAT, ARTERIAL: 98.4 %
O2 THERAPY: ABNORMAL
O2 THERAPY: ABNORMAL
PCO2 ARTERIAL: 53.5 MMHG (ref 35–45)
PCO2 ARTERIAL: 63.3 MMHG (ref 35–45)
PDW BLD-RTO: 13.7 % (ref 12.4–15.4)
PH ARTERIAL: 7.24 (ref 7.35–7.45)
PH ARTERIAL: 7.35 (ref 7.35–7.45)
PH UA: 6 (ref 5–8)
PLATELET # BLD: 269 K/UL (ref 135–450)
PMV BLD AUTO: 9.1 FL (ref 5–10.5)
PO2 ARTERIAL: 108 MMHG (ref 75–108)
PO2 ARTERIAL: 142 MMHG (ref 75–108)
POTASSIUM REFLEX MAGNESIUM: 4.1 MMOL/L (ref 3.5–5.1)
PRO-BNP: 1363 PG/ML (ref 0–124)
PROCALCITONIN: 0.36 NG/ML (ref 0–0.15)
PROTEIN UA: 30 MG/DL
PROTHROMBIN TIME: 15.5 SEC (ref 9.9–12.7)
RBC # BLD: 3.91 M/UL (ref 4–5.2)
RBC UA: ABNORMAL /HPF (ref 0–4)
SARS-COV-2 RNA, RT PCR: NOT DETECTED
SODIUM BLD-SCNC: 149 MMOL/L (ref 136–145)
SPECIFIC GRAVITY UA: 1.02 (ref 1–1.03)
TCO2 ARTERIAL: 65.4 MMOL/L
TCO2 ARTERIAL: 70 MMOL/L
TOTAL PROTEIN: 7.5 G/DL (ref 6.4–8.2)
TROPONIN: 0.13 NG/ML
URINE REFLEX TO CULTURE: ABNORMAL
URINE TYPE: ABNORMAL
UROBILINOGEN, URINE: 1 E.U./DL
WBC # BLD: 9.2 K/UL (ref 4–11)
WBC UA: 3 /HPF (ref 0–5)

## 2022-05-10 PROCEDURE — 5A1945Z RESPIRATORY VENTILATION, 24-96 CONSECUTIVE HOURS: ICD-10-PCS | Performed by: STUDENT IN AN ORGANIZED HEALTH CARE EDUCATION/TRAINING PROGRAM

## 2022-05-10 PROCEDURE — 96365 THER/PROPH/DIAG IV INF INIT: CPT

## 2022-05-10 PROCEDURE — 80053 COMPREHEN METABOLIC PANEL: CPT

## 2022-05-10 PROCEDURE — 83880 ASSAY OF NATRIURETIC PEPTIDE: CPT

## 2022-05-10 PROCEDURE — 84145 PROCALCITONIN (PCT): CPT

## 2022-05-10 PROCEDURE — 2700000000 HC OXYGEN THERAPY PER DAY

## 2022-05-10 PROCEDURE — 87150 DNA/RNA AMPLIFIED PROBE: CPT

## 2022-05-10 PROCEDURE — 87636 SARSCOV2 & INF A&B AMP PRB: CPT

## 2022-05-10 PROCEDURE — 1200000000 HC SEMI PRIVATE

## 2022-05-10 PROCEDURE — 82803 BLOOD GASES ANY COMBINATION: CPT

## 2022-05-10 PROCEDURE — 31500 INSERT EMERGENCY AIRWAY: CPT

## 2022-05-10 PROCEDURE — 2000000000 HC ICU R&B

## 2022-05-10 PROCEDURE — 2580000003 HC RX 258: Performed by: INTERNAL MEDICINE

## 2022-05-10 PROCEDURE — 81001 URINALYSIS AUTO W/SCOPE: CPT

## 2022-05-10 PROCEDURE — 85610 PROTHROMBIN TIME: CPT

## 2022-05-10 PROCEDURE — 2500000003 HC RX 250 WO HCPCS: Performed by: EMERGENCY MEDICINE

## 2022-05-10 PROCEDURE — 83605 ASSAY OF LACTIC ACID: CPT

## 2022-05-10 PROCEDURE — 93005 ELECTROCARDIOGRAM TRACING: CPT | Performed by: EMERGENCY MEDICINE

## 2022-05-10 PROCEDURE — 94761 N-INVAS EAR/PLS OXIMETRY MLT: CPT

## 2022-05-10 PROCEDURE — 6360000002 HC RX W HCPCS: Performed by: INTERNAL MEDICINE

## 2022-05-10 PROCEDURE — 6360000002 HC RX W HCPCS: Performed by: EMERGENCY MEDICINE

## 2022-05-10 PROCEDURE — 87040 BLOOD CULTURE FOR BACTERIA: CPT

## 2022-05-10 PROCEDURE — 6370000000 HC RX 637 (ALT 250 FOR IP): Performed by: EMERGENCY MEDICINE

## 2022-05-10 PROCEDURE — 36415 COLL VENOUS BLD VENIPUNCTURE: CPT

## 2022-05-10 PROCEDURE — 2580000003 HC RX 258: Performed by: EMERGENCY MEDICINE

## 2022-05-10 PROCEDURE — 0BH17EZ INSERTION OF ENDOTRACHEAL AIRWAY INTO TRACHEA, VIA NATURAL OR ARTIFICIAL OPENING: ICD-10-PCS | Performed by: STUDENT IN AN ORGANIZED HEALTH CARE EDUCATION/TRAINING PROGRAM

## 2022-05-10 PROCEDURE — 96375 TX/PRO/DX INJ NEW DRUG ADDON: CPT

## 2022-05-10 PROCEDURE — 85025 COMPLETE CBC W/AUTO DIFF WBC: CPT

## 2022-05-10 PROCEDURE — 94002 VENT MGMT INPAT INIT DAY: CPT

## 2022-05-10 PROCEDURE — 71045 X-RAY EXAM CHEST 1 VIEW: CPT

## 2022-05-10 PROCEDURE — 99285 EMERGENCY DEPT VISIT HI MDM: CPT

## 2022-05-10 PROCEDURE — 84484 ASSAY OF TROPONIN QUANT: CPT

## 2022-05-10 PROCEDURE — 51702 INSERT TEMP BLADDER CATH: CPT

## 2022-05-10 RX ORDER — ACETAMINOPHEN 650 MG/1
650 SUPPOSITORY RECTAL ONCE
Status: COMPLETED | OUTPATIENT
Start: 2022-05-10 | End: 2022-05-10

## 2022-05-10 RX ORDER — ROCURONIUM BROMIDE 10 MG/ML
50 INJECTION, SOLUTION INTRAVENOUS ONCE
Status: COMPLETED | OUTPATIENT
Start: 2022-05-10 | End: 2022-05-10

## 2022-05-10 RX ORDER — ROCURONIUM BROMIDE 10 MG/ML
100 INJECTION, SOLUTION INTRAVENOUS ONCE
Status: DISCONTINUED | OUTPATIENT
Start: 2022-05-10 | End: 2022-05-10

## 2022-05-10 RX ORDER — PROPOFOL 10 MG/ML
5-50 INJECTION, EMULSION INTRAVENOUS CONTINUOUS
Status: DISCONTINUED | OUTPATIENT
Start: 2022-05-10 | End: 2022-05-13

## 2022-05-10 RX ORDER — ETOMIDATE 2 MG/ML
20 INJECTION INTRAVENOUS ONCE
Status: COMPLETED | OUTPATIENT
Start: 2022-05-10 | End: 2022-05-10

## 2022-05-10 RX ORDER — SODIUM CHLORIDE, SODIUM LACTATE, POTASSIUM CHLORIDE, CALCIUM CHLORIDE 600; 310; 30; 20 MG/100ML; MG/100ML; MG/100ML; MG/100ML
1000 INJECTION, SOLUTION INTRAVENOUS ONCE
Status: COMPLETED | OUTPATIENT
Start: 2022-05-10 | End: 2022-05-10

## 2022-05-10 RX ORDER — SODIUM CHLORIDE, SODIUM LACTATE, POTASSIUM CHLORIDE, AND CALCIUM CHLORIDE .6; .31; .03; .02 G/100ML; G/100ML; G/100ML; G/100ML
1000 INJECTION, SOLUTION INTRAVENOUS ONCE
Status: COMPLETED | OUTPATIENT
Start: 2022-05-10 | End: 2022-05-11

## 2022-05-10 RX ORDER — FENTANYL CITRATE 50 UG/ML
25 INJECTION, SOLUTION INTRAMUSCULAR; INTRAVENOUS
Status: DISCONTINUED | OUTPATIENT
Start: 2022-05-10 | End: 2022-05-26

## 2022-05-10 RX ADMIN — SODIUM CHLORIDE, POTASSIUM CHLORIDE, SODIUM LACTATE AND CALCIUM CHLORIDE 1000 ML: 600; 310; 30; 20 INJECTION, SOLUTION INTRAVENOUS at 23:31

## 2022-05-10 RX ADMIN — ROCURONIUM BROMIDE 50 MG: 10 INJECTION, SOLUTION INTRAVENOUS at 20:47

## 2022-05-10 RX ADMIN — PROPOFOL 20 MCG/KG/MIN: 10 INJECTION, EMULSION INTRAVENOUS at 20:56

## 2022-05-10 RX ADMIN — PROPOFOL 25 MCG/KG/MIN: 10 INJECTION, EMULSION INTRAVENOUS at 23:13

## 2022-05-10 RX ADMIN — VANCOMYCIN HYDROCHLORIDE 500 MG: 500 INJECTION, POWDER, LYOPHILIZED, FOR SOLUTION INTRAVENOUS at 23:27

## 2022-05-10 RX ADMIN — CEFEPIME HYDROCHLORIDE 2000 MG: 2 INJECTION, POWDER, FOR SOLUTION INTRAVENOUS at 21:23

## 2022-05-10 RX ADMIN — SODIUM CHLORIDE, POTASSIUM CHLORIDE, SODIUM LACTATE AND CALCIUM CHLORIDE 1000 ML: 600; 310; 30; 20 INJECTION, SOLUTION INTRAVENOUS at 21:23

## 2022-05-10 RX ADMIN — ETOMIDATE 20 MG: 2 INJECTION, SOLUTION INTRAVENOUS at 20:46

## 2022-05-10 RX ADMIN — Medication 25 MCG/HR: at 23:12

## 2022-05-10 RX ADMIN — ACETAMINOPHEN 650 MG: 650 SUPPOSITORY RECTAL at 21:12

## 2022-05-10 ASSESSMENT — PAIN SCALES - GENERAL: PAINLEVEL_OUTOF10: 0

## 2022-05-10 ASSESSMENT — PULMONARY FUNCTION TESTS: PIF_VALUE: 21

## 2022-05-11 ENCOUNTER — APPOINTMENT (OUTPATIENT)
Dept: GENERAL RADIOLOGY | Age: 57
DRG: 871 | End: 2022-05-11
Payer: MEDICARE

## 2022-05-11 PROBLEM — E44.0 MODERATE MALNUTRITION (HCC): Chronic | Status: ACTIVE | Noted: 2022-05-11

## 2022-05-11 LAB
A/G RATIO: 0.7 (ref 1.1–2.2)
ALBUMIN SERPL-MCNC: 2.4 G/DL (ref 3.4–5)
ALP BLD-CCNC: 75 U/L (ref 40–129)
ALT SERPL-CCNC: 6 U/L (ref 10–40)
AMMONIA: 17 UMOL/L (ref 11–51)
ANION GAP SERPL CALCULATED.3IONS-SCNC: 8 MMOL/L (ref 3–16)
ANISOCYTOSIS: ABNORMAL
AST SERPL-CCNC: 24 U/L (ref 15–37)
BANDED NEUTROPHILS RELATIVE PERCENT: 27 % (ref 0–7)
BASE EXCESS ARTERIAL: 6.8 MMOL/L (ref -3–3)
BASOPHILS ABSOLUTE: 0 K/UL (ref 0–0.2)
BASOPHILS RELATIVE PERCENT: 0 %
BILIRUB SERPL-MCNC: 0.3 MG/DL (ref 0–1)
BUN BLDV-MCNC: 31 MG/DL (ref 7–20)
CALCIUM SERPL-MCNC: 8.8 MG/DL (ref 8.3–10.6)
CARBOXYHEMOGLOBIN ARTERIAL: 0.3 % (ref 0–1.5)
CHLORIDE BLD-SCNC: 107 MMOL/L (ref 99–110)
CO2: 31 MMOL/L (ref 21–32)
CREAT SERPL-MCNC: 0.8 MG/DL (ref 0.6–1.1)
EKG ATRIAL RATE: 110 BPM
EKG DIAGNOSIS: NORMAL
EKG P-R INTERVAL: 104 MS
EKG Q-T INTERVAL: 476 MS
EKG QRS DURATION: 84 MS
EKG QTC CALCULATION (BAZETT): 644 MS
EKG R AXIS: 65 DEGREES
EKG T AXIS: 60 DEGREES
EKG VENTRICULAR RATE: 110 BPM
EOSINOPHILS ABSOLUTE: 0 K/UL (ref 0–0.6)
EOSINOPHILS RELATIVE PERCENT: 0 %
GFR AFRICAN AMERICAN: >60
GFR NON-AFRICAN AMERICAN: >60
GLUCOSE BLD-MCNC: 78 MG/DL (ref 70–99)
HCO3 ARTERIAL: 32.9 MMOL/L (ref 21–29)
HCT VFR BLD CALC: 33.4 % (ref 36–48)
HEMOGLOBIN, ART, EXTENDED: 11.4 G/DL (ref 12–16)
HEMOGLOBIN: 11 G/DL (ref 12–16)
INR BLD: 1.63 (ref 0.88–1.12)
LACTIC ACID: 2.1 MMOL/L (ref 0.4–2)
LACTIC ACID: 3.2 MMOL/L (ref 0.4–2)
LACTIC ACID: 4.3 MMOL/L (ref 0.4–2)
LYMPHOCYTES ABSOLUTE: 1.4 K/UL (ref 1–5.1)
LYMPHOCYTES RELATIVE PERCENT: 14 %
MACROCYTES: ABNORMAL
MAGNESIUM: 1.7 MG/DL (ref 1.8–2.4)
MCH RBC QN AUTO: 33.3 PG (ref 26–34)
MCHC RBC AUTO-ENTMCNC: 32.9 G/DL (ref 31–36)
MCV RBC AUTO: 101.3 FL (ref 80–100)
METAMYELOCYTES RELATIVE PERCENT: 7 %
METHEMOGLOBIN ARTERIAL: 0.8 %
MONOCYTES ABSOLUTE: 1.3 K/UL (ref 0–1.3)
MONOCYTES RELATIVE PERCENT: 13 %
NEUTROPHILS ABSOLUTE: 7.1 K/UL (ref 1.7–7.7)
NEUTROPHILS RELATIVE PERCENT: 39 %
O2 SAT, ARTERIAL: 98.9 %
O2 THERAPY: ABNORMAL
PCO2 ARTERIAL: 53 MMHG (ref 35–45)
PDW BLD-RTO: 13.1 % (ref 12.4–15.4)
PH ARTERIAL: 7.4 (ref 7.35–7.45)
PHOSPHORUS: 2.5 MG/DL (ref 2.5–4.9)
PLATELET # BLD: 226 K/UL (ref 135–450)
PLATELET SLIDE REVIEW: ADEQUATE
PMV BLD AUTO: 8.9 FL (ref 5–10.5)
PO2 ARTERIAL: 135 MMHG (ref 75–108)
POLYCHROMASIA: ABNORMAL
POTASSIUM REFLEX MAGNESIUM: 3.7 MMOL/L (ref 3.5–5.1)
PROTHROMBIN TIME: 18.8 SEC (ref 9.9–12.7)
RBC # BLD: 3.3 M/UL (ref 4–5.2)
REPORT: NORMAL
SLIDE REVIEW: ABNORMAL
SODIUM BLD-SCNC: 146 MMOL/L (ref 136–145)
TCO2 ARTERIAL: 77.4 MMOL/L
TOTAL PROTEIN: 5.7 G/DL (ref 6.4–8.2)
TROPONIN: 0.09 NG/ML
TROPONIN: 0.13 NG/ML
VALPROIC ACID LEVEL: 36.7 UG/ML (ref 50–100)
VANCOMYCIN RANDOM: 4.7 UG/ML
WBC # BLD: 9.7 K/UL (ref 4–11)

## 2022-05-11 PROCEDURE — 6370000000 HC RX 637 (ALT 250 FOR IP): Performed by: STUDENT IN AN ORGANIZED HEALTH CARE EDUCATION/TRAINING PROGRAM

## 2022-05-11 PROCEDURE — 36415 COLL VENOUS BLD VENIPUNCTURE: CPT

## 2022-05-11 PROCEDURE — 2700000000 HC OXYGEN THERAPY PER DAY

## 2022-05-11 PROCEDURE — 6360000002 HC RX W HCPCS: Performed by: INTERNAL MEDICINE

## 2022-05-11 PROCEDURE — 99291 CRITICAL CARE FIRST HOUR: CPT | Performed by: INTERNAL MEDICINE

## 2022-05-11 PROCEDURE — 80164 ASSAY DIPROPYLACETIC ACD TOT: CPT

## 2022-05-11 PROCEDURE — 1200000000 HC SEMI PRIVATE

## 2022-05-11 PROCEDURE — 85610 PROTHROMBIN TIME: CPT

## 2022-05-11 PROCEDURE — 2580000003 HC RX 258: Performed by: STUDENT IN AN ORGANIZED HEALTH CARE EDUCATION/TRAINING PROGRAM

## 2022-05-11 PROCEDURE — 2500000003 HC RX 250 WO HCPCS: Performed by: INTERNAL MEDICINE

## 2022-05-11 PROCEDURE — 2580000003 HC RX 258: Performed by: INTERNAL MEDICINE

## 2022-05-11 PROCEDURE — 84100 ASSAY OF PHOSPHORUS: CPT

## 2022-05-11 PROCEDURE — 93010 ELECTROCARDIOGRAM REPORT: CPT | Performed by: INTERNAL MEDICINE

## 2022-05-11 PROCEDURE — 83735 ASSAY OF MAGNESIUM: CPT

## 2022-05-11 PROCEDURE — 6370000000 HC RX 637 (ALT 250 FOR IP): Performed by: INTERNAL MEDICINE

## 2022-05-11 PROCEDURE — 82803 BLOOD GASES ANY COMBINATION: CPT

## 2022-05-11 PROCEDURE — 6360000002 HC RX W HCPCS: Performed by: STUDENT IN AN ORGANIZED HEALTH CARE EDUCATION/TRAINING PROGRAM

## 2022-05-11 PROCEDURE — 80053 COMPREHEN METABOLIC PANEL: CPT

## 2022-05-11 PROCEDURE — 94761 N-INVAS EAR/PLS OXIMETRY MLT: CPT

## 2022-05-11 PROCEDURE — C9113 INJ PANTOPRAZOLE SODIUM, VIA: HCPCS | Performed by: INTERNAL MEDICINE

## 2022-05-11 PROCEDURE — 80202 ASSAY OF VANCOMYCIN: CPT

## 2022-05-11 PROCEDURE — 94003 VENT MGMT INPAT SUBQ DAY: CPT

## 2022-05-11 PROCEDURE — 84484 ASSAY OF TROPONIN QUANT: CPT

## 2022-05-11 PROCEDURE — 05HY33Z INSERTION OF INFUSION DEVICE INTO UPPER VEIN, PERCUTANEOUS APPROACH: ICD-10-PCS | Performed by: STUDENT IN AN ORGANIZED HEALTH CARE EDUCATION/TRAINING PROGRAM

## 2022-05-11 PROCEDURE — 74018 RADEX ABDOMEN 1 VIEW: CPT

## 2022-05-11 PROCEDURE — 2000000000 HC ICU R&B

## 2022-05-11 PROCEDURE — 36556 INSERT NON-TUNNEL CV CATH: CPT

## 2022-05-11 PROCEDURE — 87449 NOS EACH ORGANISM AG IA: CPT

## 2022-05-11 PROCEDURE — 85025 COMPLETE CBC W/AUTO DIFF WBC: CPT

## 2022-05-11 PROCEDURE — 83605 ASSAY OF LACTIC ACID: CPT

## 2022-05-11 PROCEDURE — 82140 ASSAY OF AMMONIA: CPT

## 2022-05-11 RX ORDER — LEVOTHYROXINE SODIUM 0.1 MG/1
100 TABLET ORAL DAILY
Status: DISCONTINUED | OUTPATIENT
Start: 2022-05-11 | End: 2022-05-26 | Stop reason: HOSPADM

## 2022-05-11 RX ORDER — MINERAL OIL AND WHITE PETROLATUM 150; 830 MG/G; MG/G
OINTMENT OPHTHALMIC EVERY 4 HOURS
Status: DISCONTINUED | OUTPATIENT
Start: 2022-05-11 | End: 2022-05-13

## 2022-05-11 RX ORDER — MAGNESIUM SULFATE 1 G/100ML
1000 INJECTION INTRAVENOUS PRN
Status: DISCONTINUED | OUTPATIENT
Start: 2022-05-11 | End: 2022-05-11

## 2022-05-11 RX ORDER — PANTOPRAZOLE SODIUM 40 MG/10ML
40 INJECTION, POWDER, LYOPHILIZED, FOR SOLUTION INTRAVENOUS DAILY
Status: DISCONTINUED | OUTPATIENT
Start: 2022-05-11 | End: 2022-05-26 | Stop reason: HOSPADM

## 2022-05-11 RX ORDER — POTASSIUM CHLORIDE 7.45 MG/ML
10 INJECTION INTRAVENOUS PRN
Status: DISCONTINUED | OUTPATIENT
Start: 2022-05-11 | End: 2022-05-11

## 2022-05-11 RX ORDER — ALBUTEROL SULFATE 2.5 MG/3ML
2.5 SOLUTION RESPIRATORY (INHALATION)
Status: DISCONTINUED | OUTPATIENT
Start: 2022-05-11 | End: 2022-05-17

## 2022-05-11 RX ORDER — CHLORHEXIDINE GLUCONATE 0.12 MG/ML
15 RINSE ORAL 2 TIMES DAILY
Status: DISCONTINUED | OUTPATIENT
Start: 2022-05-11 | End: 2022-05-11

## 2022-05-11 RX ORDER — VALPROIC ACID 250 MG/5ML
500 SOLUTION ORAL NIGHTLY
Status: DISCONTINUED | OUTPATIENT
Start: 2022-05-11 | End: 2022-05-14 | Stop reason: ALTCHOICE

## 2022-05-11 RX ORDER — POLYVINYL ALCOHOL 14 MG/ML
1 SOLUTION/ DROPS OPHTHALMIC EVERY 4 HOURS
Status: DISCONTINUED | OUTPATIENT
Start: 2022-05-11 | End: 2022-05-13

## 2022-05-11 RX ORDER — NOREPINEPHRINE BIT/0.9 % NACL 16MG/250ML
2-100 INFUSION BOTTLE (ML) INTRAVENOUS CONTINUOUS PRN
Status: DISCONTINUED | OUTPATIENT
Start: 2022-05-11 | End: 2022-05-11

## 2022-05-11 RX ORDER — NOREPINEPHRINE BIT/0.9 % NACL 16MG/250ML
INFUSION BOTTLE (ML) INTRAVENOUS
Status: DISPENSED
Start: 2022-05-11 | End: 2022-05-11

## 2022-05-11 RX ORDER — SODIUM CHLORIDE 0.9 % (FLUSH) 0.9 %
10 SYRINGE (ML) INJECTION PRN
Status: DISCONTINUED | OUTPATIENT
Start: 2022-05-11 | End: 2022-05-26

## 2022-05-11 RX ORDER — SODIUM CHLORIDE 9 MG/ML
INJECTION, SOLUTION INTRAVENOUS PRN
Status: DISCONTINUED | OUTPATIENT
Start: 2022-05-11 | End: 2022-05-26

## 2022-05-11 RX ORDER — ACETAMINOPHEN 650 MG/1
650 SUPPOSITORY RECTAL EVERY 4 HOURS PRN
Status: DISCONTINUED | OUTPATIENT
Start: 2022-05-11 | End: 2022-05-26 | Stop reason: HOSPADM

## 2022-05-11 RX ORDER — ACETAMINOPHEN 325 MG/1
650 TABLET ORAL EVERY 4 HOURS PRN
Status: DISCONTINUED | OUTPATIENT
Start: 2022-05-11 | End: 2022-05-26 | Stop reason: HOSPADM

## 2022-05-11 RX ORDER — POTASSIUM CHLORIDE 29.8 MG/ML
20 INJECTION INTRAVENOUS PRN
Status: DISCONTINUED | OUTPATIENT
Start: 2022-05-11 | End: 2022-05-11

## 2022-05-11 RX ORDER — MIDODRINE HYDROCHLORIDE 5 MG/1
5 TABLET ORAL EVERY 8 HOURS
Status: DISCONTINUED | OUTPATIENT
Start: 2022-05-11 | End: 2022-05-13

## 2022-05-11 RX ORDER — ONDANSETRON 2 MG/ML
4 INJECTION INTRAMUSCULAR; INTRAVENOUS EVERY 6 HOURS PRN
Status: DISCONTINUED | OUTPATIENT
Start: 2022-05-11 | End: 2022-05-26 | Stop reason: HOSPADM

## 2022-05-11 RX ORDER — SODIUM CHLORIDE, SODIUM LACTATE, POTASSIUM CHLORIDE, AND CALCIUM CHLORIDE .6; .31; .03; .02 G/100ML; G/100ML; G/100ML; G/100ML
1000 INJECTION, SOLUTION INTRAVENOUS ONCE
Status: COMPLETED | OUTPATIENT
Start: 2022-05-11 | End: 2022-05-11

## 2022-05-11 RX ORDER — SODIUM CHLORIDE, SODIUM LACTATE, POTASSIUM CHLORIDE, CALCIUM CHLORIDE 600; 310; 30; 20 MG/100ML; MG/100ML; MG/100ML; MG/100ML
INJECTION, SOLUTION INTRAVENOUS CONTINUOUS
Status: DISCONTINUED | OUTPATIENT
Start: 2022-05-11 | End: 2022-05-17

## 2022-05-11 RX ORDER — MIDODRINE HYDROCHLORIDE 5 MG/1
2.5 TABLET ORAL EVERY 8 HOURS
Status: DISCONTINUED | OUTPATIENT
Start: 2022-05-11 | End: 2022-05-11

## 2022-05-11 RX ORDER — NOREPINEPHRINE BIT/0.9 % NACL 16MG/250ML
2-100 INFUSION BOTTLE (ML) INTRAVENOUS CONTINUOUS
Status: DISCONTINUED | OUTPATIENT
Start: 2022-05-11 | End: 2022-05-17

## 2022-05-11 RX ORDER — HEPARIN SODIUM 5000 [USP'U]/ML
5000 INJECTION, SOLUTION INTRAVENOUS; SUBCUTANEOUS 2 TIMES DAILY
Status: DISCONTINUED | OUTPATIENT
Start: 2022-05-11 | End: 2022-05-26 | Stop reason: HOSPADM

## 2022-05-11 RX ORDER — VALPROIC ACID 250 MG/5ML
250 SOLUTION ORAL 2 TIMES DAILY
Status: DISCONTINUED | OUTPATIENT
Start: 2022-05-11 | End: 2022-05-14 | Stop reason: ALTCHOICE

## 2022-05-11 RX ORDER — VALPROIC ACID 250 MG/5ML
250 SOLUTION ORAL EVERY 8 HOURS SCHEDULED
Status: DISCONTINUED | OUTPATIENT
Start: 2022-05-11 | End: 2022-05-11

## 2022-05-11 RX ORDER — MAGNESIUM SULFATE IN WATER 40 MG/ML
2000 INJECTION, SOLUTION INTRAVENOUS ONCE
Status: COMPLETED | OUTPATIENT
Start: 2022-05-11 | End: 2022-05-11

## 2022-05-11 RX ADMIN — PANTOPRAZOLE SODIUM 40 MG: 40 INJECTION, POWDER, FOR SOLUTION INTRAVENOUS at 08:56

## 2022-05-11 RX ADMIN — VANCOMYCIN HYDROCHLORIDE 750 MG: 750 INJECTION, POWDER, LYOPHILIZED, FOR SOLUTION INTRAVENOUS at 15:52

## 2022-05-11 RX ADMIN — Medication 50 MCG/HR: at 06:30

## 2022-05-11 RX ADMIN — MIDODRINE HYDROCHLORIDE 2.5 MG: 5 TABLET ORAL at 08:56

## 2022-05-11 RX ADMIN — MINERAL OIL AND WHITE PETROLATUM: 150; 830 OINTMENT OPHTHALMIC at 16:03

## 2022-05-11 RX ADMIN — CEFEPIME HYDROCHLORIDE 1000 MG: 1 INJECTION, POWDER, FOR SOLUTION INTRAMUSCULAR; INTRAVENOUS at 08:59

## 2022-05-11 RX ADMIN — LEVOTHYROXINE SODIUM 100 MCG: 0.1 TABLET ORAL at 08:56

## 2022-05-11 RX ADMIN — SERTRALINE 100 MG: 50 TABLET, FILM COATED ORAL at 21:21

## 2022-05-11 RX ADMIN — Medication 8 MCG/MIN: at 20:02

## 2022-05-11 RX ADMIN — VALPROIC ACID 250 MG: 250 SOLUTION ORAL at 12:15

## 2022-05-11 RX ADMIN — MINERAL OIL AND WHITE PETROLATUM: 150; 830 OINTMENT OPHTHALMIC at 13:17

## 2022-05-11 RX ADMIN — MINERAL OIL AND WHITE PETROLATUM: 150; 830 OINTMENT OPHTHALMIC at 21:37

## 2022-05-11 RX ADMIN — POLYVINYL ALCOHOL 1 DROP: 14 SOLUTION/ DROPS OPHTHALMIC at 09:59

## 2022-05-11 RX ADMIN — MIDODRINE HYDROCHLORIDE 2.5 MG: 5 TABLET ORAL at 06:15

## 2022-05-11 RX ADMIN — HEPARIN SODIUM 5000 UNITS: 5000 INJECTION INTRAVENOUS; SUBCUTANEOUS at 10:16

## 2022-05-11 RX ADMIN — POLYVINYL ALCOHOL 1 DROP: 14 SOLUTION/ DROPS OPHTHALMIC at 13:17

## 2022-05-11 RX ADMIN — POLYVINYL ALCOHOL 1 DROP: 14 SOLUTION/ DROPS OPHTHALMIC at 16:03

## 2022-05-11 RX ADMIN — PROPOFOL 15 MCG/KG/MIN: 10 INJECTION, EMULSION INTRAVENOUS at 17:23

## 2022-05-11 RX ADMIN — Medication 25 MCG/HR: at 21:18

## 2022-05-11 RX ADMIN — CEFEPIME HYDROCHLORIDE 2000 MG: 2 INJECTION, POWDER, FOR SOLUTION INTRAVENOUS at 21:16

## 2022-05-11 RX ADMIN — MIDODRINE HYDROCHLORIDE 5 MG: 5 TABLET ORAL at 13:21

## 2022-05-11 RX ADMIN — MINERAL OIL AND WHITE PETROLATUM: 150; 830 OINTMENT OPHTHALMIC at 10:00

## 2022-05-11 RX ADMIN — SODIUM CHLORIDE, PRESERVATIVE FREE 10 ML: 5 INJECTION INTRAVENOUS at 08:56

## 2022-05-11 RX ADMIN — SODIUM CHLORIDE, POTASSIUM CHLORIDE, SODIUM LACTATE AND CALCIUM CHLORIDE: 600; 310; 30; 20 INJECTION, SOLUTION INTRAVENOUS at 20:04

## 2022-05-11 RX ADMIN — MIDODRINE HYDROCHLORIDE 5 MG: 5 TABLET ORAL at 21:21

## 2022-05-11 RX ADMIN — VALPROIC ACID 500 MG: 250 SOLUTION ORAL at 21:21

## 2022-05-11 RX ADMIN — CARBIDOPA AND LEVODOPA 1.5 TABLET: 25; 100 TABLET ORAL at 16:16

## 2022-05-11 RX ADMIN — POLYVINYL ALCOHOL 1 DROP: 14 SOLUTION/ DROPS OPHTHALMIC at 21:37

## 2022-05-11 RX ADMIN — SODIUM CHLORIDE, POTASSIUM CHLORIDE, SODIUM LACTATE AND CALCIUM CHLORIDE: 600; 310; 30; 20 INJECTION, SOLUTION INTRAVENOUS at 06:17

## 2022-05-11 RX ADMIN — SODIUM CHLORIDE, POTASSIUM CHLORIDE, SODIUM LACTATE AND CALCIUM CHLORIDE 1000 ML: 600; 310; 30; 20 INJECTION, SOLUTION INTRAVENOUS at 02:14

## 2022-05-11 RX ADMIN — CARBIDOPA AND LEVODOPA 1.5 TABLET: 25; 100 TABLET ORAL at 10:16

## 2022-05-11 RX ADMIN — Medication 5 MCG/MIN: at 01:09

## 2022-05-11 RX ADMIN — HEPARIN SODIUM 5000 UNITS: 5000 INJECTION INTRAVENOUS; SUBCUTANEOUS at 21:21

## 2022-05-11 RX ADMIN — MAGNESIUM SULFATE HEPTAHYDRATE 2000 MG: 2 INJECTION, SOLUTION INTRAVENOUS at 09:45

## 2022-05-11 ASSESSMENT — PAIN SCALES - GENERAL
PAINLEVEL_OUTOF10: 0

## 2022-05-11 ASSESSMENT — PULMONARY FUNCTION TESTS
PIF_VALUE: 32
PIF_VALUE: 24
PIF_VALUE: 23
PIF_VALUE: 34

## 2022-05-11 NOTE — PROGRESS NOTES
Clinical Pharmacy Note: Pharmacy to Dose Vancomcyin    Vancomycin Day: 2  Current Dosing Regimen: dose based on levels  Dosing Method: Dosing by random levels    Random: 4.7    Recent Labs     05/10/22  2032 05/11/22  0405   BUN 33* 31*       Recent Labs     05/10/22  2032 05/11/22  0405   CREATININE 1.2* 0.8       Recent Labs     05/10/22  2032 05/11/22  0413   WBC 9.2 9.7         Intake/Output Summary (Last 24 hours) at 5/11/2022 1454  Last data filed at 5/11/2022 1316  Gross per 24 hour   Intake 3015.55 ml   Output 550 ml   Net 2465.55 ml         Ht Readings from Last 1 Encounters:   05/11/22 4' 9\" (1.448 m)        Wt Readings from Last 1 Encounters:   05/11/22 78 lb 11.3 oz (35.7 kg)         Body mass index is 17.03 kg/m². Estimated Creatinine Clearance: 43 mL/min (based on SCr of 0.8 mg/dL). Assessment/Plan:  Re-dose vancomycin now with 750 mg x1. A vancomycin random level has been ordered on 5/12 at 0600 for follow-up. Changes in regimen will be determined based on culture results, renal function, and clinical response. Pharmacy will continue to monitor and adjust regimen as necessary.     Thank you for the consult,    Filipe Ralph, PharmD, [facility]

## 2022-05-11 NOTE — ED PROVIDER NOTES
[facility]        Pt Name: Glenn Tellez  MRN: [id number]  [place] 1965  Date of evaluation: 5/10/2022  Provider: Tera Desouza PA-C  PCP: Marijane Blizzard, MD  Note Started: 8:59 PM EDT        I have seen and evaluated this patient with my supervising physician Miles Farooq MD.    [address] [place] [address]       Chief Complaint   Patient presents with    Shortness of Breath     Pt presents to the ED from home in RESP distress, on NRB 15L, Recently been treated for pneumonia, family stopped giving PO antibiotics due to not thickened liquid medication       HISTORY OF PRESENT ILLNESS   (Location, Timing/Onset, Context/Setting, Quality, Duration, Modifying Factors, Severity, Associated Signs and Symptoms)  Note limiting factors. Chief Complaint: Respiratory distress     Glenn Tellez is a 60 y.o. female who presents for evaluation of severe respiratory distress. Per EMS report, patient was recently diagnosed with pneumonia, however, had not been taking her antibiotics due to concern for dysphagia and thickened diet. EMS reports satting at 77% on arrival.  Nonrebreather up to 84 to 85%. No additional information is able to be obtained at this time. Nursing Notes were all reviewed and agreed with or any disagreements were addressed in the HPI. REVIEW OF SYSTEMS    (2-9 systems for level 4, 10 or more for level 5)     Review of Systems   Unable to perform ROS: Acuity of condition       Positives and Pertinent negatives as per HPI. Except as noted above in the ROS, all other systems were reviewed and negative.        PAST MEDICAL HISTORY     Past Medical History:   Diagnosis Date    Anemia     Anorexia     Autistic disorder     Convulsions ([facility]     Down syndrome     Dysphagia     Hypothyroid     Malnutrition ([address]     Parkinson disease ([facility]     UTI (urinary tract infection)     Weakness          SURGICAL HISTORY Past Surgical History:   Procedure Laterality Date    GASTROSTOMY TUBE PLACEMENT      GASTROSTOMY TUBE PLACEMENT  2/18/14         CURRENTMEDICATIONS       Previous Medications    ACETAMINOPHEN (TYLENOL) 325 MG TABLET    Take 650 mg by mouth every 6 hours as needed for Pain. CARBIDOPA-LEVODOPA (SINEMET)  MG PER TABLET    Take 1.5 tablets by mouth 3 times daily Indications: Take one and a half tablets by mouth three times per day     DOCUSATE SODIUM (COLACE) 100 MG CAPSULE    Take 100 mg by mouth daily    LEVOTHYROXINE (SYNTHROID) 100 MCG TABLET    Take 100 mcg by mouth daily     MIDODRINE (PROAMATINE) 2.5 MG TABLET    Take 1 tablet by mouth 3 times daily (with meals)    MULTIPLE VITAMINS-MINERALS (THERAPEUTIC MULTIVITAMIN-MINERALS) TABLET    Take 1 tablet by mouth daily. OMEPRAZOLE (PRILOSEC) 20 MG CAPSULE    Take 20 mg by mouth daily. SERTRALINE (ZOLOFT) 20 MG/ML CONCENTRATED SOLUTION    Take 100 mg by mouth nightly     VALPROIC ACID (DEPAKENE) 250 MG/5ML SOLN ORAL SOLUTION    Take 250 mg by mouth every 8 hours 250 mg in the AM, 250 mg in the afternoon, 500 mg nightly. ALLERGIES     Caffeine and Iodine    FAMILYHISTORY     History reviewed. No pertinent family history. SOCIAL HISTORY       Social History     Tobacco Use    Smoking status: Never Smoker    Smokeless tobacco: Never Used   Substance Use Topics    Alcohol use: No    Drug use: No       SCREENINGS             PHYSICAL EXAM    (up to 7 for level 4, 8 or more for level 5)     ED Triage Vitals [05/10/22 2032]   BP Temp Temp src Pulse Resp SpO2 Height Weight   -- -- -- -- -- -- -- 77 lb 2.6 oz (35 kg)       Physical Exam  Vitals and nursing note reviewed. Constitutional:       General: She is in acute distress. Appearance: She is well-developed. She is ill-appearing. She is not diaphoretic. HENT:      Head: Normocephalic and atraumatic.       Right Ear: External ear normal.      Left Ear: External ear normal. Nose: Nose normal.      Mouth/Throat:      Mouth: Mucous membranes are moist.   Eyes:      General:         Right eye: No discharge. Left eye: No discharge. Cardiovascular:      Rate and Rhythm: Tachycardia present. Pulmonary:      Effort: Pulmonary effort is normal. No respiratory distress. Breath sounds: Rhonchi present. Abdominal:      General: There is no distension. Palpations: Abdomen is soft. Tenderness: There is no abdominal tenderness. Musculoskeletal:         General: Normal range of motion. Cervical back: Normal range of motion and neck supple. Skin:     General: Skin is warm and dry. Neurological:      Mental Status: She is alert and oriented to person, place, and time.    Psychiatric:         Behavior: Behavior normal.         DIAGNOSTIC RESULTS   LABS:    Labs Reviewed   BLOOD GAS, ARTERIAL - Abnormal; Notable for the following components:       Result Value    pH, Arterial 7.242 (*)     pCO2, Arterial 63.3 (*)     pO2, Arterial 142.0 (*)     All other components within normal limits   CBC WITH AUTO DIFFERENTIAL - Abnormal; Notable for the following components:    RBC 3.91 (*)     .5 (*)     All other components within normal limits   COMPREHENSIVE METABOLIC PANEL W/ REFLEX TO MG FOR LOW K - Abnormal; Notable for the following components:    Sodium 149 (*)     Anion Gap 21 (*)     BUN 33 (*)     CREATININE 1.2 (*)     GFR Non- 46 (*)     GFR  56 (*)     Albumin 3.3 (*)     Albumin/Globulin Ratio 0.8 (*)     ALT <5 (*)     All other components within normal limits   PROTIME-INR - Abnormal; Notable for the following components:    Protime 15.5 (*)     INR 1.35 (*)     All other components within normal limits   TROPONIN - Abnormal; Notable for the following components:    Troponin 0.13 (*)     All other components within normal limits   BRAIN NATRIURETIC PEPTIDE - Abnormal; Notable for the following components:    Pro-BNP 1,363 (*)     All other components within normal limits   PROCALCITONIN - Abnormal; Notable for the following components:    Procalcitonin 0.36 (*)     All other components within normal limits   URINALYSIS WITH REFLEX TO CULTURE - Abnormal; Notable for the following components:    Color, UA DARK YELLOW (*)     Bilirubin Urine SMALL (*)     Ketones, Urine TRACE (*)     Protein, UA 30 (*)     Leukocyte Esterase, Urine SMALL (*)     All other components within normal limits   LACTIC ACID - Abnormal; Notable for the following components:    Lactic Acid 12.1 (*)     All other components within normal limits    Narrative:     Frankie Hillman  [facility] tel. [phone],  Chemistry results called to and read back by DESIREE Ramos, 05/10/2022 20:58,  by Manohar - Abnormal; Notable for the following components:    Crystals, UA 1+ Ca. Oxalate (*)     All other components within normal limits   CULTURE, BLOOD 1   CULTURE, BLOOD 2   COVID-19 & INFLUENZA COMBO   LACTIC ACID       When ordered only abnormal lab results are displayed. All other labs were within normal range or not returned as of this dictation. EKG: When ordered, EKG's are interpreted by the Emergency Department Physician in the absence of a cardiologist.  Please see their note for interpretation of EKG. RADIOLOGY:   Non-plain film images such as CT, Ultrasound and MRI are read by the radiologist. Plain radiographic images are visualized and preliminarily interpreted by the ED Provider with the below findings:        Interpretation per the Radiologist below, if available at the time of this note:    XR CHEST PORTABLE   Final Result   Endotracheal tube is present with tip projecting just proximal to the anita. Left basilar opacity and trace left pleural effusion. Aspiration and   pneumonia are in the differential.           No results found.         PROCEDURES   Unless otherwise noted below, none     Procedures  Intubation Procedure Note    Indication: impending respiratory failure and hypoxia    Consent: Unable to be obtained due to the emergent nature of this procedure. Medications Used: etomidate intravenously and rocuronium intravenously    Procedure: The patient was placed in the appropriate position. Cricoid pressure was not required. Intubation was performed by direct laryngoscopy using a laryngoscope and a 7.0 cuffed endotracheal tube. The cuff was then inflated and the tube was secured appropriately at a distance of 22 cm to the dental ridge. Initial confirmation of placement included bilateral breath sounds, an end tidal CO2 detector and adequate pulse oximetry reading. A chest x-ray to verify correct placement of the tube has been ordered but is still pending. The patient tolerated the procedure well. Complications: None        CRITICAL CARE TIME   There was a high probability of life-threatening clinical deterioration in the patient's condition requiring my urgent intervention. I personally saw the patient and independently provided 60 minutes of non-concurrent critical care out of the total shared critical care time provided, excluding separately reportable procedures.        CONSULTS:  None      EMERGENCY DEPARTMENT COURSE and DIFFERENTIAL DIAGNOSIS/MDM:   Vitals:    Vitals:    05/10/22 2145 05/10/22 2150 05/10/22 2156 05/10/22 2203   BP: 110/70      Pulse: 110 106 102    Resp: 14 14 14    Temp:    101.9 °F (38.8 °C)   TempSrc:    Rectal   SpO2: 100% 100% 100%    Weight:           Patient was given the following medications:  Medications   vancomycin (VANCOCIN) 500 mg in dextrose 5 % 100 mL IVPB (mini-bag) (has no administration in time range)   propofol injection (20 mcg/kg/min × 35 kg IntraVENous New Bag 5/10/22 2056)   etomidate (AMIDATE) injection 20 mg (20 mg IntraVENous Given 5/10/22 2046)   cefepime (MAXIPIME) 2000 mg IVPB minibag (0 mg IntraVENous Stopped 5/10/22 2154)   rocuronium (ZEMURON) injection 50 mg (50 mg IntraVENous Given 5/10/22 2047)   acetaminophen (TYLENOL) suppository 650 mg (650 mg Rectal Given 5/10/22 2112)   lactated ringers infusion 1,000 mL (1,000 mLs IntraVENous New Bag 5/10/22 2123)           Patient presents in respiratory distress. On exam, she is tachypneic, tachycardic and hypoxic. Febrile at 101.9 rectal.  Given Tylenol suppository and started on aggressive fluid resuscitation for concern for sepsis. Also started on vancomycin and cefepime. Patient only at 85% on nonrebreather. Unable to back up and decision was made to intubate to help with oxygenation and respiratory failure and fatigue. This was performed per procedure note patient tolerated without difficulty. Please see attending note for EKG interpretation. CBC and CMP are relatively unremarkable aside from mild AVELINO with a creat of 1.2 and a BUN of 33. Supratherapeutic with an INR of 1.35. Troponin chronically elevated at 0.13. BNP 1363. Procalcitonin is 0.36. Lactic acid 12.1. Patient covered with broad-spectrum antibiotic therapy due to concern for nosocomial versus aspiration pneumonia. Chest x-ray shows a left basilar opacity. ET tube in proper position. Patient be admitted to the ICU for further evaluation management of septic shock likely secondary to aspiration pneumonia. Hospitalist will resume care the patient at this time. Stable for admission. FINAL IMPRESSION      1. Septic shock ([address]    2. Aspiration pneumonia, unspecified aspiration pneumonia type, unspecified laterality, unspecified part of lung ([address]    3. Acute hypoxemic respiratory failure (HCC)    4. Hypernatremia          DISPOSITION/PLAN   DISPOSITION Decision To Admit 05/10/2022 10:08:30 PM      PATIENT REFERRED TO:  No follow-up provider specified.     DISCHARGE MEDICATIONS:  New Prescriptions    No medications on file       DISCONTINUED MEDICATIONS:  Discontinued Medications    No medications on file              (Please note that portions of this note were completed with a voice recognition program.  Efforts were made to edit the dictations but occasionally words are mis-transcribed.)    Timothy Butts PA-C (electronically signed)           [place], [place]  05/10/22 1478

## 2022-05-11 NOTE — PROGRESS NOTES
Pharmacy Medication History Note      List of current medications patient is taking is complete. Source of information: fill history    Changes made to medication list:    Medications removed (include reason, ex. therapy complete or physician discontinued):  N/A    Medications added/doses adjusted:  N/A    Other notes (ex.  Recent course of antibiotics, Coumadin dosing):  I cannot confirm home OTC medications from fill history: acetaminophen, docusate, multivitamin     Catherine MahajanD, [facility]

## 2022-05-11 NOTE — PROGRESS NOTES
Nutrition Note    RECOMMENDATIONS  PO Diet: NPO  ONS: NPO  Nutrition Support:   1. Recommend to start Osmolite 1.2 (standard formula without fiber) at 20 mL per hour x 24 hours and monitor tolerance and electrolytes. 2. Recommend water flush 30 mL q 4 hours. 3. Recommend Osmolite 1.2 at eventual goal rate 35 mL per hour to provide 840 mL total volume, 1008 calories, 47 grams protein, 689 mL free water. 4. Monitor closely and correct lytes (K, Phos, Mg) before progressing TF to goal d/t risk of refeeding syndrome (hx extended inadequate nutritional intake). 5. Ensure head of bed is 30 - 45 degrees during continuous or bolus gastric feeding and for one hour after bolus. Turn off the feeding if head of bed is lowered less than 30 degrees. 6. Monitor for tolerance (bowel habits, N/V, cramping, abdominal exam findings: distended, firm, tense, guarded, discomfort). NUTRITION ASSESSMENT   Pt seen during IPOC critical care rounds, intubated and sedated. Propofol infusing at 4.2 mL per hour to provide 111 calories from fat daily. Levophed trending down; now at 15 mcg/min. Pt familiar to RD from April admission; had poor PO intake on a dysphagia pureed diet with mildly (nectar) thick liquids at that time. Pt with 3 lb (4%) weight loss over the past month. Significant muscle wasting and fat loss noted upon NFPE. OK to start trophic tube feeds today per Dr. Rissa Oates. May need to consider long term nutrition support if aggressive care is desired given malnutrition and chronic issues with aspiration.  Nutrition Related Findings: No edema noted. LR at 75 mL/hr. Na+ 146. Mg+ 1.7. Lactic 3.2. LBM 5/11. +2.1 liters.  Wounds: None   Nutrition Education:  Education not indicated    Nutrition Goals: Tolerate nutrition support at goal rate     MALNUTRITION ASSESSMENT   Chronic Illness  Malnutrition Status:  Moderate malnutrition  Findings of the 6 clinical characteristics of malnutrition:  Energy Intake:  Unable to assess  Weight Loss:  No significant weight loss     Body Fat Loss:  Severe body fat loss Triceps   Muscle Mass Loss:  Severe muscle mass loss Clavicles (pectoralis & deltoids),Thigh (quadraceps),Calf (gastrocnemius)  Fluid Accumulation:  No significant fluid accumulation     Strength:  Not Performed      NUTRITION DIAGNOSIS   · Inadequate oral intake related to impaired respiratory function as evidenced by intubation    · Moderate malnutrition related to inadequate protein-energy intake as evidenced by severe loss of subcutaneous fat,severe muscle loss      CURRENT NUTRITION THERAPIES  Diet NPO     PO Intake: NPO   PO Supplement Intake:NPO    ANTHROPOMETRICS   Current Height: 4' 9\" (144.8 cm)   Current Weight: 78 lb 11.3 oz (35.7 kg)     Admission weight: 74 lb 11.8 oz (33.9 kg)   Ideal Body Weight (IBW): 85 lbs  (39 kg)     Usual Bodyweight         BMI: 17    COMPARATIVE STANDARDS  Energy (kcal):  9712-8450     Protein (g):  47-78 grams       Fluid (mL/day):  7774-6187 mL    The patient will be monitored per nutrition standards of care. Consult dietitian if additional nutrition interventions are needed prior to RD reassessment.      Sadia Nicholas, [address],     Contact: [phone]

## 2022-05-11 NOTE — PROGRESS NOTES
Hospitalist Progress Note      PCP: Mae Love MD    Date of Admission: 5/10/2022    Subjective: Patient remained intubated and sedated, on Levophed 25 mics, FiO2 70%, magnesium 1.7, lactic acid is trending down 3.2    Medications:  Reviewed    Infusion Medications    sodium chloride      lactated ringers 75 mL/hr at 05/11/22 1314    norepinephrine 11 mcg/min (05/11/22 1338)    propofol 15 mcg/kg/min (05/11/22 1314)    fentaNYL 25 mcg/hr (05/11/22 1314)     Scheduled Medications    levothyroxine  100 mcg Per NG tube Daily    carbidopa-levodopa  1.5 tablet Per NG tube q8h    sertraline  100 mg Per NG tube Nightly    pantoprazole  40 mg IntraVENous Daily    polyvinyl alcohol  1 drop Both Eyes Q4H    And    artificial tears   Both Eyes Q4H    heparin (porcine)  5,000 Units SubCUTAneous BID    cefepime  1,000 mg IntraVENous Q12H    vancomycin (VANCOCIN) intermittent dosing (placeholder)   Other RX Placeholder    valproic acid  250 mg Per NG tube BID    valproic acid  500 mg Oral Nightly    midodrine  5 mg Orogastric q8h    alteplase  2 mg IntraCATHeter Once     PRN Meds: sodium chloride flush, sodium chloride, sodium phosphate IVPB **OR** sodium phosphate IVPB **OR** sodium phosphate IVPB, ondansetron, acetaminophen **OR** acetaminophen, albuterol, fentanNYL      Intake/Output Summary (Last 24 hours) at 5/11/2022 1405  Last data filed at 5/11/2022 1316  Gross per 24 hour   Intake 3015.55 ml   Output 550 ml   Net 2465.55 ml       Physical Exam Performed:    BP (!) 83/55   Pulse 70   Temp 98.6 °F (37 °C) (Bladder)   Resp 15   Ht 4' 9\" (1.448 m)   Wt 78 lb 11.3 oz (35.7 kg)   SpO2 95%   BMI 17.03 kg/m²     General appearance: Intubated  HEENT: Pupils equal, round, and reactive to light. Conjunctivae/corneas clear. Neck: Supple, with full range of motion. No jugular venous distention. Trachea midline. Respiratory:  Normal respiratory effort.  Clear to auscultation, bilaterally without Rales/Wheezes/Rhonchi. Cardiovascular: Regular rate and rhythm with normal S1/S2 without murmurs, rubs or gallops. Abdomen: Soft, non-tender, non-distended with normal bowel sounds. Musculoskeletal: No clubbing, cyanosis or edema bilaterally. Full range of motion without deformity. Skin: Skin color, texture, turgor normal.  No rashes or lesions. Neurologic: Intubated  Psychiatric: Intubated  Capillary Refill: Brisk,3 seconds, normal   Peripheral Pulses: +2 palpable, equal bilaterally       Labs:   Recent Labs     05/10/22  2032 05/11/22  0413   WBC 9.2 9.7   HGB 13.0 11.0*   HCT 42.0 33.4*    226     Recent Labs     05/10/22  2032 05/11/22  0405   * 146*   K 4.1 3.7    107   CO2 23 31   BUN 33* 31*   CREATININE 1.2* 0.8   CALCIUM 10.2 8.8   PHOS  --  2.5     Recent Labs     05/10/22  2032 05/11/22  0405   AST 19 24   ALT <5* 6*   BILITOT 0.3 0.3   ALKPHOS 104 75     Recent Labs     05/10/22  2032 05/11/22  0405   INR 1.35* 1.63*     Recent Labs     05/10/22  2032 05/11/22  0100 05/11/22  0405   TROPONINI 0.13* 0.13* 0.09*       Urinalysis:      Lab Results   Component Value Date    NITRU Negative 05/10/2022    WBCUA 3 05/10/2022    BACTERIA None Seen 05/10/2022    RBCUA 3-4 05/10/2022    BLOODU Negative 05/10/2022    SPECGRAV 1.025 05/10/2022    GLUCOSEU Negative 05/10/2022       Radiology:  XR ABDOMEN FOR NG/OG/NE TUBE PLACEMENT   Final Result   NG tube position appears acceptable. Endotracheal tube tip is likely within 1 cm above the anita. Dense left basilar opacity and patchy opacities in both lungs. XR CHEST PORTABLE   Final Result   Endotracheal tube is present with tip projecting just proximal to the anita. Left basilar opacity and trace left pleural effusion.   Aspiration and   pneumonia are in the differential.             Assessment/Plan:    Active Hospital Problems    Diagnosis     Moderate malnutrition ([address] [E44.0]      Priority: Medium    AVELINO [facility]acute [facility]) [facility] [N17.9]      Priority: Medium    Aspiration pneumonia ([facility] [J69.0]      Priority: Medium    Sepsis with acute organ dysfunction without septic shock ([facility] [A41.9, R65.20]      Priority: Medium    Acute respiratory failure ([facility] [J96.00]     Down syndrome [Q90.9]       Acute hypoxic respiratory failure, secondary to multifocal pneumonia, patient was started on broad-spectrum antibiotic, intubated and sedated, started on Levophed, no guidance cultures so far, appreciate pulmonary input   Sepsis, and septic shock, secondary to multifocal pneumonia, started on vancomycin and cefepime, lactic acid is improving 3.2 continue current treatment, continue vent support   Chronic hypotension, on midodrine, will continue   Acute kidney injury likely prerenal, ATN, avoid nephrotoxic medication, continue fluid resuscitation.  History of Down syndrome   Lactic acidosis, improving with fluid resuscitation   Hypomagnesemia. Repleted, will continue monitor.  History of seizure disorder continue Keppra   Hypothyroidism, continue Synthroid   Failure to thrive    DVT Prophylaxis: Heparin subcu  Diet: Diet NPO  ADULT TUBE FEEDING; Orogastric; Standard without Fiber; Continuous; 20; No; 30; Q 4 hours  Code Status: Full Code    PT/OT Eval Status: After extubation    Dispo -continue ICU care    Due to the immediate potential for life-threatening deterioration due to acute hypoxic respiratory failure, multifocal pneumonia, I spent 34 minutes providing critical care. This time is excluding time spent performing procedures.       Carlene Pereira MD

## 2022-05-11 NOTE — CONSULTS
Pharmacy to dose Potassium and Magnesium Replacement    Potassium replacement protocol rejected due to contraindicated in patients with crcl <30 ml/min    Patient's current crcl= 27 ml/min  Patient's current K=4.1  Magnesium unavailable at the time of my review, due to be drawn with AM labs. No replacement needed at this time.      Sweta AlexanderD, BCPS

## 2022-05-11 NOTE — PROGRESS NOTES
05/11/22 0806   Vent Patient Data (Readings)   Plateau Pressure (cm H2O) 16 cm H2O   Static Compliance (L/cm H2O) 32   Dynamic Compliance (L/cm H2O) 62 L/cm H2O

## 2022-05-11 NOTE — CONSULTS
PULMONARY AND CRITICAL CARE MEDICINE CONSULTATION NOTE    CONSULTING PHYSICIAN:  Ekaterina Breaux MD    ADMISSION DATE: 5/10/2022  ADMISSION DIAGNOSIS: Hypernatremia [E87.0]  Septic shock (HCC) [A41.9, R65.21]  Aspiration pneumonia, unspecified aspiration pneumonia type, unspecified laterality, unspecified part of lung ([facility] [address] [J69.0]  Acute hypoxemic respiratory failure ([address] [J96.01]  Sepsis with acute organ dysfunction without septic shock, due to unspecified organism, unspecified type ([address] [A41.9, R65.20]    REASON FOR CONSULT:   Chief Complaint   Patient presents with    Shortness of Breath     Pt presents to the ED from home in RESP distress, on NRB 15L, Recently been treated for pneumonia, family stopped giving PO antibiotics due to not thickened liquid medication       DATE OF CONSULT: 5/10/2022    HISTORY OF PRESENT ILLNESS: 60y.o. year old female with a past medical history significant for Down syndrome, swallowing difficulties and recurrent aspiration pneumonia who presented to the hospital from home with increasing respiratory distress and hypoxia. In the ER patient was unable to tolerate high flow nasal cannula/BiPAP and was subsequently intubated. Chest imaging showed left lower lobe lung infiltrate. Apparently patient was recently in our hospital with similar complaints and found to have pneumonia. Was sent back home with oral medications. Patient was unable to tolerate antibiotics orally as she has dysphagia/swallowing difficulties. Did not finish the course of antibiotics and her respiratory status never fully recovered. At the time of interview, patient lying in bed in no apparent respiratory distress. Intubated and sedated. Appears emaciated and malnourished. REVIEW OF SYSTEMS:   14 point ROS could not be obtained due to patient factors. Intubated and sedated.       PAST MEDICAL HISTORY:   Past Medical History:   Diagnosis Date    Anemia     Anorexia     Autistic disorder  Convulsions ([facility] [address]     Down syndrome     Dysphagia     Hypothyroid     Malnutrition ([facility] [address]     Parkinson disease ([facility] [address]     UTI (urinary tract infection)     Weakness        PAST SURGICAL HISTORY:   Past Surgical History:   Procedure Laterality Date    GASTROSTOMY TUBE PLACEMENT      GASTROSTOMY TUBE PLACEMENT  2/18/14       SOCIAL HISTORY:   Social History     Tobacco Use    Smoking status: Never Smoker    Smokeless tobacco: Never Used   Substance Use Topics    Alcohol use: No    Drug use: No       FAMILY HISTORY: History reviewed. No pertinent family history. MEDICATIONS:     No current facility-administered medications on file prior to encounter. Current Outpatient Medications on File Prior to Encounter   Medication Sig Dispense Refill    midodrine (PROAMATINE) 2.5 MG tablet Take 1 tablet by mouth 3 times daily (with meals) 90 tablet 1    valproic acid (DEPAKENE) 250 MG/5ML SOLN oral solution Take 250 mg by mouth every 8 hours 250 mg in the AM, 250 mg in the afternoon, 500 mg nightly.  levothyroxine (SYNTHROID) 100 MCG tablet Take 100 mcg by mouth daily       docusate sodium (COLACE) 100 MG capsule Take 100 mg by mouth daily      acetaminophen (TYLENOL) 325 MG tablet Take 650 mg by mouth every 6 hours as needed for Pain.  carbidopa-levodopa (SINEMET)  MG per tablet Take 1.5 tablets by mouth 3 times daily Indications: Take one and a half tablets by mouth three times per day       sertraline (ZOLOFT) 20 MG/ML concentrated solution Take 100 mg by mouth nightly       Multiple Vitamins-Minerals (THERAPEUTIC MULTIVITAMIN-MINERALS) tablet Take 1 tablet by mouth daily.  omeprazole (PRILOSEC) 20 MG capsule Take 20 mg by mouth daily.           levothyroxine  100 mcg Per NG tube Daily    carbidopa-levodopa  1.5 tablet Per NG tube q8h    sertraline  100 mg Per NG tube Nightly    pantoprazole  40 mg IntraVENous Daily    polyvinyl alcohol  1 drop Both Eyes Q4H    And    artificial tears   Both Eyes Q4H    heparin (porcine)  5,000 Units SubCUTAneous BID    cefepime  1,000 mg IntraVENous Q12H    vancomycin (VANCOCIN) intermittent dosing (placeholder)   Other RX Placeholder    valproic acid  250 mg Per NG tube BID    valproic acid  500 mg Oral Nightly    midodrine  5 mg Orogastric q8h    alteplase  2 mg IntraCATHeter Once      sodium chloride      lactated ringers 75 mL/hr at 05/11/22 1314    norepinephrine 13 mcg/min (05/11/22 1057)    propofol 15 mcg/kg/min (05/11/22 1314)    fentaNYL 25 mcg/hr (05/11/22 1314)     sodium chloride flush, sodium chloride, sodium phosphate IVPB **OR** sodium phosphate IVPB **OR** sodium phosphate IVPB, ondansetron, acetaminophen **OR** acetaminophen, albuterol, fentanNYL      ALLERGIES:   Allergies as of 05/10/2022 - Fully Reviewed 05/10/2022   Allergen Reaction Noted    Caffeine  02/18/2015    Iodine  02/18/2015      OBJECTIVE:   height is 4' 9\" (1.448 m) and weight is 78 lb 11.3 oz (35.7 kg). Her bladder temperature is 98.6 °F (37 °C). Her blood pressure is 77/54 (abnormal) and her pulse is 71. Her respiration is 15 and oxygen saturation is 90%. I/O this shift:  In: 637 [I.V.:596.3; IV Piggyback:40.7]  Out: 250 [Urine:250]     PHYSICAL EXAM:    CONSTITUTIONAL: She is a 60y.o.-year-old who appears her stated age. She is intubated and sedated. HEENT: PERRLA, EOMI. No scleral icterus. No thrush, atraumatic, normocephalic. NECK: Supple, without cervical or supraclavicular lymphadenopathy:  CARDIOVASCULAR: S1 S2 RRR. Without murmer  RESPIRATORY & CHEST: Bibasilar scattered crackles heard. No wheezing heard. GASTROINTESTINAL & ABDOMEN: Soft, nontender, positive bowel sounds in all quadrants, non-distended, without hepatosplenomegaly. GENITOURINARY: Deferred. MUSCULOSKELETAL: No tenderness to palpation of the axial skeleton. There is no clubbing. No cyanosis. No edema of the lower extremities. SKIN OF BODY: No rash or jaundice. PSYCHIATRIC EVALUATION: Unable to assess  HEMATOLOGIC/LYMPHATIC/ IMMUNOLOGIC: No palpable lymphadenopathy. NEUROLOGIC: Intubated and sedated. LABS:  Recent Labs     05/10/22  2032 05/11/22  0405 05/11/22  0413   WBC 9.2  --  9.7   HGB 13.0  --  11.0*   HCT 42.0  --  33.4*     --  226   ALT <5* 6*  --    AST 19 24  --    * 146*  --    K 4.1 3.7  --     107  --    CREATININE 1.2* 0.8  --    BUN 33* 31*  --    CO2 23 31  --    INR 1.35* 1.63*  --        Recent Labs     05/10/22  2032 05/11/22  0405   GLUCOSE 93 78   CALCIUM 10.2 8.8   * 146*   K 4.1 3.7   CO2 23 31    107   BUN 33* 31*   CREATININE 1.2* 0.8       Recent Labs     05/10/22  2134 05/10/22  2212 05/11/22  0524   PHART 7.242* 7.352 7.401   EJA1RHF 63.3* 53.5* 53.0*   PO2ART 142.0* 108.0 135.0*   WXW9FGY 27.2 29.6* 32.9*   E9UFTFIH 98.4 97.9 98.9   BEART -1.4 3.0 6.8*       Lab Results   Component Value Date    INR 1.63 (H) 05/11/2022    INR 1.35 (H) 05/10/2022    INR 0.93 03/31/2019    PROTIME 18.8 (H) 05/11/2022    PROTIME 15.5 (H) 05/10/2022    PROTIME 10.6 03/31/2019     No results found for: AMYLASE   No results found for: LABA1C  No results found for: EAG  Lab Results   Component Value Date    TSH 19.85 (H) 08/21/2012    T4FREE 0.68 (L) 08/21/2012     Lab Results   Component Value Date    CKTOTAL 78 08/22/2012    CKMB 10.72 (H) 08/22/2012    TROPONINI 0.09 (H) 05/11/2022      No results found for: CRP   Lab Results   Component Value Date    .8 (H) 08/22/2012      No results found for: DDIMER   No results found for: FERRITIN   Lab Results   Component Value Date    LACTA 2.1 (H) 05/11/2022           IMAGING:    Narrative   EXAMINATION:   ONE XRAY VIEW OF THE CHEST       5/10/2022 9:21 pm           FINDINGS:   Endotracheal tube is present with tip projecting just proximal to the anita. Cardiomediastinal silhouette is unremarkable given rotated projection and   scoliosis projection.  Left basilar opacity.  Trace left pleural effusion. No pneumothorax appreciated on this projection.           Impression   Endotracheal tube is present with tip projecting just proximal to the anita.       Left basilar opacity and trace left pleural effusion.  Aspiration and   pneumonia are in the differential.             IMPRESSION:     1. Acute respiratory distress  2. Suspicion for aspiration pneumonia  3. Failure to thrive  4. Need for mechanical ventilation  5. Septic shock  6. History of seizure disorder  7. Down syndrome    RECOMMENDATION:     1. Patient has presented to the hospital again with respiratory distress and found to have left lower lobe infiltrate. 2. High suspicion for recurrent aspiration due to swallowing difficulties. 3. Currently on full mechanical ventilatory support with AC/VC, tidal volume 350, rate of 14, PEEP of 5 and FiO2 of 70%. ABG shows mild hypercapnia. I further reduced the FiO2 down to 60%. Continue to titrate the FiO2 down to maintain SPO2 above 90%. 4. Patient started on vancomycin and cefepime. 5. Blood cultures pending. Send for tracheal aspirate culture. 6. Remains sedated on propofol and fentanyl. Increase the dosage of fentanyl and decrease the dosage of propofol in order to improve the hemodynamics. 7. Currently on Levophed at 15 mcg/min. Patient has history of chronic hypotension. Takes midodrine 2.5 mg 3 times daily. I will restart her on midodrine 5 mg 3 times a day in order to get her off of vasopressors. 8. Patient is also on LR at 75 cc/h. Continue for now. 9. Urine output marginal but slowly improving with fluid resuscitation. 10. Patient has failure to thrive, and Down syndrome and recurrent aspiration. Long-term prognosis remains poor. We will get palliative care medicine involved in this case in order to discuss goals of care and CODE STATUS with patient's family.     Total critical care time caring for this patient with life threatening illness, including direct patient contact, management of life support systems, review of data including imaging and labs, discussions with other team members and physicians is at least 32 minutes so far today, excluding procedures. Mirtha Schwartz MD  Pulmonary Critical Care and Sleep Medicine  5/10/2022, 1:34 PM    This note was completed using dragon medical speech recognition software. Grammatical errors, random word insertions, pronoun errors and incomplete sentences are occasional consequences of this technology due to software limitations. If there are questions or concerns about the content of this note of information contained within the body of this dictation they should be addressed with the provider for clarification.

## 2022-05-11 NOTE — H&P
Hospital Medicine History & Physical      Patient:  Alisia Fragoso  :   1965  MRN:   [id number]  Date of Service: 05/10/22    Chief Complaint   Patient presents with    Shortness of Breath     Pt presents to the ED from home in RESP distress, on NRB 15L, Recently been treated for pneumonia, family stopped giving PO antibiotics due to not thickened liquid medication       HISTORY OF PRESENT ILLNESS:    Alisia Fragoso is a 60 y.o. female. She presented to the ER from home by ambulance. EMS was activated for respiratory distress. She has a h/o Down syndrome, dysphagia, and aspiration pneumonia. Review of Systems:  Unobtainable. Patient is intubated. Past Medical History:   Diagnosis Date    Anemia     Anorexia     Autistic disorder     Convulsions ([address]     Down syndrome     Dysphagia     Hypothyroid     Malnutrition ([facility] [address]     Parkinson disease ([facility] [address]     UTI (urinary tract infection)     Weakness        Past Surgical History:   Procedure Laterality Date    GASTROSTOMY TUBE PLACEMENT      GASTROSTOMY TUBE PLACEMENT  14         Prior to Admission medications    Medication Sig Start Date End Date Taking? Authorizing Provider   midodrine (PROAMATINE) 2.5 MG tablet Take 1 tablet by mouth 3 times daily (with meals) 22   Mike Laird PA-C   valproic acid (DEPAKENE) 250 MG/5ML SOLN oral solution Take 250 mg by mouth every 8 hours 250 mg in the AM, 250 mg in the afternoon, 500 mg nightly. Historical Provider, MD   levothyroxine (SYNTHROID) 100 MCG tablet Take 100 mcg by mouth daily     Historical Provider, MD   docusate sodium (COLACE) 100 MG capsule Take 100 mg by mouth daily    Historical Provider, MD   acetaminophen (TYLENOL) 325 MG tablet Take 650 mg by mouth every 6 hours as needed for Pain. Historical Provider, MD   carbidopa-levodopa (SINEMET)  MG per tablet Take 1.5 tablets by mouth 3 times daily Indications:  Take one and a half tablets by mouth three times per day     Historical Provider, MD   sertraline (ZOLOFT) 20 MG/ML concentrated solution Take 100 mg by mouth nightly     Historical Provider, MD   Multiple Vitamins-Minerals (THERAPEUTIC MULTIVITAMIN-MINERALS) tablet Take 1 tablet by mouth daily. Historical Provider, MD   omeprazole (PRILOSEC) 20 MG capsule Take 20 mg by mouth daily. Historical Provider, MD       Allergies:   Caffeine and Iodine    Social:   reports that she has never smoked. She has never used smokeless tobacco.   reports no history of alcohol use. Social History     Substance and Sexual Activity   Drug Use No       History reviewed. No pertinent family history. PHYSICAL EXAM:  I performed this physical examination. Vitals:  Patient Vitals for the past 24 hrs:   BP Temp Temp src Pulse Resp SpO2 Weight   05/10/22 2203 -- 101.9 °F (38.8 °C) Rectal -- -- -- --   05/10/22 2156 -- -- -- 102 14 100 % --   05/10/22 2150 -- -- -- 106 14 100 % --   05/10/22 2145 110/70 -- -- 110 14 100 % --   05/10/22 2130 (!) 98/59 -- -- 113 14 100 % --   05/10/22 2115 (!) 108/46 -- -- 125 14 99 % --   05/10/22 2107 109/71 -- -- 127 14 100 % --   05/10/22 2100 -- -- -- 130 14 99 % --   05/10/22 2032 -- -- -- -- -- -- 77 lb 2.6 oz (35 kg)       Intake/Output Summary (Last 24 hours) at 5/10/2022 2211  Last data filed at 5/10/2022 2154  Gross per 24 hour   Intake 100 ml   Output --   Net 100 ml       Vent Settings:  Vent Mode: AC/VC Resp Rate (Set): 14 bmp/Vt (Set, mL): 350 mL/ /FiO2 : 100 %    GEN:  Appearance: Thin chronically ill appearing female . Level of Consciousness:  sedated . Orientation:  n/a    HEENT: Sclera anicteric.  no conjunctival chemosis. tacky mucus membranes. no specific or diagnostic oral lesions. ET and OG tubes in situ. Thick tan secretions suctioned from airway. NECK:  no signs of meningismus. Jugular veins not distended. Carotid pulses  2+.  no cervical lymphadenopathy. no thyromegaly. CV:  regular rhythm.   normal S1 & S2.    no murmur. no rub.  no gallop. PULM:  Chest excursion is symmetric. Breath sounds are breath sounds are coarse and rhonchorous throughout the right lung with crackles at the base. Breath sounds are reduced, bronchial, and rhonchorous over the LLL. AB:  Abdominal shape is normal.  Bowel sounds are active. Generally soft to palpation. no tenderness is present. no involuntary guarding. no rebound guarding. RECTAL:   :  Yuan in situ with clear alexander urine in collection reservoir. EXTR:  Skin is warm. Capillary refill brisk. no specific or pathognomic rash. no clubbing. no pitting edema. no active wound or ulcer. Pulses 2+ x 4    LINES:  Right femoral triple lumen 5/10/22    LABS:  Lab Results   Component Value Date    WBC 9.2 05/10/2022    HGB 13.0 05/10/2022    HCT 42.0 05/10/2022    .5 (H) 05/10/2022     05/10/2022     Lab Results   Component Value Date    CREATININE 1.2 (H) 05/10/2022    BUN 33 (H) 05/10/2022     (H) 05/10/2022    K 4.1 05/10/2022     05/10/2022    CO2 23 05/10/2022     Lab Results   Component Value Date    ALT <5 (L) 05/10/2022    AST 19 05/10/2022    ALKPHOS 104 05/10/2022    BILITOT 0.3 05/10/2022     Lab Results   Component Value Date    CKTOTAL 78 08/22/2012    CKMB 10.72 (H) 08/22/2012    TROPONINI 0.13 (H) 05/10/2022     Recent Labs     05/10/22  2134   PHART 7.242*   BYY6ACF 63.3*   PO2ART 142.0*       IMAGING:  XR CHEST PORTABLE    Result Date: 5/10/2022  EXAMINATION: ONE XRAY VIEW OF THE CHEST 5/10/2022 9:21 pm COMPARISON: Chest x-ray 04/13/2022. HISTORY: ORDERING SYSTEM PROVIDED HISTORY: resp failure TECHNOLOGIST PROVIDED HISTORY: Reason for exam:->resp failure Reason for Exam: resp failure FINDINGS: Endotracheal tube is present with tip projecting just proximal to the anita. Cardiomediastinal silhouette is unremarkable given rotated projection and scoliosis projection. Left basilar opacity.   Trace left pleural effusion. No pneumothorax appreciated on this projection. Endotracheal tube is present with tip projecting just proximal to the anita. Left basilar opacity and trace left pleural effusion. Aspiration and pneumonia are in the differential.     I directly reviewed all recent imaging studies as well as pertinent prior studies. Radiology reports may or may not be available at the time of my review. EKG:  New and pertinent prior tracings were directly reviewed. My interpretation is as follows:  Sinus tachycardia    Active Hospital Problems    Diagnosis Date Noted    AVELINO (acute kidney injury) ([facility] [address] [N17.9] 05/10/2022     Priority: Medium    Aspiration pneumonia ([facility] [address] [J69.0] 05/10/2022     Priority: Medium    Sepsis with acute organ dysfunction without septic shock ([facility] [address] [A41.9, R65.20] 05/10/2022     Priority: Medium    Acute respiratory failure ([facility] [address] [J96.00] 04/05/2022    Down syndrome [Q90.9]        ASSESSMENT & PLAN  Sepsis  -  Candidate source(s):  Lower respiratory, presumably pneumonia due to aspiration.  -  Cultures:  Blood, sputum  -  Empiric abx:  Cefepime, vancomycin  -  Resus:  Received 1L LR in the ER. Based on serial reassessments I ordered an additional 1L LR over an hour then another 1L LR over 2 hours. -  Continue home midodrine for chronic hypotension. Norepinephrine infusion also made available on an as-needed basis. Acute respiratory failure  -  Continue vent support w/ lung protective setttings. Adjustments according to ABG's. -  Critical care assistance requested. AVELINO  -  Baseline creatinine < 0.5 and currently 1.2. Clinically patient seems hypovolemic.  -  Avoid nephrotoxin exposure where possible. -  Crystalloid resuscitation as described above. Parksinsons  -  Continue home sinemet. The indication for valproate is unclear but will be continued for now.     DVT prophylaxis: SCDs, lovenox  Stress ulcers:  IV protonix  Code Status:  Full code status  Disposition:  Inpatient for the foreseeable future. I attest that this patient's condition was sufficiently critical to warrant urgent intervention due to sepsis. A total of 60 minutes was spent in direct patient care at the bedside and entering orders. This time did not include separately billable procedures.       Carolina Jones MD MD

## 2022-05-11 NOTE — CONSULTS
Palliative Care:        Pt presents to the ED from home in RESP distress, on NRB 15L, Recently been treated for pneumonia, family stopped giving PO antibiotics due to not thickened liquid medication  60 y.o. female. She presented to the ER from home by ambulance. EMS was activated for respiratory distress. She has a h/o Down syndrome, dysphagia, and aspiration pneumonia. Sepsis, Acute resp fail, AVELNIO, Parkinsons  60y.o. year old female with a past medical history significant for Down syndrome, swallowing difficulties and recurrent aspiration pneumonia who presented to the hospital from home with increasing respiratory distress and hypoxia. In the ER patient was unable to tolerate high flow nasal cannula/BiPAP and was subsequently intubated. Chest imaging showed left lower lobe lung infiltrate. Apparently patient was recently in our hospital with similar complaints and found to have pneumonia. Was sent back home with oral medications. Patient was unable to tolerate antibiotics orally as she has dysphagia/swallowing difficulties. Did not finish the course of antibiotics and her respiratory status never fully recovered. Admit 5/10, consult 5/11    Past Medical History:   has a past medical history of Anemia, Anorexia, Autistic disorder, Convulsions ([address] Down syndrome, Dysphagia, Hypothyroid, Malnutrition ([address] Parkinson disease ([address] UTI (urinary tract infection), and Weakness. Past Surgical History:   has a past surgical history that includes Gastrostomy tube placement and Gastrostomy tube placement (2/18/14). Advance Directives:        Full code; no ACP docs on file - legal guardianship listed as Apsi but no document on file to support this    Problem Severity: Pain/Other Symptoms:        Intubated  Fent 25mcg; levo 10mcg; propofol 15mcg;      Bed Mobility/Toileting/Transfer:        No PT/OT notes this admission    Performance Status:        Palliative Performance Scale:  100% []Full Normal activity & work No evidence of disease  90%   [] Full Normal activity & work Some evidence of disease  80%   [] Full Normal activity with Effort Some evidence of disease  70%   [] Reduced Unable Normal Job/Work Significant disease Full Normal or reduced  60%   [] Ambulation reduced; Significant disease; Can't do hobbies/housework; intake normal   or reduced; occasional assist; LOC full/confusion  50%   [] Mainly sit/lie; Extensive disease; Can't do any work; Considerable assist; intake normal  Or reduced; LOC full/confusion  40%   [] Mainly in bed; Extensive disease; Mainly assist; intake normal or reduced; occasional assist; LOC full/confusion  30%   [] Bed Bound; Extensive disease; Total care; intake reduced; LOC full/confusion  20%   [] Bed Bound; Extensive disease; Total care; intake minimal; Drowsy/coma  10%   [x] Bed Bound; Extensive disease; Total care; Mouth care only; Drowsy/coma    PPS 10%    Symptom Assessment: Appetite/Nausea/Bowels/Fatigue:        NPO, OG no TF at this time    Social History:   reports that she has never smoked. She has never used smokeless tobacco. She reports that she does not drink alcohol and does not use drugs. Family History:  family history is not on file. Psychological/Spiritual:         N/A    Family Discussion:        All MD/RN notes and personal interaction indicate that pt is not capable of independent decision making capabilities. At direction of RN/EMR, contacted [address] who is reportedly pt's legal guardian. Requested a faxed copy of guardianship paperwork to add to pt's chart. They verbally confirmed their guardianship of pt. VM left for legal department requesting a fax of document. Will follow up as time allows or circumstances dictate. Please reach out via [phone], [employer], or email [email or web address] if pt condition changes significantly or if family requests support. Thank you.     Electronically signed by Leticia Monahan RN, BSN on 5/11/2022 at 2:55 PM

## 2022-05-11 NOTE — CONSULTS
Clinical Pharmacy Note: Pharmacy to Dose Vancomycin    Kenny Garsia is a 60 y.o. female started on Vancomycin for aspiration pneumonia; consult received from Dr. Helio Deasi to manage therapy. Also receiving the following antibiotics: cefepime. Goal Trough Level: 15-20 mcg/mL    Assessment/Plan:  A 500 mg loading dose was given on 5/10 at 2330  Will dose by level due to weight 33.9 kg + crcl <30 ml/min  A vancomycin random level has been ordered for 5/11 at 0600  Changes in regimen will be determined based on culture results, renal function, and clinical response. Pharmacy will continue to monitor and adjust regimen as necessary. Allergies:  Caffeine and Iodine     Recent Labs     05/10/22  2032   CREATININE 1.2*       Recent Labs     05/10/22  2032   WBC 9.2       Ht Readings from Last 1 Encounters:   05/11/22 4' 9\" (1.448 m)        Wt Readings from Last 1 Encounters:   05/11/22 74 lb 11.8 oz (33.9 kg)         Estimated Creatinine Clearance: 27 mL/min (A) (based on SCr of 1.2 mg/dL (H)).       Thank you for the consult,    Stef AlexanderD, BCPS

## 2022-05-11 NOTE — RT PROTOCOL NOTE
RT Nebulizer Bronchodilator Protocol Note    There is a bronchodilator order in the chart from a provider indicating to follow the RT Bronchodilator Protocol and there is an Initiate RT Bronchodilator Protocol order as well (see protocol at bottom of note). CXR Findings:  XR CHEST PORTABLE    Result Date: 5/10/2022  Endotracheal tube is present with tip projecting just proximal to the anita. Left basilar opacity and trace left pleural effusion. Aspiration and pneumonia are in the differential.       The findings from the last RT Protocol Assessment were as follows:  Smoking: None or smoker <15 pack years  Respiratory Pattern: Regular pattern and RR 12-20 bpm  Breath Sounds: Clear breath sounds  Cough: Strong, productive  Indication for Bronchodilator Therapy: Decreased or absent breath sounds  Bronchodilator Assessment Score: 1    Aerosolized bronchodilator medication orders have been revised according to the RT Nebulizer Bronchodilator Protocol below. Respiratory Therapist to perform RT Therapy Protocol Assessment initially then follow the protocol. Repeat RT Therapy Protocol Assessment PRN for score 0-3 or on second treatment, BID, and PRN for scores above 3. No Indications - adjust the frequency to every 6 hours PRN wheezing or bronchospasm, if no treatments needed after 48 hours then discontinue using Per Protocol order mode. If indication present, adjust the RT bronchodilator orders based on the Bronchodilator Assessment Score as indicated below. If a patient is on this medication at home then do not decrease Frequency below that used at home. 0-3 - enter or revise RT bronchodilator order(s) to equivalent RT Bronchodilator order with Frequency of every 4 hours PRN for wheezing or increased work of breathing using Per Protocol order mode.        4-6 - enter or revise RT Bronchodilator order(s) to two equivalent RT bronchodilator orders with one order with BID Frequency and one order with Frequency of every 4 hours PRN wheezing or increased work of breathing using Per Protocol order mode. 7-10 - enter or revise RT Bronchodilator order(s) to two equivalent RT bronchodilator orders with one order with TID Frequency and one order with Frequency of every 4 hours PRN wheezing or increased work of breathing using Per Protocol order mode. 11-13 - enter or revise RT Bronchodilator order(s) to one equivalent RT bronchodilator order with QID Frequency and an Albuterol order with Frequency of every 4 hours PRN wheezing or increased work of breathing using Per Protocol order mode. Greater than 13 - enter or revise RT Bronchodilator order(s) to one equivalent RT bronchodilator order with every 4 hours Frequency and an Albuterol order with Frequency of every 2 hours PRN wheezing or increased work of breathing using Per Protocol order mode. RT to enter RT Home Evaluation for COPD & MDI Assessment order using Per Protocol order mode.     Electronically signed by Cori Cabrera RCP on 5/11/2022 at 1:41 AM

## 2022-05-11 NOTE — ED PROVIDER NOTES
I independently saw performed a substantive portion of the visit (history, physical, and MDM) on Miriam Rosa. All diagnostic, treatment, and disposition decisions were made by myself in conjunction with the advanced practice provider. I have participated in the medical decision making and directed the treatment plan and disposition of the patient. For further details of [facility]le[facility] Pear[facility] emergency department encounter, please see the advanced practice provider's documentation. CHIEF COMPLAINT  Chief Complaint   Patient presents with    Shortness of Breath     Pt presents to the ED from home in RESP distress, on NRB 15L, Recently been treated for pneumonia, family stopped giving PO antibiotics due to not thickened liquid medication       Briefly, Glenn Tellez is a 60 y.o. female  who presents to the ED complaining of respiratory distress on arrival, hypoxic despite nonrebreather mask, with notable tachypnea. She is MRDD at baseline but full code. She was admitted in April for respiratory failure and pneumonia as well. Apparently she has not been getting her p.o. antibiotics because they are not the appropriate thickened consistency as per her aspiration risk diet guidelines per EMS.   She arrives febrile and unable to provide any history herself    FOCUSED PHYSICAL EXAMINATION  /70   Pulse 102   Temp 101.9 °F (38.8 °C) (Rectal)   Resp 14   Wt 77 lb 2.6 oz (35 kg)   SpO2 100%   BMI 15.58 kg/m²    Focused physical examination notable for distress, tachycardic, rhonchi throughout with notable tachypnea and accessory muscle usage, abdomen is soft nontender nondistended, frail and thin, dry tacky mucous membranes       The 12 lead EKG was interpreted by me as follows:  Rate: tachycardia with a rate of 110  Rhythm: sinus  Axis: normal  Intervals: short NJ, narrow QRS  ST segments: ST depressions diffusely  T waves: no abnormal inversions  Non-specific T wave changes: present  Prior EKG comparison: EKG dated 4/2/22 is not significantly different      MDM:  Diagnostic considerations included acute coronary syndrome, pulmonary embolism, COPD/asthma, pneumonia, sepsis, pericardial tamponade, pneumothorax, CHF, thoracic aortic dissection, anxiety    ED course was notable for septic shock with respiratory failure requiring prompt intubation on arrival.    Patient was intubated by SILVERIO under my direct continuous bedside supervision as I was placing a central line, see her note for intubation details. Patient has a profound lactic acidemia consistent with septic shock, fever, tachycardia and was treated with broad-spectrum antibiotics to cover for aspiration and nosocomial pneumonia. Central Line Placement Procedure Note    Indication: vascular access    Consent: Unable to be obtained due to the emergent nature of this procedure. Location:  The central line was placed in the right femoral vein due to ease of ultrasound accessibility     Procedure: The patient was positioned appropriately and the skin over the right femoral vein was prepped with chloraprep and draped in a sterile fashion. Local anesthesia was not performed due to emergent nature of the procedure. A large bore needle was used to identify the vein under direct visualization and guidance with sterile ultrasound (linear probe on vascular settings). See details of ultrasound component below. A guide wire was then inserted into the vein through the needle. A triple lumen catheter was then inserted into the vessel over the guide wire using the Seldinger technique. All ports showed good, free flowing blood return and were flushed with saline solution. The catheter was then securely fastened to the skin with sutures and covered with a sterile dressing. A post procedure X-ray was not indicated. The patient tolerated the procedure well.     Complications: None    Ultrasound guided central venous access  Indication: need for central access as documented above  Views:  Long access view of target vein: Adequate  Short access view of target vein: Adequate  Adjacent artery: Adequate    Images obtained with findings:   Vein compressible: yes  Needle tip within vein: yes    Impression:   Successful cannulation of central vein: yes     Images obtained by Dajuan Shepard MD, interpreted by Dajuan Shepard MD.    Cornelia silver    Date/Time: 5/10/2022 9:07 PM  Performed by: Dajuan Shepard MD  Authorized by: Dajuan Shepard MD     Consent:     Consent obtained:  Emergent situation    Alternatives discussed:  No treatment  Anesthesia (see MAR for exact dosages): Anesthesia method:  None  Procedure details:     Location:  L femoral    Kleber's test performed: no    Post-procedure details:     Dressing applied: yes      Bleeding:  Manual pressure applied    Circulation, movement, and sensation:  Normal    Patient tolerance of procedure: Tolerated well, no immediate complications          Is this patient to be included in the SEP-1 Core Measure? Yes   SEP-1 CORE MEASURE DATA      Sepsis Criteria   Severe Sepsis Criteria   Septic Shock Criteria     Must be confirmed or suspected to move forward with diagnosis of sepsis. Must meet 2:    [x] Temperature > 100.9 F (38.3 C)        or < 96.8 F (36 C)  [x] HR > 90  [x] RR > 20  [] WBC > 12 or < 4 or 10% bands      AND:      [x] Infection Confirmed or        Suspected. Must meet 1:    [x] Lactate > 2       or   [x] Signs of Organ Dysfunction:    - SBP < 90 or MAP < 65  - Altered mental status  - Creatinine > 2 or increased from      baseline  - Urine Output < 0.5 ml/kg/hr  - Bilirubin > 2  - INR > 1.5 (not anticoagulated)  - Platelets < 138,651  - Acute Respiratory Failure as     evidenced by new need for NIPPV     or mechanical ventilation      [] No criteria met for Severe Sepsis.    Must meet 1:    [x] Lactate > 4        or   [] SBP < 90 or MAP < 65 for at        least two readings in the CLINICAL IMPRESSION  1. Septic shock ([facility] [address]    2. Aspiration pneumonia, unspecified aspiration pneumonia type, unspecified laterality, unspecified part of lung ([address]    3. Acute hypoxemic respiratory failure (HCC)    4. Hypernatremia        DISPOSITION  Sandeep Mcpherson was admitted in critical condition. The plan is to admit to the hospital at this time under the hospitalist service. Hospitalist accepted the patient and will take over the patient's care. The total critical care time I independently spent while evaluating and treating this patient was 60 minutes. This excludes time spent doing separately billable procedures. This includes time at the bedside, data interpretation, medication management, obtaining critical history from collateral sources if the patient is unable to provide it directly, and physician consultation. Specifics of interventions taken and potentially life-threatening diagnostic considerations are listed above in the medical decision making. If this was a shared visit with an SILVERIO, the time in this attestation is non-concurrent critical care time out of the total shared critical care time provided by the SILVERIO and myself. This chart was created using Dragon dictation software. Efforts were made by me to ensure accuracy, however some errors may be present due to limitations of this technology.             Stephie Mederos MD  05/10/22 [id number]

## 2022-05-12 ENCOUNTER — APPOINTMENT (OUTPATIENT)
Dept: GENERAL RADIOLOGY | Age: 57
DRG: 871 | End: 2022-05-12
Payer: MEDICARE

## 2022-05-12 PROBLEM — J96.01 ACUTE HYPOXEMIC RESPIRATORY FAILURE (HCC): Status: ACTIVE | Noted: 2022-04-05

## 2022-05-12 PROBLEM — R65.21 SEPTIC SHOCK (HCC): Status: ACTIVE | Noted: 2022-05-10

## 2022-05-12 LAB
A/G RATIO: 0.6 (ref 1.1–2.2)
ALBUMIN SERPL-MCNC: 2.1 G/DL (ref 3.4–5)
ALP BLD-CCNC: 108 U/L (ref 40–129)
ALT SERPL-CCNC: <5 U/L (ref 10–40)
ANION GAP SERPL CALCULATED.3IONS-SCNC: 4 MMOL/L (ref 3–16)
AST SERPL-CCNC: 35 U/L (ref 15–37)
BASE EXCESS ARTERIAL: 8.4 MMOL/L (ref -3–3)
BASOPHILS ABSOLUTE: 0.1 K/UL (ref 0–0.2)
BASOPHILS RELATIVE PERCENT: 0.7 %
BILIRUB SERPL-MCNC: <0.2 MG/DL (ref 0–1)
BUN BLDV-MCNC: 19 MG/DL (ref 7–20)
CALCIUM SERPL-MCNC: 8.9 MG/DL (ref 8.3–10.6)
CARBOXYHEMOGLOBIN ARTERIAL: 0.3 % (ref 0–1.5)
CHLORIDE BLD-SCNC: 103 MMOL/L (ref 99–110)
CO2: 32 MMOL/L (ref 21–32)
CREAT SERPL-MCNC: <0.5 MG/DL (ref 0.6–1.1)
EOSINOPHILS ABSOLUTE: 0.1 K/UL (ref 0–0.6)
EOSINOPHILS RELATIVE PERCENT: 0.4 %
GFR AFRICAN AMERICAN: >60
GFR NON-AFRICAN AMERICAN: >60
GLUCOSE BLD-MCNC: 70 MG/DL (ref 70–99)
HCO3 ARTERIAL: 33.9 MMOL/L (ref 21–29)
HCT VFR BLD CALC: 31.4 % (ref 36–48)
HEMOGLOBIN, ART, EXTENDED: 10.4 G/DL (ref 12–16)
HEMOGLOBIN: 10.3 G/DL (ref 12–16)
INR BLD: 1.91 (ref 0.88–1.12)
L. PNEUMOPHILA SEROGP 1 UR AG: NORMAL
LYMPHOCYTES ABSOLUTE: 1.8 K/UL (ref 1–5.1)
LYMPHOCYTES RELATIVE PERCENT: 8.2 %
MAGNESIUM: 2 MG/DL (ref 1.8–2.4)
MCH RBC QN AUTO: 33.5 PG (ref 26–34)
MCHC RBC AUTO-ENTMCNC: 32.9 G/DL (ref 31–36)
MCV RBC AUTO: 101.9 FL (ref 80–100)
METHEMOGLOBIN ARTERIAL: 0.4 %
MONOCYTES ABSOLUTE: 0.6 K/UL (ref 0–1.3)
MONOCYTES RELATIVE PERCENT: 2.7 %
NEUTROPHILS ABSOLUTE: 18.9 K/UL (ref 1.7–7.7)
NEUTROPHILS RELATIVE PERCENT: 88 %
O2 SAT, ARTERIAL: 99 %
O2 THERAPY: ABNORMAL
PCO2 ARTERIAL: 50.8 MMHG (ref 35–45)
PDW BLD-RTO: 13.4 % (ref 12.4–15.4)
PH ARTERIAL: 7.43 (ref 7.35–7.45)
PHOSPHORUS: 2.4 MG/DL (ref 2.5–4.9)
PLATELET # BLD: 218 K/UL (ref 135–450)
PMV BLD AUTO: 8.7 FL (ref 5–10.5)
PO2 ARTERIAL: 128 MMHG (ref 75–108)
POTASSIUM REFLEX MAGNESIUM: 3.9 MMOL/L (ref 3.5–5.1)
PROTHROMBIN TIME: 22.2 SEC (ref 9.9–12.7)
RBC # BLD: 3.08 M/UL (ref 4–5.2)
SODIUM BLD-SCNC: 139 MMOL/L (ref 136–145)
STREP PNEUMONIAE ANTIGEN, URINE: NORMAL
TCO2 ARTERIAL: 79.5 MMOL/L
TOTAL PROTEIN: 5.4 G/DL (ref 6.4–8.2)
VANCOMYCIN RANDOM: 9.9 UG/ML
WBC # BLD: 21.5 K/UL (ref 4–11)

## 2022-05-12 PROCEDURE — 94003 VENT MGMT INPAT SUBQ DAY: CPT

## 2022-05-12 PROCEDURE — 80202 ASSAY OF VANCOMYCIN: CPT

## 2022-05-12 PROCEDURE — 6370000000 HC RX 637 (ALT 250 FOR IP): Performed by: INTERNAL MEDICINE

## 2022-05-12 PROCEDURE — 80053 COMPREHEN METABOLIC PANEL: CPT

## 2022-05-12 PROCEDURE — 6370000000 HC RX 637 (ALT 250 FOR IP): Performed by: STUDENT IN AN ORGANIZED HEALTH CARE EDUCATION/TRAINING PROGRAM

## 2022-05-12 PROCEDURE — 6360000002 HC RX W HCPCS: Performed by: INTERNAL MEDICINE

## 2022-05-12 PROCEDURE — C9113 INJ PANTOPRAZOLE SODIUM, VIA: HCPCS | Performed by: INTERNAL MEDICINE

## 2022-05-12 PROCEDURE — 2500000003 HC RX 250 WO HCPCS: Performed by: INTERNAL MEDICINE

## 2022-05-12 PROCEDURE — 2700000000 HC OXYGEN THERAPY PER DAY

## 2022-05-12 PROCEDURE — 6360000002 HC RX W HCPCS: Performed by: STUDENT IN AN ORGANIZED HEALTH CARE EDUCATION/TRAINING PROGRAM

## 2022-05-12 PROCEDURE — 99291 CRITICAL CARE FIRST HOUR: CPT | Performed by: INTERNAL MEDICINE

## 2022-05-12 PROCEDURE — 82803 BLOOD GASES ANY COMBINATION: CPT

## 2022-05-12 PROCEDURE — 83735 ASSAY OF MAGNESIUM: CPT

## 2022-05-12 PROCEDURE — 94761 N-INVAS EAR/PLS OXIMETRY MLT: CPT

## 2022-05-12 PROCEDURE — 2580000003 HC RX 258: Performed by: STUDENT IN AN ORGANIZED HEALTH CARE EDUCATION/TRAINING PROGRAM

## 2022-05-12 PROCEDURE — 87641 MR-STAPH DNA AMP PROBE: CPT

## 2022-05-12 PROCEDURE — 2580000003 HC RX 258: Performed by: INTERNAL MEDICINE

## 2022-05-12 PROCEDURE — 2000000000 HC ICU R&B

## 2022-05-12 PROCEDURE — 85610 PROTHROMBIN TIME: CPT

## 2022-05-12 PROCEDURE — 87077 CULTURE AEROBIC IDENTIFY: CPT

## 2022-05-12 PROCEDURE — 87070 CULTURE OTHR SPECIMN AEROBIC: CPT

## 2022-05-12 PROCEDURE — 71045 X-RAY EXAM CHEST 1 VIEW: CPT

## 2022-05-12 PROCEDURE — 87205 SMEAR GRAM STAIN: CPT

## 2022-05-12 PROCEDURE — 85025 COMPLETE CBC W/AUTO DIFF WBC: CPT

## 2022-05-12 PROCEDURE — 87186 SC STD MICRODIL/AGAR DIL: CPT

## 2022-05-12 PROCEDURE — 84100 ASSAY OF PHOSPHORUS: CPT

## 2022-05-12 RX ORDER — METRONIDAZOLE 500 MG/100ML
500 INJECTION, SOLUTION INTRAVENOUS EVERY 8 HOURS
Status: DISCONTINUED | OUTPATIENT
Start: 2022-05-12 | End: 2022-05-23

## 2022-05-12 RX ADMIN — PROPOFOL 10 MCG/KG/MIN: 10 INJECTION, EMULSION INTRAVENOUS at 06:05

## 2022-05-12 RX ADMIN — VALPROIC ACID 250 MG: 250 SOLUTION ORAL at 12:40

## 2022-05-12 RX ADMIN — HEPARIN SODIUM 5000 UNITS: 5000 INJECTION INTRAVENOUS; SUBCUTANEOUS at 20:09

## 2022-05-12 RX ADMIN — VALPROIC ACID 500 MG: 250 SOLUTION ORAL at 20:10

## 2022-05-12 RX ADMIN — POLYVINYL ALCOHOL 1 DROP: 14 SOLUTION/ DROPS OPHTHALMIC at 20:08

## 2022-05-12 RX ADMIN — HEPARIN SODIUM 5000 UNITS: 5000 INJECTION INTRAVENOUS; SUBCUTANEOUS at 08:40

## 2022-05-12 RX ADMIN — SODIUM CHLORIDE: 9 INJECTION, SOLUTION INTRAVENOUS at 13:54

## 2022-05-12 RX ADMIN — CEFEPIME HYDROCHLORIDE 2000 MG: 2 INJECTION, POWDER, FOR SOLUTION INTRAVENOUS at 20:51

## 2022-05-12 RX ADMIN — HYDROCORTISONE SODIUM SUCCINATE 50 MG: 100 INJECTION, POWDER, FOR SOLUTION INTRAMUSCULAR; INTRAVENOUS at 20:08

## 2022-05-12 RX ADMIN — LEVOTHYROXINE SODIUM 100 MCG: 0.1 TABLET ORAL at 08:40

## 2022-05-12 RX ADMIN — MINERAL OIL AND WHITE PETROLATUM: 150; 830 OINTMENT OPHTHALMIC at 05:26

## 2022-05-12 RX ADMIN — HYDROCORTISONE SODIUM SUCCINATE 50 MG: 100 INJECTION, POWDER, FOR SOLUTION INTRAMUSCULAR; INTRAVENOUS at 12:55

## 2022-05-12 RX ADMIN — MIDODRINE HYDROCHLORIDE 5 MG: 5 TABLET ORAL at 22:09

## 2022-05-12 RX ADMIN — CARBIDOPA AND LEVODOPA 1.5 TABLET: 25; 100 TABLET ORAL at 00:45

## 2022-05-12 RX ADMIN — VANCOMYCIN HYDROCHLORIDE 750 MG: 750 INJECTION, POWDER, LYOPHILIZED, FOR SOLUTION INTRAVENOUS at 23:28

## 2022-05-12 RX ADMIN — POLYVINYL ALCOHOL 1 DROP: 14 SOLUTION/ DROPS OPHTHALMIC at 00:45

## 2022-05-12 RX ADMIN — SODIUM CHLORIDE, POTASSIUM CHLORIDE, SODIUM LACTATE AND CALCIUM CHLORIDE: 600; 310; 30; 20 INJECTION, SOLUTION INTRAVENOUS at 09:28

## 2022-05-12 RX ADMIN — SODIUM CHLORIDE: 9 INJECTION, SOLUTION INTRAVENOUS at 17:40

## 2022-05-12 RX ADMIN — MINERAL OIL AND WHITE PETROLATUM: 150; 830 OINTMENT OPHTHALMIC at 08:50

## 2022-05-12 RX ADMIN — POLYVINYL ALCOHOL 1 DROP: 14 SOLUTION/ DROPS OPHTHALMIC at 05:26

## 2022-05-12 RX ADMIN — MIDODRINE HYDROCHLORIDE 5 MG: 5 TABLET ORAL at 05:26

## 2022-05-12 RX ADMIN — MINERAL OIL AND WHITE PETROLATUM: 150; 830 OINTMENT OPHTHALMIC at 17:50

## 2022-05-12 RX ADMIN — MINERAL OIL AND WHITE PETROLATUM: 150; 830 OINTMENT OPHTHALMIC at 20:09

## 2022-05-12 RX ADMIN — METRONIDAZOLE 500 MG: 500 INJECTION, SOLUTION INTRAVENOUS at 16:14

## 2022-05-12 RX ADMIN — METRONIDAZOLE 500 MG: 500 INJECTION, SOLUTION INTRAVENOUS at 22:16

## 2022-05-12 RX ADMIN — MINERAL OIL AND WHITE PETROLATUM: 150; 830 OINTMENT OPHTHALMIC at 12:48

## 2022-05-12 RX ADMIN — MINERAL OIL AND WHITE PETROLATUM: 150; 830 OINTMENT OPHTHALMIC at 00:45

## 2022-05-12 RX ADMIN — CARBIDOPA AND LEVODOPA 1.5 TABLET: 25; 100 TABLET ORAL at 08:38

## 2022-05-12 RX ADMIN — POLYVINYL ALCOHOL 1 DROP: 14 SOLUTION/ DROPS OPHTHALMIC at 17:50

## 2022-05-12 RX ADMIN — POLYVINYL ALCOHOL 1 DROP: 14 SOLUTION/ DROPS OPHTHALMIC at 08:50

## 2022-05-12 RX ADMIN — VANCOMYCIN HYDROCHLORIDE 750 MG: 750 INJECTION, POWDER, LYOPHILIZED, FOR SOLUTION INTRAVENOUS at 12:07

## 2022-05-12 RX ADMIN — PANTOPRAZOLE SODIUM 40 MG: 40 INJECTION, POWDER, FOR SOLUTION INTRAVENOUS at 09:01

## 2022-05-12 RX ADMIN — VALPROIC ACID 250 MG: 250 SOLUTION ORAL at 08:36

## 2022-05-12 RX ADMIN — CEFEPIME HYDROCHLORIDE 2000 MG: 2 INJECTION, POWDER, FOR SOLUTION INTRAVENOUS at 08:59

## 2022-05-12 RX ADMIN — POLYVINYL ALCOHOL 1 DROP: 14 SOLUTION/ DROPS OPHTHALMIC at 12:47

## 2022-05-12 RX ADMIN — CARBIDOPA AND LEVODOPA 1.5 TABLET: 25; 100 TABLET ORAL at 16:33

## 2022-05-12 RX ADMIN — MIDODRINE HYDROCHLORIDE 5 MG: 5 TABLET ORAL at 16:23

## 2022-05-12 ASSESSMENT — PULMONARY FUNCTION TESTS
PIF_VALUE: 26
PIF_VALUE: 18
PIF_VALUE: 29

## 2022-05-12 ASSESSMENT — PAIN SCALES - GENERAL: PAINLEVEL_OUTOF10: 0

## 2022-05-12 NOTE — PROGRESS NOTES
PT is lying in bed with eyes closed, Right femoral IV dressing changed, PT tolerated well  with no distress noted. Vitalls are in normal range.

## 2022-05-12 NOTE — PROGRESS NOTES
Trachea midline. Respiratory:  Normal respiratory effort. Clear to auscultation, bilaterally without Rales/Wheezes/Rhonchi. Cardiovascular: Regular rate and rhythm with normal S1/S2 without murmurs, rubs or gallops. Abdomen: Soft, non-tender, non-distended with normal bowel sounds. Musculoskeletal: No clubbing, cyanosis or edema bilaterally. Full range of motion without deformity. Skin: Skin color, texture, turgor normal.  No rashes or lesions. Neurologic: Intubated  Psychiatric: Intubated  Capillary Refill: Brisk,3 seconds, normal   Peripheral Pulses: +2 palpable, equal bilaterally       Labs:   Recent Labs     05/10/22  2032 05/11/22  0413 05/12/22  0330   WBC 9.2 9.7 21.5*   HGB 13.0 11.0* 10.3*   HCT 42.0 33.4* 31.4*    226 218     Recent Labs     05/10/22  2032 05/11/22  0405 05/12/22  0330   * 146* 139   K 4.1 3.7 3.9    107 103   CO2 23 31 32   BUN 33* 31* 19   CREATININE 1.2* 0.8 <0.5*   CALCIUM 10.2 8.8 8.9   PHOS  --  2.5 2.4*     Recent Labs     05/10/22  2032 05/11/22  0405 05/12/22  0330   AST 19 24 35   ALT <5* 6* <5*   BILITOT 0.3 0.3 <0.2   ALKPHOS 104 75 108     Recent Labs     05/10/22  2032 05/11/22  0405 05/12/22  0330   INR 1.35* 1.63* 1.91*     Recent Labs     05/10/22  2032 05/11/22  0100 05/11/22  0405   TROPONINI 0.13* 0.13* 0.09*       Urinalysis:      Lab Results   Component Value Date    NITRU Negative 05/10/2022    WBCUA 3 05/10/2022    BACTERIA None Seen 05/10/2022    RBCUA 3-4 05/10/2022    BLOODU Negative 05/10/2022    SPECGRAV 1.025 05/10/2022    GLUCOSEU Negative 05/10/2022       Radiology:  XR CHEST PORTABLE   Final Result   1. Endotracheal tube terminates in the left mainstem bronchus, for which   retraction 5 cm is recommended. 2.  Enteric tube terminates at the level of the body of the stomach. 3.  No significant change in bilateral airspace disease. Probable small left   effusion.          XR ABDOMEN FOR NG/OG/NE TUBE PLACEMENT   Final Result   NG tube position appears acceptable. Endotracheal tube tip is likely within 1 cm above the anita. Dense left basilar opacity and patchy opacities in both lungs. XR CHEST PORTABLE   Final Result   Endotracheal tube is present with tip projecting just proximal to the anita. Left basilar opacity and trace left pleural effusion. Aspiration and   pneumonia are in the differential.             Assessment/Plan:    Active Hospital Problems    Diagnosis     Streptococcal bacteremia [R78.81, B95.5]      Priority: Medium    Pneumonia of left lower lobe due to infectious organism [J18.9]      Priority: Medium    Parkinsonism ([address] [G20]      Priority: Medium    Anorexia [R63.0]      Priority: Medium    Moderate malnutrition ([address] [E44.0]      Priority: Medium    AVELINO (acute kidney injury) ([address] [N17.9]      Priority: Medium    Aspiration pneumonia ([address] [J69.0]      Priority: Medium    Septic shock ([address] [A41.9, R65.21]      Priority: Medium    Acute hypoxemic respiratory failure ([address] [J96.01]     Down syndrome [Q90.9]       Acute hypoxic respiratory failure, secondary to multifocal pneumonia, patient was started on broad-spectrum antibiotic, intubated and sedated, started on Levophed, no guidance cultures so far, appreciate pulmonary input   Sepsis, and septic shock, secondary to multifocal pneumonia, started on vancomycin and cefepime, lactic acid is improving 3.2 continue current treatment, continue vent support leukocytosis has been worsening 21,000, blood culture growing Streptococcus, will repeat blood culture, send strep antigen urine, consult ID   Chronic hypotension, on midodrine, will continue   Acute kidney injury likely prerenal, ATN, avoid nephrotoxic medication, continue fluid resuscitation.  History of Down syndrome   Lactic acidosis, improving with fluid resuscitation   Hypomagnesemia. Repleted, will continue monitor.    History of seizure disorder continue Keppra   Hypothyroidism, continue Synthroid   Failure to thrive    DVT Prophylaxis: Heparin subcu  Diet: Diet NPO  ADULT TUBE FEEDING; Orogastric; Standard without Fiber; Continuous; 20; Yes; 15; Q 4 hours; 35; 30; Q 4 hours  Code Status: Full Code    PT/OT Eval Status: After extubation    Dispo -continue ICU care    Due to the immediate potential for life-threatening deterioration due to acute hypoxic respiratory failure, multifocal pneumonia, I spent 34 minutes providing critical care. This time is excluding time spent performing procedures.       Cholo Chapman MD

## 2022-05-12 NOTE — PROGRESS NOTES
PT is extubated since 2:30 pm with no Respiratory  Distress. BUE Restraint removed with no skin issues. She is on low dose of levophed at moment. Clear yellow noted in the beaulieu with no odor.

## 2022-05-12 NOTE — PLAN OF CARE
Problem: Discharge Planning  Goal: Discharge to home or other facility with appropriate resources  Outcome: Progressing     Problem: Pain  Goal: Verbalizes/displays adequate comfort level or baseline comfort level  Outcome: Progressing     Problem: Nutrition Deficit:  Goal: Optimize nutritional status  Outcome: Progressing     Problem: Skin/Tissue Integrity  Goal: Absence of new skin breakdown  Description: 1. Monitor for areas of redness and/or skin breakdown  2. Assess vascular access sites hourly  3. Every 4-6 hours minimum:  Change oxygen saturation probe site  4. Every 4-6 hours:  If on nasal continuous positive airway pressure, respiratory therapy assess nares and determine need for appliance change or resting period. Outcome: Progressing     Problem: Safety - Adult  Goal: Free from fall injury  Outcome: Progressing     Problem: ABCDS Injury Assessment  Goal: Absence of physical injury  Outcome: Progressing     Problem: Safety - Medical Restraint  Goal: Remains free of injury from restraints (Restraint for Interference with Medical Device)  Description: INTERVENTIONS:  1. Determine that other, less restrictive measures have been tried or would not be effective before applying the restraint  2. Evaluate the patient's condition at the time of restraint application  3. Inform patient/family regarding the reason for restraint  4.  Q2H: Monitor safety, psychosocial status, comfort, nutrition and hydration  Outcome: Progressing  Flowsheets (Taken 5/11/2022 2200)  Remains free of injury from restraints (restraint for interference with medical device): Determine that other, less restrictive measures have been tried or would not be effective before applying the restraint

## 2022-05-12 NOTE — PROGRESS NOTES
PT intermittently alert and confused on vent. PT on minimal sedation to maintain compliance/comfot on vent. Rass of 0. Pt is has MRDD history with dementia. Pt is calm but not following commands, and keeps reching for vent with contracted limbs. Pt in restraints for pt safety. Levo titrated down as tolerated. Tube feed tolerated well. Pt assessed and bathed per protocol, pt resting without acute incident.

## 2022-05-12 NOTE — CONSULTS
Infectious Diseases   Consult Note        Admission Date: 5/10/2022  Hospital Day: Hospital Day: 3   Attending: Celina Tian MD  Date of service: 5/12/22     Reason for admission: Hypernatremia [E87.0]  Septic shock ([address] [A41.9, R65.21]  Aspiration pneumonia, unspecified aspiration pneumonia type, unspecified laterality, unspecified part of lung ([address] [J69.0]  Acute hypoxemic respiratory failure ([address] [J96.01]  Sepsis with acute organ dysfunction without septic shock, due to unspecified organism, unspecified type ([address] [A41.9, R65.20]    Chief complaint/ Reason for consult: Septic shock, group B streptococcus bacteremia    Microbiology:        I have reviewed allavailable micro lab data and cultures    · Blood culture (1/2) - collected on 5/10/2022: Group B streptococcus        Antibiotics and immunizations:       Current antibiotics: All antibiotics and their doses were reviewed by me    Recent Abx Admin                   vancomycin (VANCOCIN) 750 mg in dextrose 5 % 250 mL IVPB (mg) 750 mg New Bag 05/12/22 1207    cefepime (MAXIPIME) 2000 mg IVPB minibag (mg) 2,000 mg New Bag 05/12/22 0859     2,000 mg New Bag 05/11/22 2116    vancomycin (VANCOCIN) 750 mg in dextrose 5 % 250 mL IVPB (mg) 750 mg New Bag 05/11/22 1552                  Immunization History: All immunization history was reviewed by me today. There is no immunization history on file for this patient. Known drug allergies: All allergies were reviewed and updated    Allergies   Allergen Reactions    Caffeine     Iodine        Social history:     Social History:  All social andepidemiologic history was reviewed and updated by me today as needed. · Tobacco use:   reports that she has never smoked. She has never used smokeless tobacco.  · Alcohol use:   reports no history of alcohol use. · Currently lives in: [address]  ·  reports no history of drug use.      COVID VACCINATION AND LAB RESULT RECORDS:     Internal Administration   First Dose      Second Dose           Last COVID Lab SARS-CoV-2 (no units)   Date Value   04/05/2022 Not Detected     SARS-CoV-2, PCR (no units)   Date Value   03/24/2022 Not Detected     SARS-CoV-2 RNA, RT PCR (no units)   Date Value   05/10/2022 NOT DETECTED            Assessment:     The patient is a 60 y.o. old female who  has a past medical history of Anemia, Anorexia, Autistic disorder, Convulsions ([address] Down syndrome, Dysphagia, Hypothyroid, Malnutrition ([address] Parkinson disease ([address] UTI (urinary tract infection), and Weakness. with following problems:    · Septic shock with high fever, worsening leukocytosis, hypotension requiring vasopressors and lactic acidosis  · Group B streptococcus bacteremia  · Acute respiratory failure with hypoxia  · Left lower lobe pneumonia, concern for aspiration  · Parkinsonism  · History of Down syndrome  · Anorexia        Discussion:      The patient has been admitted with septic shock with high fever of one 1.9, and worsening white cell count to 21,500, lactic acidosis and hypotension requiring vasopressors. She has been on IV vancomycin and IV cefepime for past 3 days. White cell count is worsening. The patient is still running hypotensive. She is also hypothermic today      Plan:     Diagnostic Workup:    · Follow-up on blood cultures and tracheal aspirate cultures  · Follow-up on nasal MRSA probe  · Continue to follow fever curve, WBC count and blood cultures  · Follow up on liverand renal functions closely  · Plan to repeat blood cultures tomorrow  · 2D echo to rule out endocarditis    Antimicrobials:    · Will continue empiric IV vancomycin  Check Vancomycin trough before the 5th dose. Target vancomycin trough level of 15-20  mg/L or vancomycin area under curve (AUC) of 400-600 mg*h/L by Bayesian modeling method. If dosing vancomycin based on trough levels, keep vancomycin trough below 20 at all times.   Avoid increasing the dose of vancomycin above a total of 4 grams in a 24-hour period in patients younger than 45 years and above 3 grams in a 24-hour period in a patient of age 39 years or older. Continue to monitor serum creatinine and Vanco levels closely, while the patient is on I/v Vancomycin. · Continue empiric IV cefepime 2 g every 12 hours  · Both vancomycin and cefepime are covering for Streptococcus  · Will order IV Flagyl 500 mg every 8 hour for anaerobic coverage given concern for aspiration  · Continue oxygen support to maintain oxygen saturation above 92%  · Vasopressor support to maintain mean arterial pressure greater than 65 mmHg  Recommend holding off on PICC line until blood cultures clear for at least 48 and preferably 72 hours. · Midline or regular central venous line okay for now, if needed for IV access. · We will follow up on the culture results and clinical progress and will make further recommendations accordingly. · Continue close vitals monitoring. · Maintain good glycemic control. · Fall precautions. Aspiration precautions. · Continue to watch for new fever or diarrhea. · DVT prophylaxis. · Critically ill patient. High risk of morbidity and mortality  · Continue close monitoring in the ICU setting      Drug Monitoring:    · Continue serial monitoring for antibiotic toxicity as follows: Vancomycin trough, CBC, CMP  · Continue to watch for following: new or worsening fever, hypotension, hives, lip swelling and redness or purulence at vascular access sites. I/v access Management:    · Continue to monitor i.v access sites for erythema, induration, discharge or tenderness. · As always, continue efforts to minimizetubes/lines/drains as clinically appropriate to reduce chances of line associated infections. Current isolation precautions: There are no current isolations documented for this patient.        Level of complexity of consult: High     Risk of Complications/Morbidity: High     · Illness(es)/ Infection present that pose threat to life/bodily function. · There is potential for severe exacerbation of infection/side effects of treatment. · Therapy requires intensive monitoring for antimicrobial agent toxicity. Thank you for involving me in the care of your patient. I will continue to follow. If you have any additional questions, please do not hesitate to contact me. Subjective:     Presenting complaint in ER:     Chief Complaint   Patient presents with    Shortness of Breath     Pt presents to the ED from home in RESP distress, on NRB 15L, Recently been treated for pneumonia, family stopped giving PO antibiotics due to not thickened liquid medication        HPI: Gertrude Hough is a 60 y.o. female patient, who was seen at the request of Dr. Kuldip Sequeira MD.    History was obtained from chart review and RN as patient is encephalopathic    The patient was admitted on 5/10/2022. I have been consulted to see the patient for above mentioned reason(s). The patient has multiple medical comorbidities, and presented to the ER for severe respiratory distress. The patient reportedly had dysphagia issues and was not taking antibiotics at home. He was desaturating down to 77% on arrival.  The patient was intubated in the ER and was placed on a ventilator. He had a high fever of 101.9. White cell count was 9700. He was started on IV vancomycin and IV cefepime. 1 set of blood culture has grown Streptococcus agalactiae    I have been asked for my opinion for management for this patient. Past Medical History: All past medical history reviewed today.     Past Medical History:   Diagnosis Date    Anemia     Anorexia     Autistic disorder     Convulsions ([address]     Down syndrome     Dysphagia     Hypothyroid     Malnutrition ([address]     Parkinson disease ([address]     UTI (urinary tract infection)     Weakness          Past Surgical History: All pastsurgical history was reviewed today. Past Surgical History:   Procedure Laterality Date    GASTROSTOMY TUBE PLACEMENT      GASTROSTOMY TUBE PLACEMENT  2/18/14         Family History: All family history was reviewed today. History reviewed. No pertinent family history. Medications: All current and past medications were reviewed. Medications Prior to Admission: midodrine (PROAMATINE) 2.5 MG tablet, Take 1 tablet by mouth 3 times daily (with meals)  valproic acid (DEPAKENE) 250 MG/5ML SOLN oral solution, Take 250 mg by mouth every 8 hours 250 mg in the AM, 250 mg in the afternoon, 500 mg nightly. levothyroxine (SYNTHROID) 100 MCG tablet, Take 100 mcg by mouth daily   docusate sodium (COLACE) 100 MG capsule, Take 100 mg by mouth daily  acetaminophen (TYLENOL) 325 MG tablet, Take 650 mg by mouth every 6 hours as needed for Pain. carbidopa-levodopa (SINEMET)  MG per tablet, Take 1.5 tablets by mouth 3 times daily Indications: Take one and a half tablets by mouth three times per day   sertraline (ZOLOFT) 20 MG/ML concentrated solution, Take 100 mg by mouth nightly   Multiple Vitamins-Minerals (THERAPEUTIC MULTIVITAMIN-MINERALS) tablet, Take 1 tablet by mouth daily. omeprazole (PRILOSEC) 20 MG capsule, Take 20 mg by mouth daily.      vancomycin  750 mg IntraVENous Q12H    hydrocortisone sodium succinate PF  50 mg IntraVENous Q6H    metroNIDAZOLE  500 mg IntraVENous Q8H    levothyroxine  100 mcg Per NG tube Daily    carbidopa-levodopa  1.5 tablet Per NG tube q8h    sertraline  100 mg Per NG tube Nightly    pantoprazole  40 mg IntraVENous Daily    polyvinyl alcohol  1 drop Both Eyes Q4H    And    artificial tears   Both Eyes Q4H    heparin (porcine)  5,000 Units SubCUTAneous BID    valproic acid  250 mg Per NG tube BID    valproic acid  500 mg Oral Nightly    midodrine  5 mg Orogastric q8h    alteplase  2 mg IntraCATHeter Once    cefepime  2,000 mg IntraVENous Q12H          REVIEW OF SYSTEMS:       Review of Systems Unable to perform ROS: Mental status change          Objective:       PHYSICAL EXAM:      Vitals:   Vitals:    05/12/22 1230 05/12/22 1245 05/12/22 1300 05/12/22 1315   BP: (!) 249/226  (!) 76/67 (!) 73/53   Pulse: 101 102 81 66   Resp: 22 19 19 17   Temp:       TempSrc:       SpO2: 94% 90% 95% 97%   Weight:       Height:           Physical Exam     General: Encephalopathic but protecting the airway so far, hypotensive, requiring vasopressors  HEENT: normocephalic, atraumatic, sclera clear, pupils equal, light reflex preserved bilaterally  Cardiovascular: RRR, no murmurs/rubs/gallops detected  Pulmonary: CTABL, no rhonchi/rales   Abdomen/GI: soft, no organomegaly, bowel sounds positive  Neuro: Encephalopathic, pupils are equal and reactive to light, moves all extremities  Skin: no rash,   Musculoskeletal:  No obvious joint swelling, redness. No limitation of range of passive motion  Genitourinary: Yuan's catheter in place   Psych: could not assess   Lymphatic/Immunologic: No obvious bruising, no cervical lymphadenopathy    Lines: All vascular access sites are healthy with no local erythema, discharge or tenderness    Intake and output:     I/O last 3 completed shifts: In: 4987.6 [I.V.:3097.2; NG/GT:364; IV Piggyback:1526.4]  Out: 7405 [Urine:1550]    Lab Data:   All available labs were reviewed by me today.      CBC:   Recent Labs     05/10/22  2032 05/11/22  0413 05/12/22  0330   WBC 9.2 9.7 21.5*   RBC 3.91* 3.30* 3.08*   HGB 13.0 11.0* 10.3*   HCT 42.0 33.4* 31.4*    226 218   .5* 101.3* 101.9*   MCH 33.3 33.3 33.5   MCHC 31.0 32.9 32.9   RDW 13.7 13.1 13.4   BANDSPCT  --  27*  --         BMP:  Recent Labs     05/10/22  2032 05/11/22  0405 05/12/22  0330   * 146* 139   K 4.1 3.7 3.9    107 103   CO2 23 31 32   BUN 33* 31* 19   CREATININE 1.2* 0.8 <0.5*   CALCIUM 10.2 8.8 8.9   GLUCOSE 93 78 70        Hepatic FunctionPanel:   Lab Results   Component Value Date    ALKPHOS 108 05/12/2022 ALT <5 05/12/2022    AST 35 05/12/2022    PROT 5.4 05/12/2022    PROT 6.9 02/06/2013    BILITOT <0.2 05/12/2022    BILIDIR <0.2 12/23/2016    IBILI see below 12/23/2016    LABALBU 2.1 05/12/2022       CPK:   Lab Results   Component Value Date    CKTOTAL 78 08/22/2012     ESR: No results found for: SEDRATE  CRP: No results found for: CRP      Imaging: All pertinent images and reports for the current visit were reviewed by me during this visit. I reviewed the chest x-ray/CT scan/MRI images and independently interpreted the findings and results today. XR CHEST PORTABLE   Final Result   1. Endotracheal tube terminates in the left mainstem bronchus, for which   retraction 5 cm is recommended. 2.  Enteric tube terminates at the level of the body of the stomach. 3.  No significant change in bilateral airspace disease. Probable small left   effusion. XR ABDOMEN FOR NG/OG/NE TUBE PLACEMENT   Final Result   NG tube position appears acceptable. Endotracheal tube tip is likely within 1 cm above the anita. Dense left basilar opacity and patchy opacities in both lungs. XR CHEST PORTABLE   Final Result   Endotracheal tube is present with tip projecting just proximal to the anita. Left basilar opacity and trace left pleural effusion. Aspiration and   pneumonia are in the differential.             Outside records:    Labs, Microbiology, Radiology and pertinent results from Care everywhere, if available, were reviewed as a part ofthe consultation.       Problem list:       Patient Active Problem List   Diagnosis Code    Down syndrome Q90.9    Down syndrome Q90.9    Down syndrome Q90.9    Dysphagia R13.10    Autistic disorder F84.0    PEG adjustment, replacement, or removal Z43.1    Hypotension I95.9    Sepsis ([address] A41.9    Acute hypoxemic respiratory failure ([address] J96.01    AVELINO (acute kidney injury) ([address] N17.9    Aspiration pneumonia ([address] J69.0    Septic shock ([facility] A41.9, R65.21    Moderate malnutrition ([facility] E44.0    Streptococcal bacteremia R78.81, B95.5    Pneumonia of left lower lobe due to infectious organism J18.9    Parkinsonism ([facility] G20    Anorexia R63.0         Please note that this chart was generated using Dragon dictation software. Although every effort was made to ensure the accuracy of this automated transcription, some errors in transcription may have occurred inadvertently. If you may need any clarification, please do not hesitate to contact me through EP[employer] or at the phone number provided below with my electronic signature. Any pictures or media included in this note were obtained after taking informed verbal consent from the patient and with their approval to include those in the patient's medical record.         Monica Noland MD, MPH, [address] [place] [address]  5/12/2022, 2:15 PM  [facility] Infectious Disease   [address] [place] [address], [address] [place] [address]  Office: [phone]  Fax: [phone]  Clinic days:  Tuesday & Thursday

## 2022-05-12 NOTE — PROGRESS NOTES
Assessment completed, medication given as ordered with no adverse effects noted. PT is intubated with levophed and propofol. She is on restraint with no skin problem noted. See flow sheet for detail report.

## 2022-05-12 NOTE — PROGRESS NOTES
PULMONARY AND CRITICAL CARE MEDICINE PROGRESS NOTE      Subjective: Patient making slow progress. Mentation close to her baseline. Remains on full mechanical ventilatory support. Vasopressor requirement slowly coming down. Suffering with aspiration pneumonia related septic shock. REVIEW OF SYSTEMS:   14 point ROS could not be obtained due to patient factors. Intubated and sedated. PAST MEDICAL HISTORY:   Past Medical History:   Diagnosis Date    Anemia     Anorexia     Autistic disorder     Convulsions ([address]     Down syndrome     Dysphagia     Hypothyroid     Malnutrition ([address]     Parkinson disease [facility]     UTI (urinary tract infection)     Weakness        PAST SURGICAL HISTORY:   Past Surgical History:   Procedure Laterality Date    GASTROSTOMY TUBE PLACEMENT      GASTROSTOMY TUBE PLACEMENT  2/18/14       SOCIAL HISTORY:   Social History     Tobacco Use    Smoking status: Never Smoker    Smokeless tobacco: Never Used   Substance Use Topics    Alcohol use: No    Drug use: No       FAMILY HISTORY: History reviewed. No pertinent family history. MEDICATIONS:     No current facility-administered medications on file prior to encounter. Current Outpatient Medications on File Prior to Encounter   Medication Sig Dispense Refill    midodrine (PROAMATINE) 2.5 MG tablet Take 1 tablet by mouth 3 times daily (with meals) 90 tablet 1    valproic acid (DEPAKENE) 250 MG/5ML SOLN oral solution Take 250 mg by mouth every 8 hours 250 mg in the AM, 250 mg in the afternoon, 500 mg nightly.  levothyroxine (SYNTHROID) 100 MCG tablet Take 100 mcg by mouth daily       docusate sodium (COLACE) 100 MG capsule Take 100 mg by mouth daily      acetaminophen (TYLENOL) 325 MG tablet Take 650 mg by mouth every 6 hours as needed for Pain.  carbidopa-levodopa (SINEMET)  MG per tablet Take 1.5 tablets by mouth 3 times daily Indications:  Take one and a half tablets by mouth three times per day       sertraline (ZOLOFT) 20 MG/ML concentrated solution Take 100 mg by mouth nightly       Multiple Vitamins-Minerals (THERAPEUTIC MULTIVITAMIN-MINERALS) tablet Take 1 tablet by mouth daily.  omeprazole (PRILOSEC) 20 MG capsule Take 20 mg by mouth daily.  vancomycin  750 mg IntraVENous Q12H    hydrocortisone sodium succinate PF  50 mg IntraVENous Q6H    metroNIDAZOLE  500 mg IntraVENous Q8H    levothyroxine  100 mcg Per NG tube Daily    carbidopa-levodopa  1.5 tablet Per NG tube q8h    sertraline  100 mg Per NG tube Nightly    pantoprazole  40 mg IntraVENous Daily    polyvinyl alcohol  1 drop Both Eyes Q4H    And    artificial tears   Both Eyes Q4H    heparin (porcine)  5,000 Units SubCUTAneous BID    valproic acid  250 mg Per NG tube BID    valproic acid  500 mg Oral Nightly    midodrine  5 mg Orogastric q8h    alteplase  2 mg IntraCATHeter Once    cefepime  2,000 mg IntraVENous Q12H      sodium chloride 62.5 mL/hr at 05/12/22 1354    lactated ringers 75 mL/hr at 05/12/22 1221    norepinephrine 6 mcg/min (05/12/22 0248)    propofol Stopped (05/12/22 1219)    fentaNYL Stopped (05/12/22 1219)     sodium chloride flush, sodium chloride, sodium phosphate IVPB **OR** sodium phosphate IVPB **OR** sodium phosphate IVPB, ondansetron, acetaminophen **OR** acetaminophen, albuterol, fentanNYL      ALLERGIES:   Allergies as of 05/10/2022 - Fully Reviewed 05/10/2022   Allergen Reaction Noted    Caffeine  02/18/2015    Iodine  02/18/2015      OBJECTIVE:   height is 4' 9\" (1.448 m) and weight is 84 lb 7 oz (38.3 kg). Her bladder temperature is 89.5 °F (31.9 °C) (abnormal). Her blood pressure is 85/66 and her pulse is 66. Her respiration is 16 and oxygen saturation is 95%. I/O this shift: In: 596 [I.V.:555.1; IV Piggyback:40.9]  Out: -      PHYSICAL EXAM:    CONSTITUTIONAL: She is a 60y.o.-year-old who appears her stated age. She is intubated and sedated. HEENT: PIO MEDELLIN.  No scleral icterus. No thrush, atraumatic, normocephalic. NECK: Supple, without cervical or supraclavicular lymphadenopathy:  CARDIOVASCULAR: S1 S2 RRR. Without murmer  RESPIRATORY & CHEST: Bibasilar scattered crackles heard. No wheezing heard. GASTROINTESTINAL & ABDOMEN: Soft, nontender, positive bowel sounds in all quadrants, non-distended, without hepatosplenomegaly. GENITOURINARY: Deferred. MUSCULOSKELETAL: No tenderness to palpation of the axial skeleton. There is no clubbing. No cyanosis. No edema of the lower extremities. SKIN OF BODY: No rash or jaundice. PSYCHIATRIC EVALUATION: Unable to assess  HEMATOLOGIC/LYMPHATIC/ IMMUNOLOGIC: No palpable lymphadenopathy. NEUROLOGIC: Intubated and sedated.      LABS:  Recent Labs     05/10/22  2032 05/11/22  0405 05/11/22  0413 05/12/22  0330   WBC 9.2  --  9.7 21.5*   HGB 13.0  --  11.0* 10.3*   HCT 42.0  --  33.4* 31.4*     --  226 218   ALT <5* 6*  --  <5*   AST 19 24  --  35   * 146*  --  139   K 4.1 3.7  --  3.9    107  --  103   CREATININE 1.2* 0.8  --  <0.5*   BUN 33* 31*  --  19   CO2 23 31  --  32   INR 1.35* 1.63*  --  1.91*       Recent Labs     05/10/22  2032 05/11/22  0405 05/12/22  0330   GLUCOSE 93 78 70   CALCIUM 10.2 8.8 8.9   * 146* 139   K 4.1 3.7 3.9   CO2 23 31 32    107 103   BUN 33* 31* 19   CREATININE 1.2* 0.8 <0.5*       Recent Labs     05/10/22  2212 05/11/22  0524 05/12/22  0330   PHART 7.352 7.401 7.433   MIY6VJO 53.5* 53.0* 50.8*   PO2ART 108.0 135.0* 128.0*   RLR9HEG 29.6* 32.9* 33.9*   M4IGSSFK 97.9 98.9 99.0   BEART 3.0 6.8* 8.4*       Lab Results   Component Value Date    INR 1.91 (H) 05/12/2022    INR 1.63 (H) 05/11/2022    INR 1.35 (H) 05/10/2022    PROTIME 22.2 (H) 05/12/2022    PROTIME 18.8 (H) 05/11/2022    PROTIME 15.5 (H) 05/10/2022     No results found for: AMYLASE   No results found for: LABA1C  No results found for: EAG  Lab Results   Component Value Date    TSH 19.85 (H) 08/21/2012 T4FREE 0.68 (L) 08/21/2012     Lab Results   Component Value Date    CKTOTAL 78 08/22/2012    CKMB 10.72 (H) 08/22/2012    TROPONINI 0.09 (H) 05/11/2022      No results found for: CRP   Lab Results   Component Value Date    .8 (H) 08/22/2012      No results found for: DDIMER   No results found for: FERRITIN   Lab Results   Component Value Date    LACTA 2.1 (H) 05/11/2022           IMAGING:    Narrative   EXAMINATION:   ONE XRAY VIEW OF THE CHEST       5/12/2022 8:12 am           FINDINGS:   The endotracheal tube terminates at the left mainstem bronchus.  The enteric   tube terminates at the level of the body of the stomach.  Shallow lung   inflation.  Cardiac silhouette enlargement again demonstrated.  Perihilar and   lower lung field opacities, left greater than right, are again demonstrated. No pneumothorax identified probable small left effusion.           Impression   1.  Endotracheal tube terminates in the left mainstem bronchus, for which   retraction 5 cm is recommended.       2.  Enteric tube terminates at the level of the body of the stomach.       3.  No significant change in bilateral airspace disease.  Probable small left   effusion.             IMPRESSION:     1. Acute respiratory distress, resolving  2. Suspicion for aspiration pneumonia  3. Failure to thrive  4. Need for mechanical ventilation  5. Septic shock, improving  6. History of seizure disorder  7. Down syndrome    RECOMMENDATION:     1. Patient has presented to the hospital again with respiratory distress and found to have left lower lobe infiltrate. 2. High suspicion for recurrent aspiration due to swallowing difficulties. 3. Currently on full mechanical ventilatory support with AC/VC, tidal volume 350, rate of 14, PEEP of 5 and FiO2 of 65 %. ABG shows mild hypercapnia which continues to improve. I tried patient on pressure support ventilation and she tolerated well. 4. Wean her sedation down and extubate her to nasal cannula. 5. Patient started on vancomycin and cefepime. Continue with same. Isolating group B strep from blood culture. We will check for MRSA nares. 6. Currently on Levophed at 6 mcg/min. Patient has history of chronic hypotension. Takes midodrine 2.5 mg 3 times daily. Has been started on midodrine 5 mg 3 times a day in order to get her off of vasopressors. 7. Patient is also on LR at 75 cc/h. Continue for now. 8. Urine output marginal but slowly improving with fluid resuscitation. 9. Patient has failure to thrive, and Down syndrome and recurrent aspiration. Long-term prognosis remains poor. We will get palliative care medicine involved in this case in order to discuss goals of care and CODE STATUS with patient's family. Total critical care time caring for this patient with life threatening illness, including direct patient contact, management of life support systems, review of data including imaging and labs, discussions with other team members and physicians is at least 32 minutes so far today, excluding procedures. Jami Miller MD  Pulmonary Critical Care and Sleep Medicine  5/10/2022, 4:02 PM    This note was completed using dragon medical speech recognition software. Grammatical errors, random word insertions, pronoun errors and incomplete sentences are occasional consequences of this technology due to software limitations. If there are questions or concerns about the content of this note of information contained within the body of this dictation they should be addressed with the provider for clarification.

## 2022-05-12 NOTE — PROGRESS NOTES
Patient suctioned down ETT and orally prior to extubation. Patient placed on 4 lpm nasal cannula. Patient resting comfortably. RT will continue to monitor.

## 2022-05-12 NOTE — PROGRESS NOTES
BRIEF NUTRITION NOTE    Pt seen during IPOC critical care rounds, remains on mechanical ventilation. Propofol infusing at 2.1 mL per hour to provide 55 calories from fat daily. Levophed at 2 mcg/min. LR continues at 75 mL/hr. Lytes are stable; Phos slightly low at 2.4. Tolerating Osmolite 1.2 at 20 mL/hr. Will increase to goal rate 35 mL/hr to better meet nutrition needs. For full dietitian assessment, see SHYANN note dated 5/11.      Electronically signed by Jennifer Bruno RD, HERBERTH on 5/12/2022 at 9:35 AM     Contact: [phone]

## 2022-05-12 NOTE — CARE COORDINATION
Discharge Planning:     (CM) called the patient's Caregiver, Raymond Vargas (on emergency contact list) and confirmed that patient lives at the following group home:    [facility]. [address], [address]    Caregiver confirmed that the patient can return upon discharge. Caregiver confirmed that she can transport the patient home upon discharge.         Carl GIRON, [facility], [facility] -   [phone]    Electronically signed by MACK Mosher on 5/12/2022 at 8:40 AM

## 2022-05-13 LAB
A/G RATIO: 0.8 (ref 1.1–2.2)
ALBUMIN SERPL-MCNC: 2.1 G/DL (ref 3.4–5)
ALP BLD-CCNC: 97 U/L (ref 40–129)
ALT SERPL-CCNC: <5 U/L (ref 10–40)
ANION GAP SERPL CALCULATED.3IONS-SCNC: 8 MMOL/L (ref 3–16)
AST SERPL-CCNC: 25 U/L (ref 15–37)
BASE EXCESS ARTERIAL: 7.2 MMOL/L (ref -3–3)
BASOPHILS ABSOLUTE: 0 K/UL (ref 0–0.2)
BASOPHILS RELATIVE PERCENT: 0.2 %
BILIRUB SERPL-MCNC: <0.2 MG/DL (ref 0–1)
BUN BLDV-MCNC: 18 MG/DL (ref 7–20)
CALCIUM SERPL-MCNC: 8.2 MG/DL (ref 8.3–10.6)
CARBOXYHEMOGLOBIN ARTERIAL: 0.6 % (ref 0–1.5)
CHLORIDE BLD-SCNC: 103 MMOL/L (ref 99–110)
CO2: 29 MMOL/L (ref 21–32)
CREAT SERPL-MCNC: <0.5 MG/DL (ref 0.6–1.1)
CULTURE, BLOOD 2: ABNORMAL
CULTURE, BLOOD 2: ABNORMAL
EOSINOPHILS ABSOLUTE: 0 K/UL (ref 0–0.6)
EOSINOPHILS RELATIVE PERCENT: 0 %
GFR AFRICAN AMERICAN: >60
GFR NON-AFRICAN AMERICAN: >60
GLUCOSE BLD-MCNC: 99 MG/DL (ref 70–99)
HCO3 ARTERIAL: 30.8 MMOL/L (ref 21–29)
HCT VFR BLD CALC: 31.9 % (ref 36–48)
HEMOGLOBIN, ART, EXTENDED: 10.8 G/DL (ref 12–16)
HEMOGLOBIN: 10.3 G/DL (ref 12–16)
INR BLD: 1.31 (ref 0.88–1.12)
LV EF: 65 %
LVEF MODALITY: NORMAL
LYMPHOCYTES ABSOLUTE: 0.7 K/UL (ref 1–5.1)
LYMPHOCYTES RELATIVE PERCENT: 3.3 %
MAGNESIUM: 1.8 MG/DL (ref 1.8–2.4)
MCH RBC QN AUTO: 32.7 PG (ref 26–34)
MCHC RBC AUTO-ENTMCNC: 32.3 G/DL (ref 31–36)
MCV RBC AUTO: 101.4 FL (ref 80–100)
METHEMOGLOBIN ARTERIAL: 0.3 %
MONOCYTES ABSOLUTE: 0.2 K/UL (ref 0–1.3)
MONOCYTES RELATIVE PERCENT: 1.2 %
MRSA SCREEN RT-PCR: NORMAL
NEUTROPHILS ABSOLUTE: 19.2 K/UL (ref 1.7–7.7)
NEUTROPHILS RELATIVE PERCENT: 95.3 %
O2 SAT, ARTERIAL: 93.6 %
O2 THERAPY: ABNORMAL
ORGANISM: ABNORMAL
ORGANISM: ABNORMAL
PCO2 ARTERIAL: 38.7 MMHG (ref 35–45)
PDW BLD-RTO: 13 % (ref 12.4–15.4)
PH ARTERIAL: 7.51 (ref 7.35–7.45)
PHOSPHORUS: 4.6 MG/DL (ref 2.5–4.9)
PLATELET # BLD: 252 K/UL (ref 135–450)
PMV BLD AUTO: 8.8 FL (ref 5–10.5)
PO2 ARTERIAL: 62.3 MMHG (ref 75–108)
POTASSIUM REFLEX MAGNESIUM: 4.1 MMOL/L (ref 3.5–5.1)
PROTHROMBIN TIME: 15 SEC (ref 9.9–12.7)
RBC # BLD: 3.15 M/UL (ref 4–5.2)
SODIUM BLD-SCNC: 140 MMOL/L (ref 136–145)
TCO2 ARTERIAL: 71.6 MMOL/L
TOTAL PROTEIN: 4.9 G/DL (ref 6.4–8.2)
VANCOMYCIN RANDOM: 18.1 UG/ML
WBC # BLD: 20.1 K/UL (ref 4–11)

## 2022-05-13 PROCEDURE — C9113 INJ PANTOPRAZOLE SODIUM, VIA: HCPCS | Performed by: INTERNAL MEDICINE

## 2022-05-13 PROCEDURE — 6360000002 HC RX W HCPCS: Performed by: INTERNAL MEDICINE

## 2022-05-13 PROCEDURE — 84100 ASSAY OF PHOSPHORUS: CPT

## 2022-05-13 PROCEDURE — 6370000000 HC RX 637 (ALT 250 FOR IP): Performed by: INTERNAL MEDICINE

## 2022-05-13 PROCEDURE — 99291 CRITICAL CARE FIRST HOUR: CPT | Performed by: INTERNAL MEDICINE

## 2022-05-13 PROCEDURE — 2700000000 HC OXYGEN THERAPY PER DAY

## 2022-05-13 PROCEDURE — 2500000003 HC RX 250 WO HCPCS: Performed by: INTERNAL MEDICINE

## 2022-05-13 PROCEDURE — 85610 PROTHROMBIN TIME: CPT

## 2022-05-13 PROCEDURE — 6370000000 HC RX 637 (ALT 250 FOR IP): Performed by: STUDENT IN AN ORGANIZED HEALTH CARE EDUCATION/TRAINING PROGRAM

## 2022-05-13 PROCEDURE — 6360000002 HC RX W HCPCS: Performed by: STUDENT IN AN ORGANIZED HEALTH CARE EDUCATION/TRAINING PROGRAM

## 2022-05-13 PROCEDURE — 2580000003 HC RX 258: Performed by: INTERNAL MEDICINE

## 2022-05-13 PROCEDURE — 92526 ORAL FUNCTION THERAPY: CPT

## 2022-05-13 PROCEDURE — 92610 EVALUATE SWALLOWING FUNCTION: CPT

## 2022-05-13 PROCEDURE — 87040 BLOOD CULTURE FOR BACTERIA: CPT

## 2022-05-13 PROCEDURE — 80053 COMPREHEN METABOLIC PANEL: CPT

## 2022-05-13 PROCEDURE — 85025 COMPLETE CBC W/AUTO DIFF WBC: CPT

## 2022-05-13 PROCEDURE — 82803 BLOOD GASES ANY COMBINATION: CPT

## 2022-05-13 PROCEDURE — 2580000003 HC RX 258: Performed by: STUDENT IN AN ORGANIZED HEALTH CARE EDUCATION/TRAINING PROGRAM

## 2022-05-13 PROCEDURE — 83735 ASSAY OF MAGNESIUM: CPT

## 2022-05-13 PROCEDURE — 2000000000 HC ICU R&B

## 2022-05-13 PROCEDURE — 93306 TTE W/DOPPLER COMPLETE: CPT

## 2022-05-13 PROCEDURE — 94761 N-INVAS EAR/PLS OXIMETRY MLT: CPT

## 2022-05-13 PROCEDURE — 80202 ASSAY OF VANCOMYCIN: CPT

## 2022-05-13 RX ORDER — MIDODRINE HYDROCHLORIDE 5 MG/1
10 TABLET ORAL EVERY 8 HOURS
Status: DISCONTINUED | OUTPATIENT
Start: 2022-05-13 | End: 2022-05-15

## 2022-05-13 RX ORDER — MIDODRINE HYDROCHLORIDE 5 MG/1
5 TABLET ORAL ONCE
Status: COMPLETED | OUTPATIENT
Start: 2022-05-13 | End: 2022-05-13

## 2022-05-13 RX ADMIN — HYDROCORTISONE SODIUM SUCCINATE 50 MG: 100 INJECTION, POWDER, FOR SOLUTION INTRAMUSCULAR; INTRAVENOUS at 01:30

## 2022-05-13 RX ADMIN — HEPARIN SODIUM 5000 UNITS: 5000 INJECTION INTRAVENOUS; SUBCUTANEOUS at 08:19

## 2022-05-13 RX ADMIN — SODIUM CHLORIDE, PRESERVATIVE FREE 10 ML: 5 INJECTION INTRAVENOUS at 20:38

## 2022-05-13 RX ADMIN — LEVOTHYROXINE SODIUM 100 MCG: 0.1 TABLET ORAL at 08:21

## 2022-05-13 RX ADMIN — SODIUM CHLORIDE, POTASSIUM CHLORIDE, SODIUM LACTATE AND CALCIUM CHLORIDE: 600; 310; 30; 20 INJECTION, SOLUTION INTRAVENOUS at 05:49

## 2022-05-13 RX ADMIN — MIDODRINE HYDROCHLORIDE 5 MG: 5 TABLET ORAL at 10:46

## 2022-05-13 RX ADMIN — CEFTRIAXONE 2000 MG: 2 INJECTION, POWDER, FOR SOLUTION INTRAMUSCULAR; INTRAVENOUS at 14:41

## 2022-05-13 RX ADMIN — MIDODRINE HYDROCHLORIDE 10 MG: 5 TABLET ORAL at 22:18

## 2022-05-13 RX ADMIN — HYDROCORTISONE SODIUM SUCCINATE 50 MG: 100 INJECTION, POWDER, FOR SOLUTION INTRAMUSCULAR; INTRAVENOUS at 18:35

## 2022-05-13 RX ADMIN — METRONIDAZOLE 500 MG: 500 INJECTION, SOLUTION INTRAVENOUS at 06:16

## 2022-05-13 RX ADMIN — MINERAL OIL AND WHITE PETROLATUM: 150; 830 OINTMENT OPHTHALMIC at 05:33

## 2022-05-13 RX ADMIN — HYDROCORTISONE SODIUM SUCCINATE 50 MG: 100 INJECTION, POWDER, FOR SOLUTION INTRAMUSCULAR; INTRAVENOUS at 13:12

## 2022-05-13 RX ADMIN — SODIUM CHLORIDE, PRESERVATIVE FREE 10 ML: 5 INJECTION INTRAVENOUS at 08:12

## 2022-05-13 RX ADMIN — POLYVINYL ALCOHOL 1 DROP: 14 SOLUTION/ DROPS OPHTHALMIC at 01:30

## 2022-05-13 RX ADMIN — HEPARIN SODIUM 5000 UNITS: 5000 INJECTION INTRAVENOUS; SUBCUTANEOUS at 20:32

## 2022-05-13 RX ADMIN — VANCOMYCIN HYDROCHLORIDE 750 MG: 750 INJECTION, POWDER, LYOPHILIZED, FOR SOLUTION INTRAVENOUS at 12:28

## 2022-05-13 RX ADMIN — CARBIDOPA AND LEVODOPA 1.5 TABLET: 25; 100 TABLET ORAL at 08:21

## 2022-05-13 RX ADMIN — VALPROIC ACID 250 MG: 250 SOLUTION ORAL at 12:24

## 2022-05-13 RX ADMIN — CARBIDOPA AND LEVODOPA 1.5 TABLET: 25; 100 TABLET ORAL at 01:30

## 2022-05-13 RX ADMIN — VALPROIC ACID 250 MG: 250 SOLUTION ORAL at 08:02

## 2022-05-13 RX ADMIN — MINERAL OIL AND WHITE PETROLATUM: 150; 830 OINTMENT OPHTHALMIC at 01:30

## 2022-05-13 RX ADMIN — CEFEPIME HYDROCHLORIDE 2000 MG: 2 INJECTION, POWDER, FOR SOLUTION INTRAVENOUS at 08:37

## 2022-05-13 RX ADMIN — METRONIDAZOLE 500 MG: 500 INJECTION, SOLUTION INTRAVENOUS at 22:19

## 2022-05-13 RX ADMIN — POLYVINYL ALCOHOL 1 DROP: 14 SOLUTION/ DROPS OPHTHALMIC at 05:33

## 2022-05-13 RX ADMIN — METRONIDAZOLE 500 MG: 500 INJECTION, SOLUTION INTRAVENOUS at 15:52

## 2022-05-13 RX ADMIN — PANTOPRAZOLE SODIUM 40 MG: 40 INJECTION, POWDER, FOR SOLUTION INTRAVENOUS at 08:18

## 2022-05-13 RX ADMIN — SODIUM CHLORIDE, POTASSIUM CHLORIDE, SODIUM LACTATE AND CALCIUM CHLORIDE: 600; 310; 30; 20 INJECTION, SOLUTION INTRAVENOUS at 16:57

## 2022-05-13 RX ADMIN — VALPROIC ACID 500 MG: 250 SOLUTION ORAL at 20:31

## 2022-05-13 RX ADMIN — MIDODRINE HYDROCHLORIDE 5 MG: 5 TABLET ORAL at 06:13

## 2022-05-13 RX ADMIN — SERTRALINE 100 MG: 50 TABLET, FILM COATED ORAL at 20:31

## 2022-05-13 RX ADMIN — HYDROCORTISONE SODIUM SUCCINATE 50 MG: 100 INJECTION, POWDER, FOR SOLUTION INTRAMUSCULAR; INTRAVENOUS at 06:13

## 2022-05-13 RX ADMIN — MIDODRINE HYDROCHLORIDE 10 MG: 5 TABLET ORAL at 13:03

## 2022-05-13 RX ADMIN — CARBIDOPA AND LEVODOPA 1.5 TABLET: 25; 100 TABLET ORAL at 17:33

## 2022-05-13 ASSESSMENT — PAIN SCALES - PAIN ASSESSMENT IN ADVANCED DEMENTIA (PAINAD)
FACIALEXPRESSION: 0
NEGVOCALIZATION: 0
NEGVOCALIZATION: 0
TOTALSCORE: 2
BODYLANGUAGE: 1
NEGVOCALIZATION: 0
TOTALSCORE: 2
CONSOLABILITY: 1
BREATHING: 0
TOTALSCORE: 2
BREATHING: 0
NEGVOCALIZATION: 0
FACIALEXPRESSION: 0
TOTALSCORE: 0
CONSOLABILITY: 1
NEGVOCALIZATION: 0
CONSOLABILITY: 0
BODYLANGUAGE: 1
NEGVOCALIZATION: 0
NEGVOCALIZATION: 0
CONSOLABILITY: 1
BODYLANGUAGE: 0
BODYLANGUAGE: 1
BREATHING: 0
FACIALEXPRESSION: 0
BREATHING: 0
TOTALSCORE: 2
FACIALEXPRESSION: 0
TOTALSCORE: 2
FACIALEXPRESSION: 0
TOTALSCORE: 2
CONSOLABILITY: 1
CONSOLABILITY: 1
BREATHING: 0
CONSOLABILITY: 1
BREATHING: 0
BODYLANGUAGE: 1
BODYLANGUAGE: 1
BREATHING: 0
BODYLANGUAGE: 1

## 2022-05-13 ASSESSMENT — PAIN SCALES - GENERAL
PAINLEVEL_OUTOF10: 2
PAINLEVEL_OUTOF10: 0

## 2022-05-13 NOTE — FLOWSHEET NOTE
Patient from group home. Patient seen for redness to buttocks and sacrum. Skin is intact with some peeling skin near sacrum. Reds is blanchable. Will use foam dressings to protect skin. Will use barrier cream to protect skin from incontinence. [address], CHIKIN, RN, [facility]-  Inpatient  [facility]  [phone]       05/13/22 1859   Wound 05/11/22 Coccyx   Date First Assessed/Time First Assessed: 05/11/22 0100   Present on Hospital Admission: Yes  Primary Wound Type: Pressure Injury  Location: Coccyx  Wound Outcome: Healed   Dressing/Treatment Foam   Wound Assessment Epithelialization;Pink/red   Hermila-wound Assessment Intact; Blanchable erythema   Wound Follow Up   Require Follow Up Yes

## 2022-05-13 NOTE — PROGRESS NOTES
Clinical Pharmacy Note: Pharmacy to Dose Vancomcyin    Vancomycin Day: 3  Current Dosing Regimen: 750 mg q12  Dosing Method: Bayesian Modeling    Random: 18.1    Recent Labs     05/12/22  0330 05/13/22  0445   BUN 19 18       Recent Labs     05/12/22  0330 05/13/22  0445   CREATININE <0.5* <0.5*       Recent Labs     05/12/22  0330 05/13/22  0445   WBC 21.5* 20.1*         Intake/Output Summary (Last 24 hours) at 5/13/2022 0813  Last data filed at 5/13/2022 0454  Gross per 24 hour   Intake 2189.21 ml   Output 700 ml   Net 1489.21 ml         Ht Readings from Last 1 Encounters:   05/11/22 4' 9\" (1.448 m)        Wt Readings from Last 1 Encounters:   05/13/22 83 lb 12.4 oz (38 kg)         Body mass index is 18.13 kg/m². Estimated Creatinine Clearance: 74 mL/min (based on SCr of 0.5 mg/dL). Assessment/Plan:  Vancomycin level is therapeutic. Level was drawn appropriately in respect to last dose given. Continue vancomycin regimen to 750 mg every 12 hours. Bayesian Modeling predicts an AUC of 504 mg/L*hr and trough of 14.7 mg/L. No follow up levels ordered at this time. Changes in regimen will be determined based on culture results, renal function, and clinical response. Pharmacy will continue to monitor and adjust regimen as necessary.     Thank you for the consult,    Any Germain, PharmD, [facility]

## 2022-05-13 NOTE — PROGRESS NOTES
Palliative Care:     Spoke at length with pt's legal guardian/APSI . Provided significant education regarding pt's current status, POC, and overall prognosis. Discussed interventions (primarily PEG) that will likely be required in the very near future, even possibly this weekend. Provided information about longer-term implications of placing a PEG. Guardian indicated that the MD should call back and speak with the medical consent team at [facility] but the guardian indicated she expected consent would be given. She did indicate that pt should remain full code. Will follow up as time allows or circumstances dictate. Please reach out via [employer], or email [email or web address] in [email or web address] [email or web address] if pt condition changes significantly or if additional support is required. Thank you.     Electronically signed by Roseann Trivedi RN, BSN on 5/13/2022 at 12:09 PM

## 2022-05-13 NOTE — PLAN OF CARE
Problem: Discharge Planning  Goal: Discharge to home or other facility with appropriate resources  Outcome: Progressing     Problem: Pain  Goal: Verbalizes/displays adequate comfort level or baseline comfort level  Outcome: Progressing     Problem: Nutrition Deficit:  Goal: Optimize nutritional status  Outcome: Progressing     Problem: Skin/Tissue Integrity  Goal: Absence of new skin breakdown  Description: 1. Monitor for areas of redness and/or skin breakdown  2. Assess vascular access sites hourly  3. Every 4-6 hours minimum:  Change oxygen saturation probe site  4. Every 4-6 hours:  If on nasal continuous positive airway pressure, respiratory therapy assess nares and determine need for appliance change or resting period.   Outcome: Progressing     Problem: Safety - Adult  Goal: Free from fall injury  Outcome: Progressing     Problem: ABCDS Injury Assessment  Goal: Absence of physical injury  Outcome: Progressing

## 2022-05-13 NOTE — PROGRESS NOTES
PULMONARY AND CRITICAL CARE MEDICINE PROGRESS NOTE      Subjective: Patient making slow progress. Mentation close to her baseline. Extubated yesterday and tolerated it well. Currently on 3 L/min of oxygen supplementation saturating in mid 90s. Patient placed remains on full mechanical ventilatory support. Vasopressor requirement slowly coming down. Suffering with aspiration pneumonia related septic shock. REVIEW OF SYSTEMS:   14 point ROS could not be obtained due to patient factors. Intubated and sedated. PAST MEDICAL HISTORY:   Past Medical History:   Diagnosis Date    Anemia     Anorexia     Autistic disorder     [facility]     Down syndrome     Dysphagia     Hypothyroid     Malnutrition ([facility]     Parkinson disease ([facility]     UTI (urinary tract infection)     Weakness        PAST SURGICAL HISTORY:   Past Surgical History:   Procedure Laterality Date    GASTROSTOMY TUBE PLACEMENT      GASTROSTOMY TUBE PLACEMENT  2/18/14       SOCIAL HISTORY:   Social History     Tobacco Use    Smoking status: Never Smoker    Smokeless tobacco: Never Used   Substance Use Topics    Alcohol use: No    Drug use: No       FAMILY HISTORY: History reviewed. No pertinent family history. MEDICATIONS:     No current facility-administered medications on file prior to encounter. Current Outpatient Medications on File Prior to Encounter   Medication Sig Dispense Refill    midodrine (PROAMATINE) 2.5 MG tablet Take 1 tablet by mouth 3 times daily (with meals) 90 tablet 1    valproic acid (DEPAKENE) 250 MG/5ML SOLN oral solution Take 250 mg by mouth every 8 hours 250 mg in the AM, 250 mg in the afternoon, 500 mg nightly.  levothyroxine (SYNTHROID) 100 MCG tablet Take 100 mcg by mouth daily       docusate sodium (COLACE) 100 MG capsule Take 100 mg by mouth daily      acetaminophen (TYLENOL) 325 MG tablet Take 650 mg by mouth every 6 hours as needed for Pain.       carbidopa-levodopa icterus. No thrush, atraumatic, normocephalic. NECK: Supple, without cervical or supraclavicular lymphadenopathy:  CARDIOVASCULAR: S1 S2 RRR. Without murmer  RESPIRATORY & CHEST: Bibasilar scattered crackles heard. No wheezing heard. GASTROINTESTINAL & ABDOMEN: Soft, nontender, positive bowel sounds in all quadrants, non-distended, without hepatosplenomegaly. GENITOURINARY: Deferred. MUSCULOSKELETAL: No tenderness to palpation of the axial skeleton. There is no clubbing. No cyanosis. No edema of the lower extremities. Multiple contractures seen. SKIN OF BODY: No rash or jaundice. PSYCHIATRIC EVALUATION: Unable to assess  HEMATOLOGIC/LYMPHATIC/ IMMUNOLOGIC: No palpable lymphadenopathy. NEUROLOGIC: Remains minimally responsive which is close to her baseline. LABS:  Recent Labs     05/10/22  2032 05/11/22  0405 05/11/22  0413 05/12/22  0330 05/13/22  0445   WBC   < >  --  9.7 21.5* 20.1*   HGB   < >  --  11.0* 10.3* 10.3*   HCT   < >  --  33.4* 31.4* 31.9*   PLT   < >  --  226 218 252   ALT  --  6*  --  <5* <5*   AST  --  24  --  35 25   NA  --  146*  --  139 140   K  --  3.7  --  3.9 4.1   CL  --  107  --  103 103   CREATININE  --  0.8  --  <0.5* <0.5*   BUN  --  31*  --  19 18   CO2  --  31  --  32 29   INR  --  1.63*  --  1.91* 1.31*    < > = values in this interval not displayed.        Recent Labs     05/11/22 0405 05/12/22 0330 05/13/22  0445   GLUCOSE 78 70 99   CALCIUM 8.8 8.9 8.2*   * 139 140   K 3.7 3.9 4.1   CO2 31 32 29    103 103   BUN 31* 19 18   CREATININE 0.8 <0.5* <0.5*       Recent Labs     05/11/22 0524 05/12/22  0330 05/13/22  0745   PHART 7.401 7.433 7.508*   SLZ6KEP 53.0* 50.8* 38.7   PO2ART 135.0* 128.0* 62.3*   IWY9YRH 32.9* 33.9* 30.8*   Y3MVQFAS 98.9 99.0 93.6   BEART 6.8* 8.4* 7.2*       Lab Results   Component Value Date    INR 1.31 (H) 05/13/2022    INR 1.91 (H) 05/12/2022    INR 1.63 (H) 05/11/2022    PROTIME 15.0 (H) 05/13/2022    PROTIME 22.2 (H) 05/12/2022 PROTIME 18.8 (H) 05/11/2022     No results found for: AMYLASE   No results found for: LABA1C  No results found for: EAG  Lab Results   Component Value Date    TSH 19.85 (H) 08/21/2012    T4FREE 0.68 (L) 08/21/2012     Lab Results   Component Value Date    CKTOTAL 78 08/22/2012    CKMB 10.72 (H) 08/22/2012    TROPONINI 0.09 (H) 05/11/2022      No results found for: CRP   Lab Results   Component Value Date    .8 (H) 08/22/2012      No results found for: DDIMER   No results found for: FERRITIN   Lab Results   Component Value Date    LACTA 2.1 (H) 05/11/2022           IMAGING:    Narrative   EXAMINATION:   ONE XRAY VIEW OF THE CHEST       5/12/2022 8:12 am           FINDINGS:   The endotracheal tube terminates at the left mainstem bronchus.  The enteric   tube terminates at the level of the body of the stomach.  Shallow lung   inflation.  Cardiac silhouette enlargement again demonstrated.  Perihilar and   lower lung field opacities, left greater than right, are again demonstrated. No pneumothorax identified probable small left effusion.           Impression   1.  Endotracheal tube terminates in the left mainstem bronchus, for which   retraction 5 cm is recommended.       2.  Enteric tube terminates at the level of the body of the stomach.       3.  No significant change in bilateral airspace disease.  Probable small left   effusion.             IMPRESSION:     1. Acute respiratory distress, resolving  2. Suspicion for aspiration pneumonia  3. Echo findings suggestive of aortic root abscess  4. Failure to thrive  5. Need for mechanical ventilation  6. Septic shock, improving  7. History of seizure disorder  8. Down syndrome    RECOMMENDATION:     1. Patient has presented to the hospital again with respiratory distress and found to have left lower lobe infiltrate. 2. High suspicion for recurrent aspiration due to swallowing difficulties. 3. Mentation close to her baseline.   Patient was extubated yesterday and tolerated well. Currently on 3 L/min of oxygen supplementation. 4. Leukocytosis persist.  Underwent a transthoracic echocardiogram which showed findings suggestive of aortic root abscess. 5. I will get cardiology involved in the case in order to assess if patient needs STORMY. 6. Blood cultures have been repeated again. Infectious disease on board. 7. Patient started on vancomycin and cefepime. Continue with same. Isolating group B strep from blood culture. We will check for MRSA nares. 8. Currently on Levophed at 4 mcg/min. Patient has history of chronic hypotension. Takes midodrine 2.5 mg 3 times daily. Has been started on midodrine 5 mg 3 times a day in order to get her off of vasopressors. I will increase the dosage of midodrine to 10 mg 3 times a day. 9. Also started on Solu-Cortef 50 mg IV every 6 hours. If aortic root abscess is confirmed, may have to stop steroids. 10. Patient is also on LR at 75 cc/h. Continue for now. 11. Urine output marginal but slowly improving with fluid resuscitation. 12. Patient has failure to thrive, and Down syndrome and recurrent aspiration. Long-term prognosis remains poor. Patient does not have any family. There is a caregiver involved. Patient remains full code. Total critical care time caring for this patient with life threatening illness, including direct patient contact, management of life support systems, review of data including imaging and labs, discussions with other team members and physicians is at least 32 minutes so far today, excluding procedures. Lilly Sierra MD  Pulmonary Critical Care and Sleep Medicine  5/10/2022, 3:06 PM    This note was completed using dragon medical speech recognition software. Grammatical errors, random word insertions, pronoun errors and incomplete sentences are occasional consequences of this technology due to software limitations.  If there are questions or concerns about the content of this note of information contained within the body of this dictation they should be addressed with the provider for clarification.

## 2022-05-13 NOTE — PROGRESS NOTES
Facility/Department: [address]  Initial Assessment  DYSPHAGIA BEDSIDE SWALLOW EVALUATION     Patient: Inocencia Hough   : 1965   MRN: [id number]      Evaluation Date: 2022   Admitting Diagnosis: Hypernatremia [E87.0]  Septic shock ([address] [A41.9, R65.21]  Aspiration pneumonia, unspecified aspiration pneumonia type, unspecified laterality, unspecified part of lung ([address] [J69.0]  Acute hypoxemic respiratory failure ([address] [J96.01]  Sepsis with acute organ dysfunction without septic shock, due to unspecified organism, unspecified type ([address] [A41.9, R65.20]  Pain: Did not state                                                       H&P: Inocencia Hough is a 60 y.o. female. She presented to the ER from home by ambulance. EMS was activated for respiratory distress. She has a h/o Down syndrome, dysphagia, and aspiration pneumonia.     Imaging:  Chest X-ray:    1.  Endotracheal tube terminates in the left mainstem bronchus, for which   retraction 5 cm is recommended.       2.  Enteric tube terminates at the level of the body of the stomach.       3.  No significant change in bilateral airspace disease.  Probable small left   effusion. Modified Barium Swallow Study:   Modified Barium Swallow evaluation completed on 2022. Patient presents with moderate-severe oropharyngeal dysphagia secondary to reduced bolus control, oral holding, decreased A-P propulsion, lingual pumping, reduced tongue base retraction, premature bolus loss to pharynx, significantly delayed swallow initiation, and reduced hyolaryngeal excursion resulting in observed aspiration during the swallow with thin liquids and mildly thick liquids. A delayed congested cough was noted post aspiration episodes with mildly thick liquids, difficult to determine if cough was effective in clearing material from trachea. Significant oral holding with reduced A-P propulsion noted with puree, mildly thick liquids, and moderately thick liquids.  Pt with head and body tremors/movements during study with Pt turing head from side-to-side partially obstructing view during study and further increasing risk of aspiration. Thin and mildly thick liquids revealed pooling to pyriforms with delayed UES opening noted, improved bolus control assessed with moderately thick liquids. No aspiration/penetration noted with puree and moderately thick liquid trials. Soft solid trials were not provided due to concern for Pt's ability to masticate and inability to follow commands. Overall, Pt is at risk for aspiration as well as nutritional compromise due to delayed swallow initiation, intermittent reduced oral opening to accept bolus, and need for total feeding assistance. Recommend strict use of aspiration precautions. Recommendations following MBS:    Diet Level:   - Dysphagia I Puree  with Moderately (honey) thick liquids    - Recommend calorie count to ensure adequate hydration/nutrition, Pt may need consideration of alternative means of hydration/nutrition  - Medication: Crushed as able in puree       History/Prior Level of Function:   Living Status: Group home  Prior Dysphagia History: See MBS report above. Most recent recommendations were for a Dysphagia I Pureed with Moderately Thick (honey) Liquids with consideration of alternative means of hydration/nutrition. Reason for referral: SLP evaluation orders received due to prior hx of dysphagia  and respiratory status     Dysphagia Impressions/Diagnosis: Moderate-Severe Oropharyngeal Dysphagia   Pt was positioned upright in bed on 3L O2 via nasal cannula. Pt non-verbal and unable to follow commands to assess oral motor function. Tremors and head movements noted during assessment. Pt with tongue thrusting therefore reduced oral opening to accept PO trials. Based on prior MBS results (aspiration with thin and mildly thick liquids), trials of puree and Moderately Thick (honey) Liquids only were provided.  Reduced bolus control with decreased A-P propulsion noted with suspected premature bolus loss to pharynx, significantly delayed swallow initiation noted. Difficult to consistently assess laryngeal elevation upon manual palpation partially due to Pt's movements. No overt coughing or throat clearing was noted however x1 instance of O2 dropping to 80% following PO trials. At this time, Pt remains at HIGH risk for aspiration as well as nutritional compromise due to delayed swallow initiation, intermittent reduced oral opening to accept bolus, and need for total feeding assistance. Recommend consideration of alternative means of hydration/nutrition pending goal of care. See below for recommendations. **FEES orders received this date however per discussion with RN Pt does not appear to be able to tolerate due to tremors and constant head movements. Will discontinue order at this time. Pt had MBS completed 4/7/2022 (see above report). Recommended Diet and Intervention 5/13/2022:  Diet Solids Recommendation:  1.) If aggressive means of care are desired, recommend NPO with alternative means of hydration/nutrition. 2.) If comfort care is desired recommend Dysphagia I Pureed with Moderately Thick (honey) Liquids, meds crushed in puree  Recommended form of Meds: Crushed as able in puree  or Via alternative means of nutrition      Compensatory Swallowing Strategies: TBD    SHORT TERM DYSPHAGIA GOALS/PLAN OF CARE: Speech therapy for dysphagia tx 3-5 times per week during acute care stay pending goals of care. Pt unable to actively participate in dysphagia therapy. Pt will functionally tolerate ongoing assessment of swallow function with diet to be determined as indicated pending goals of care.      Dysphagia Therapeutic Intervention:  Oral Care, Therapeutic Trials with SLP     Discharge Recommendations: Discharge recommendations to be determined pending ongoing follow-up during acute care stay    Patient Positioning: Upright in bed     Current Diet Level (prior to evaluation): NPO      Respiratory Status:   []Room Air   [x]O2 via nasal cannula (3L)   []Other:    Dentition:  []Adequate  []Dentures   []Missing Many Teeth  [x]Edentulous  []Other:    Baseline Vocal Quality:  []Normal  []Dysphonic   []Aphonic   []Hoarse  []Wet  []Weak  [x]Other: unable to assess     Oral Mechanism Exam:  []WFL []Mild   [] Moderate  []Severe  [x]Unable to be assessed  Impaired:   []Left side      []Right side    []Labial ROM/Coordination    []Labial Symmetry   []Lingual ROM/Coordination   []Lingual Symmetry  []Gag  []Other:     Oral Phase: []WFL []Mild   [x] Moderate  [x]Severe  []To be assessed   []Impaired/Prolonged Mastication:   []Oral Holding:   []Spillage Left:   []Spillage Right:  []Pocketing Left:   []Pocketing Right:   [x]Decreased Anterior to Posterior Transit:   [x]Suspected Premature Bolus Loss:   []Lingual/Palatal Residue:   [x]Other:  Reduced bolus control     Pharyngeal Phase: []WFL []Mild   [x] Moderate  [x]Severe  []To be assessed   [x]Delayed Swallow:   [x]Suspected Pharyngeal Pooling:   [x]Decreased Laryngeal Elevation:   []Absent Swallow:  []Wet Vocal Quality:   []Throat Clearing-Immediate:   []Throat Clearing-Delayed:   []Cough-Immediate:   []Cough-Delayed:  [x]Change in Vital Signs: drop in O2 to 80% following PO intake   [x]Suspected Delayed Pharyngeal Clearing:  []Other:     Eating Assistance:  []Independent  []Setup or clean-up assistance   [] Supervision or touching assistance   [] Partial or moderate assistance   [] Substantial or maximal assistance  [x] Dependent       EDUCATION:   Provided education regarding role of SLP, results of assessment, recommendations and general speech pathology plan of care. [] Pt verbalized understanding and agreement   [] Pt requires ongoing learning   [x] No evidence of comprehension     If patient discharges prior to next visit, this note will serve as discharge.      Timed Code Minutes:  0 minutes  Total Treatment

## 2022-05-13 NOTE — PROGRESS NOTES
Hospitalist Progress Note      PCP: Jenniffer Fuentes MD    Date of Admission: 5/10/2022    Subjective:     Extubated, remains on Levophed, possible endocarditis on echocardiogram, plan for STORMY    Medications:  Reviewed    Infusion Medications    sodium chloride 100 mL/hr at 05/12/22 1740    lactated ringers 75 mL/hr at 05/13/22 0549    norepinephrine 4 mcg/min (05/13/22 0204)     Scheduled Medications    midodrine  10 mg Orogastric q8h    cefTRIAXone (ROCEPHIN) IV  2,000 mg IntraVENous Q24H    hydrocortisone sodium succinate PF  50 mg IntraVENous Q6H    metroNIDAZOLE  500 mg IntraVENous Q8H    levothyroxine  100 mcg Per NG tube Daily    carbidopa-levodopa  1.5 tablet Per NG tube q8h    sertraline  100 mg Per NG tube Nightly    pantoprazole  40 mg IntraVENous Daily    heparin (porcine)  5,000 Units SubCUTAneous BID    valproic acid  250 mg Per NG tube BID    valproic acid  500 mg Oral Nightly    alteplase  2 mg IntraCATHeter Once     PRN Meds: sodium chloride flush, sodium chloride, sodium phosphate IVPB **OR** sodium phosphate IVPB **OR** sodium phosphate IVPB, ondansetron, acetaminophen **OR** acetaminophen, albuterol, fentanNYL      Intake/Output Summary (Last 24 hours) at 5/13/2022 1418  Last data filed at 5/13/2022 0454  Gross per 24 hour   Intake 1593.26 ml   Output 700 ml   Net 893.26 ml       Physical Exam Performed:    BP (!) 249/222   Pulse 65   Temp 97.6 °F (36.4 °C) (Bladder)   Resp 24   Ht 4' 9\" (1.448 m)   Wt 83 lb 12.4 oz (38 kg)   SpO2 (!) 88%   BMI 18.13 kg/m²     General appearance: Extubated  HEENT: Pupils equal, round, and reactive to light. Conjunctivae/corneas clear. Neck: Supple, with full range of motion. No jugular venous distention. Trachea midline. Respiratory:  Normal respiratory effort. Clear to auscultation, bilaterally without Rales/Wheezes/Rhonchi. Cardiovascular: Regular rate and rhythm with normal S1/S2 without murmurs, rubs or gallops.   Abdomen: Soft, non-tender, non-distended with normal bowel sounds. Musculoskeletal: No clubbing, cyanosis or edema bilaterally. Full range of motion without deformity. Skin: Skin color, texture, turgor normal.  No rashes or lesions. Neurologic: Alert  Psychiatric: Calm  Capillary Refill: Brisk,3 seconds, normal   Peripheral Pulses: +2 palpable, equal bilaterally       Labs:   Recent Labs     05/11/22 0413 05/12/22 0330 05/13/22  0445   WBC 9.7 21.5* 20.1*   HGB 11.0* 10.3* 10.3*   HCT 33.4* 31.4* 31.9*    218 252     Recent Labs     05/11/22 0405 05/12/22 0330 05/13/22  0445   * 139 140   K 3.7 3.9 4.1    103 103   CO2 31 32 29   BUN 31* 19 18   CREATININE 0.8 <0.5* <0.5*   CALCIUM 8.8 8.9 8.2*   PHOS 2.5 2.4* 4.6     Recent Labs     05/11/22 0405 05/12/22  0330 05/13/22  0445   AST 24 35 25   ALT 6* <5* <5*   BILITOT 0.3 <0.2 <0.2   ALKPHOS 75 108 97     Recent Labs     05/11/22 0405 05/12/22  0330 05/13/22  0445   INR 1.63* 1.91* 1.31*     Recent Labs     05/10/22  2032 05/11/22  0100 05/11/22  0405   TROPONINI 0.13* 0.13* 0.09*       Urinalysis:      Lab Results   Component Value Date    NITRU Negative 05/10/2022    WBCUA 3 05/10/2022    BACTERIA None Seen 05/10/2022    RBCUA 3-4 05/10/2022    BLOODU Negative 05/10/2022    SPECGRAV 1.025 05/10/2022    GLUCOSEU Negative 05/10/2022       Radiology:  XR CHEST PORTABLE   Final Result   1. Endotracheal tube terminates in the left mainstem bronchus, for which   retraction 5 cm is recommended. 2.  Enteric tube terminates at the level of the body of the stomach. 3.  No significant change in bilateral airspace disease. Probable small left   effusion. XR ABDOMEN FOR NG/OG/NE TUBE PLACEMENT   Final Result   NG tube position appears acceptable. Endotracheal tube tip is likely within 1 cm above the anita. Dense left basilar opacity and patchy opacities in both lungs.          XR CHEST PORTABLE   Final Result   Endotracheal tube is Synthroid   Failure to thrive    DVT Prophylaxis: Heparin subcu  Diet: Diet NPO  Code Status: Full Code    PT/OT Eval Status: After extubation    Dispo -continue ICU care, keep n.p.o., STORMY this evening    Due to the immediate potential for life-threatening deterioration due to acute hypoxic respiratory failure, multifocal pneumonia, I spent 34 minutes providing critical care. This time is excluding time spent performing procedures.       Gerber Love MD

## 2022-05-13 NOTE — PROGRESS NOTES
Assessment completed, see doc flowsheets. Pt is nonverbal and not follow commands. Lung sounds are diminished. VSS. Tele on. Medication given per [facility]. Patient has no needs at this time. Call light within in reach, will continue to monitor.

## 2022-05-13 NOTE — CONSULTS
Patient seen/examined to evaluate for STORMY. She has Carol Camacho with advanced dementia and is nonverbal and unable to follow commands. Lives at a group home    Has been admitted for septic shock, source thought to be pneumonia, however had strep in her blood, thus an echo was done, showing a questionable shaggy mobile area adjacent to her aortic root that could be infective in origin. On exam, she is very frail appearing, has contractures, is unable to understand verbal commands and thus can't cooperate. It would be unsafe to do a STORMY via conscious sedation, as she can't follow commands. Her petite size may be too small for an adult STORMY probe. I discussed her case with Dr. Kalina Javed who is concerned with her ability to tolerate IV antibioitcs and manage a line as an outpatient. I have canceled her STORMY - we could consider doing it under [place], however I don't think it will , as she is clearly to frail to be considered for heart surgery if she were found to have surgical endocarditis. Would instead continue suppressive antibiotics per ID recs and treat for a full course if possible. Cardiology will sign-off at this time. Please call us if there are other issues or questions.

## 2022-05-13 NOTE — PROGRESS NOTES
swallowing difficulty as evidenced by NPO or clear liquid status due to medical condition,swallow study results    · Moderate malnutrition related to inadequate protein-energy intake as evidenced by severe muscle loss,severe loss of subcutaneous fat        CURRENT NUTRITION THERAPIES  Diet NPO     PO Intake: NPO   PO Supplement Intake:NPO    ANTHROPOMETRICS   Current Height: 4' 9\" (144.8 cm)   Current Weight: 83 lb 12.4 oz (38 kg)     Admission weight: 74 lb 11.8 oz (33.9 kg)   Ideal Body Weight (IBW): 85 lbs  (39 kg)      BMI: 18    COMPARATIVE STANDARDS  Energy (kcal):  3463-7421     Protein (g):  47-78 grams       Fluid (mL/day):  6009-8635 mL    The patient will be monitored per nutrition standards of care. Consult dietitian if additional nutrition interventions are needed prior to RD reassessment.      Manuela De Jesus RD, LD    Contact: [phone]

## 2022-05-13 NOTE — PROGRESS NOTES
Palliative Care:     Left VM for [facility] requesting a call back regarding pt's prognosis and POC.     Electronically signed by Andree Barroso RN on 5/13/2022 at 8:59 AM

## 2022-05-13 NOTE — PROGRESS NOTES
Infectious Diseases   Progress Note      Admission Date: 5/10/2022  Hospital Day: Hospital Day: 4   Attending: Freddie Renee MD  Date of service: 5/13/2022     Chief complaint/ Reason for consult:     · Septic shock with high fever, worsening leukocytosis, hypotension requiring vasopressors and lactic acidosis  · Group B streptococcus bacteremia  · Acute respiratory failure with hypoxia  · Left lower lobe pneumonia, concern for aspiration    Microbiology:        I have reviewed allavailable micro lab data and cultures    · Blood culture (1/2)  - collected on 5/10/2022: Group B streptococcus  Tracheal aspirate culture  - collected on 5/12/2022: Group B streptococcus    Antibiotics and immunizations:       Current antibiotics: All antibiotics and their doses were reviewed by me    Recent Abx Admin                   vancomycin (VANCOCIN) 750 mg in dextrose 5 % 250 mL IVPB (mg) 750 mg New Bag 05/13/22 1228     750 mg New Bag 05/12/22 2328    cefepime (MAXIPIME) 2000 mg IVPB minibag (mg) 2,000 mg New Bag 05/13/22 0837     2,000 mg New Bag 05/12/22 2051    metronidazole (FLAGYL) 500 mg in 0.9% NaCl 100 mL IVPB premix (mg) 500 mg New Bag 05/13/22 0616     500 mg New Bag 05/12/22 2216     500 mg New Bag  1614                  Immunization History: All immunization history was reviewed by me today. There is no immunization history on file for this patient. Known drug allergies: All allergies were reviewed and updated    Allergies   Allergen Reactions    Caffeine     Iodine        Social history:     Social History:  All social andepidemiologic history was reviewed and updated by me today as needed. · Tobacco use:   reports that she has never smoked. She has never used smokeless tobacco.  · Alcohol use:   reports no history of alcohol use. · Currently lives in: [place] [id number]  ·  reports no history of drug use.      COVID VACCINATION AND LAB RESULT RECORDS:     Internal Administration   First Dose Second Dose           Last COVID Lab SARS-CoV-2 (no units)   Date Value   04/05/2022 Not Detected     SARS-CoV-2, PCR (no units)   Date Value   03/24/2022 Not Detected     SARS-CoV-2 RNA, RT PCR (no units)   Date Value   05/10/2022 NOT DETECTED            Assessment:     The patient is a 60 y.o. old female who  has a past medical history of Anemia, Anorexia, Autistic disorder, Convulsions ([address] Down syndrome, Dysphagia, Hypothyroid, Malnutrition ([address] Parkinson disease ([address] UTI (urinary tract infection), and Weakness. with following problems:      · Septic shock with high fever, worsening leukocytosis, hypotension requiring vasopressors and lactic acidosis-shock ongoing  · Group B streptococcus bacteremia-currently on vancomycin and cefepime  · Acute respiratory failure with hypoxia -on 3 L oxygen via nasal cannula  · Left lower lobe pneumonia, concern for aspiration  · Parkinsonism  · History of Down syndrome  · Anorexia  ·       Discussion:      Tracheal aspirate culture and 1 set of blood culture from admission has grown group B streptococcus so far. The patient is currently on IV vancomycin and IV cefepime and metronidazole. Her white cell count is 20,100 today. Serum creatinine 0.5. Nasal MRSA probe was negative. 2D echo has been done today. It is concerning for possible aortic root abscess      Plan:     Diagnostic Workup:    · Will order 2 sets of repeat blood cultures today  · Continue to follow  fever curve, WBC count and blood cultures. · Continue to monitor blood counts, liver and renal function. Antimicrobials:    · Will stop IV vancomycin today  · Will switch IV cefepime to IV ceftriaxone 2 g every 24 hours  · Continue IV Flagyl 500 mg every 8 hour for empiric anaerobic coverage for any potential aspiration  · We will follow up on the culture results and clinical progress and will make further recommendations accordingly. · Continue close vitals monitoring.   · Continue oxygen support to maintain oxygen saturation above 92%  · Vasopressor support to maintain mean arterial pressure greater than 65 due to mobility  · Maintain good glycemic control. · Fall precautions. Aspiration precautions. · Continue to watch for new fever or diarrhea. · DVT prophylaxis. · Critically ill patient. Remains at high risk of morbidity and mortality  · Continue close monitoring in ICU setting  · Discussed in detail with the ICU RN  · Discussed with Dr. Rom Araiza, cardiology  · Given the complexity and seriousness of her condition and her existing comorbidities, I think considering palliative options and comfort care would be very reasonable in her case. Palliative care team already following      Drug Monitoring:    · Continue monitoring for antibiotic toxicity as follows: CBC, CMP   · Continue to watch for following: new or worsening fever, new hypotension, hives, lip swelling and redness or purulence at vascular access sites. I/v access Management:    · Continue to monitor i.v access sites for erythema, induration, discharge or tenderness. · As always, continue efforts to minimize tubes/lines/drains as clinically appropriate to reduce chances of line associated infections. Risk of Complications/Morbidity/Mortality: High     · Patient is critically ill and has a potentially life threatening infection that poses threat to life/bodily function. · There is potential for worsening infection/ sudden clinically significant or life-threatening deterioration in the patient's condition without appropriate antimicrobial therapy. · Complex medical decision making process was involved to select appropriate antimicrobial agents to reverse the cause of patient's severe infection/ illness. · Antimicrobial therapy requires intensive monitoring for toxicity and frequent dose adjustments to prevent toxicity and permanent end-organ dysfunction.     Critical care time: 33  minutes      Thank you for involving me in the care of your patient. I will continue to follow. If you have anyadditional questions, please do not hesitate to contact me. Subjective: Interval history: Interval history was obtained from chart review and patient/ RN. The patient remains hypotensive. She is on broad-spectrum antibiotic coverage. She seems to be tolerating antibiotics okay. She has been requiring vasopressors. REVIEW OF SYSTEMS:      Review of Systems   Unable to perform ROS: Patient nonverbal         Past Medical History: All past medical history reviewed today. Past Medical History:   Diagnosis Date    Anemia     Anorexia     Autistic disorder     Convulsions ([address]     Down syndrome     Dysphagia     Hypothyroid     Mal[facility]     Parkinson disease ([facility]     UTI (urinary tract infection)     Weakness        Past Surgical History: All past surgical history was reviewed today. Past Surgical History:   Procedure Laterality Date    GASTROSTOMY TUBE PLACEMENT      GASTROSTOMY TUBE PLACEMENT  2/18/14       Family History: All family history was reviewed today. History reviewed. No pertinent family history. Objective:       PHYSICAL EXAM:      Vitals:   Vitals:    05/13/22 1015 05/13/22 1030 05/13/22 1045 05/13/22 1237   BP: (!) 78/52 (!) 88/51 (!) 77/48    Pulse: (!) 45 57 (!) 42    Resp: 18 24 16    Temp:       TempSrc:       SpO2: 97% (!) 86% 96%    Weight:       Height:    4' 9\" (1.448 m)       Physical Exam     General: Encephalopathic but protecting the airway so far,   HEENT: normocephalic, atraumatic, sclera clear, pupils equal, light reflex preserved bilaterally  Cardiovascular: RRR, no murmurs/rubs/gallops detected  Pulmonary: CTABL, no rhonchi/rales   Abdomen/GI: soft, no organomegaly, bowel sounds positive  Neuro: Encephalopathic, pupils are equal and reactive to light, moves all extremities  Skin: no rash,   Musculoskeletal:  No obvious joint swelling, redness.  No limitation of range of passive motion  Genitourinary: Yuan's catheter in place   Psych: could not assess   Lymphatic/Immunologic: No obvious bruising, no cervical lymphadenopathy    Lines: All vascular access sites are healthy with no local erythema, discharge or tenderness    Intake and output:    I/O last 3 completed shifts: In: 4101.2 [I.V.:3060.9; NG/GT:304; IV Piggyback:736.3]  Out: 1700 [Urine:1700]    Lab Data:   All available labs and old records have been reviewed by me. CBC:  Recent Labs     05/11/22  0413 05/12/22  0330 05/13/22  0445   WBC 9.7 21.5* 20.1*   RBC 3.30* 3.08* 3.15*   HGB 11.0* 10.3* 10.3*   HCT 33.4* 31.4* 31.9*    218 252   .3* 101.9* 101.4*   MCH 33.3 33.5 32.7   MCHC 32.9 32.9 32.3   RDW 13.1 13.4 13.0   BANDSPCT 27*  --   --         BMP:  Recent Labs     05/11/22  0405 05/12/22  0330 05/13/22  0445   * 139 140   K 3.7 3.9 4.1    103 103   CO2 31 32 29   BUN 31* 19 18   CREATININE 0.8 <0.5* <0.5*   CALCIUM 8.8 8.9 8.2*   GLUCOSE 78 70 99        Hepatic Function Panel:   Lab Results   Component Value Date    ALKPHOS 97 05/13/2022    ALT <5 05/13/2022    AST 25 05/13/2022    PROT 4.9 05/13/2022    PROT 6.9 02/06/2013    BILITOT <0.2 05/13/2022    BILIDIR <0.2 12/23/2016    IBILI see below 12/23/2016    LABALBU 2.1 05/13/2022       CPK:   Lab Results   Component Value Date    CKTOTAL 78 08/22/2012     ESR: No results found for: SEDRATE  CRP: No results found for: CRP        Imaging: All pertinent images and reports for the current visit were reviewed by me during this visit. I reviewed the chest x-ray/CT scan/MRI images and independently interpreted the findings and results today. XR CHEST PORTABLE   Final Result   1. Endotracheal tube terminates in the left mainstem bronchus, for which   retraction 5 cm is recommended. 2.  Enteric tube terminates at the level of the body of the stomach. 3.  No significant change in bilateral airspace disease.   Probable small left   effusion. XR ABDOMEN FOR NG/OG/NE TUBE PLACEMENT   Final Result   NG tube position appears acceptable. Endotracheal tube tip is likely within 1 cm above the anita. Dense left basilar opacity and patchy opacities in both lungs. XR CHEST PORTABLE   Final Result   Endotracheal tube is present with tip projecting just proximal to the anita. Left basilar opacity and trace left pleural effusion. Aspiration and   pneumonia are in the differential.             Medications: All current and past medications were reviewed.      midodrine  10 mg Orogastric q8h    vancomycin  750 mg IntraVENous Q12H    hydrocortisone sodium succinate PF  50 mg IntraVENous Q6H    metroNIDAZOLE  500 mg IntraVENous Q8H    levothyroxine  100 mcg Per NG tube Daily    carbidopa-levodopa  1.5 tablet Per NG tube q8h    sertraline  100 mg Per NG tube Nightly    pantoprazole  40 mg IntraVENous Daily    heparin (porcine)  5,000 Units SubCUTAneous BID    valproic acid  250 mg Per NG tube BID    valproic acid  500 mg Oral Nightly    alteplase  2 mg IntraCATHeter Once    cefepime  2,000 mg IntraVENous Q12H        sodium chloride 100 mL/hr at 05/12/22 1740    lactated ringers 75 mL/hr at 05/13/22 0549    norepinephrine 4 mcg/min (05/13/22 0204)       sodium chloride flush, sodium chloride, sodium phosphate IVPB **OR** sodium phosphate IVPB **OR** sodium phosphate IVPB, ondansetron, acetaminophen **OR** acetaminophen, albuterol, fentanNYL      Problem list:       Patient Active Problem List   Diagnosis Code    Down syndrome Q90.9    Down syndrome Q90.9    Down syndrome Q90.9    Dysphagia R13.10    Autistic disorder F84.0    PEG adjustment, replacement, or removal Z43.1    Hypotension I95.9    Sepsis ([address] A41.9    Acute hypoxemic respiratory failure (HCC) J96.01    AVELINO (acute kidney injury) ([address] N17.9    Aspiration pneumonia (HCC) J69.0    Septic shock (HCC) A41.9, R65.21    Moderate malnutrition ([facility] E44.0    Streptococcal bacteremia R78.81, B95.5    Pneumonia of left lower lobe due to infectious organism J18.9    Parkinsonism ([facility] G20    Anorexia R63.0       Please note that this chart was generated using Dragon dictation software. Although every effort was made to ensure the accuracy of this automated transcription, some errors in transcription may have occurred inadvertently. If you may need any clarification, please do not hesitate to contact me through EPIC or at the phone number provided below with my electronic signature. Any pictures or media included in this note were obtained after taking informed verbal consent from the patient and with their approval to include those in the patient's medical record.       Katherine Bañuelos MD, MPH, [facility], [facility]  5/13/2022, 1:37 PM  [facility] Infectious Disease   [address] [place] [address], [address] [place] [address]  Office: [phone]  Fax: [phone]  Clinic days:  Tuesday & Thursday

## 2022-05-14 LAB
A/G RATIO: 0.6 (ref 1.1–2.2)
ALBUMIN SERPL-MCNC: 2.1 G/DL (ref 3.4–5)
ALP BLD-CCNC: 101 U/L (ref 40–129)
ALT SERPL-CCNC: <5 U/L (ref 10–40)
ANION GAP SERPL CALCULATED.3IONS-SCNC: 9 MMOL/L (ref 3–16)
AST SERPL-CCNC: 23 U/L (ref 15–37)
BASOPHILS ABSOLUTE: 0 K/UL (ref 0–0.2)
BASOPHILS RELATIVE PERCENT: 0.2 %
BILIRUB SERPL-MCNC: <0.2 MG/DL (ref 0–1)
BLOOD CULTURE, ROUTINE: NORMAL
BUN BLDV-MCNC: 24 MG/DL (ref 7–20)
CALCIUM SERPL-MCNC: 8.5 MG/DL (ref 8.3–10.6)
CHLORIDE BLD-SCNC: 104 MMOL/L (ref 99–110)
CO2: 28 MMOL/L (ref 21–32)
CREAT SERPL-MCNC: 0.6 MG/DL (ref 0.6–1.1)
EOSINOPHILS ABSOLUTE: 0 K/UL (ref 0–0.6)
EOSINOPHILS RELATIVE PERCENT: 0 %
GFR AFRICAN AMERICAN: >60
GFR NON-AFRICAN AMERICAN: >60
GLUCOSE BLD-MCNC: 92 MG/DL (ref 70–99)
HCT VFR BLD CALC: 32.7 % (ref 36–48)
HEMOGLOBIN: 10.7 G/DL (ref 12–16)
INR BLD: 1.27 (ref 0.88–1.12)
LYMPHOCYTES ABSOLUTE: 1 K/UL (ref 1–5.1)
LYMPHOCYTES RELATIVE PERCENT: 6 %
MAGNESIUM: 1.8 MG/DL (ref 1.8–2.4)
MCH RBC QN AUTO: 33 PG (ref 26–34)
MCHC RBC AUTO-ENTMCNC: 32.9 G/DL (ref 31–36)
MCV RBC AUTO: 100.5 FL (ref 80–100)
MONOCYTES ABSOLUTE: 0.4 K/UL (ref 0–1.3)
MONOCYTES RELATIVE PERCENT: 2.5 %
NEUTROPHILS ABSOLUTE: 15.1 K/UL (ref 1.7–7.7)
NEUTROPHILS RELATIVE PERCENT: 91.3 %
PDW BLD-RTO: 13.2 % (ref 12.4–15.4)
PHOSPHORUS: 4.1 MG/DL (ref 2.5–4.9)
PLATELET # BLD: 301 K/UL (ref 135–450)
PMV BLD AUTO: 9 FL (ref 5–10.5)
POTASSIUM REFLEX MAGNESIUM: 3.7 MMOL/L (ref 3.5–5.1)
PROTHROMBIN TIME: 14.5 SEC (ref 9.9–12.7)
RBC # BLD: 3.25 M/UL (ref 4–5.2)
SODIUM BLD-SCNC: 141 MMOL/L (ref 136–145)
TOTAL PROTEIN: 5.6 G/DL (ref 6.4–8.2)
WBC # BLD: 16.5 K/UL (ref 4–11)

## 2022-05-14 PROCEDURE — 2580000003 HC RX 258: Performed by: STUDENT IN AN ORGANIZED HEALTH CARE EDUCATION/TRAINING PROGRAM

## 2022-05-14 PROCEDURE — 84100 ASSAY OF PHOSPHORUS: CPT

## 2022-05-14 PROCEDURE — 6370000000 HC RX 637 (ALT 250 FOR IP): Performed by: INTERNAL MEDICINE

## 2022-05-14 PROCEDURE — 2580000003 HC RX 258: Performed by: INTERNAL MEDICINE

## 2022-05-14 PROCEDURE — 2000000000 HC ICU R&B

## 2022-05-14 PROCEDURE — 85610 PROTHROMBIN TIME: CPT

## 2022-05-14 PROCEDURE — 83735 ASSAY OF MAGNESIUM: CPT

## 2022-05-14 PROCEDURE — 2500000003 HC RX 250 WO HCPCS: Performed by: STUDENT IN AN ORGANIZED HEALTH CARE EDUCATION/TRAINING PROGRAM

## 2022-05-14 PROCEDURE — 92526 ORAL FUNCTION THERAPY: CPT

## 2022-05-14 PROCEDURE — 2700000000 HC OXYGEN THERAPY PER DAY

## 2022-05-14 PROCEDURE — 6360000002 HC RX W HCPCS: Performed by: INTERNAL MEDICINE

## 2022-05-14 PROCEDURE — 85025 COMPLETE CBC W/AUTO DIFF WBC: CPT

## 2022-05-14 PROCEDURE — 2500000003 HC RX 250 WO HCPCS: Performed by: INTERNAL MEDICINE

## 2022-05-14 PROCEDURE — 80053 COMPREHEN METABOLIC PANEL: CPT

## 2022-05-14 PROCEDURE — 94761 N-INVAS EAR/PLS OXIMETRY MLT: CPT

## 2022-05-14 PROCEDURE — C9113 INJ PANTOPRAZOLE SODIUM, VIA: HCPCS | Performed by: INTERNAL MEDICINE

## 2022-05-14 PROCEDURE — 99233 SBSQ HOSP IP/OBS HIGH 50: CPT | Performed by: INTERNAL MEDICINE

## 2022-05-14 RX ORDER — ATROPINE SULFATE 0.1 MG/ML
0.5 INJECTION INTRAVENOUS EVERY 30 MIN PRN
Status: DISCONTINUED | OUTPATIENT
Start: 2022-05-14 | End: 2022-05-26 | Stop reason: HOSPADM

## 2022-05-14 RX ADMIN — HEPARIN SODIUM 5000 UNITS: 5000 INJECTION INTRAVENOUS; SUBCUTANEOUS at 09:06

## 2022-05-14 RX ADMIN — Medication 5 MCG/MIN: at 02:39

## 2022-05-14 RX ADMIN — SODIUM CHLORIDE, POTASSIUM CHLORIDE, SODIUM LACTATE AND CALCIUM CHLORIDE: 600; 310; 30; 20 INJECTION, SOLUTION INTRAVENOUS at 20:12

## 2022-05-14 RX ADMIN — MIDODRINE HYDROCHLORIDE 10 MG: 5 TABLET ORAL at 20:32

## 2022-05-14 RX ADMIN — HYDROCORTISONE SODIUM SUCCINATE 50 MG: 100 INJECTION, POWDER, FOR SOLUTION INTRAMUSCULAR; INTRAVENOUS at 00:40

## 2022-05-14 RX ADMIN — ATROPINE SULFATE 0.5 MG: 0.1 INJECTION, SOLUTION ENDOTRACHEAL; INTRAMUSCULAR; INTRAVENOUS; SUBCUTANEOUS at 15:36

## 2022-05-14 RX ADMIN — Medication 3 MCG/MIN: at 03:15

## 2022-05-14 RX ADMIN — HYDROCORTISONE SODIUM SUCCINATE 50 MG: 100 INJECTION, POWDER, FOR SOLUTION INTRAMUSCULAR; INTRAVENOUS at 19:57

## 2022-05-14 RX ADMIN — HEPARIN SODIUM 5000 UNITS: 5000 INJECTION INTRAVENOUS; SUBCUTANEOUS at 20:32

## 2022-05-14 RX ADMIN — VALPROATE SODIUM 250 MG: 100 INJECTION, SOLUTION INTRAVENOUS at 16:13

## 2022-05-14 RX ADMIN — HYDROCORTISONE SODIUM SUCCINATE 50 MG: 100 INJECTION, POWDER, FOR SOLUTION INTRAMUSCULAR; INTRAVENOUS at 05:51

## 2022-05-14 RX ADMIN — CEFTRIAXONE 2000 MG: 2 INJECTION, POWDER, FOR SOLUTION INTRAMUSCULAR; INTRAVENOUS at 14:13

## 2022-05-14 RX ADMIN — ATROPINE SULFATE 0.5 MG: 0.1 INJECTION, SOLUTION ENDOTRACHEAL; INTRAMUSCULAR; INTRAVENOUS; SUBCUTANEOUS at 03:19

## 2022-05-14 RX ADMIN — METRONIDAZOLE 500 MG: 500 INJECTION, SOLUTION INTRAVENOUS at 14:11

## 2022-05-14 RX ADMIN — SERTRALINE 100 MG: 50 TABLET, FILM COATED ORAL at 20:32

## 2022-05-14 RX ADMIN — METRONIDAZOLE 500 MG: 500 INJECTION, SOLUTION INTRAVENOUS at 05:54

## 2022-05-14 RX ADMIN — PANTOPRAZOLE SODIUM 40 MG: 40 INJECTION, POWDER, FOR SOLUTION INTRAVENOUS at 09:07

## 2022-05-14 RX ADMIN — SODIUM CHLORIDE, PRESERVATIVE FREE 10 ML: 5 INJECTION INTRAVENOUS at 20:36

## 2022-05-14 RX ADMIN — CARBIDOPA AND LEVODOPA 1.5 TABLET: 25; 100 TABLET ORAL at 00:40

## 2022-05-14 RX ADMIN — SODIUM CHLORIDE, POTASSIUM CHLORIDE, SODIUM LACTATE AND CALCIUM CHLORIDE: 600; 310; 30; 20 INJECTION, SOLUTION INTRAVENOUS at 05:08

## 2022-05-14 RX ADMIN — METRONIDAZOLE 500 MG: 500 INJECTION, SOLUTION INTRAVENOUS at 22:10

## 2022-05-14 RX ADMIN — VALPROATE SODIUM 250 MG: 100 INJECTION, SOLUTION INTRAVENOUS at 22:06

## 2022-05-14 RX ADMIN — MIDODRINE HYDROCHLORIDE 10 MG: 5 TABLET ORAL at 05:52

## 2022-05-14 ASSESSMENT — PAIN SCALES - PAIN ASSESSMENT IN ADVANCED DEMENTIA (PAINAD)
BODYLANGUAGE: 1
CONSOLABILITY: 1
BREATHING: 0
FACIALEXPRESSION: 0
NEGVOCALIZATION: 0
BODYLANGUAGE: 1
BODYLANGUAGE: 1
NEGVOCALIZATION: 0
BODYLANGUAGE: 1
BREATHING: 0
FACIALEXPRESSION: 0
FACIALEXPRESSION: 0
CONSOLABILITY: 1
BREATHING: 0
BREATHING: 0
TOTALSCORE: 2
BREATHING: 0
TOTALSCORE: 2
TOTALSCORE: 2
NEGVOCALIZATION: 0
CONSOLABILITY: 1
TOTALSCORE: 2
FACIALEXPRESSION: 0
BODYLANGUAGE: 1
CONSOLABILITY: 1
FACIALEXPRESSION: 0
CONSOLABILITY: 1
FACIALEXPRESSION: 0
BREATHING: 0
TOTALSCORE: 2
BODYLANGUAGE: 1
TOTALSCORE: 2
NEGVOCALIZATION: 0
TOTALSCORE: 2
BREATHING: 0
BODYLANGUAGE: 1
NEGVOCALIZATION: 0
BREATHING: 0
NEGVOCALIZATION: 0
BREATHING: 0
TOTALSCORE: 2
BODYLANGUAGE: 1
BREATHING: 0
CONSOLABILITY: 1
NEGVOCALIZATION: 0
FACIALEXPRESSION: 0
FACIALEXPRESSION: 0
TOTALSCORE: 2
BODYLANGUAGE: 1
FACIALEXPRESSION: 0
BODYLANGUAGE: 1
CONSOLABILITY: 1
BODYLANGUAGE: 1
TOTALSCORE: 2
TOTALSCORE: 2
FACIALEXPRESSION: 0
NEGVOCALIZATION: 0
CONSOLABILITY: 1
CONSOLABILITY: 1
BREATHING: 0
CONSOLABILITY: 1
CONSOLABILITY: 1
FACIALEXPRESSION: 0
NEGVOCALIZATION: 0

## 2022-05-14 NOTE — PROGRESS NOTES
Shift assessment completed. Sinus rajni with PVCs on monitor. Remains on 4L via NC at this time, SpO2 in upper 90s. No edema noted. Yuan remain in place. Patient unable to verbalize needs but resting in relaxed position. See flowsheets for further details.

## 2022-05-14 NOTE — PROGRESS NOTES
Patient remains too lethargic to follow commands for oral medications. Dr. Samuel Andrew notified that RN is unable to give patient valproic acid, RN requesting IV orders. Dr. Samuel Andrew gave verbal orders for IV valproic acid, requesting pharmacy dose it. Pharmacy notified of change of route.

## 2022-05-14 NOTE — PROGRESS NOTES
Hospitalist Progress Note      PCP: Pierre Abrams MD    Date of Admission: 5/10/2022    Subjective:     Extubated, remains on Levophed, possible endocarditis on echocardiogram, cardiology signing off, not considering STORMY given her overall prognosis. Continue on IV antibiotic. Medications:  Reviewed    Infusion Medications    sodium chloride 100 mL/hr at 05/12/22 1740    lactated ringers 75 mL/hr at 05/14/22 0508    norepinephrine Stopped (05/14/22 1151)     Scheduled Medications    midodrine  10 mg Orogastric q8h    cefTRIAXone (ROCEPHIN) IV  2,000 mg IntraVENous Q24H    hydrocortisone sodium succinate PF  50 mg IntraVENous Q6H    metroNIDAZOLE  500 mg IntraVENous Q8H    levothyroxine  100 mcg Per NG tube Daily    carbidopa-levodopa  1.5 tablet Per NG tube q8h    sertraline  100 mg Per NG tube Nightly    pantoprazole  40 mg IntraVENous Daily    heparin (porcine)  5,000 Units SubCUTAneous BID    valproic acid  250 mg Per NG tube BID    valproic acid  500 mg Oral Nightly    alteplase  2 mg IntraCATHeter Once     PRN Meds: atropine, sodium chloride flush, sodium chloride, sodium phosphate IVPB **OR** sodium phosphate IVPB **OR** sodium phosphate IVPB, ondansetron, acetaminophen **OR** acetaminophen, albuterol, fentanNYL      Intake/Output Summary (Last 24 hours) at 5/14/2022 1153  Last data filed at 5/14/2022 0900  Gross per 24 hour   Intake 2410.77 ml   Output 372 ml   Net 2038.77 ml       Physical Exam Performed:    BP (!) 86/55   Pulse (!) 49   Temp 97.5 °F (36.4 °C) (Temporal)   Resp 15   Ht 4' 9\" (1.448 m)   Wt 89 lb 15.2 oz (40.8 kg)   SpO2 97%   BMI 19.46 kg/m²     General appearance: Extubated  HEENT: Pupils equal, round, and reactive to light. Conjunctivae/corneas clear. Neck: Supple, with full range of motion. No jugular venous distention. Trachea midline. Respiratory:  Normal respiratory effort.  Clear to auscultation, bilaterally without Rales/Wheezes/Rhonchi. Cardiovascular: Regular rate and rhythm with normal S1/S2 without murmurs, rubs or gallops. Abdomen: Soft, non-tender, non-distended with normal bowel sounds. Musculoskeletal: No clubbing, cyanosis or edema bilaterally. Full range of motion without deformity. Skin: Skin color, texture, turgor normal.  No rashes or lesions. Neurologic: Alert  Psychiatric: Calm  Capillary Refill: Brisk,3 seconds, normal   Peripheral Pulses: +2 palpable, equal bilaterally       Labs:   Recent Labs     05/12/22 0330 05/13/22 0445 05/14/22 0415   WBC 21.5* 20.1* 16.5*   HGB 10.3* 10.3* 10.7*   HCT 31.4* 31.9* 32.7*    252 301     Recent Labs     05/12/22 0330 05/13/22 0445 05/14/22 0415    140 141   K 3.9 4.1 3.7    103 104   CO2 32 29 28   BUN 19 18 24*   CREATININE <0.5* <0.5* 0.6   CALCIUM 8.9 8.2* 8.5   PHOS 2.4* 4.6 4.1     Recent Labs     05/12/22 0330 05/13/22 0445 05/14/22 0415   AST 35 25 23   ALT <5* <5* <5*   BILITOT <0.2 <0.2 <0.2   ALKPHOS 108 97 101     Recent Labs     05/12/22 0330 05/13/22 0445 05/14/22 0415   INR 1.91* 1.31* 1.27*     No results for input(s): CKTOTAL, TROPONINI in the last 72 hours. Urinalysis:      Lab Results   Component Value Date    NITRU Negative 05/10/2022    WBCUA 3 05/10/2022    BACTERIA None Seen 05/10/2022    RBCUA 3-4 05/10/2022    BLOODU Negative 05/10/2022    SPECGRAV 1.025 05/10/2022    GLUCOSEU Negative 05/10/2022       Radiology:  XR CHEST PORTABLE   Final Result   1. Endotracheal tube terminates in the left mainstem bronchus, for which   retraction 5 cm is recommended. 2.  Enteric tube terminates at the level of the body of the stomach. 3.  No significant change in bilateral airspace disease. Probable small left   effusion. XR ABDOMEN FOR NG/OG/NE TUBE PLACEMENT   Final Result   NG tube position appears acceptable. Endotracheal tube tip is likely within 1 cm above the anita.       Dense left basilar opacity and patchy opacities in both lungs. XR CHEST PORTABLE   Final Result   Endotracheal tube is present with tip projecting just proximal to the anita. Left basilar opacity and trace left pleural effusion. Aspiration and   pneumonia are in the differential.             Assessment/Plan:    Active Hospital Problems    Diagnosis     Streptococcal bacteremia [R78.81, B95.5]      Priority: Medium    Pneumonia of left lower lobe due to infectious organism [J18.9]      Priority: Medium    Parkinsonism ([address] [G20]      Priority: Medium    Anorexia [R63.0]      Priority: Medium    Moderate malnutrition ([address] [E44.0]      Priority: Medium    AVELINO (acute kidney injury) ([address] [N17.9]      Priority: Medium    Aspiration pneumonia ([address] [J69.0]      Priority: Medium    Septic shock ([address] [A41.9, R65.21]      Priority: Medium    Acute hypoxemic respiratory failure ([address] [J96.01]     Down syndrome [Q90.9]       Acute hypoxic respiratory failure, secondary to multifocal pneumonia, patient was started on broad-spectrum antibiotic, intubated and sedated, started on Levophed, no guidance cultures so far, appreciate pulmonary input, liberated from mechanical ventilation 5/12 remain n.p.o., failed speech.    Sepsis, and septic shock, secondary to multifocal pneumonia,/and possible infective endocarditis with possible aortic root abscess started on vancomycin and cefepime antibiotic has been changed to Rocephin and Flagyl per ID, lactic acid is improving 3.2 continue current treatment, continue vent support leukocytosis has been worsening 21,000, blood culture growing Streptococcus, will repeat blood culture, send strep antigen urine concern for endocarditis on echocardiogram, per cardiology it would be unsafe to do STORMY via conscious sedation as patient cannot follow commands STORMY was canceled, plan to treat with antibiotic, likely patient will need a PICC line, repeated blood cultures are still negative so far.    Chronic hypotension, on midodrine, will continue now on low-dose of Levophed   Acute kidney injury likely prerenal, ATN, avoid nephrotoxic medication, continue fluid resuscitation.  History of Down syndrome   Lactic acidosis, improving with fluid resuscitation   Hypomagnesemia. Repleted, will continue monitor.  History of seizure disorder continue Keppra   Hypothyroidism, continue Synthroid   Failure to thrive   Overall prognosis poor    DVT Prophylaxis: Heparin subcu  Diet: Diet NPO  Code Status: Full Code      Dispo -continue ICU care, may be transferred to progressive care unit tomorrow if she remain vitally stable and off Levophed    Due to the immediate potential for life-threatening deterioration due to acute hypoxic respiratory failure, multifocal pneumonia, I spent 34 minutes providing critical care. This time is excluding time spent performing procedures.       Jimi Barraza MD

## 2022-05-14 NOTE — PROGRESS NOTES
Facility/Department: [address]  Speech Language Pathology   Dysphagia Treatment Note    Patient: Sandeep Mcpherson   : 1965   MRN: [id number]      Evaluation Date: 2022      Admitting Dx: Hypernatremia [E87.0]  Septic shock ([address] [A41.9, R65.21]  Aspiration pneumonia, unspecified aspiration pneumonia type, unspecified laterality, unspecified part of lung ([address] [J69.0]  Acute hypoxemic respiratory failure ([address] [J96.01]  Sepsis with acute organ dysfunction without septic shock, due to unspecified organism, unspecified type ([address] [A41.9, R65.20]  Treatment Diagnosis: Oropharyngeal Dysphagia   Pain: Unable to state                                              Diet and Treatment Recommendations 2022:  Diet Solids Recommendation:  1.) If aggressive means of care are desired, recommend NPO with alternative means of hydration/nutrition.     2.) If comfort care is desired recommend Dysphagia I Pureed with Moderately Thick (honey) Liquids, meds crushed in puree Recommended form of Meds: Crushed as able in puree  or Via alternative means of nutrition         Compensatory strategies:   TBD     Assessment of Texture Tolerance:  Diet level prior to treatment: NPO    Tolerance of Current Diet Level: N/A     Impressions: Pt was positioned Upright in bed , awake and alert. RN reported there were discussions about PEG tube placement but no decisions have been made. Currently on 4L O2 via nasal cannula . Trials of moderately (honey) thick liquids  and puree  were provided to assess swallow function. Pt unable to self feed. Unable to follow any commands. limited oral cavity opening for trials via tsp was noted with pt accepting small boluses. Limited bolus manipulation was assessed with prolonged oral holding, pharyngeal pooling was suspected with delayed vs absent swallow initiation. No overt clinical s/s of aspiration/penetration were assessed however, pt only accepting of limited trials.  Pt remains at [facility] risk for aspiration as well as nutritional compromise due to delayed swallow initiation, intermittent reduced oral opening to accept bolus, and need for total feeding assistance. Recommend consideration of alternative means of hydration/nutrition pending goal of care. See above for recommendations.     Dysphagia Goals:   Pt will functionally tolerate ongoing assessment of swallow function with diet to be determined as indicated pending goals of care. (ongoing 5/14/2022)     Plan:    Speech therapy for dysphagia tx 3-5 times per week during acute care stay pending goals of care. Pt unable to actively participate in dysphagia therapy. Patient/Family Education:  Provided education regarding role of SLP, recommendations and general speech pathology plan of care. [] Pt verbalized understanding and agreement   [] Pt requires ongoing learning   [x] No evidence of comprehension     Discharge Recommendations:    Discharge recommendations to be determined pending ongoing follow-up during acute care stay    Timed Code Treatment:    Total Treatment Time:    If patient discharges prior to next session this note will serve as a discharge summary.        Signature: Isaac Hernandez [address]  Speech Language Pathologist

## 2022-05-14 NOTE — PROGRESS NOTES
pt has been rajni, in 40-50s. when she is asleep her HR drops lower, like 30s but now HR is down to 27 and stays at high 20s, under 30s. MD is aware. Atropine PRN ordered.

## 2022-05-14 NOTE — PROGRESS NOTES
PULMONARY AND CRITICAL CARE MEDICINE PROGRESS NOTE      Subjective: Patient continues to make slow progress. Mentation close to her baseline. Remains on 4 L/min of oxygen supplementation saturating in high 90s. Echo yesterday showed evidence of aortic root abscess. Also suffering with aspiration pneumonia. Hemodynamics stabilizing and patient off of vasopressor support. REVIEW OF SYSTEMS:   14 point ROS could not be obtained due to patient factors. Minimally verbal at baseline. PAST MEDICAL HISTORY:   Past Medical History:   Diagnosis Date    Anemia     Anorexia     Autistic disorder     Convulsions ([address]     Down syndrome     Dysphagia     Hypothyroid     Malnutrition [facility]     Parkinson disease ([address]     UTI (urinary tract infection)     Weakness        PAST SURGICAL HISTORY:   Past Surgical History:   Procedure Laterality Date    GASTROSTOMY TUBE PLACEMENT      GASTROSTOMY TUBE PLACEMENT  2/18/14       SOCIAL HISTORY:   Social History     Tobacco Use    Smoking status: Never Smoker    Smokeless tobacco: Never Used   Substance Use Topics    Alcohol use: No    Drug use: No       FAMILY HISTORY: History reviewed. No pertinent family history. MEDICATIONS:     No current facility-administered medications on file prior to encounter. Current Outpatient Medications on File Prior to Encounter   Medication Sig Dispense Refill    midodrine (PROAMATINE) 2.5 MG tablet Take 1 tablet by mouth 3 times daily (with meals) 90 tablet 1    valproic acid (DEPAKENE) 250 MG/5ML SOLN oral solution Take 250 mg by mouth every 8 hours 250 mg in the AM, 250 mg in the afternoon, 500 mg nightly.  levothyroxine (SYNTHROID) 100 MCG tablet Take 100 mcg by mouth daily       docusate sodium (COLACE) 100 MG capsule Take 100 mg by mouth daily      acetaminophen (TYLENOL) 325 MG tablet Take 650 mg by mouth every 6 hours as needed for Pain.       carbidopa-levodopa (SINEMET)  MG per tablet with high risk of complications with the procedure. 7. Blood cultures have been repeated again. Infectious disease on board. 8. Patient started on vancomycin and cefepime. Continue with same. Isolating group B strep from blood culture. 9. Continue with midodrine to 10 mg 3 times a day. Patient off of vasopressors. 10. Also started on Solu-Cortef 50 mg IV every 6 hours. I will reduce the frequency to every 12 hours today. But tomorrow this can be changed to once a day and then stopped. 11. Patient is also on LR at 75 cc/h. Continue for now. 12. Urine output marginal but slowly improving with fluid resuscitation. 13. Patient has failure to thrive, and Down syndrome and recurrent aspiration. Long-term prognosis remains poor. Patient does not have any family. There is a caregiver involved. Patient remains full code. Palliative care on board. 15. May have to go to SNF facility. 15. Nothing further to add from critical care standpoint. We will sign off. Bowen Gonzales MD  Pulmonary Critical Care and Sleep Medicine  5/10/2022, 12:31 PM    This note was completed using dragon medical speech recognition software. Grammatical errors, random word insertions, pronoun errors and incomplete sentences are occasional consequences of this technology due to software limitations. If there are questions or concerns about the content of this note of information contained within the body of this dictation they should be addressed with the provider for clarification.

## 2022-05-14 NOTE — PROGRESS NOTES
Assessment completed, see doc flowsheets. Pt is nonverbal. Lung sounds are clear/diminished. VSS. Tele on. Medication given per [facility]. Patient has no needs at this time. Call light within in reach, will continue to monitor.

## 2022-05-14 NOTE — RT PROTOCOL NOTE
RT Nebulizer Bronchodilator Protocol Note    There is a bronchodilator order in the chart from a provider indicating to follow the RT Bronchodilator Protocol and there is an Initiate RT Bronchodilator Protocol order as well (see protocol at bottom of note). CXR Findings:  No results found. The findings from the last RT Protocol Assessment were as follows:  Smoking: None or smoker <15 pack years  Respiratory Pattern: Regular pattern and RR 12-20 bpm  Breath Sounds: Slightly diminished and/or crackles  Cough: Strong, spontaneous, non-productive  Indication for Bronchodilator Therapy: Decreased or absent breath sounds  Bronchodilator Assessment Score: 2    Aerosolized bronchodilator medication orders have been revised according to the RT Nebulizer Bronchodilator Protocol below. Respiratory Therapist to perform RT Therapy Protocol Assessment initially then follow the protocol. Repeat RT Therapy Protocol Assessment PRN for score 0-3 or on second treatment, BID, and PRN for scores above 3. No Indications - adjust the frequency to every 6 hours PRN wheezing or bronchospasm, if no treatments needed after 48 hours then discontinue using Per Protocol order mode. If indication present, adjust the RT bronchodilator orders based on the Bronchodilator Assessment Score as indicated below. If a patient is on this medication at home then do not decrease Frequency below that used at home. 0-3 - enter or revise RT bronchodilator order(s) to equivalent RT Bronchodilator order with Frequency of every 4 hours PRN for wheezing or increased work of breathing using Per Protocol order mode. 4-6 - enter or revise RT Bronchodilator order(s) to two equivalent RT bronchodilator orders with one order with BID Frequency and one order with Frequency of every 4 hours PRN wheezing or increased work of breathing using Per Protocol order mode.          7-10 - enter or revise RT Bronchodilator order(s) to two equivalent RT bronchodilator orders with one order with TID Frequency and one order with Frequency of every 4 hours PRN wheezing or increased work of breathing using Per Protocol order mode. 11-13 - enter or revise RT Bronchodilator order(s) to one equivalent RT bronchodilator order with QID Frequency and an Albuterol order with Frequency of every 4 hours PRN wheezing or increased work of breathing using Per Protocol order mode. Greater than 13 - enter or revise RT Bronchodilator order(s) to one equivalent RT bronchodilator order with every 4 hours Frequency and an Albuterol order with Frequency of every 2 hours PRN wheezing or increased work of breathing using Per Protocol order mode. RT to enter RT Home Evaluation for COPD & MDI Assessment order using Per Protocol order mode.     Electronically signed by Benny Rg RCP on 5/14/2022 at 7:52 PM

## 2022-05-14 NOTE — PLAN OF CARE
Problem: Discharge Planning  Goal: Discharge to home or other facility with appropriate resources  5/14/2022 1942 by Gracia George RN  Outcome: Progressing  5/14/2022 1942 by Gracia George RN  Outcome: Not Progressing     Problem: Pain  Goal: Verbalizes/displays adequate comfort level or baseline comfort level  5/14/2022 1942 by Gracia George RN  Outcome: Progressing  5/14/2022 1942 by Gracia George RN  Outcome: Not Progressing     Problem: Nutrition Deficit:  Goal: Optimize nutritional status  5/14/2022 1942 by Gracia George RN  Outcome: Progressing  5/14/2022 1942 by Gracia George RN  Outcome: Not Progressing     Problem: Skin/Tissue Integrity  Goal: Absence of new skin breakdown  Description: 1. Monitor for areas of redness and/or skin breakdown  2. Assess vascular access sites hourly  3. Every 4-6 hours minimum:  Change oxygen saturation probe site  4. Every 4-6 hours:  If on nasal continuous positive airway pressure, respiratory therapy assess nares and determine need for appliance change or resting period. 5/14/2022 1942 by Gracia George RN  Outcome: Progressing  5/14/2022 1942 by Gracia George RN  Outcome: Not Progressing     Problem: Safety - Adult  Goal: Free from fall injury  5/14/2022 1942 by Gracia George RN  Outcome: Progressing  5/14/2022 1942 by Gracia George RN  Outcome: Not Progressing     Problem: ABCDS Injury Assessment  Goal: Absence of physical injury  5/14/2022 1942 by Gracia George RN  Outcome: Progressing  5/14/2022 1942 by Gracia George RN  Outcome: Not Progressing     Problem: Safety - Medical Restraint  Goal: Remains free of injury from restraints (Restraint for Interference with Medical Device)  Description: INTERVENTIONS:  1. Determine that other, less restrictive measures have been tried or would not be effective before applying the restraint  2. Evaluate the patient's condition at the time of restraint application  3.  Inform patient/family regarding the reason for restraint  4.  Q2H: Monitor safety, psychosocial status, comfort, nutrition and hydration  5/14/2022 1942 by Roxanne Waldrop, RN  Outcome: Progressing  5/14/2022 1942 by Roxanne Waldrop, RN  Outcome: Not Progressing

## 2022-05-15 ENCOUNTER — APPOINTMENT (OUTPATIENT)
Dept: GENERAL RADIOLOGY | Age: 57
DRG: 871 | End: 2022-05-15
Payer: MEDICARE

## 2022-05-15 LAB
A/G RATIO: 0.7 (ref 1.1–2.2)
ALBUMIN SERPL-MCNC: 2.1 G/DL (ref 3.4–5)
ALP BLD-CCNC: 86 U/L (ref 40–129)
ALT SERPL-CCNC: <5 U/L (ref 10–40)
ANION GAP SERPL CALCULATED.3IONS-SCNC: 9 MMOL/L (ref 3–16)
AST SERPL-CCNC: 22 U/L (ref 15–37)
BASOPHILS ABSOLUTE: 0 K/UL (ref 0–0.2)
BASOPHILS RELATIVE PERCENT: 0.2 %
BILIRUB SERPL-MCNC: <0.2 MG/DL (ref 0–1)
BUN BLDV-MCNC: 28 MG/DL (ref 7–20)
CALCIUM SERPL-MCNC: 8.3 MG/DL (ref 8.3–10.6)
CHLORIDE BLD-SCNC: 106 MMOL/L (ref 99–110)
CO2: 26 MMOL/L (ref 21–32)
CREAT SERPL-MCNC: 0.6 MG/DL (ref 0.6–1.1)
CULTURE, RESPIRATORY: ABNORMAL
EOSINOPHILS ABSOLUTE: 0 K/UL (ref 0–0.6)
EOSINOPHILS RELATIVE PERCENT: 0 %
GFR AFRICAN AMERICAN: >60
GFR NON-AFRICAN AMERICAN: >60
GLUCOSE BLD-MCNC: 72 MG/DL (ref 70–99)
GLUCOSE BLD-MCNC: 81 MG/DL (ref 70–99)
GLUCOSE BLD-MCNC: 82 MG/DL (ref 70–99)
GLUCOSE BLD-MCNC: 94 MG/DL (ref 70–99)
GRAM STAIN RESULT: ABNORMAL
HCT VFR BLD CALC: 32.3 % (ref 36–48)
HEMOGLOBIN: 10.7 G/DL (ref 12–16)
INR BLD: 1.49 (ref 0.88–1.12)
LYMPHOCYTES ABSOLUTE: 1.3 K/UL (ref 1–5.1)
LYMPHOCYTES RELATIVE PERCENT: 9 %
MAGNESIUM: 1.8 MG/DL (ref 1.8–2.4)
MCH RBC QN AUTO: 33.5 PG (ref 26–34)
MCHC RBC AUTO-ENTMCNC: 33.1 G/DL (ref 31–36)
MCV RBC AUTO: 101.3 FL (ref 80–100)
MONOCYTES ABSOLUTE: 0.4 K/UL (ref 0–1.3)
MONOCYTES RELATIVE PERCENT: 2.7 %
NEUTROPHILS ABSOLUTE: 12.3 K/UL (ref 1.7–7.7)
NEUTROPHILS RELATIVE PERCENT: 88.1 %
OCCULT BLOOD DIAGNOSTIC: NORMAL
ORGANISM: ABNORMAL
ORGANISM: ABNORMAL
PDW BLD-RTO: 13.4 % (ref 12.4–15.4)
PERFORMED ON: NORMAL
PHOSPHORUS: 3.2 MG/DL (ref 2.5–4.9)
PLATELET # BLD: 283 K/UL (ref 135–450)
PMV BLD AUTO: 9.2 FL (ref 5–10.5)
POTASSIUM REFLEX MAGNESIUM: 3.1 MMOL/L (ref 3.5–5.1)
PROTHROMBIN TIME: 17.1 SEC (ref 9.9–12.7)
RBC # BLD: 3.19 M/UL (ref 4–5.2)
SODIUM BLD-SCNC: 141 MMOL/L (ref 136–145)
TOTAL PROTEIN: 5.2 G/DL (ref 6.4–8.2)
WBC # BLD: 14 K/UL (ref 4–11)

## 2022-05-15 PROCEDURE — C9113 INJ PANTOPRAZOLE SODIUM, VIA: HCPCS | Performed by: INTERNAL MEDICINE

## 2022-05-15 PROCEDURE — 71045 X-RAY EXAM CHEST 1 VIEW: CPT

## 2022-05-15 PROCEDURE — 85610 PROTHROMBIN TIME: CPT

## 2022-05-15 PROCEDURE — 2500000003 HC RX 250 WO HCPCS: Performed by: STUDENT IN AN ORGANIZED HEALTH CARE EDUCATION/TRAINING PROGRAM

## 2022-05-15 PROCEDURE — 94761 N-INVAS EAR/PLS OXIMETRY MLT: CPT

## 2022-05-15 PROCEDURE — 6370000000 HC RX 637 (ALT 250 FOR IP): Performed by: STUDENT IN AN ORGANIZED HEALTH CARE EDUCATION/TRAINING PROGRAM

## 2022-05-15 PROCEDURE — 82270 OCCULT BLOOD FECES: CPT

## 2022-05-15 PROCEDURE — 2580000003 HC RX 258: Performed by: STUDENT IN AN ORGANIZED HEALTH CARE EDUCATION/TRAINING PROGRAM

## 2022-05-15 PROCEDURE — 2500000003 HC RX 250 WO HCPCS: Performed by: INTERNAL MEDICINE

## 2022-05-15 PROCEDURE — 2580000003 HC RX 258: Performed by: INTERNAL MEDICINE

## 2022-05-15 PROCEDURE — 6360000002 HC RX W HCPCS: Performed by: INTERNAL MEDICINE

## 2022-05-15 PROCEDURE — 84100 ASSAY OF PHOSPHORUS: CPT

## 2022-05-15 PROCEDURE — 6370000000 HC RX 637 (ALT 250 FOR IP): Performed by: INTERNAL MEDICINE

## 2022-05-15 PROCEDURE — 2700000000 HC OXYGEN THERAPY PER DAY

## 2022-05-15 PROCEDURE — 83735 ASSAY OF MAGNESIUM: CPT

## 2022-05-15 PROCEDURE — 2000000000 HC ICU R&B

## 2022-05-15 PROCEDURE — 80053 COMPREHEN METABOLIC PANEL: CPT

## 2022-05-15 PROCEDURE — 85025 COMPLETE CBC W/AUTO DIFF WBC: CPT

## 2022-05-15 RX ORDER — POTASSIUM CHLORIDE 7.45 MG/ML
10 INJECTION INTRAVENOUS PRN
Status: DISCONTINUED | OUTPATIENT
Start: 2022-05-15 | End: 2022-05-16

## 2022-05-15 RX ORDER — MIDODRINE HYDROCHLORIDE 5 MG/1
2.5 TABLET ORAL EVERY 8 HOURS
Status: DISCONTINUED | OUTPATIENT
Start: 2022-05-15 | End: 2022-05-26 | Stop reason: HOSPADM

## 2022-05-15 RX ORDER — POTASSIUM CHLORIDE 20 MEQ/1
40 TABLET, EXTENDED RELEASE ORAL PRN
Status: DISCONTINUED | OUTPATIENT
Start: 2022-05-15 | End: 2022-05-16

## 2022-05-15 RX ADMIN — CEFTRIAXONE 2000 MG: 2 INJECTION, POWDER, FOR SOLUTION INTRAMUSCULAR; INTRAVENOUS at 13:58

## 2022-05-15 RX ADMIN — VALPROATE SODIUM 250 MG: 100 INJECTION, SOLUTION INTRAVENOUS at 16:02

## 2022-05-15 RX ADMIN — METRONIDAZOLE 500 MG: 500 INJECTION, SOLUTION INTRAVENOUS at 14:01

## 2022-05-15 RX ADMIN — VALPROATE SODIUM 250 MG: 100 INJECTION, SOLUTION INTRAVENOUS at 03:38

## 2022-05-15 RX ADMIN — HYDROCORTISONE SODIUM SUCCINATE 50 MG: 100 INJECTION, POWDER, FOR SOLUTION INTRAMUSCULAR; INTRAVENOUS at 19:41

## 2022-05-15 RX ADMIN — PANTOPRAZOLE SODIUM 40 MG: 40 INJECTION, POWDER, FOR SOLUTION INTRAVENOUS at 09:13

## 2022-05-15 RX ADMIN — ATROPINE SULFATE 0.5 MG: 0.1 INJECTION, SOLUTION ENDOTRACHEAL; INTRAMUSCULAR; INTRAVENOUS; SUBCUTANEOUS at 09:12

## 2022-05-15 RX ADMIN — ATROPINE SULFATE 0.5 MG: 0.1 INJECTION, SOLUTION ENDOTRACHEAL; INTRAMUSCULAR; INTRAVENOUS; SUBCUTANEOUS at 12:38

## 2022-05-15 RX ADMIN — VALPROATE SODIUM 250 MG: 100 INJECTION, SOLUTION INTRAVENOUS at 21:04

## 2022-05-15 RX ADMIN — ATROPINE SULFATE 0.5 MG: 0.1 INJECTION, SOLUTION ENDOTRACHEAL; INTRAMUSCULAR; INTRAVENOUS; SUBCUTANEOUS at 00:17

## 2022-05-15 RX ADMIN — METRONIDAZOLE 500 MG: 500 INJECTION, SOLUTION INTRAVENOUS at 06:21

## 2022-05-15 RX ADMIN — SODIUM CHLORIDE: 9 INJECTION, SOLUTION INTRAVENOUS at 12:43

## 2022-05-15 RX ADMIN — HEPARIN SODIUM 5000 UNITS: 5000 INJECTION INTRAVENOUS; SUBCUTANEOUS at 09:13

## 2022-05-15 RX ADMIN — MIDODRINE HYDROCHLORIDE 2.5 MG: 5 TABLET ORAL at 22:09

## 2022-05-15 RX ADMIN — HYDROCORTISONE SODIUM SUCCINATE 50 MG: 100 INJECTION, POWDER, FOR SOLUTION INTRAMUSCULAR; INTRAVENOUS at 06:20

## 2022-05-15 RX ADMIN — SODIUM CHLORIDE, PRESERVATIVE FREE 10 ML: 5 INJECTION INTRAVENOUS at 20:58

## 2022-05-15 RX ADMIN — SODIUM CHLORIDE, POTASSIUM CHLORIDE, SODIUM LACTATE AND CALCIUM CHLORIDE: 600; 310; 30; 20 INJECTION, SOLUTION INTRAVENOUS at 12:37

## 2022-05-15 RX ADMIN — SERTRALINE 100 MG: 50 TABLET, FILM COATED ORAL at 22:10

## 2022-05-15 RX ADMIN — METRONIDAZOLE 500 MG: 500 INJECTION, SOLUTION INTRAVENOUS at 22:20

## 2022-05-15 RX ADMIN — VALPROATE SODIUM 250 MG: 100 INJECTION, SOLUTION INTRAVENOUS at 09:41

## 2022-05-15 RX ADMIN — ATROPINE SULFATE 0.5 MG: 0.1 INJECTION, SOLUTION ENDOTRACHEAL; INTRAMUSCULAR; INTRAVENOUS; SUBCUTANEOUS at 15:59

## 2022-05-15 RX ADMIN — HEPARIN SODIUM 5000 UNITS: 5000 INJECTION INTRAVENOUS; SUBCUTANEOUS at 20:58

## 2022-05-15 ASSESSMENT — PAIN SCALES - PAIN ASSESSMENT IN ADVANCED DEMENTIA (PAINAD)
FACIALEXPRESSION: 0
NEGVOCALIZATION: 1
BODYLANGUAGE: 1
CONSOLABILITY: 1
NEGVOCALIZATION: 0
BREATHING: 0
NEGVOCALIZATION: 0
BODYLANGUAGE: 1
CONSOLABILITY: 1
TOTALSCORE: 2
FACIALEXPRESSION: 0
BODYLANGUAGE: 1
BREATHING: 0
NEGVOCALIZATION: 0
TOTALSCORE: 2
TOTALSCORE: 3
CONSOLABILITY: 1
BREATHING: 0
BREATHING: 0
CONSOLABILITY: 1
TOTALSCORE: 3
FACIALEXPRESSION: 0
BREATHING: 0
FACIALEXPRESSION: 0
FACIALEXPRESSION: 0
BODYLANGUAGE: 1
TOTALSCORE: 2
NEGVOCALIZATION: 0
TOTALSCORE: 2
BREATHING: 0
BODYLANGUAGE: 1
CONSOLABILITY: 1
FACIALEXPRESSION: 0
NEGVOCALIZATION: 1
CONSOLABILITY: 1
BODYLANGUAGE: 1

## 2022-05-15 ASSESSMENT — PAIN SCALES - GENERAL
PAINLEVEL_OUTOF10: 3
PAINLEVEL_OUTOF10: 3

## 2022-05-15 NOTE — PLAN OF CARE
Problem: Discharge Planning  Goal: Discharge to home or other facility with appropriate resources  5/14/2022 2305 by Adriana Tinajero RN  Outcome: Progressing       Problem: Pain  Goal: Verbalizes/displays adequate comfort level or baseline comfort level  5/14/2022 2305 by Adriana Tinajero RN  Outcome: Progressing       Problem: Nutrition Deficit:  Goal: Optimize nutritional status  5/14/2022 2305 by Adriana Tinajero RN  Outcome: Progressing       Problem: Skin/Tissue Integrity  Goal: Absence of new skin breakdown  Description: 1. Monitor for areas of redness and/or skin breakdown  2. Assess vascular access sites hourly  3. Every 4-6 hours minimum:  Change oxygen saturation probe site  4. Every 4-6 hours:  If on nasal continuous positive airway pressure, respiratory therapy assess nares and determine need for appliance change or resting period.   5/14/2022 2305 by Adriana Tinajero RN  Outcome: Progressing

## 2022-05-15 NOTE — PROGRESS NOTES
Speech Language Pathology    [facility]  1965    SLP Eval and Treat orders were received. Pt seen by speech on 5/13/22 and 5/14/22 with the following recommendations. Per discussion with MD and RN, Pt with no change in status. See recommendations below. Will follow up as indicated.     Diet and Treatment Recommendations 5/14/2022:  Diet Solids Recommendation:  1.) If aggressive means of care are desired, recommend NPO with alternative means of hydration/nutrition.     2.) If comfort care is desired recommend Dysphagia I Pureed with Moderately Thick (honey) Liquids, meds crushed in puree Recommended form of Meds: Crushed as able in puree  or Via alternative means of nutrition          Carlos Davison M.A., [address] [facility]  Speech-Language Pathologist

## 2022-05-15 NOTE — PROGRESS NOTES
Hospitalist Progress Note      PCP: Nata Kelly MD    Date of Admission: 5/10/2022    Subjective:     No acute event overnight, remains on low-dose of Levophed, sinus bradycardia, hypokalemia 3.1, leukocytosis improving may need PEG tube and been feeling speech    Medications:  Reviewed    Infusion Medications    sodium chloride 10 mL/hr at 05/15/22 1243    lactated ringers 75 mL/hr at 05/15/22 1237    norepinephrine 2 mcg/min (05/14/22 1604)     Scheduled Medications    hydrocortisone sodium succinate PF  50 mg IntraVENous Q12H    valproate sodium (DEPACON) IVPB  250 mg IntraVENous Q6H    midodrine  10 mg Orogastric q8h    cefTRIAXone (ROCEPHIN) IV  2,000 mg IntraVENous Q24H    metroNIDAZOLE  500 mg IntraVENous Q8H    levothyroxine  100 mcg Per NG tube Daily    carbidopa-levodopa  1.5 tablet Per NG tube q8h    sertraline  100 mg Per NG tube Nightly    pantoprazole  40 mg IntraVENous Daily    heparin (porcine)  5,000 Units SubCUTAneous BID    alteplase  2 mg IntraCATHeter Once     PRN Meds: potassium chloride **OR** potassium alternative oral replacement **OR** potassium chloride, atropine, sodium chloride flush, sodium chloride, sodium phosphate IVPB **OR** sodium phosphate IVPB **OR** sodium phosphate IVPB, ondansetron, acetaminophen **OR** acetaminophen, albuterol, fentanNYL      Intake/Output Summary (Last 24 hours) at 5/15/2022 1331  Last data filed at 5/15/2022 0800  Gross per 24 hour   Intake 2365.31 ml   Output 318 ml   Net 2047.31 ml       Physical Exam Performed:    BP (!) 96/59   Pulse (!) 32   Temp 98 °F (36.7 °C) (Bladder)   Resp 15   Ht 4' 9\" (1.448 m)   Wt 88 lb 9.6 oz (40.2 kg)   SpO2 97%   BMI 19.17 kg/m²     General appearance: Extubated  HEENT: Pupils equal, round, and reactive to light. Conjunctivae/corneas clear. Neck: Supple, with full range of motion. No jugular venous distention. Trachea midline. Respiratory:  Normal respiratory effort.  Clear to auscultation, bilaterally without Rales/Wheezes/Rhonchi. Cardiovascular: Regular rate and rhythm with normal S1/S2 without murmurs, rubs or gallops. Abdomen: Soft, non-tender, non-distended with normal bowel sounds. Musculoskeletal: No clubbing, cyanosis or edema bilaterally. Full range of motion without deformity. Skin: Skin color, texture, turgor normal.  No rashes or lesions. Neurologic: Alert  Psychiatric: Calm  Capillary Refill: Brisk,3 seconds, normal   Peripheral Pulses: +2 palpable, equal bilaterally       Labs:   Recent Labs     05/13/22  0445 05/14/22  0415 05/15/22  0445   WBC 20.1* 16.5* 14.0*   HGB 10.3* 10.7* 10.7*   HCT 31.9* 32.7* 32.3*    301 283     Recent Labs     05/13/22  0445 05/14/22  0415 05/15/22  0445    141 141   K 4.1 3.7 3.1*    104 106   CO2 29 28 26   BUN 18 24* 28*   CREATININE <0.5* 0.6 0.6   CALCIUM 8.2* 8.5 8.3   PHOS 4.6 4.1 3.2     Recent Labs     05/13/22  0445 05/14/22  0415 05/15/22  0445   AST 25 23 22   ALT <5* <5* <5*   BILITOT <0.2 <0.2 <0.2   ALKPHOS 97 101 86     Recent Labs     05/13/22  0445 05/14/22  0415 05/15/22  0445   INR 1.31* 1.27* 1.49*     No results for input(s): CKTOTAL, TROPONINI in the last 72 hours. Urinalysis:      Lab Results   Component Value Date    NITRU Negative 05/10/2022    WBCUA 3 05/10/2022    BACTERIA None Seen 05/10/2022    RBCUA 3-4 05/10/2022    BLOODU Negative 05/10/2022    SPECGRAV 1.025 05/10/2022    GLUCOSEU Negative 05/10/2022       Radiology:  XR CHEST PORTABLE   Final Result   1. Endotracheal tube terminates in the left mainstem bronchus, for which   retraction 5 cm is recommended. 2.  Enteric tube terminates at the level of the body of the stomach. 3.  No significant change in bilateral airspace disease. Probable small left   effusion. XR ABDOMEN FOR NG/OG/NE TUBE PLACEMENT   Final Result   NG tube position appears acceptable. Endotracheal tube tip is likely within 1 cm above the anita. Dense left basilar opacity and patchy opacities in both lungs. XR CHEST PORTABLE   Final Result   Endotracheal tube is present with tip projecting just proximal to the anita. Left basilar opacity and trace left pleural effusion. Aspiration and   pneumonia are in the differential.             Assessment/Plan:    Active Hospital Problems    Diagnosis     Streptococcal bacteremia [R78.81, B95.5]      Priority: Medium    Pneumonia of left lower lobe due to infectious organism [J18.9]      Priority: Medium    Parkinsonism ([address] [G20]      Priority: Medium    Anorexia [R63.0]      Priority: Medium    Moderate malnutrition ([address] [E44.0]      Priority: Medium    AVELINO (acute kidney injury) ([address] [N17.9]      Priority: Medium    Aspiration pneumonia ([address] [J69.0]      Priority: Medium    Septic shock ([address] [A41.9, R65.21]      Priority: Medium    Acute hypoxemic respiratory failure ([address] [J96.01]     Down syndrome [Q90.9]       Acute hypoxic respiratory failure, secondary to multifocal pneumonia, patient was started on broad-spectrum antibiotic, intubated and sedated, started on Levophed, no guidance cultures so far, appreciate pulmonary input, liberated from mechanical ventilation 5/12 remain n.p.o., failed speech.    Sepsis, and septic shock, secondary to multifocal pneumonia,/and possible infective endocarditis with possible aortic root abscess started on vancomycin and cefepime antibiotic has been changed to Rocephin and Flagyl per ID, lactic acid is improving 3.2 continue current treatment, continue vent support leukocytosis has been worsening 21,000, blood culture growing Streptococcus, will repeat blood culture, send strep antigen urine concern for endocarditis on echocardiogram, per cardiology it would be unsafe to do STORMY via conscious sedation as patient cannot follow commands STORMY was canceled, plan to treat with antibiotic, likely patient will need a PICC line, repeated blood cultures are still negative so far.  Chronic hypotension, on midodrine, will continue now on low-dose of Levophed   Acute kidney injury likely prerenal, ATN, avoid nephrotoxic medication, continue fluid resuscitation.  Sinus bradycardia, will decrease dose of midodrine 2.5 mg 3 times daily   History of Down syndrome,  May need  PEG tube   Lactic acidosis, improving with fluid resuscitation   Hypomagnesemia. Repleted, will continue monitor.  History of seizure disorder continue Keppra   Hypothyroidism, continue Synthroid   Failure to thrive   Overall prognosis poor    DVT Prophylaxis: Heparin subcu  Diet: Diet NPO  Code Status: Full Code      Dispo -continue ICU care, may be transferred to progressive care unit tomorrow if she remain vitally stable and off Levophed    Due to the immediate potential for life-threatening deterioration due to acute hypoxic respiratory failure, multifocal pneumonia, I spent 34 minutes providing critical care. This time is excluding time spent performing procedures.       Ralph uRth MD

## 2022-05-15 NOTE — PROGRESS NOTES
Assessment completed, see doc flowsheets. Pt is nonverbal. Lung sounds are diminished. VSS. Tele on. Medication given per [facility]. Patient has no needs at this time. Call light within in reach, will continue to monitor.

## 2022-05-16 LAB
A/G RATIO: 0.7 (ref 1.1–2.2)
ALBUMIN SERPL-MCNC: 1.9 G/DL (ref 3.4–5)
ALP BLD-CCNC: 67 U/L (ref 40–129)
ALT SERPL-CCNC: <5 U/L (ref 10–40)
ANION GAP SERPL CALCULATED.3IONS-SCNC: 9 MMOL/L (ref 3–16)
AST SERPL-CCNC: 37 U/L (ref 15–37)
BASOPHILS ABSOLUTE: 0 K/UL (ref 0–0.2)
BASOPHILS RELATIVE PERCENT: 0.3 %
BILIRUB SERPL-MCNC: <0.2 MG/DL (ref 0–1)
BUN BLDV-MCNC: 27 MG/DL (ref 7–20)
CALCIUM SERPL-MCNC: 8 MG/DL (ref 8.3–10.6)
CHLORIDE BLD-SCNC: 106 MMOL/L (ref 99–110)
CO2: 25 MMOL/L (ref 21–32)
CREAT SERPL-MCNC: 0.6 MG/DL (ref 0.6–1.1)
EOSINOPHILS ABSOLUTE: 0 K/UL (ref 0–0.6)
EOSINOPHILS RELATIVE PERCENT: 0 %
GFR AFRICAN AMERICAN: >60
GFR NON-AFRICAN AMERICAN: >60
GLUCOSE BLD-MCNC: 72 MG/DL (ref 70–99)
HCT VFR BLD CALC: 30.9 % (ref 36–48)
HEMOGLOBIN: 10 G/DL (ref 12–16)
INR BLD: 1.6 (ref 0.88–1.12)
LYMPHOCYTES ABSOLUTE: 0.9 K/UL (ref 1–5.1)
LYMPHOCYTES RELATIVE PERCENT: 16.9 %
MAGNESIUM: 1.8 MG/DL (ref 1.8–2.4)
MCH RBC QN AUTO: 32.7 PG (ref 26–34)
MCHC RBC AUTO-ENTMCNC: 32.6 G/DL (ref 31–36)
MCV RBC AUTO: 100.6 FL (ref 80–100)
MONOCYTES ABSOLUTE: 0.2 K/UL (ref 0–1.3)
MONOCYTES RELATIVE PERCENT: 3.2 %
NEUTROPHILS ABSOLUTE: 4.2 K/UL (ref 1.7–7.7)
NEUTROPHILS RELATIVE PERCENT: 79.6 %
PDW BLD-RTO: 13.2 % (ref 12.4–15.4)
PHOSPHORUS: 2.6 MG/DL (ref 2.5–4.9)
PLATELET # BLD: 255 K/UL (ref 135–450)
PMV BLD AUTO: 8.4 FL (ref 5–10.5)
POTASSIUM REFLEX MAGNESIUM: 3.2 MMOL/L (ref 3.5–5.1)
POTASSIUM SERPL-SCNC: 3.7 MMOL/L (ref 3.5–5.1)
PROTHROMBIN TIME: 18.4 SEC (ref 9.9–12.7)
RBC # BLD: 3.07 M/UL (ref 4–5.2)
SODIUM BLD-SCNC: 140 MMOL/L (ref 136–145)
TOTAL PROTEIN: 4.8 G/DL (ref 6.4–8.2)
WBC # BLD: 5.3 K/UL (ref 4–11)

## 2022-05-16 PROCEDURE — 2500000003 HC RX 250 WO HCPCS: Performed by: STUDENT IN AN ORGANIZED HEALTH CARE EDUCATION/TRAINING PROGRAM

## 2022-05-16 PROCEDURE — 6370000000 HC RX 637 (ALT 250 FOR IP): Performed by: INTERNAL MEDICINE

## 2022-05-16 PROCEDURE — C1751 CATH, INF, PER/CENT/MIDLINE: HCPCS

## 2022-05-16 PROCEDURE — 6360000002 HC RX W HCPCS: Performed by: INTERNAL MEDICINE

## 2022-05-16 PROCEDURE — 85610 PROTHROMBIN TIME: CPT

## 2022-05-16 PROCEDURE — 85025 COMPLETE CBC W/AUTO DIFF WBC: CPT

## 2022-05-16 PROCEDURE — 2580000003 HC RX 258: Performed by: INTERNAL MEDICINE

## 2022-05-16 PROCEDURE — 2500000003 HC RX 250 WO HCPCS: Performed by: INTERNAL MEDICINE

## 2022-05-16 PROCEDURE — 94761 N-INVAS EAR/PLS OXIMETRY MLT: CPT

## 2022-05-16 PROCEDURE — 36569 INSJ PICC 5 YR+ W/O IMAGING: CPT

## 2022-05-16 PROCEDURE — 99233 SBSQ HOSP IP/OBS HIGH 50: CPT | Performed by: INTERNAL MEDICINE

## 2022-05-16 PROCEDURE — 84100 ASSAY OF PHOSPHORUS: CPT

## 2022-05-16 PROCEDURE — 83735 ASSAY OF MAGNESIUM: CPT

## 2022-05-16 PROCEDURE — 02HV33Z INSERTION OF INFUSION DEVICE INTO SUPERIOR VENA CAVA, PERCUTANEOUS APPROACH: ICD-10-PCS | Performed by: STUDENT IN AN ORGANIZED HEALTH CARE EDUCATION/TRAINING PROGRAM

## 2022-05-16 PROCEDURE — 2000000000 HC ICU R&B

## 2022-05-16 PROCEDURE — C9113 INJ PANTOPRAZOLE SODIUM, VIA: HCPCS | Performed by: INTERNAL MEDICINE

## 2022-05-16 PROCEDURE — 6360000002 HC RX W HCPCS: Performed by: STUDENT IN AN ORGANIZED HEALTH CARE EDUCATION/TRAINING PROGRAM

## 2022-05-16 PROCEDURE — 2700000000 HC OXYGEN THERAPY PER DAY

## 2022-05-16 PROCEDURE — 84132 ASSAY OF SERUM POTASSIUM: CPT

## 2022-05-16 PROCEDURE — 80053 COMPREHEN METABOLIC PANEL: CPT

## 2022-05-16 PROCEDURE — 92526 ORAL FUNCTION THERAPY: CPT

## 2022-05-16 PROCEDURE — 2580000003 HC RX 258: Performed by: STUDENT IN AN ORGANIZED HEALTH CARE EDUCATION/TRAINING PROGRAM

## 2022-05-16 PROCEDURE — 6370000000 HC RX 637 (ALT 250 FOR IP): Performed by: STUDENT IN AN ORGANIZED HEALTH CARE EDUCATION/TRAINING PROGRAM

## 2022-05-16 RX ORDER — POTASSIUM CHLORIDE 7.45 MG/ML
10 INJECTION INTRAVENOUS PRN
Status: DISCONTINUED | OUTPATIENT
Start: 2022-05-16 | End: 2022-05-26 | Stop reason: HOSPADM

## 2022-05-16 RX ORDER — SODIUM CHLORIDE 9 MG/ML
25 INJECTION, SOLUTION INTRAVENOUS PRN
Status: DISCONTINUED | OUTPATIENT
Start: 2022-05-16 | End: 2022-05-26

## 2022-05-16 RX ORDER — SODIUM CHLORIDE 0.9 % (FLUSH) 0.9 %
5-40 SYRINGE (ML) INJECTION PRN
Status: DISCONTINUED | OUTPATIENT
Start: 2022-05-16 | End: 2022-05-26

## 2022-05-16 RX ORDER — SODIUM CHLORIDE 0.9 % (FLUSH) 0.9 %
5-40 SYRINGE (ML) INJECTION EVERY 12 HOURS SCHEDULED
Status: DISCONTINUED | OUTPATIENT
Start: 2022-05-16 | End: 2022-05-26

## 2022-05-16 RX ORDER — LIDOCAINE HYDROCHLORIDE 10 MG/ML
5 INJECTION, SOLUTION EPIDURAL; INFILTRATION; INTRACAUDAL; PERINEURAL ONCE
Status: DISCONTINUED | OUTPATIENT
Start: 2022-05-16 | End: 2022-05-26

## 2022-05-16 RX ORDER — POTASSIUM CHLORIDE 29.8 MG/ML
20 INJECTION INTRAVENOUS PRN
Status: DISCONTINUED | OUTPATIENT
Start: 2022-05-16 | End: 2022-05-19

## 2022-05-16 RX ADMIN — SODIUM CHLORIDE, POTASSIUM CHLORIDE, SODIUM LACTATE AND CALCIUM CHLORIDE: 600; 310; 30; 20 INJECTION, SOLUTION INTRAVENOUS at 02:28

## 2022-05-16 RX ADMIN — HEPARIN SODIUM 5000 UNITS: 5000 INJECTION INTRAVENOUS; SUBCUTANEOUS at 09:09

## 2022-05-16 RX ADMIN — HEPARIN SODIUM 5000 UNITS: 5000 INJECTION INTRAVENOUS; SUBCUTANEOUS at 20:54

## 2022-05-16 RX ADMIN — VALPROATE SODIUM 250 MG: 100 INJECTION, SOLUTION INTRAVENOUS at 16:31

## 2022-05-16 RX ADMIN — SODIUM CHLORIDE, PRESERVATIVE FREE 10 ML: 5 INJECTION INTRAVENOUS at 09:09

## 2022-05-16 RX ADMIN — HYDROCORTISONE SODIUM SUCCINATE 50 MG: 100 INJECTION, POWDER, FOR SOLUTION INTRAMUSCULAR; INTRAVENOUS at 05:24

## 2022-05-16 RX ADMIN — CARBIDOPA AND LEVODOPA 1.5 TABLET: 25; 100 TABLET ORAL at 18:24

## 2022-05-16 RX ADMIN — CARBIDOPA AND LEVODOPA 1.5 TABLET: 25; 100 TABLET ORAL at 02:23

## 2022-05-16 RX ADMIN — SERTRALINE 100 MG: 50 TABLET, FILM COATED ORAL at 20:54

## 2022-05-16 RX ADMIN — SODIUM CHLORIDE, PRESERVATIVE FREE 10 ML: 5 INJECTION INTRAVENOUS at 20:54

## 2022-05-16 RX ADMIN — CEFTRIAXONE 2000 MG: 2 INJECTION, POWDER, FOR SOLUTION INTRAMUSCULAR; INTRAVENOUS at 13:24

## 2022-05-16 RX ADMIN — VALPROATE SODIUM 250 MG: 100 INJECTION, SOLUTION INTRAVENOUS at 21:51

## 2022-05-16 RX ADMIN — HYDROCORTISONE SODIUM SUCCINATE 50 MG: 100 INJECTION, POWDER, FOR SOLUTION INTRAMUSCULAR; INTRAVENOUS at 18:25

## 2022-05-16 RX ADMIN — METRONIDAZOLE 500 MG: 500 INJECTION, SOLUTION INTRAVENOUS at 15:16

## 2022-05-16 RX ADMIN — PANTOPRAZOLE SODIUM 40 MG: 40 INJECTION, POWDER, FOR SOLUTION INTRAVENOUS at 09:09

## 2022-05-16 RX ADMIN — METRONIDAZOLE 500 MG: 500 INJECTION, SOLUTION INTRAVENOUS at 23:43

## 2022-05-16 RX ADMIN — LEVOTHYROXINE SODIUM 100 MCG: 0.1 TABLET ORAL at 09:09

## 2022-05-16 RX ADMIN — POTASSIUM CHLORIDE 20 MEQ: 29.8 INJECTION, SOLUTION INTRAVENOUS at 09:25

## 2022-05-16 RX ADMIN — CARBIDOPA AND LEVODOPA 1.5 TABLET: 25; 100 TABLET ORAL at 09:09

## 2022-05-16 RX ADMIN — POTASSIUM CHLORIDE 20 MEQ: 29.8 INJECTION, SOLUTION INTRAVENOUS at 10:16

## 2022-05-16 RX ADMIN — MIDODRINE HYDROCHLORIDE 2.5 MG: 5 TABLET ORAL at 20:54

## 2022-05-16 RX ADMIN — MIDODRINE HYDROCHLORIDE 2.5 MG: 5 TABLET ORAL at 05:24

## 2022-05-16 RX ADMIN — VALPROATE SODIUM 250 MG: 100 INJECTION, SOLUTION INTRAVENOUS at 09:11

## 2022-05-16 RX ADMIN — METRONIDAZOLE 500 MG: 500 INJECTION, SOLUTION INTRAVENOUS at 05:32

## 2022-05-16 RX ADMIN — SODIUM CHLORIDE, POTASSIUM CHLORIDE, SODIUM LACTATE AND CALCIUM CHLORIDE: 600; 310; 30; 20 INJECTION, SOLUTION INTRAVENOUS at 15:42

## 2022-05-16 RX ADMIN — MIDODRINE HYDROCHLORIDE 2.5 MG: 5 TABLET ORAL at 13:24

## 2022-05-16 RX ADMIN — VALPROATE SODIUM 250 MG: 100 INJECTION, SOLUTION INTRAVENOUS at 02:27

## 2022-05-16 ASSESSMENT — PAIN SCALES - PAIN ASSESSMENT IN ADVANCED DEMENTIA (PAINAD)
NEGVOCALIZATION: 1
TOTALSCORE: 3
NEGVOCALIZATION: 1
BODYLANGUAGE: 0
BODYLANGUAGE: 0
FACIALEXPRESSION: 1
NEGVOCALIZATION: 1
CONSOLABILITY: 1
CONSOLABILITY: 1
FACIALEXPRESSION: 1
BREATHING: 0
BODYLANGUAGE: 0
CONSOLABILITY: 1
TOTALSCORE: 3
BODYLANGUAGE: 0
NEGVOCALIZATION: 1
FACIALEXPRESSION: 1
BREATHING: 0
FACIALEXPRESSION: 1
CONSOLABILITY: 1
BREATHING: 0
TOTALSCORE: 3
TOTALSCORE: 3
BREATHING: 0

## 2022-05-16 ASSESSMENT — PAIN SCALES - GENERAL
PAINLEVEL_OUTOF10: 0
PAINLEVEL_OUTOF10: 3
PAINLEVEL_OUTOF10: 3
PAINLEVEL_OUTOF10: 0
PAINLEVEL_OUTOF10: 0
PAINLEVEL_OUTOF10: 3

## 2022-05-16 NOTE — PROGRESS NOTES
Infectious Diseases   Progress Note      Admission Date: 5/10/2022  Hospital Day: Hospital Day: 7   Attending: Kuldip Sequeira MD  Date of service: 5/16/2022     Chief complaint/ Reason for consult:     · Septic shock with high fever, worsening leukocytosis, hypotension requiring vasopressors and lactic acidosis  · Group B streptococcus bacteremia  · Acute respiratory failure with hypoxia  · Left lower lobe pneumonia, concern for aspiration    Microbiology:        I have reviewed allavailable micro lab data and cultures    · Blood culture (1/2)  - collected on 5/10/2022: Group B streptococcus  Tracheal aspirate culture  - collected on 5/12/2022: Group B streptococcus, MSSA    Susceptibility      Staphylococcus aureus (2)    Antibiotic Interpretation Microscan  Method Status    ceFAZolin Sensitive <=4 mcg/mL BACTERIAL SUSCEPTIBILITY PANEL BY PALLAVI     clindamycin Sensitive <=0.5 mcg/mL BACTERIAL SUSCEPTIBILITY PANEL BY PALLAVI     erythromycin Sensitive <=0.5 mcg/mL BACTERIAL SUSCEPTIBILITY PANEL BY PALLAVI     oxacillin Sensitive 0.5 mcg/mL BACTERIAL SUSCEPTIBILITY PANEL BY PALLAVI     tetracycline Sensitive <=4 mcg/mL BACTERIAL SUSCEPTIBILITY PANEL BY PALLAVI     trimethoprim-sulfamethoxazole Sensitive <=0.5/9.5 mcg/mL BACTERIAL SUSCEPTIBILITY PANEL BY PALLAVI           Antibiotics and immunizations:       Current antibiotics: All antibiotics and their doses were reviewed by me    Recent Abx Admin                   cefTRIAXone (ROCEPHIN) 2000 mg IVPB in D5W 50ml minibag (mg) 2,000 mg New Bag 05/16/22 1324    metronidazole (FLAGYL) 500 mg in 0.9% NaCl 100 mL IVPB premix (mg) 500 mg New Bag 05/16/22 0532     500 mg New Bag 05/15/22 2220                  Immunization History: All immunization history was reviewed by me today. There is no immunization history on file for this patient. Known drug allergies:      All allergies were reviewed and updated    Allergies   Allergen Reactions    Caffeine     Iodine        Social history: Social History:  All social andepidemiologic history was reviewed and updated by me today as needed. · Tobacco use:   reports that she has never smoked. She has never used smokeless tobacco.  · Alcohol use:   reports no history of alcohol use. · Currently lives in: [address]  ·  reports no history of drug use. COVID VACCINATION AND LAB RESULT RECORDS:     Internal Administration   First Dose      Second Dose           Last COVID Lab SARS-CoV-2 (no units)   Date Value   04/05/2022 Not Detected     SARS-CoV-2, PCR (no units)   Date Value   03/24/2022 Not Detected     SARS-CoV-2 RNA, RT PCR (no units)   Date Value   05/10/2022 NOT DETECTED            Assessment:     The patient is a 60 y.o. old female who  has a past medical history of Anemia, Anorexia, Autistic disorder, Convulsions ([address] Down syndrome, Dysphagia, Hypothyroid, Malnutrition ([address] Parkinson disease ([address] UTI (urinary tract infection), and Weakness. with following problems:      · Septic shock with high fever, worsening leukocytosis, hypotension requiring vasopressors and lactic acidosis-white cell count improved, however, remains hypotensive and tachypneic  · Group B streptococcus bacteremia-covered with ceftriaxone  · Acute respiratory failure with hypoxia -remains on 3 to oxygen via nasal cannula  · Left lower lobe pneumonia, concern for aspiration-tracheal aspirate culture has grown Streptococcus and MSSA  · Parkinsonism  · History of Down syndrome  · Anorexia  ·       Discussion:      The patient is afebrile. Tracheal aspirate culture has grown MSSA in addition to Streptococcus agalactiae    She remains on IV ceftriaxone and Flagyl. White cell count is improved to 4300. Repeat blood culture from 5/13/2022 is staying negative. Serum creatinine 0.6 today. Chest x-ray shows mucous plugging and infiltrate in the right upper lobe and right lower lobe. Palliative care discussions are in progress.     Plan: Diagnostic Workup:      · Continue to follow  fever curve, WBC count and blood cultures. · Continue to monitor blood counts, liver and renal function. Antimicrobials:    · Will continue IV ceftriaxone 2 g every 24 hour  · Continue IV Flagyl 500 mg every 8 hour  · Continue to monitor her vitals closely  · He remains on 3 L oxygen via nasal cannula  · Vasopressor support to maintain mean arterial pressure greater than 65 mmHg  · CODE STATUS discussions are in progress. · Continue close vitals monitoring. · Maintain good glycemic control. · Fall precautions. · Aspiration precautions. · Continue to watch for new fever or diarrhea. · DVT prophylaxis. · Discussed all above with RN. · Continue close monitoring in ICU setting      Drug Monitoring:    · Continue monitoring for antibiotic toxicity as follows: CBC, CMP   · Continue to watch for following: new or worsening fever, new hypotension, hives, lip swelling and redness or purulence at vascular access sites. I/v access Management:    · Continue to monitor i.v access sites for erythema, induration, discharge or tenderness. · As always, continue efforts to minimize tubes/lines/drains as clinically appropriate to reduce chances of line associated infections. Level of complexity of visit: High     Risk of Complications/Morbidity: High     · Illness(es)/ Infection present that pose threat to life/bodily function. · There is potential for severe exacerbation of infection/side effects of treatment. · Therapy requires intensive monitoring for antimicrobial agent toxicity. Thank you for involving me in the care of your patient. I will continue to follow. If you have anyadditional questions, please do not hesitate to contact me. Subjective: Interval history: Interval history was obtained from chart review and patient/ RN. He remains hypotensive and tachypneic. He has been requiring vasopressors. Seems to be tolerating antibiotics okay. REVIEW OF SYSTEMS:      Review of Systems   Unable to perform ROS: Patient nonverbal         Past Medical History: All past medical history reviewed today. Past Medical History:   Diagnosis Date    Anemia     Anorexia     Autistic disorder     [facility]     Down syndrome     Dysphagia     Hypothyroid     Malnutrition ([facility]     Parkinson disease ([facility]     UTI (urinary tract infection)     Weakness        Past Surgical History: All past surgical history was reviewed today. Past Surgical History:   Procedure Laterality Date    GASTROSTOMY TUBE PLACEMENT      GASTROSTOMY TUBE PLACEMENT  2/18/14       Family History: All family history was reviewed today. History reviewed. No pertinent family history. Objective:       PHYSICAL EXAM:      Vitals:   Vitals:    05/16/22 1100 05/16/22 1140 05/16/22 1200 05/16/22 1300   BP: (!) 81/53 (!) 89/55 (!) 86/58 89/72   Pulse: (!) 46  (!) 42 (!) 44   Resp: 18  15 16   Temp:   97.3 °F (36.3 °C)    TempSrc:   Bladder    SpO2: 91%   93%   Weight:       Height:           Physical Exam     General: Encephalopathic but protecting the airway so far,   HEENT: normocephalic, atraumatic, sclera clear, pupils equal, light reflex preserved bilaterally  Cardiovascular: RRR, no murmurs/rubs/gallops detected  Pulmonary: CTABL, no rhonchi/rales   Abdomen/GI: soft, no organomegaly, bowel sounds positive  Neuro: Encephalopathic, pupils are equal and reactive to light, moves all extremities  Skin: no rash,   Musculoskeletal:  No obvious joint swelling, redness. No limitation of range of passive motion  Genitourinary: Yuan's catheter in place   Psych: could not assess   Lymphatic/Immunologic: No obvious bruising, no cervical lymphadenopathy    Lines: All vascular access sites are healthy with no local erythema, discharge or tenderness    Intake and output:    I/O last 3 completed shifts:   In: 3355.6 [I.V.:1760.6; NG/GT:200; IV Piggyback:1395]  Out: 567 left basilar opacity and patchy opacities in both lungs. XR CHEST PORTABLE   Final Result   Endotracheal tube is present with tip projecting just proximal to the anita. Left basilar opacity and trace left pleural effusion. Aspiration and   pneumonia are in the differential.             Medications: All current and past medications were reviewed.      midodrine  2.5 mg Orogastric q8h    hydrocortisone sodium succinate PF  50 mg IntraVENous Q12H    valproate sodium (DEPACON) IVPB  250 mg IntraVENous Q6H    cefTRIAXone (ROCEPHIN) IV  2,000 mg IntraVENous Q24H    metroNIDAZOLE  500 mg IntraVENous Q8H    levothyroxine  100 mcg Per NG tube Daily    carbidopa-levodopa  1.5 tablet Per NG tube q8h    sertraline  100 mg Per NG tube Nightly    pantoprazole  40 mg IntraVENous Daily    heparin (porcine)  5,000 Units SubCUTAneous BID    alteplase  2 mg IntraCATHeter Once        sodium chloride 10 mL/hr at 05/15/22 1243    lactated ringers 75 mL/hr at 05/16/22 0228    norepinephrine Stopped (05/15/22 2116)       potassium chloride **OR** potassium chloride, atropine, sodium chloride flush, sodium chloride, sodium phosphate IVPB **OR** sodium phosphate IVPB **OR** sodium phosphate IVPB, ondansetron, acetaminophen **OR** acetaminophen, albuterol, fentanNYL      Problem list:       Patient Active Problem List   Diagnosis Code    Down syndrome Q90.9    Down syndrome Q90.9    Down syndrome Q90.9    Dysphagia R13.10    Autistic disorder F84.0    PEG adjustment, replacement, or removal Z43.1    Hypotension I95.9    Sepsis ([address] A41.9    Acute hypoxemic respiratory failure (HCC) J96.01    AVELINO (acute kidney injury) ([address] N17.9    Aspiration pneumonia (HCC) J69.0    Septic shock (HCC) A41.9, R65.21    Moderate malnutrition ([address] E44.0    Streptococcal bacteremia R78.81, B95.5    Pneumonia of left lower lobe due to infectious organism J18.9    Parkinsonism ([address] G20    Anorexia R63.0    Pneumonia of both lower lobes due to methicillin susceptible Staphylococcus aureus (MSSA) ([facility] J15.211       Please note that this chart was generated using Dragon dictation software. Although every effort was made to ensure the accuracy of this automated transcription, some errors in transcription may have occurred inadvertently. If you may need any clarification, please do not hesitate to contact me through EPIC or at the phone number provided below with my electronic signature. Any pictures or media included in this note were obtained after taking informed verbal consent from the patient and with their approval to include those in the patient's medical record.       Tr Magana MD, MPH, [address]  5/16/2022, 2:25 PM  [facility] Infectious Disease   [address] [place] [address], [address] [place] [address]  Office: [phone]  Fax: [phone]  Clinic days:  Tuesday & Thursday

## 2022-05-16 NOTE — PROGRESS NOTES
Assessment and VS complete. See flowsheet. Pt nonverbal at baseline. NG placed per MD order with bridle to hold in place. Tolerated well. CXR ordered to verify placement.

## 2022-05-16 NOTE — PROGRESS NOTES
Nutrition Note    RECOMMENDATIONS  PO Diet: NPO  ONS: NPO  Nutrition Support:    1. Recommend 2 Dayron HN (2.0 calorie formula) at goal rate 25 mL per hour to provide 600 mL total volume, 1200 calories, 50 grams protein, 420 mL free water. 2. Recommend water flush 30 mL q 4 hours for tube maintenance given LR at 75 mL/hr. When IV fluids are d/c'd, recommend water bolus 125 mL q 4 hours. 3. Ensure head of bed is 30 - 45 degrees during continuous or bolus gastric feeding and for one hour after bolus. Turn off the feeding if head of bed is lowered less than 30 degrees. 4. Monitor for tolerance (bowel habits, N/V, cramping, abdominal exam findings: distended, firm, tense, guarded, discomfort). NUTRITION ASSESSMENT   SLP continues to recommend NPO. Pt now with NG tube placed and EN support to resume. Pt was previously on Osmolite 1.2 d/t vasopressors which have now been discontinued. Dr. Angel Piedra has asked for tube feed goal rate to be kept to less than 40 mL/hr as pt is an aspiration risk. Will trial 2 Dayron HN.  Nutrition Related Findings: K+ 3.2. LR at 75 mL/hr. No edema noted. LBM 5/16. +9.8 liters.  Wounds: Stage II,Pressure Injury (coccyx)   Nutrition Education:  Education not indicated    Nutrition Goals: Tolerate nutrition support at goal rate     MALNUTRITION ASSESSMENT   Chronic Illness  Malnutrition Status:  Moderate malnutrition  Findings of the 6 clinical characteristics of malnutrition:  Energy Intake:  Unable to assess  Weight Loss:  No significant weight loss     Body Fat Loss:  Severe body fat loss Triceps   Muscle Mass Loss:  Severe muscle mass loss Clavicles (pectoralis & deltoids),Thigh (quadraceps),Calf (gastrocnemius)  Fluid Accumulation:  No significant fluid accumulation     Strength:  Not Performed    NUTRITION DIAGNOSIS   · Inadequate oral intake related to swallowing difficulty as evidenced by NPO or clear liquid status due to medical condition    · Moderate malnutrition related to inadequate protein-energy intake as evidenced by severe muscle loss,severe loss of subcutaneous fat    CURRENT NUTRITION THERAPIES  Diet NPO     PO Intake: NPO   PO Supplement Intake:NPO    ANTHROPOMETRICS   Current Height: 4' 9\" (144.8 cm)   Current Weight: 93 lb 11.1 oz (42.5 kg)     Admission weight: 74 lb 11.8 oz (33.9 kg)   Ideal Body Weight (IBW): 85 lbs  (39 kg)        BMI: 20.3      COMPARATIVE STANDARDS  Energy (kcal):  3556-7278     Protein (g):  47-78 grams       Fluid (mL/day):  4178-5168 mL    The patient will be monitored per nutrition standards of care. Consult dietitian if additional nutrition interventions are needed prior to RD reassessment.      Jeff Zaman, [address] [place] [address],     Contact: [phone]

## 2022-05-16 NOTE — PROGRESS NOTES
Facility/Department: [address]  Speech Language Pathology   Dysphagia Treatment Note    Patient: Donna Montes   : 1965   MRN: [id number]      Evaluation Date: 2022      Admitting Dx: Hypernatremia [E87.0]  Septic shock ([address] [A41.9, R65.21]  Aspiration pneumonia, unspecified aspiration pneumonia type, unspecified laterality, unspecified part of lung ([address] [J69.0]  Acute hypoxemic respiratory failure ([address] [J96.01]  Sepsis with acute organ dysfunction without septic shock, due to unspecified organism, unspecified type ([address] [A41.9, R65.20]  Treatment Diagnosis: Oropharyngeal Dysphagia   Pain: Unable to state                                              Diet and Treatment Recommendations 2022:  Diet Solids Recommendation:  1.) If aggressive means of care are desired, recommend NPO with alternative means of hydration/nutrition.     2.) If comfort care is desired recommend Dysphagia I Pureed with Moderately Thick (honey) Liquids, meds crushed in puree Recommended form of Meds: Crushed as able in puree  or Via alternative means of nutrition      Compensatory strategies:   TBD     Assessment of Texture Tolerance:  Diet level prior to treatment: NPO    Tolerance of Current Diet Level: N/A     Impressions: Pt was repositioned Upright in bed , awake and alert. RN reported continued discussion about PEG tube placement but no decisions have been made. Currently on 3L O2 via nasal cannula . Oral care completed via dental swab which assisted with removal of some thick secretions along labial surface. Trials of moderately (honey) thick liquids  and puree  were provided to assess swallow function. SLP facilitated trials due to pt inability to self feed. Clinical symptoms of  limited oral cavity opening for trials via tsp, decreased oral control with anterior bolus loss out of R/L sulcus, suspected pharyngeal pooling and delayed vs absent swallow initiation assessed across trials.  Slight throat clearing present intermittently with trials of puree and no overt clinical s/s of aspiration/penetration were assessed with moderately thick liquids however pt only accepting of limited trials. Pt remains at HIGH risk for aspiration as well as nutritional compromise due to delayed swallow initiation, intermittent oral acceptance and need for total feeding assistance. Recommend consideration of alternative means of hydration/nutrition pending goal of care. See above for recommendations.     Dysphagia Goals:   Pt will functionally tolerate ongoing assessment of swallow function with diet to be determined as indicated pending goals of care. (ongoing 5/16/2022)    Plan:    Speech therapy for dysphagia tx 3-5 times per week during acute care stay pending goals of care. Pt unable to actively participate in dysphagia therapy. Patient/Family Education:  Provided education regarding role of SLP, recommendations and general speech pathology plan of care. [] Pt verbalized understanding and agreement   [] Pt requires ongoing learning   [x] No evidence of comprehension     Discharge Recommendations:    Discharge recommendations to be determined pending ongoing follow-up during acute care stay    Timed Code Treatment: 0 minutes    Total Treatment Time: 15 minutes     If patient discharges prior to next session this note will serve as a discharge summary.      Signature:  Yasir Valderrama, [address] [facility] [id number]  Speech-Language Pathologist  5/16/2022 11:12 AM

## 2022-05-16 NOTE — PROCEDURES
Call place to pt RN Rafia Simpson regarding need for PICC. Pt with fem line leaking, pt off levo at present but staff wanted central access in case back on levo. This RN offered to evaluate pt if definitely an arm candidate d/t weighing only 93lbs, also told pt contracted. Pt RN said not present pt agitated/shaking and family in room. DIT already notified to evaluate pt per Rafia Simpson.

## 2022-05-16 NOTE — PROGRESS NOTES
AM assessment complete. Unable to follow commands. Respirations regular and unlabored. Breath sounds diminished. On 3 L NC. Unable to wean. SB on tele. HR drops into 30s-MD aware. NG at 62 cm-clamped. Yuan in place-low urine output overnight. MD notified. Right fem TLC-does not meet criteria to restart levo at this time. Warming blanket in place. Fall precautions in place.

## 2022-05-16 NOTE — PROGRESS NOTES
DIT VASCULAR ACCESS SERVICES:  Unsuccessful attempt, stick attempt x2, R side    PICC PLACEMENT: Preprocedure & timeout done w primary RN Stu Del Castillo; +PICC Order & Consent verified; stick attempt x2 to the Basilic vein; however, unable to establish vascular access d/t pt involuntary trembling and movement of the upper body; pt is inconsolable, even w music therapy and visitor at bedside to assist in calming; I am not willing to attempt the L arm at this time d/t her said involuntary trembling; pt currently has fem cvc and 2 PIVs; if she continues to NOT need a central line, she does have vessels for more PIVs; reported same to primary RN Carmen; it is also reported to me by pts visitor that since the procedure started, pts L eye and cheek has begun to swell; no iodine was used; only lidocaine given prior to attempt of vascular access; also reported to DESIREE Childs and she will continue to monitor said swelling of the face; pt did not tolerate procedure well; pt will remain in bed at rest post procedure attempt; she is left in a position of comfort w siderails up x3, call light in reach and bed is locked in lowest position.     Corinne Cruz, IKER RN, BSN, Chaitanya Pinaolas.

## 2022-05-16 NOTE — PLAN OF CARE
Problem: Pain  Goal: Verbalizes/displays adequate comfort level or baseline comfort level  Outcome: Progressing     Problem: Nutrition Deficit:  Goal: Optimize nutritional status  Outcome: Progressing     Problem: Skin/Tissue Integrity  Goal: Absence of new skin breakdown  Description: 1. Monitor for areas of redness and/or skin breakdown  2. Assess vascular access sites hourly  3. Every 4-6 hours minimum:  Change oxygen saturation probe site  4. Every 4-6 hours:  If on nasal continuous positive airway pressure, respiratory therapy assess nares and determine need for appliance change or resting period.   Outcome: Progressing

## 2022-05-16 NOTE — PROGRESS NOTES
Hospitalist Progress Note      PCP: Merari Perez MD    Date of Admission: 5/10/2022    Subjective:     Remains on a low-dose of Levophed, leukocytosis is improving, hypokalemia 3.2, no acute event overnight, remain bradycardia, sinus bradycardia, not following commands, palliative care on board. Overall not improving. Medications:  Reviewed    Infusion Medications    sodium chloride 10 mL/hr at 05/15/22 1243    lactated ringers 75 mL/hr at 05/16/22 0228    norepinephrine Stopped (05/15/22 2116)     Scheduled Medications    midodrine  2.5 mg Orogastric q8h    hydrocortisone sodium succinate PF  50 mg IntraVENous Q12H    valproate sodium (DEPACON) IVPB  250 mg IntraVENous Q6H    cefTRIAXone (ROCEPHIN) IV  2,000 mg IntraVENous Q24H    metroNIDAZOLE  500 mg IntraVENous Q8H    levothyroxine  100 mcg Per NG tube Daily    carbidopa-levodopa  1.5 tablet Per NG tube q8h    sertraline  100 mg Per NG tube Nightly    pantoprazole  40 mg IntraVENous Daily    heparin (porcine)  5,000 Units SubCUTAneous BID    alteplase  2 mg IntraCATHeter Once     PRN Meds: potassium chloride **OR** potassium chloride, atropine, sodium chloride flush, sodium chloride, sodium phosphate IVPB **OR** sodium phosphate IVPB **OR** sodium phosphate IVPB, ondansetron, acetaminophen **OR** acetaminophen, albuterol, fentanNYL      Intake/Output Summary (Last 24 hours) at 5/16/2022 1210  Last data filed at 5/16/2022 0381  Gross per 24 hour   Intake 1090.32 ml   Output 293 ml   Net 797.32 ml       Physical Exam Performed:    BP (!) 89/55   Pulse (!) 46   Temp 95.8 °F (35.4 °C) (Bladder)   Resp 18   Ht 4' 9\" (1.448 m)   Wt 93 lb 11.1 oz (42.5 kg)   SpO2 91%   BMI 20.28 kg/m²     General appearance: Extubated  HEENT: Pupils equal, round, and reactive to light. Conjunctivae/corneas clear. Neck: Supple, with full range of motion. No jugular venous distention. Trachea midline. Respiratory:  Normal respiratory effort.  Clear to auscultation, bilaterally without Rales/Wheezes/Rhonchi. Cardiovascular: Regular rate and rhythm with normal S1/S2 without murmurs, rubs or gallops. Abdomen: Soft, non-tender, non-distended with normal bowel sounds. Musculoskeletal: No clubbing, cyanosis or edema bilaterally. Full range of motion without deformity. Skin: Skin color, texture, turgor normal.  No rashes or lesions. Neurologic: Alert  Psychiatric: Calm  Capillary Refill: Brisk,3 seconds, normal   Peripheral Pulses: +2 palpable, equal bilaterally       Labs:   Recent Labs     05/14/22  0415 05/15/22  0445 05/16/22  0710   WBC 16.5* 14.0* 5.3   HGB 10.7* 10.7* 10.0*   HCT 32.7* 32.3* 30.9*    283 255     Recent Labs     05/14/22  0415 05/15/22  0445 05/16/22  0710    141 140   K 3.7 3.1* 3.2*    106 106   CO2 28 26 25   BUN 24* 28* 27*   CREATININE 0.6 0.6 0.6   CALCIUM 8.5 8.3 8.0*   PHOS 4.1 3.2 2.6     Recent Labs     05/14/22  0415 05/15/22  0445 05/16/22  0710   AST 23 22 37   ALT <5* <5* <5*   BILITOT <0.2 <0.2 <0.2   ALKPHOS 101 86 67     Recent Labs     05/14/22  0415 05/15/22  0445 05/16/22  0710   INR 1.27* 1.49* 1.60*     No results for input(s): Kailey Yeh in the last 72 hours. Urinalysis:      Lab Results   Component Value Date    NITRU Negative 05/10/2022    WBCUA 3 05/10/2022    BACTERIA None Seen 05/10/2022    RBCUA 3-4 05/10/2022    BLOODU Negative 05/10/2022    SPECGRAV 1.025 05/10/2022    GLUCOSEU Negative 05/10/2022       Radiology:  XR CHEST PORTABLE   Final Result   Moderate airspace disease right upper and lower lobe with volume loss. Pneumonia and or mucous plugging both considered. NG tube extends well into the stomach. XR CHEST PORTABLE   Final Result   1. Endotracheal tube terminates in the left mainstem bronchus, for which   retraction 5 cm is recommended. 2.  Enteric tube terminates at the level of the body of the stomach.       3.  No significant change in bilateral airspace disease. Probable small left   effusion. XR ABDOMEN FOR NG/OG/NE TUBE PLACEMENT   Final Result   NG tube position appears acceptable. Endotracheal tube tip is likely within 1 cm above the anita. Dense left basilar opacity and patchy opacities in both lungs. XR CHEST PORTABLE   Final Result   Endotracheal tube is present with tip projecting just proximal to the anita. Left basilar opacity and trace left pleural effusion. Aspiration and   pneumonia are in the differential.             Assessment/Plan:    Active Hospital Problems    Diagnosis     Streptococcal bacteremia [R78.81, B95.5]      Priority: Medium    Pneumonia of left lower lobe due to infectious organism [J18.9]      Priority: Medium    Parkinsonism ([address] [G20]      Priority: Medium    Anorexia [R63.0]      Priority: Medium    Moderate malnutrition ([address] [E44.0]      Priority: Medium    AVELINO (acute kidney injury) ([address] [N17.9]      Priority: Medium    Aspiration pneumonia ([address] [J69.0]      Priority: Medium    Septic shock ([address] [A41.9, R65.21]      Priority: Medium    Acute hypoxemic respiratory failure ([address] [J96.01]     Down syndrome [Q90.9]       Acute hypoxic respiratory failure, secondary to multifocal pneumonia, patient was started on broad-spectrum antibiotic, intubated and sedated, started on Levophed, no guidance cultures so far, appreciate pulmonary input, liberated from mechanical ventilation 5/12 remain n.p.o., failed speech.    Sepsis, and septic shock, secondary to multifocal pneumonia,/and possible infective endocarditis with possible aortic root abscess started on vancomycin and cefepime antibiotic has been changed to Rocephin and Flagyl per ID, lactic acid is improving 3.2 continue current treatment, continue vent support leukocytosis has been worsening 21,000, blood culture growing Streptococcus, will repeat blood culture, send strep antigen urine concern for endocarditis on echocardiogram, per cardiology it would be unsafe to do STORMY via conscious sedation as patient cannot follow commands STORMY was canceled, plan to treat with antibiotic, likely patient will need a PICC line, repeated blood cultures are still negative so far.  Chronic hypotension, on midodrine, will continue now on low-dose of Levophed   Acute kidney injury likely prerenal, ATN, avoid nephrotoxic medication, continue fluid resuscitation.  Sinus bradycardia, will decrease dose of midodrine 2.5 mg 3 times daily. If develop any heart block,  will reconsult cardiology   History of Down syndrome,  May need  PEG tube   Lactic acidosis, improving with fluid resuscitation   Hypomagnesemia. Repleted, will continue monitor.  History of seizure disorder continue Keppra   Hypothyroidism, continue Synthroid   Failure to thrive   Overall prognosis poor, I spoke with [employer] at [facility], [address],[place] change has been told, I signed it, need to be signed with another physician, I spoke with critical care, currently patient is full code. Palliative care involved. Palliative care follow-up on CODE STATUS updates    DVT Prophylaxis: Heparin subcu  Diet: Diet NPO  ADULT TUBE FEEDING; Nasogastric; 2.0 Calorie; Continuous; 25; No; 30; Q 4 hours  Code Status: Full Code    Dispo -continue ICU care, may be transferred to progressive care unit tomorrow if she remain vitally stable and off Levophed    Due to the immediate potential for life-threatening deterioration due to acute hypoxic respiratory failure, multifocal pneumonia, I spent 34 minutes providing critical care. This time is excluding time spent performing procedures.       Leverne Homans, MD

## 2022-05-16 NOTE — FLOWSHEET NOTE
Messaged Dr Saintclair Bourgeois to fill out paperwork that was faxed over for him. And put on chart.

## 2022-05-17 LAB
A/G RATIO: 0.5 (ref 1.1–2.2)
ALBUMIN SERPL-MCNC: 1.7 G/DL (ref 3.4–5)
ALP BLD-CCNC: 96 U/L (ref 40–129)
ALT SERPL-CCNC: <5 U/L (ref 10–40)
ANION GAP SERPL CALCULATED.3IONS-SCNC: 9 MMOL/L (ref 3–16)
AST SERPL-CCNC: 44 U/L (ref 15–37)
BASOPHILS ABSOLUTE: 0 K/UL (ref 0–0.2)
BASOPHILS RELATIVE PERCENT: 0.1 %
BILIRUB SERPL-MCNC: <0.2 MG/DL (ref 0–1)
BUN BLDV-MCNC: 28 MG/DL (ref 7–20)
CALCIUM SERPL-MCNC: 8.3 MG/DL (ref 8.3–10.6)
CHLORIDE BLD-SCNC: 102 MMOL/L (ref 99–110)
CO2: 19 MMOL/L (ref 21–32)
CREAT SERPL-MCNC: 0.6 MG/DL (ref 0.6–1.1)
CULTURE, BLOOD 2: NORMAL
EOSINOPHILS ABSOLUTE: 0 K/UL (ref 0–0.6)
EOSINOPHILS RELATIVE PERCENT: 0.1 %
GFR AFRICAN AMERICAN: >60
GFR NON-AFRICAN AMERICAN: >60
GLUCOSE BLD-MCNC: 79 MG/DL (ref 70–99)
HCT VFR BLD CALC: 37.1 % (ref 36–48)
HEMOGLOBIN: 12.1 G/DL (ref 12–16)
LYMPHOCYTES ABSOLUTE: 0.9 K/UL (ref 1–5.1)
LYMPHOCYTES RELATIVE PERCENT: 13.9 %
MAGNESIUM: 2.3 MG/DL (ref 1.8–2.4)
MCH RBC QN AUTO: 33.1 PG (ref 26–34)
MCHC RBC AUTO-ENTMCNC: 32.6 G/DL (ref 31–36)
MCV RBC AUTO: 101.7 FL (ref 80–100)
MONOCYTES ABSOLUTE: 0.2 K/UL (ref 0–1.3)
MONOCYTES RELATIVE PERCENT: 3.7 %
NEUTROPHILS ABSOLUTE: 5.2 K/UL (ref 1.7–7.7)
NEUTROPHILS RELATIVE PERCENT: 82.2 %
PDW BLD-RTO: 13.6 % (ref 12.4–15.4)
PHOSPHORUS: 2.4 MG/DL (ref 2.5–4.9)
PLATELET # BLD: 224 K/UL (ref 135–450)
PMV BLD AUTO: 8.9 FL (ref 5–10.5)
POTASSIUM REFLEX MAGNESIUM: 3.7 MMOL/L (ref 3.5–5.1)
RBC # BLD: 3.65 M/UL (ref 4–5.2)
SODIUM BLD-SCNC: 130 MMOL/L (ref 136–145)
TOTAL PROTEIN: 5.4 G/DL (ref 6.4–8.2)
WBC # BLD: 6.3 K/UL (ref 4–11)

## 2022-05-17 PROCEDURE — 6360000002 HC RX W HCPCS: Performed by: INTERNAL MEDICINE

## 2022-05-17 PROCEDURE — 2580000003 HC RX 258: Performed by: INTERNAL MEDICINE

## 2022-05-17 PROCEDURE — 6370000000 HC RX 637 (ALT 250 FOR IP): Performed by: STUDENT IN AN ORGANIZED HEALTH CARE EDUCATION/TRAINING PROGRAM

## 2022-05-17 PROCEDURE — 2500000003 HC RX 250 WO HCPCS: Performed by: STUDENT IN AN ORGANIZED HEALTH CARE EDUCATION/TRAINING PROGRAM

## 2022-05-17 PROCEDURE — 6370000000 HC RX 637 (ALT 250 FOR IP): Performed by: INTERNAL MEDICINE

## 2022-05-17 PROCEDURE — 2500000003 HC RX 250 WO HCPCS: Performed by: INTERNAL MEDICINE

## 2022-05-17 PROCEDURE — 85025 COMPLETE CBC W/AUTO DIFF WBC: CPT

## 2022-05-17 PROCEDURE — 84100 ASSAY OF PHOSPHORUS: CPT

## 2022-05-17 PROCEDURE — 99232 SBSQ HOSP IP/OBS MODERATE 35: CPT | Performed by: INTERNAL MEDICINE

## 2022-05-17 PROCEDURE — 80053 COMPREHEN METABOLIC PANEL: CPT

## 2022-05-17 PROCEDURE — 83735 ASSAY OF MAGNESIUM: CPT

## 2022-05-17 PROCEDURE — 2580000003 HC RX 258: Performed by: STUDENT IN AN ORGANIZED HEALTH CARE EDUCATION/TRAINING PROGRAM

## 2022-05-17 PROCEDURE — 2000000000 HC ICU R&B

## 2022-05-17 PROCEDURE — C9113 INJ PANTOPRAZOLE SODIUM, VIA: HCPCS | Performed by: INTERNAL MEDICINE

## 2022-05-17 PROCEDURE — 92526 ORAL FUNCTION THERAPY: CPT

## 2022-05-17 RX ORDER — SODIUM CHLORIDE 9 MG/ML
INJECTION, SOLUTION INTRAVENOUS CONTINUOUS
Status: DISCONTINUED | OUTPATIENT
Start: 2022-05-17 | End: 2022-05-21

## 2022-05-17 RX ORDER — FUROSEMIDE 10 MG/ML
20 INJECTION INTRAMUSCULAR; INTRAVENOUS ONCE
Status: COMPLETED | OUTPATIENT
Start: 2022-05-17 | End: 2022-05-17

## 2022-05-17 RX ADMIN — HYDROCORTISONE SODIUM SUCCINATE 50 MG: 100 INJECTION, POWDER, FOR SOLUTION INTRAMUSCULAR; INTRAVENOUS at 05:23

## 2022-05-17 RX ADMIN — SODIUM CHLORIDE: 9 INJECTION, SOLUTION INTRAVENOUS at 22:29

## 2022-05-17 RX ADMIN — VALPROATE SODIUM 250 MG: 100 INJECTION, SOLUTION INTRAVENOUS at 03:45

## 2022-05-17 RX ADMIN — VALPROATE SODIUM 250 MG: 100 INJECTION, SOLUTION INTRAVENOUS at 09:02

## 2022-05-17 RX ADMIN — ATROPINE SULFATE 0.5 MG: 0.1 INJECTION, SOLUTION ENDOTRACHEAL; INTRAMUSCULAR; INTRAVENOUS; SUBCUTANEOUS at 01:59

## 2022-05-17 RX ADMIN — CARBIDOPA AND LEVODOPA 1.5 TABLET: 25; 100 TABLET ORAL at 16:53

## 2022-05-17 RX ADMIN — CARBIDOPA AND LEVODOPA 1.5 TABLET: 25; 100 TABLET ORAL at 08:33

## 2022-05-17 RX ADMIN — METRONIDAZOLE 500 MG: 500 INJECTION, SOLUTION INTRAVENOUS at 05:22

## 2022-05-17 RX ADMIN — HEPARIN SODIUM 5000 UNITS: 5000 INJECTION INTRAVENOUS; SUBCUTANEOUS at 08:32

## 2022-05-17 RX ADMIN — LEVOTHYROXINE SODIUM 100 MCG: 0.1 TABLET ORAL at 08:32

## 2022-05-17 RX ADMIN — FUROSEMIDE 20 MG: 10 INJECTION, SOLUTION INTRAMUSCULAR; INTRAVENOUS at 12:54

## 2022-05-17 RX ADMIN — VALPROATE SODIUM 250 MG: 100 INJECTION, SOLUTION INTRAVENOUS at 22:26

## 2022-05-17 RX ADMIN — SODIUM CHLORIDE 5 ML/HR: 9 INJECTION, SOLUTION INTRAVENOUS at 09:01

## 2022-05-17 RX ADMIN — VALPROATE SODIUM 250 MG: 100 INJECTION, SOLUTION INTRAVENOUS at 16:45

## 2022-05-17 RX ADMIN — CEFTRIAXONE 2000 MG: 2 INJECTION, POWDER, FOR SOLUTION INTRAMUSCULAR; INTRAVENOUS at 13:37

## 2022-05-17 RX ADMIN — SODIUM CHLORIDE, POTASSIUM CHLORIDE, SODIUM LACTATE AND CALCIUM CHLORIDE: 600; 310; 30; 20 INJECTION, SOLUTION INTRAVENOUS at 03:52

## 2022-05-17 RX ADMIN — SERTRALINE 100 MG: 50 TABLET, FILM COATED ORAL at 19:59

## 2022-05-17 RX ADMIN — SODIUM CHLORIDE: 9 INJECTION, SOLUTION INTRAVENOUS at 12:53

## 2022-05-17 RX ADMIN — SODIUM PHOSPHATE, MONOBASIC, MONOHYDRATE 10 MMOL: 276; 142 INJECTION, SOLUTION INTRAVENOUS at 18:24

## 2022-05-17 RX ADMIN — HEPARIN SODIUM 5000 UNITS: 5000 INJECTION INTRAVENOUS; SUBCUTANEOUS at 19:59

## 2022-05-17 RX ADMIN — MIDODRINE HYDROCHLORIDE 2.5 MG: 5 TABLET ORAL at 13:36

## 2022-05-17 RX ADMIN — METRONIDAZOLE 500 MG: 500 INJECTION, SOLUTION INTRAVENOUS at 15:06

## 2022-05-17 RX ADMIN — SODIUM CHLORIDE, PRESERVATIVE FREE 10 ML: 5 INJECTION INTRAVENOUS at 08:34

## 2022-05-17 RX ADMIN — CARBIDOPA AND LEVODOPA 1.5 TABLET: 25; 100 TABLET ORAL at 01:53

## 2022-05-17 RX ADMIN — PANTOPRAZOLE SODIUM 40 MG: 40 INJECTION, POWDER, FOR SOLUTION INTRAVENOUS at 08:34

## 2022-05-17 RX ADMIN — MIDODRINE HYDROCHLORIDE 2.5 MG: 5 TABLET ORAL at 05:23

## 2022-05-17 RX ADMIN — MIDODRINE HYDROCHLORIDE 2.5 MG: 5 TABLET ORAL at 22:34

## 2022-05-17 ASSESSMENT — PAIN SCALES - GENERAL
PAINLEVEL_OUTOF10: 0

## 2022-05-17 NOTE — PROGRESS NOTES
Reassessment complete. Weaned to room air. BP low however MAP >65. Will continue to monitor. Urine output better following lasix. Lots of serous drainage from old fem line site. New dressing applied.

## 2022-05-17 NOTE — CONSULTS
Gastroenterology Consult Note        Patient: Storm Washington  : 1965  Acct#:      Date:  2022    Subjective:       History of Present Illness  Patient is a 60 y.o.  female admitted with Hypernatremia [E87.0]  Septic shock (HCC) [A41.9, R65.21]  Aspiration pneumonia, unspecified aspiration pneumonia type, unspecified laterality, unspecified part of lung ([address] [J69.0]  Acute hypoxemic respiratory failure ([address] [J96.01]  Sepsis with acute organ dysfunction without septic shock, due to unspecified organism, unspecified type ([address] [A41.9, R65.20] who is seen in consult for PEG tube placement. History of Down syndrome. History of poor p.o. intake in the past and has had PEG. Apparently she had pulled this in the past.  Patient was brought to the ER for respiratory distress. She was diagnosed with pneumonia. Also had group B strep bacteremia. There was concern for endocarditis but she was not felt to be a candidate for STORMY. She has been treated with antibiotics. Did require Levophed but no longer on this. Blood pressure has been borderline. She had eval by SLP and has oral pharyngeal dysphagia. Recommendations were for n.p.o. with alternative means of hydration versus if comfort care elected. She has an NG tube in place currently and is tolerating tube feeds. Past Medical History:   Diagnosis Date    Anemia     Anorexia     Autistic disorder     Convulsions ([address]     Down syndrome     Dysphagia     Hypothyroid     Malnutrition ([address]     Parkinson disease ([address]     UTI (urinary tract infection)     Weakness       Past Surgical History:   Procedure Laterality Date    GASTROSTOMY TUBE PLACEMENT      GASTROSTOMY TUBE PLACEMENT  14      Past Endoscopic History  EGD 2015 with Dr Syeda Goodman   Indications: This is a 44y.o. year old female who presents today with accidental removal of G-tube. Here for replacement.   Impression:     Successful replacement of a PEG tube    Admission Meds  No current facility-administered medications on file prior to encounter. Current Outpatient Medications on File Prior to Encounter   Medication Sig Dispense Refill    midodrine (PROAMATINE) 2.5 MG tablet Take 1 tablet by mouth 3 times daily (with meals) 90 tablet 1    valproic acid (DEPAKENE) 250 MG/5ML SOLN oral solution Take 250 mg by mouth every 8 hours 250 mg in the AM, 250 mg in the afternoon, 500 mg nightly.  levothyroxine (SYNTHROID) 100 MCG tablet Take 100 mcg by mouth daily       docusate sodium (COLACE) 100 MG capsule Take 100 mg by mouth daily      acetaminophen (TYLENOL) 325 MG tablet Take 650 mg by mouth every 6 hours as needed for Pain.  carbidopa-levodopa (SINEMET)  MG per tablet Take 1.5 tablets by mouth 3 times daily Indications: Take one and a half tablets by mouth three times per day       sertraline (ZOLOFT) 20 MG/ML concentrated solution Take 100 mg by mouth nightly       Multiple Vitamins-Minerals (THERAPEUTIC MULTIVITAMIN-MINERALS) tablet Take 1 tablet by mouth daily.  omeprazole (PRILOSEC) 20 MG capsule Take 20 mg by mouth daily. Allergies  Allergies   Allergen Reactions    Caffeine     Iodine       Social   Social History     Tobacco Use    Smoking status: Never Smoker    Smokeless tobacco: Never Used   Substance Use Topics    Alcohol use: No        History reviewed. No pertinent family history. Review of Systems  Review of systems not obtained due to patient factors. Physical Exam  Blood pressure (!) 95/55, pulse (!) 45, temperature 95.7 °F (35.4 °C), temperature source Temporal, resp. rate 15, height 4' 9\" (1.448 m), weight 93 lb 11.1 oz (42.5 kg), SpO2 95 %.     General appearance: alert, cooperative, no distress, appears stated age  Eyes: Anicteric  Head: Normocephalic, without obvious abnormality  Lungs: clear to auscultation bilaterally, Normal Effort  Heart: bradycardia,  regular rhythm, normal S1 and S2, no murmurs or rubs  Abdomen: soft, non-tender. Bowel sounds normal. No masses,  no organomegaly. Scar in epigastrium from prior PEG. Extremities: atraumatic, no cyanosis or edema  Skin: warm and dry, no jaundice  Neuro: alert, nonverbal   Musculoskeletal: 5/5  strength BUE      Data Review:    Recent Labs     05/15/22  0445 05/16/22  0710 05/17/22  1105   WBC 14.0* 5.3 6.3   HGB 10.7* 10.0* 12.1   HCT 32.3* 30.9* 37.1   .3* 100.6* 101.7*    255 224     Recent Labs     05/15/22  0445 05/15/22  0445 05/16/22  0710 05/16/22  1320 05/17/22  0907     --  140  --  130*   K 3.1*   < > 3.2* 3.7 3.7     --  106  --  102   CO2 26  --  25  --  19*   PHOS 3.2  --  2.6  --  2.4*   BUN 28*  --  27*  --  28*   CREATININE 0.6  --  0.6  --  0.6    < > = values in this interval not displayed. Recent Labs     05/15/22  0445 05/16/22  0710 05/17/22  0907   AST 22 37 44*   ALT <5* <5* <5*   BILITOT <0.2 <0.2 <0.2   ALKPHOS 86 67 96     No results for input(s): LIPASE, AMYLASE in the last 72 hours. Recent Labs     05/15/22  0445 05/16/22  0710   PROTIME 17.1* 18.4*   INR 1.49* 1.60*     No results for input(s): PTT in the last 72 hours. No results for input(s): OCCULTBLD in the last 72 hours. Imaging Studies:                 CT-scan of chest wo contrast 4/5/22      Impression   Tree-in-bud opacities with consolidation in the left lower lobe, consistent   with bronchopneumonia, most likely aspiration etiology.  Secretions are noted   in left lower lobe bronchi.                       Assessment:     Principal Problem:    Septic shock ([address]  Active Problems:    AVELINO (acute kidney injury) ([address]    Aspiration pneumonia (HCC)    Moderate malnutrition ([address]    Streptococcal bacteremia    Pneumonia of left lower lobe due to infectious organism    Parkinsonism ([address]    Anorexia    Pneumonia of both lower lobes due to methicillin susceptible Staphylococcus aureus (MSSA) (HCC)    Down syndrome    Acute

## 2022-05-17 NOTE — CARE COORDINATION
Chart reviewed for discharge planning    CM/SW has continued to follow patient progress to anticipate potential discharge needs. At this time, patient is not ready for discharge. Barrier(s) to discharge-patient-   Oxygen - Patient currently on 2L Nasal Cannula. Other - Palliative Care following. Patient's code status changed to [facility] by patient's guardian through [address] (per Dr. Ester Gant note on 5/16/2022). Tentative discharge plan- Patient to possibly be downgraded to progressive care unit. Patient is a resident at [address] [place] [address]. Tentative discharge date- When medically stable. *Case management will continue to follow progress and update discharge plan as needed.         Silver GIRON, EDILSON, [facility] -   [phone]    Electronically signed by MACK Larios on 5/17/2022 at 10:51 AM

## 2022-05-17 NOTE — PROGRESS NOTES
Hospitalist Progress Note      PCP: Carla Howard MD    Date of Admission: 5/10/2022    Subjective:     Remains on a low-dose of Levophed, leukocytosis is improving, hypokalemia 3.2, no acute event overnight, remain bradycardia, sinus bradycardia, not following commands, palliative care on board. Overall not improving. Gi consulted for possible PEG. Medications:  Reviewed    Infusion Medications    sodium chloride 100 mL/hr at 05/17/22 1256    sodium chloride      sodium chloride 5 mL/hr at 05/17/22 1256     Scheduled Medications    [START ON 5/18/2022] hydrocortisone sodium succinate PF  50 mg IntraVENous Daily    lidocaine 1 % injection  5 mL IntraDERmal Once    sodium chloride flush  5-40 mL IntraVENous 2 times per day    midodrine  2.5 mg Orogastric q8h    valproate sodium (DEPACON) IVPB  250 mg IntraVENous Q6H    cefTRIAXone (ROCEPHIN) IV  2,000 mg IntraVENous Q24H    metroNIDAZOLE  500 mg IntraVENous Q8H    levothyroxine  100 mcg Per NG tube Daily    carbidopa-levodopa  1.5 tablet Per NG tube q8h    sertraline  100 mg Per NG tube Nightly    pantoprazole  40 mg IntraVENous Daily    heparin (porcine)  5,000 Units SubCUTAneous BID    alteplase  2 mg IntraCATHeter Once     PRN Meds: potassium chloride **OR** potassium chloride, sodium chloride flush, sodium chloride, atropine, sodium chloride flush, sodium chloride, sodium phosphate IVPB **OR** sodium phosphate IVPB **OR** sodium phosphate IVPB, ondansetron, acetaminophen **OR** acetaminophen, albuterol, fentanNYL      Intake/Output Summary (Last 24 hours) at 5/17/2022 1618  Last data filed at 5/17/2022 1256  Gross per 24 hour   Intake 2942. 45 ml   Output 200 ml   Net 2742. 45 ml       Physical Exam Performed:    BP (!) 76/64   Pulse 57   Temp 97.4 °F (36.3 °C) (Bladder)   Resp 14   Ht 4' 9\" (1.448 m)   Wt 93 lb 11.1 oz (42.5 kg)   SpO2 93%   BMI 20.28 kg/m²     General appearance: Extubated  HEENT: Pupils equal, round, and considered. NG tube extends well into the stomach. XR CHEST PORTABLE   Final Result   1. Endotracheal tube terminates in the left mainstem bronchus, for which   retraction 5 cm is recommended. 2.  Enteric tube terminates at the level of the body of the stomach. 3.  No significant change in bilateral airspace disease. Probable small left   effusion. XR ABDOMEN FOR NG/OG/NE TUBE PLACEMENT   Final Result   NG tube position appears acceptable. Endotracheal tube tip is likely within 1 cm above the anita. Dense left basilar opacity and patchy opacities in both lungs. XR CHEST PORTABLE   Final Result   Endotracheal tube is present with tip projecting just proximal to the anita. Left basilar opacity and trace left pleural effusion. Aspiration and   pneumonia are in the differential.             Assessment/Plan:    Active Hospital Problems    Diagnosis     Pneumonia of both lower lobes due to methicillin susceptible Staphylococcus aureus (MSSA) ([facility]) [J15.211]      Priority: Medium    Streptococcal bacteremia [R78.81, B95.5]      Priority: Medium    Pneumonia of left lower lobe due to infectious organism [J18.9]      Priority: Medium    Parkinsonism ([address] [G20]      Priority: Medium    Anorexia [R63.0]      Priority: Medium    Moderate malnutrition ([address] [E44.0]      Priority: Medium    AVELINO (acute kidney injury) ([address] [N17.9]      Priority: Medium    Aspiration pneumonia ([address] [J69.0]      Priority: Medium    Septic shock ([address] [A41.9, R65.21]      Priority: Medium    Acute hypoxemic respiratory failure ([address] [J96.01]     Down syndrome [Q90.9]       Acute hypoxic respiratory failure, secondary to multifocal pneumonia, patient was started on broad-spectrum antibiotic, intubated and sedated, started on Levophed, no guidance cultures so far, appreciate pulmonary input, liberated from mechanical ventilation 5/12 remain n.p.o., failed speech.    Sepsis, and septic shock, secondary to multifocal pneumonia,/and possible infective endocarditis with possible aortic root abscess started on vancomycin and cefepime antibiotic has been changed to Rocephin and Flagyl per ID, lactic acid is improving 3.2 continue current treatment, continue vent support leukocytosis has been worsening 21,000, blood culture growing Streptococcus, will repeat blood culture, send strep antigen urine concern for endocarditis on echocardiogram, per cardiology it would be unsafe to do STORMY via conscious sedation as patient cannot follow commands STORMY was canceled, plan to treat with antibiotic, likely patient will need a PICC line, repeated blood cultures are still negative so far.  Chronic hypotension, on midodrine, will continue now on low-dose of Levophed   Acute kidney injury likely prerenal, ATN, avoid nephrotoxic medication, continue fluid resuscitation.  Sinus bradycardia, will decrease dose of midodrine 2.5 mg 3 times daily. If develop any heart block,  will reconsult cardiology   History of Down syndrome,  May need  PEG tube   Lactic acidosis, improving with fluid resuscitation   Hypomagnesemia. Repleted, will continue monitor.  History of seizure disorder continue Keppra   Hypothyroidism, continue Synthroid   Failure to thrive  Overall prognosis poor,GI has been consulted for PEG. DVT Prophylaxis: Heparin subcu  Diet: Diet NPO  ADULT TUBE FEEDING; Nasogastric; 2.0 Calorie; Continuous; 25; No; 30; Q 4 hours  Code Status: DNR-CCA    Dispo -continue ICU care, may be transferred to progressive care unit tomorrow if she remain vitally stable and off Levophed    Due to the immediate potential for life-threatening deterioration due to acute hypoxic respiratory failure, multifocal pneumonia, I spent 30 minutes providing critical care. This time is excluding time spent performing procedures.       Severo Moon, MD

## 2022-05-17 NOTE — PROGRESS NOTES
AM assessment complete. MRDD/nonverbal. Arms contracted. Respirations regular and unlabored. Breath sounds diminished. On 3 L NC. SB on tele. Abdomen soft. NG at 58 cm. TF running at goal. Yuan. Right fem line removed. Fall precautions in place.

## 2022-05-17 NOTE — PROGRESS NOTES
Facility/Department: [address]  Speech Language Pathology   Dysphagia Treatment Note    Patient: Tay Page   : 1965   MRN: [id number]      Evaluation Date: 2022      Admitting Dx: Hypernatremia [E87.0]  Septic shock ([address] [A41.9, R65.21]  Aspiration pneumonia, unspecified aspiration pneumonia type, unspecified laterality, unspecified part of lung ([address] [J69.0]  Acute hypoxemic respiratory failure ([address] [J96.01]  Sepsis with acute organ dysfunction without septic shock, due to unspecified organism, unspecified type ([address] [A41.9, R65.20]  Treatment Diagnosis: Oropharyngeal Dysphagia   Pain: Unable to state                                              Diet and Treatment Recommendations 2022:  Diet Solids Recommendation:  1.) If aggressive means of care are desired, recommend NPO with alternative means of hydration/nutrition.     2.) If comfort care is desired recommend Dysphagia I Pureed with Moderately Thick (honey) Liquids, meds crushed in puree Recommended form of Meds: Crushed as able in puree  or Via alternative means of nutrition      Compensatory strategies:   TBD     Assessment of Texture Tolerance:  Diet level prior to treatment: NPO    Tolerance of Current Diet Level: N/A     Impressions: Pt was repositioned Upright in bed , awake and alert. RN reported GI was consulted with continued discussion about PEG tube placement. Currently on room air. Oral care completed via dental swab, difficult to swab oral cavity due to anterior lingual posture and lingual protrusion. Trials of moderately (honey) thick liquids  and puree  were provided to assess swallow function. SLP facilitated trials due to pt inability to self feed. Clinical symptoms of limited oral cavity opening for trials via tsp, decreased oral control with reduced A-P propulsion. Continued signs of suspected pharyngeal pooling and delayed vs absent swallow initiation assessed across trials.  Delayed, congested cough noted after 3-4 puree trials and 2 trials of moderately thick liquids, PO trials discontinued. Overall, Pt remains at HIGH risk for aspiration as well as nutritional compromise due to delayed swallow initiation, intermittent oral acceptance and need for total feeding assistance. Recommend consideration of alternative means of hydration/nutrition pending goal of care. See above for recommendations.     Dysphagia Goals:   Pt will functionally tolerate ongoing assessment of swallow function with diet to be determined as indicated pending goals of care. (ongoing 5/17/2022)     Plan:    Speech therapy for dysphagia tx 3-5 times per week during acute care stay pending goals of care. Pt unable to actively participate in dysphagia therapy. Patient/Family Education:  Provided education regarding role of SLP, recommendations and general speech pathology plan of care. [] Pt verbalized understanding and agreement   [] Pt requires ongoing learning   [x] No evidence of comprehension     Discharge Recommendations:    Discharge recommendations to be determined pending ongoing follow-up during acute care stay    Timed Code Treatment: 0 minutes    Total Treatment Time: 13 minutes     If patient discharges prior to next session this note will serve as a discharge summary.      Signature:  Bridger Cerda M.A., [address]  Speech-Language Pathologist

## 2022-05-17 NOTE — PLAN OF CARE
Problem: Discharge Planning  Goal: Discharge to home or other facility with appropriate resources  Outcome: Progressing     Problem: Pain  Goal: Verbalizes/displays adequate comfort level or baseline comfort level  Outcome: Adequate for Discharge     Problem: Nutrition Deficit:  Goal: Optimize nutritional status  Outcome: Progressing     Problem: Skin/Tissue Integrity  Goal: Absence of new skin breakdown  Description: 1. Monitor for areas of redness and/or skin breakdown  2. Assess vascular access sites hourly  3. Every 4-6 hours minimum:  Change oxygen saturation probe site  4. Every 4-6 hours:  If on nasal continuous positive airway pressure, respiratory therapy assess nares and determine need for appliance change or resting period.   Outcome: Progressing     Problem: Safety - Adult  Goal: Free from fall injury  Outcome: Adequate for Discharge  Flowsheets (Taken 5/16/2022 2000)  Free From Fall Injury: Instruct family/caregiver on patient safety

## 2022-05-17 NOTE — PLAN OF CARE
Problem: Pain  Goal: Verbalizes/displays adequate comfort level or baseline comfort level  Outcome: Progressing     Problem: Skin/Tissue Integrity  Goal: Absence of new skin breakdown  Description: 1. Monitor for areas of redness and/or skin breakdown  2. Assess vascular access sites hourly  3. Every 4-6 hours minimum:  Change oxygen saturation probe site  4. Every 4-6 hours:  If on nasal continuous positive airway pressure, respiratory therapy assess nares and determine need for appliance change or resting period.   5/17/2022 1951 by Sharon Mendes RN  Outcome: Progressing  5/17/2022 0945 by Sanjiv Espino RN  Outcome: Progressing     Problem: Safety - Adult  Goal: Free from fall injury  Outcome: Progressing     Problem: ABCDS Injury Assessment  Goal: Absence of physical injury  Outcome: Progressing

## 2022-05-17 NOTE — PLAN OF CARE
Problem: Nutrition Deficit:  Goal: Optimize nutritional status  5/17/2022 0945 by Kerline Sosa RN  Outcome: Progressing     Problem: Skin/Tissue Integrity  Goal: Absence of new skin breakdown  Description: 1. Monitor for areas of redness and/or skin breakdown  2. Assess vascular access sites hourly  3. Every 4-6 hours minimum:  Change oxygen saturation probe site  4. Every 4-6 hours:  If on nasal continuous positive airway pressure, respiratory therapy assess nares and determine need for appliance change or resting period.   5/17/2022 0945 by Kerline Sosa RN  Outcome: Progressing

## 2022-05-17 NOTE — PROGRESS NOTES
Infectious Diseases   Progress Note      Admission Date: 5/10/2022  Hospital Day: Hospital Day: 8   Attending: Alley Thapa MD  Date of service: 5/17/2022     Chief complaint/ Reason for consult:     · Septic shock with high fever, worsening leukocytosis, hypotension requiring vasopressors and lactic acidosis  · Group B streptococcus bacteremia  · Acute respiratory failure with hypoxia  · Left lower lobe pneumonia, concern for aspiration    Microbiology:        I have reviewed allavailable micro lab data and cultures    · Blood culture (1/2)  - collected on 5/10/2022: Group B streptococcus  Tracheal aspirate culture  - collected on 5/12/2022: Group B streptococcus, MSSA    Susceptibility      Staphylococcus aureus (2)    Antibiotic Interpretation Microscan  Method Status    ceFAZolin Sensitive <=4 mcg/mL BACTERIAL SUSCEPTIBILITY PANEL BY PALLAVI     clindamycin Sensitive <=0.5 mcg/mL BACTERIAL SUSCEPTIBILITY PANEL BY PALLAVI     erythromycin Sensitive <=0.5 mcg/mL BACTERIAL SUSCEPTIBILITY PANEL BY PALLAVI     oxacillin Sensitive 0.5 mcg/mL BACTERIAL SUSCEPTIBILITY PANEL BY PALLAVI     tetracycline Sensitive <=4 mcg/mL BACTERIAL SUSCEPTIBILITY PANEL BY PALLAVI     trimethoprim-sulfamethoxazole Sensitive <=0.5/9.5 mcg/mL BACTERIAL SUSCEPTIBILITY PANEL BY PALLAVI           Antibiotics and immunizations:       Current antibiotics: All antibiotics and their doses were reviewed by me    Recent Abx Admin                   metronidazole (FLAGYL) 500 mg in 0.9% NaCl 100 mL IVPB premix (mg) 500 mg New Bag 05/17/22 1506     500 mg New Bag  0522     500 mg New Bag 05/16/22 2343    cefTRIAXone (ROCEPHIN) 2000 mg IVPB in D5W 50ml minibag (mg) 2,000 mg New Bag 05/17/22 1337                  Immunization History: All immunization history was reviewed by me today. There is no immunization history on file for this patient. Known drug allergies:      All allergies were reviewed and updated    Allergies   Allergen Reactions    Caffeine     Iodine        Social history:     Social History:  All social andepidemiologic history was reviewed and updated by me today as needed. · Tobacco use:   reports that she has never smoked. She has never used smokeless tobacco.  · Alcohol use:   reports no history of alcohol use. · Currently lives in: [address]  ·  reports no history of drug use. COVID VACCINATION AND LAB RESULT RECORDS:     Internal Administration   First Dose      Second Dose           Last COVID Lab SARS-CoV-2 (no units)   Date Value   04/05/2022 Not Detected     SARS-CoV-2, PCR (no units)   Date Value   03/24/2022 Not Detected     SARS-CoV-2 RNA, RT PCR (no units)   Date Value   05/10/2022 NOT DETECTED            Assessment:     The patient is a 60 y.o. old female who  has a past medical history of Anemia, Anorexia, Autistic disorder, Convulsions ([address] Down syndrome, Dysphagia, Hypothyroid, Malnutrition ([address] Parkinson disease ([address] UTI (urinary tract infection), and Weakness. with following problems:      · Septic shock with high fever, worsening leukocytosis, hypotension requiring vasopressors and lactic acidosis-sepsis resolved, remains hypotensive  · Group B streptococcus bacteremia-covered with ceftriaxone  · Acute respiratory failure with hypoxia -respiratory culture grew MSSA, covered with ceftriaxone  · Left lower lobe pneumonia, concern for aspiration-tracheal aspirate culture has grown Streptococcus and MSSA-covered with ceftriaxone  · Parkinsonism  · History of Down syndrome  · Anorexia  ·       Discussion:      The patient is afebrile. White cell count is 6300 today. She remains on IV ceftriaxone and Flagyl. Serum creatinine 0.6 today. Blood culture from 5/13/2022 remains negative    She is now DNR [facility]    Plan:     Diagnostic Workup:      · Continue to follow  fever curve, WBC count and blood cultures. · Continue to monitor blood counts, liver and renal function.     Antimicrobials:    · Continue IV ceftriaxone 2 g every 24 hour for group B streptococcus and MSSA coverage  · Continue IV Flagyl 500 mg every 8 hours empiric anaerobic coverage to cover for any potential aspiration  · Continue to monitor her vitals closely  · Cough and deep breathing exercises  · GI following for possibility of PEG tube placement  · We will follow up on the culture results and clinical progress and will make further recommendations accordingly. · Continue close vitals monitoring. · Maintain good glycemic control. · Fall precautions. · Aspiration precautions. · Continue to watch for new fever or diarrhea. · DVT prophylaxis. · Discussed all above with RN. Drug Monitoring:    · Continue monitoring for antibiotic toxicity as follows: CBC, CMP   · Continue to watch for following: new or worsening fever, new hypotension, hives, lip swelling and redness or purulence at vascular access sites. I/v access Management:    · Continue to monitor i.v access sites for erythema, induration, discharge or tenderness. · As always, continue efforts to minimize tubes/lines/drains as clinically appropriate to reduce chances of line associated infections. Level of complexity of visit: High       Thank you for involving me in the care of your patient. I will continue to follow. If you have anyadditional questions, please do not hesitate to contact me. Subjective: Interval history: Interval history was obtained from chart review and patient/ RN. She remains nonverbal.  She is tolerating antibiotics okay. REVIEW OF SYSTEMS:      Review of Systems   Unable to perform ROS: Patient nonverbal         Past Medical History: All past medical history reviewed today.     Past Medical History:   Diagnosis Date    Anemia     Anorexia     Autistic disorder     [facility]     Down syndrome     Dysphagia     Hypothyroid     Malnutrition ([facility]     Parkinson disease ([facility]     UTI (urinary tract infection)     Weakness Past Surgical History: All past surgical history was reviewed today. Past Surgical History:   Procedure Laterality Date    GASTROSTOMY TUBE PLACEMENT      GASTROSTOMY TUBE PLACEMENT  2/18/14       Family History: All family history was reviewed today. History reviewed. No pertinent family history. Objective:       PHYSICAL EXAM:      Vitals:   Vitals:    05/17/22 1200 05/17/22 1250 05/17/22 1300 05/17/22 1400   BP: (!) 84/54 (!) 95/55 (!) 87/56 (!) 92/49   Pulse: (!) 45  (!) 49 56   Resp: 15  14 17   Temp: 95.7 °F (35.4 °C)      TempSrc: Temporal      SpO2: 95%  92% 94%   Weight:       Height:           Physical Exam     General: Encephalopathic but protecting the airway so far,   HEENT: normocephalic, atraumatic, sclera clear, pupils equal, light reflex preserved bilaterally  Cardiovascular: RRR, no murmurs/rubs/gallops detected  Pulmonary: CTABL, no rhonchi/rales   Abdomen/GI: soft, no organomegaly, bowel sounds positive  Neuro: Encephalopathic, pupils are equal and reactive to light, moves all extremities  Skin: no rash,   Musculoskeletal:  No obvious joint swelling, redness. No limitation of range of passive motion  Genitourinary: Yuan's catheter in place   Psych: could not assess   Lymphatic/Immunologic: No obvious bruising, no cervical lymphadenopathy    Lines: All vascular access sites are healthy with no local erythema, discharge or tenderness    Intake and output:    I/O last 3 completed shifts: In: 2193.3 [I.V.:807.4; NG/GT:297; IV Piggyback:1088.8]  Out: 330 [Urine:330]    Lab Data:   All available labs and old records have been reviewed by me.     CBC:  Recent Labs     05/15/22  0445 05/16/22  0710 05/17/22  1105   WBC 14.0* 5.3 6.3   RBC 3.19* 3.07* 3.65*   HGB 10.7* 10.0* 12.1   HCT 32.3* 30.9* 37.1    255 224   .3* 100.6* 101.7*   MCH 33.5 32.7 33.1   MCHC 33.1 32.6 32.6   RDW 13.4 13.2 13.6        BMP:  Recent Labs     05/15/22  0445 05/15/22  0445 05/16/22  0710 05/16/22  1320 05/17/22  0907     --  140  --  130*   K 3.1*   < > 3.2* 3.7 3.7     --  106  --  102   CO2 26  --  25  --  19*   BUN 28*  --  27*  --  28*   CREATININE 0.6  --  0.6  --  0.6   CALCIUM 8.3  --  8.0*  --  8.3   GLUCOSE 94  --  72  --  79    < > = values in this interval not displayed. Hepatic Function Panel:   Lab Results   Component Value Date    ALKPHOS 96 05/17/2022    ALT <5 05/17/2022    AST 44 05/17/2022    PROT 5.4 05/17/2022    PROT 6.9 02/06/2013    BILITOT <0.2 05/17/2022    BILIDIR <0.2 12/23/2016    IBILI see below 12/23/2016    LABALBU 1.7 05/17/2022       CPK:   Lab Results   Component Value Date    CKTOTAL 78 08/22/2012     ESR: No results found for: SEDRATE  CRP: No results found for: CRP        Imaging: All pertinent images and reports for the current visit were reviewed by me during this visit. I reviewed the chest x-ray/CT scan/MRI images and independently interpreted the findings and results today. XR CHEST PORTABLE   Final Result   Moderate airspace disease right upper and lower lobe with volume loss. Pneumonia and or mucous plugging both considered. NG tube extends well into the stomach. XR CHEST PORTABLE   Final Result   1. Endotracheal tube terminates in the left mainstem bronchus, for which   retraction 5 cm is recommended. 2.  Enteric tube terminates at the level of the body of the stomach. 3.  No significant change in bilateral airspace disease. Probable small left   effusion. XR ABDOMEN FOR NG/OG/NE TUBE PLACEMENT   Final Result   NG tube position appears acceptable. Endotracheal tube tip is likely within 1 cm above the anita. Dense left basilar opacity and patchy opacities in both lungs. XR CHEST PORTABLE   Final Result   Endotracheal tube is present with tip projecting just proximal to the anita. Left basilar opacity and trace left pleural effusion.   Aspiration and   pneumonia are in the differential.             Medications: All current and past medications were reviewed.      [START ON 5/18/2022] hydrocortisone sodium succinate PF  50 mg IntraVENous Daily    lidocaine 1 % injection  5 mL IntraDERmal Once    sodium chloride flush  5-40 mL IntraVENous 2 times per day    midodrine  2.5 mg Orogastric q8h    valproate sodium (DEPACON) IVPB  250 mg IntraVENous Q6H    cefTRIAXone (ROCEPHIN) IV  2,000 mg IntraVENous Q24H    metroNIDAZOLE  500 mg IntraVENous Q8H    levothyroxine  100 mcg Per NG tube Daily    carbidopa-levodopa  1.5 tablet Per NG tube q8h    sertraline  100 mg Per NG tube Nightly    pantoprazole  40 mg IntraVENous Daily    heparin (porcine)  5,000 Units SubCUTAneous BID    alteplase  2 mg IntraCATHeter Once        sodium chloride 100 mL/hr at 05/17/22 1256    sodium chloride      sodium chloride 5 mL/hr at 05/17/22 1256       potassium chloride **OR** potassium chloride, sodium chloride flush, sodium chloride, atropine, sodium chloride flush, sodium chloride, sodium phosphate IVPB **OR** sodium phosphate IVPB **OR** sodium phosphate IVPB, ondansetron, acetaminophen **OR** acetaminophen, albuterol, fentanNYL      Problem list:       Patient Active Problem List   Diagnosis Code    Down syndrome Q90.9    Down syndrome Q90.9    Down syndrome Q90.9    Dysphagia R13.10    Autistic disorder F84.0    PEG adjustment, replacement, or removal Z43.1    Hypotension I95.9    Sepsis ([address] A41.9    Acute hypoxemic respiratory failure (HCC) J96.01    AVELINO (acute kidney injury) ([address] N17.9    Aspiration pneumonia (HCC) J69.0    Septic shock (HCC) A41.9, R65.21    Moderate malnutrition ([address] E44.0    Streptococcal bacteremia R78.81, B95.5    Pneumonia of left lower lobe due to infectious organism J18.9    Parkinsonism ([address] G20    Anorexia R63.0    Pneumonia of both lower lobes due to methicillin susceptible Staphylococcus aureus (MSSA) ([address] J15.211       Please note that this chart was generated using Dragon dictation software. Although every effort was made to ensure the accuracy of this automated transcription, some errors in transcription may have occurred inadvertently. If you may need any clarification, please do not hesitate to contact me through EPIC or at the phone number provided below with my electronic signature. Any pictures or media included in this note were obtained after taking informed verbal consent from the patient and with their approval to include those in the patient's medical record.       Arsalan Hoover MD, MPH, [address] [place] [address]  5/17/2022, 3:19 PM  [facility] Infectious Disease   [address] [place] [address]., [address] [facility], [address] [place] [address]  Office: [phone]  Fax: [phone]  Clinic days:  Tuesday & Thursday

## 2022-05-18 LAB
A/G RATIO: 0.5 (ref 1.1–2.2)
ALBUMIN SERPL-MCNC: 1.7 G/DL (ref 3.4–5)
ALP BLD-CCNC: 110 U/L (ref 40–129)
ALT SERPL-CCNC: <5 U/L (ref 10–40)
ANION GAP SERPL CALCULATED.3IONS-SCNC: 7 MMOL/L (ref 3–16)
AST SERPL-CCNC: 40 U/L (ref 15–37)
BASOPHILS ABSOLUTE: 0 K/UL (ref 0–0.2)
BASOPHILS RELATIVE PERCENT: 0.2 %
BILIRUB SERPL-MCNC: <0.2 MG/DL (ref 0–1)
BUN BLDV-MCNC: 24 MG/DL (ref 7–20)
CALCIUM SERPL-MCNC: 7.9 MG/DL (ref 8.3–10.6)
CHLORIDE BLD-SCNC: 103 MMOL/L (ref 99–110)
CO2: 24 MMOL/L (ref 21–32)
CREAT SERPL-MCNC: <0.5 MG/DL (ref 0.6–1.1)
EOSINOPHILS ABSOLUTE: 0 K/UL (ref 0–0.6)
EOSINOPHILS RELATIVE PERCENT: 0.5 %
GFR AFRICAN AMERICAN: >60
GFR NON-AFRICAN AMERICAN: >60
GLUCOSE BLD-MCNC: 90 MG/DL (ref 70–99)
HCT VFR BLD CALC: 36.4 % (ref 36–48)
HEMOGLOBIN: 11.8 G/DL (ref 12–16)
INR BLD: 1.18 (ref 0.88–1.12)
LYMPHOCYTES ABSOLUTE: 1.8 K/UL (ref 1–5.1)
LYMPHOCYTES RELATIVE PERCENT: 20 %
MAGNESIUM: 1.9 MG/DL (ref 1.8–2.4)
MCH RBC QN AUTO: 32.8 PG (ref 26–34)
MCHC RBC AUTO-ENTMCNC: 32.3 G/DL (ref 31–36)
MCV RBC AUTO: 101.5 FL (ref 80–100)
MONOCYTES ABSOLUTE: 0.8 K/UL (ref 0–1.3)
MONOCYTES RELATIVE PERCENT: 8.6 %
NEUTROPHILS ABSOLUTE: 6.4 K/UL (ref 1.7–7.7)
NEUTROPHILS RELATIVE PERCENT: 70.7 %
PDW BLD-RTO: 13.6 % (ref 12.4–15.4)
PHOSPHORUS: 2.1 MG/DL (ref 2.5–4.9)
PLATELET # BLD: 220 K/UL (ref 135–450)
PMV BLD AUTO: 8.8 FL (ref 5–10.5)
POTASSIUM REFLEX MAGNESIUM: 3.9 MMOL/L (ref 3.5–5.1)
PROTHROMBIN TIME: 13.4 SEC (ref 9.9–12.7)
RBC # BLD: 3.59 M/UL (ref 4–5.2)
SODIUM BLD-SCNC: 134 MMOL/L (ref 136–145)
TOTAL PROTEIN: 5.3 G/DL (ref 6.4–8.2)
WBC # BLD: 9.1 K/UL (ref 4–11)

## 2022-05-18 PROCEDURE — 6370000000 HC RX 637 (ALT 250 FOR IP): Performed by: STUDENT IN AN ORGANIZED HEALTH CARE EDUCATION/TRAINING PROGRAM

## 2022-05-18 PROCEDURE — 2500000003 HC RX 250 WO HCPCS: Performed by: INTERNAL MEDICINE

## 2022-05-18 PROCEDURE — 2580000003 HC RX 258: Performed by: INTERNAL MEDICINE

## 2022-05-18 PROCEDURE — C9113 INJ PANTOPRAZOLE SODIUM, VIA: HCPCS | Performed by: INTERNAL MEDICINE

## 2022-05-18 PROCEDURE — 2000000000 HC ICU R&B

## 2022-05-18 PROCEDURE — 6370000000 HC RX 637 (ALT 250 FOR IP): Performed by: INTERNAL MEDICINE

## 2022-05-18 PROCEDURE — 6360000002 HC RX W HCPCS: Performed by: INTERNAL MEDICINE

## 2022-05-18 PROCEDURE — 92526 ORAL FUNCTION THERAPY: CPT

## 2022-05-18 PROCEDURE — 2580000003 HC RX 258: Performed by: STUDENT IN AN ORGANIZED HEALTH CARE EDUCATION/TRAINING PROGRAM

## 2022-05-18 PROCEDURE — 2500000003 HC RX 250 WO HCPCS: Performed by: STUDENT IN AN ORGANIZED HEALTH CARE EDUCATION/TRAINING PROGRAM

## 2022-05-18 PROCEDURE — 80053 COMPREHEN METABOLIC PANEL: CPT

## 2022-05-18 PROCEDURE — 99233 SBSQ HOSP IP/OBS HIGH 50: CPT | Performed by: INTERNAL MEDICINE

## 2022-05-18 PROCEDURE — 84100 ASSAY OF PHOSPHORUS: CPT

## 2022-05-18 PROCEDURE — 36415 COLL VENOUS BLD VENIPUNCTURE: CPT

## 2022-05-18 PROCEDURE — 83735 ASSAY OF MAGNESIUM: CPT

## 2022-05-18 PROCEDURE — 85610 PROTHROMBIN TIME: CPT

## 2022-05-18 PROCEDURE — 94761 N-INVAS EAR/PLS OXIMETRY MLT: CPT

## 2022-05-18 PROCEDURE — 85025 COMPLETE CBC W/AUTO DIFF WBC: CPT

## 2022-05-18 RX ADMIN — CEFTRIAXONE 2000 MG: 2 INJECTION, POWDER, FOR SOLUTION INTRAMUSCULAR; INTRAVENOUS at 14:29

## 2022-05-18 RX ADMIN — VALPROATE SODIUM 250 MG: 100 INJECTION, SOLUTION INTRAVENOUS at 16:46

## 2022-05-18 RX ADMIN — SERTRALINE 100 MG: 50 TABLET, FILM COATED ORAL at 20:32

## 2022-05-18 RX ADMIN — MIDODRINE HYDROCHLORIDE 2.5 MG: 5 TABLET ORAL at 14:32

## 2022-05-18 RX ADMIN — CARBIDOPA AND LEVODOPA 1.5 TABLET: 25; 100 TABLET ORAL at 00:22

## 2022-05-18 RX ADMIN — CARBIDOPA AND LEVODOPA 1.5 TABLET: 25; 100 TABLET ORAL at 18:02

## 2022-05-18 RX ADMIN — METRONIDAZOLE 500 MG: 500 INJECTION, SOLUTION INTRAVENOUS at 14:32

## 2022-05-18 RX ADMIN — VALPROATE SODIUM 250 MG: 100 INJECTION, SOLUTION INTRAVENOUS at 20:30

## 2022-05-18 RX ADMIN — METRONIDAZOLE 500 MG: 500 INJECTION, SOLUTION INTRAVENOUS at 00:04

## 2022-05-18 RX ADMIN — HYDROCORTISONE SODIUM SUCCINATE 50 MG: 100 INJECTION, POWDER, FOR SOLUTION INTRAMUSCULAR; INTRAVENOUS at 09:32

## 2022-05-18 RX ADMIN — HEPARIN SODIUM 5000 UNITS: 5000 INJECTION INTRAVENOUS; SUBCUTANEOUS at 10:31

## 2022-05-18 RX ADMIN — MIDODRINE HYDROCHLORIDE 2.5 MG: 5 TABLET ORAL at 05:39

## 2022-05-18 RX ADMIN — VALPROATE SODIUM 250 MG: 100 INJECTION, SOLUTION INTRAVENOUS at 02:17

## 2022-05-18 RX ADMIN — SODIUM CHLORIDE, PRESERVATIVE FREE 10 ML: 5 INJECTION INTRAVENOUS at 20:32

## 2022-05-18 RX ADMIN — VALPROATE SODIUM 250 MG: 100 INJECTION, SOLUTION INTRAVENOUS at 09:40

## 2022-05-18 RX ADMIN — PANTOPRAZOLE SODIUM 40 MG: 40 INJECTION, POWDER, FOR SOLUTION INTRAVENOUS at 09:31

## 2022-05-18 RX ADMIN — MIDODRINE HYDROCHLORIDE 2.5 MG: 5 TABLET ORAL at 20:32

## 2022-05-18 RX ADMIN — SODIUM CHLORIDE: 9 INJECTION, SOLUTION INTRAVENOUS at 20:28

## 2022-05-18 RX ADMIN — SODIUM PHOSPHATE, MONOBASIC, MONOHYDRATE 15 MMOL: 276; 142 INJECTION, SOLUTION INTRAVENOUS at 15:20

## 2022-05-18 RX ADMIN — METRONIDAZOLE 500 MG: 500 INJECTION, SOLUTION INTRAVENOUS at 05:38

## 2022-05-18 RX ADMIN — ACETAMINOPHEN 650 MG: 325 TABLET ORAL at 00:23

## 2022-05-18 RX ADMIN — CARBIDOPA AND LEVODOPA 1.5 TABLET: 25; 100 TABLET ORAL at 09:32

## 2022-05-18 RX ADMIN — METRONIDAZOLE 500 MG: 500 INJECTION, SOLUTION INTRAVENOUS at 20:34

## 2022-05-18 RX ADMIN — SODIUM CHLORIDE: 9 INJECTION, SOLUTION INTRAVENOUS at 09:31

## 2022-05-18 RX ADMIN — HEPARIN SODIUM 5000 UNITS: 5000 INJECTION INTRAVENOUS; SUBCUTANEOUS at 20:32

## 2022-05-18 RX ADMIN — LEVOTHYROXINE SODIUM 100 MCG: 0.1 TABLET ORAL at 09:32

## 2022-05-18 ASSESSMENT — PAIN SCALES - GENERAL
PAINLEVEL_OUTOF10: 0

## 2022-05-18 NOTE — PROGRESS NOTES
Gastroenterology Progress Note      John Mcmillan is a 60 y.o. female patient. 1. Septic shock ([facility]    2. Aspiration pneumonia, unspecified aspiration pneumonia type, unspecified laterality, unspecified part of lung ([facility]    3. Acute hypoxemic respiratory failure (HCC)    4. Hypernatremia        SUBJECTIVE:  Pt nonverbal. Tolerating NG tube feeds. BP better this morning. ROS:  Unable to obtain    Physical    VITALS:  BP 95/82   Pulse 62   Temp 96.2 °F (35.7 °C) (Bladder)   Resp 20   Ht 4' 9\" (1.448 m)   Wt 100 lb 5 oz (45.5 kg)   SpO2 97%   BMI 21.71 kg/m²   TEMPERATURE:  Current - Temp: 96.2 °F (35.7 °C); Max - Temp  Av.8 °F (36 °C)  Min: 95.7 °F (35.4 °C)  Max: 97.5 °F (36.4 °C)    NAD  RRR   Abdomen soft, ND, NT, no HSM, Bowel sounds normal  Nonverbal  Anicteric no jaundice    Data    Data Review:    Recent Labs     22  0710 22  1105 22  0626   WBC 5.3 6.3 9.1   HGB 10.0* 12.1 11.8*   HCT 30.9* 37.1 36.4   .6* 101.7* 101.5*    224 220     Recent Labs     22  0710 22  1320 22  0907 22  0626     --  130* 134*   K 3.2* 3.7 3.7 3.9     --  102 103   CO2 25  --  19* 24   PHOS 2.6  --  2.4* 2.1*   BUN 27*  --  28* 24*   CREATININE 0.6  --  0.6 <0.5*     Recent Labs     22  0710 22  0907 22  0626   AST 37 44* 40*   ALT <5* <5* <5*   BILITOT <0.2 <0.2 <0.2   ALKPHOS 67 96 110     No results for input(s): LIPASE, AMYLASE in the last 72 hours. Recent Labs     22  0710 22  0626   PROTIME 18.4* 13.4*   INR 1.60* 1.18*     No results for input(s): PTT in the last 72 hours. ASSESSMENT     Oropharyngeal dysphagia -per SLP eval.  Recs were for n.p.o. with alternative means of nutrition. Patient did have prior PEG but apparently had pulled this in the past and it is no longer in place.   Group B strep bacteremia  Pneumonia      PLAN    -Left  with APSINavya, to call back to discuss EGD with PEG placement possibly on Friday if BP stable. Of note, there is the possibility she may pull the tube as she has done this in the past.      Discussed with Dr. Amado Lozano, JENNIFER  [facility] attending addendum:     I have personally performed a face-to-face diagnostic evaluation on this patient. I have reviewed and agreed with the plan of care as documented by nurse practitioner. History and exam by me shows: We are following this patient for dysphagia. Her vital signs appear to be more stable today. Discussed with primary team regarding timing of the PEG. We will also discussed with patient's POA.   Pending this we will tentatively plan for Friday if clinically stable    Tammie Anderson MD,   Attending Shweta Zuleta

## 2022-05-18 NOTE — PROGRESS NOTES
Facility/Department: [address]  Speech Language Pathology   Dysphagia Treatment Note    Patient: Amie Justin   : 1965   MRN: [id number]      Evaluation Date: 2022      Admitting Dx: Hypernatremia [E87.0]  Septic shock ([address] [A41.9, R65.21]  Aspiration pneumonia, unspecified aspiration pneumonia type, unspecified laterality, unspecified part of lung ([address] [J69.0]  Acute hypoxemic respiratory failure ([address] [J96.01]  Sepsis with acute organ dysfunction without septic shock, due to unspecified organism, unspecified type ([address] [A41.9, R65.20]  Treatment Diagnosis: Oropharyngeal Dysphagia   Pain: Unable to state                                              Diet and Treatment Recommendations 2022:  Diet Solids Recommendation:  1.) If aggressive means of care are desired, recommend NPO with alternative means of hydration/nutrition.     2.) If comfort care is desired recommend Dysphagia I Pureed with Moderately Thick (honey) Liquids, meds crushed in puree Recommended form of Meds: Crushed as able in puree  or Via alternative means of nutrition      Compensatory strategies:   TBD     Assessment of Texture Tolerance:  Diet level prior to treatment: NPO    Tolerance of Current Diet Level: N/A     Impressions: Pt was repositioned Upright in bed . Pt sleeping upon entering the room, however easily awakened. Per GI note, plan is for possible PEG tube placement on 22. Currently on room air. Trials of puree  were provided to assess swallow function. SLP facilitated trials due to pt inability to self feed. Clinical symptoms of limited oral cavity opening for trials via tsp, decreased oral control with reduced A-P propulsion. Continued signs of suspected pharyngeal pooling and delayed vs absent swallow initiation assessed across trials. Difficult to palpate to assess laryngeal elevation and feel for swallow initiation. Following 4-5 trials of puree, delayed, congested coughing was noted.  Unclear if due to possible aspiration vs congestion as Pt's sister in room reported she had been coughing earlier in the date as well. Overall, Pt remains at HIGH risk for aspiration as well as nutritional compromise due to delayed swallow initiation, intermittent oral acceptance and need for total feeding assistance. Recommend consideration of alternative means of hydration/nutrition pending goal of care. See above for recommendations.     Dysphagia Goals:   Pt will functionally tolerate ongoing assessment of swallow function with diet to be determined as indicated pending goals of care. (ongoing 5/18/2022)     Plan:    Speech therapy for dysphagia tx 3-5 times per week during acute care stay pending goals of care. Pt unable to actively participate in dysphagia therapy. Patient/Family Education:  Provided education regarding role of SLP, recommendations and general speech pathology plan of care. [] Pt verbalized understanding and agreement   [] Pt requires ongoing learning   [x] No evidence of comprehension     Discharge Recommendations:    Discharge recommendations to be determined pending ongoing follow-up during acute care stay    Timed Code Treatment: 0 minutes    Total Treatment Time: 13 minutes     If patient discharges prior to next session this note will serve as a discharge summary.      Signature:  Belgica Elizabeth M.A., [address] [place] [address]  Speech-Language Pathologist

## 2022-05-18 NOTE — PROGRESS NOTES
Infectious Diseases   Progress Note      Admission Date: 5/10/2022  Hospital Day: Hospital Day: 9   Attending: Shawn Davis MD  Date of service: 5/18/2022     Chief complaint/ Reason for consult:     · Septic shock with high fever, worsening leukocytosis, hypotension requiring vasopressors and lactic acidosis  · Group B streptococcus bacteremia  · Acute respiratory failure with hypoxia  · Left lower lobe pneumonia, concern for aspiration    Microbiology:        I have reviewed allavailable micro lab data and cultures    · Blood culture (1/2)  - collected on 5/10/2022: Group B streptococcus  Tracheal aspirate culture  - collected on 5/12/2022: Group B streptococcus, MSSA    Susceptibility      Staphylococcus aureus (2)    Antibiotic Interpretation Microscan  Method Status    ceFAZolin Sensitive <=4 mcg/mL BACTERIAL SUSCEPTIBILITY PANEL BY PALLAVI     clindamycin Sensitive <=0.5 mcg/mL BACTERIAL SUSCEPTIBILITY PANEL BY PALLAVI     erythromycin Sensitive <=0.5 mcg/mL BACTERIAL SUSCEPTIBILITY PANEL BY PALLAVI     oxacillin Sensitive 0.5 mcg/mL BACTERIAL SUSCEPTIBILITY PANEL BY PALLAVI     tetracycline Sensitive <=4 mcg/mL BACTERIAL SUSCEPTIBILITY PANEL BY PALLAVI     trimethoprim-sulfamethoxazole Sensitive <=0.5/9.5 mcg/mL BACTERIAL SUSCEPTIBILITY PANEL BY PALLAVI           Antibiotics and immunizations:       Current antibiotics: All antibiotics and their doses were reviewed by me    Recent Abx Admin                   metronidazole (FLAGYL) 500 mg in 0.9% NaCl 100 mL IVPB premix (mg) 500 mg New Bag 05/18/22 0538     500 mg New Bag  0004     500 mg New Bag 05/17/22 1506    cefTRIAXone (ROCEPHIN) 2000 mg IVPB in D5W 50ml minibag (mg) 2,000 mg New Bag 05/17/22 1337                  Immunization History: All immunization history was reviewed by me today. There is no immunization history on file for this patient. Known drug allergies:      All allergies were reviewed and updated    Allergies   Allergen Reactions    Caffeine     Iodine        Social history:     Social History:  All social andepidemiologic history was reviewed and updated by me today as needed. · Tobacco use:   reports that she has never smoked. She has never used smokeless tobacco.  · Alcohol use:   reports no history of alcohol use. · Currently lives in: [address]  ·  reports no history of drug use. COVID VACCINATION AND LAB RESULT RECORDS:     Internal Administration   First Dose      Second Dose           Last COVID Lab SARS-CoV-2 (no units)   Date Value   04/05/2022 Not Detected     SARS-CoV-2, PCR (no units)   Date Value   03/24/2022 Not Detected     SARS-CoV-2 RNA, RT PCR (no units)   Date Value   05/10/2022 NOT DETECTED            Assessment:     The patient is a 60 y.o. old female who  has a past medical history of Anemia, Anorexia, Autistic disorder, Convulsions ([address] Down syndrome, Dysphagia, Hypothyroid, Malnutrition ([address] Parkinson disease ([address] UTI (urinary tract infection), and Weakness. with following problems:      · Septic shock with high fever, worsening leukocytosis, hypotension requiring vasopressors and lactic acidosis-sepsis resolved, remains hypotensive  · Group B streptococcus bacteremia-covered with ceftriaxone  · Acute respiratory failure with hypoxia -respiratory culture grew MSSA, covered with ceftriaxone  · Left lower lobe pneumonia, concern for aspiration-tracheal aspirate culture has grown Streptococcus and MSSA-covered with ceftriaxone  · Parkinsonism  · History of Down syndrome  · Anorexia  ·       Discussion:      The patient is afebrile. White cell count is 9100. She is on IV ceftriaxone and IV Flagyl. Serum creatinine 0.5. Liver functions are okay. Plan:     Diagnostic Workup:    ·   · Continue to follow  fever curve, WBC count and blood cultures. · Continue to monitor blood counts, liver and renal function.     Antimicrobials:    · Will continue IV ceftriaxone for MSSA and streptococcal coverage  · Continue IV Flagyl 500 mg every 8 hour  · PICC line could not be placed due to contracture issues  · Remains on NG tube feeding  · GI team following about PEG  · If PEG tube can be placed, can potentially switch the patient to oral antibiotics at discharge  · Continue close vitals monitoring. · Maintain good glycemic control. · Fall precautions. · Aspiration precautions. · Continue to watch for new fever or diarrhea. · DVT prophylaxis. · Discussed all above with RN. Drug Monitoring:    · Continue monitoring for antibiotic toxicity as follows: CBC, CMP   · Continue to watch for following: new or worsening fever, new hypotension, hives, lip swelling and redness or purulence at vascular access sites. I/v access Management:    · Continue to monitor i.v access sites for erythema, induration, discharge or tenderness. · As always, continue efforts to minimize tubes/lines/drains as clinically appropriate to reduce chances of line associated infections. Level of complexity of visit: High     TIME SPENT TODAY:     - Spent over  36 minutes on visit (including interval history, physical exam, review of data including labs, cultures, imaging, development and implementation of treatment plan and coordination of complex care). More than 50 percent of this includes face-to-face time spent with the patient for counseling and coordination of care. Thank you for involving me in the care of your patient. I will continue to follow. If you have anyadditional questions, please do not hesitate to contact me. Subjective: Interval history: Interval history was obtained from chart review and RN. She is afebrile. The patient is afebrile. She is on iv antibiotics ok. No diarrhea     REVIEW OF SYSTEMS:      Review of Systems   Unable to perform ROS: Patient nonverbal         Past Medical History: All past medical history reviewed today.     Past Medical History:   Diagnosis Date    Anemia     Anorexia     Autistic disorder     [facility]     Down syndrome     Dysphagia     Hypothyroid     Malnutrition ([facility]     [facility] disease [facility]     UTI (urinary tract infection)     Weakness        Past Surgical History: All past surgical history was reviewed today. Past Surgical History:   Procedure Laterality Date    GASTROSTOMY TUBE PLACEMENT      GASTROSTOMY TUBE PLACEMENT  2/18/14       Family History: All family history was reviewed today. History reviewed. No pertinent family history. Objective:       PHYSICAL EXAM:      Vitals:   Vitals:    05/18/22 0400 05/18/22 0500 05/18/22 0800 05/18/22 1250   BP: 101/73  95/82 102/76   Pulse: 59  62 63   Resp: 18  20 14   Temp: 97 °F (36.1 °C)  96.2 °F (35.7 °C) 95.2 °F (35.1 °C)   TempSrc: Bladder  Bladder Bladder   SpO2: 93%  97% 94%   Weight:  100 lb 5 oz (45.5 kg)     Height:           Physical Exam     General: Encephalopathic but protecting the airway so far,   HEENT: normocephalic, atraumatic, sclera clear, pupils equal, light reflex preserved bilaterally  Cardiovascular: RRR, no murmurs/rubs/gallops detected  Pulmonary: CTABL, no rhonchi/rales   Abdomen/GI: soft, no organomegaly, bowel sounds positive  Neuro: Encephalopathic, pupils are equal and reactive to light, moves all extremities  Skin: no rash,   Musculoskeletal:  No obvious joint swelling, redness. No limitation of range of passive motion  Genitourinary: Yuan's catheter in place   Psych: could not assess   Lymphatic/Immunologic: No obvious bruising, no cervical lymphadenopathy    Lines: All vascular access sites are healthy with no local erythema, discharge or tenderness    Intake and output:    I/O last 3 completed shifts: In: 2942.5 [I.V.:1581.7; NG/GT:666; IV Piggyback:694.7]  Out: 750 [Urine:750]    Lab Data:   All available labs and old records have been reviewed by me.     CBC:  Recent Labs     05/16/22  0710 05/17/22  1105 05/18/22  0626   WBC 5.3 6.3 9.1   RBC 3.07* 3.65* 3.59*   HGB 10.0* 12.1 11.8*   HCT 30.9* 37.1 36.4    224 220   .6* 101.7* 101.5*   MCH 32.7 33.1 32.8   MCHC 32.6 32.6 32.3   RDW 13.2 13.6 13.6        BMP:  Recent Labs     05/16/22  0710 05/16/22  1320 05/17/22  0907 05/18/22  0626     --  130* 134*   K 3.2* 3.7 3.7 3.9     --  102 103   CO2 25  --  19* 24   BUN 27*  --  28* 24*   CREATININE 0.6  --  0.6 <0.5*   CALCIUM 8.0*  --  8.3 7.9*   GLUCOSE 72  --  79 90        Hepatic Function Panel:   Lab Results   Component Value Date    ALKPHOS 110 05/18/2022    ALT <5 05/18/2022    AST 40 05/18/2022    PROT 5.3 05/18/2022    PROT 6.9 02/06/2013    BILITOT <0.2 05/18/2022    BILIDIR <0.2 12/23/2016    IBILI see below 12/23/2016    LABALBU 1.7 05/18/2022       CPK:   Lab Results   Component Value Date    CKTOTAL 78 08/22/2012     ESR: No results found for: SEDRATE  CRP: No results found for: CRP        Imaging: All pertinent images and reports for the current visit were reviewed by me during this visit. I reviewed the chest x-ray/CT scan/MRI images and independently interpreted the findings and results today. XR CHEST PORTABLE   Final Result   Moderate airspace disease right upper and lower lobe with volume loss. Pneumonia and or mucous plugging both considered. NG tube extends well into the stomach. XR CHEST PORTABLE   Final Result   1. Endotracheal tube terminates in the left mainstem bronchus, for which   retraction 5 cm is recommended. 2.  Enteric tube terminates at the level of the body of the stomach. 3.  No significant change in bilateral airspace disease. Probable small left   effusion. XR ABDOMEN FOR NG/OG/NE TUBE PLACEMENT   Final Result   NG tube position appears acceptable. Endotracheal tube tip is likely within 1 cm above the anita. Dense left basilar opacity and patchy opacities in both lungs.          XR CHEST PORTABLE   Final Result   Endotracheal tube is present with tip projecting just proximal to the anita. Left basilar opacity and trace left pleural effusion. Aspiration and   pneumonia are in the differential.             Medications: All current and past medications were reviewed.      hydrocortisone sodium succinate PF  50 mg IntraVENous Daily    lidocaine 1 % injection  5 mL IntraDERmal Once    sodium chloride flush  5-40 mL IntraVENous 2 times per day    midodrine  2.5 mg Orogastric q8h    valproate sodium (DEPACON) IVPB  250 mg IntraVENous Q6H    cefTRIAXone (ROCEPHIN) IV  2,000 mg IntraVENous Q24H    metroNIDAZOLE  500 mg IntraVENous Q8H    levothyroxine  100 mcg Per NG tube Daily    carbidopa-levodopa  1.5 tablet Per NG tube q8h    sertraline  100 mg Per NG tube Nightly    pantoprazole  40 mg IntraVENous Daily    heparin (porcine)  5,000 Units SubCUTAneous BID    alteplase  2 mg IntraCATHeter Once        sodium chloride 100 mL/hr at 05/18/22 0931    sodium chloride      sodium chloride 5 mL/hr at 05/17/22 1256       potassium chloride **OR** potassium chloride, sodium chloride flush, sodium chloride, atropine, sodium chloride flush, sodium chloride, sodium phosphate IVPB **OR** sodium phosphate IVPB **OR** sodium phosphate IVPB, ondansetron, acetaminophen **OR** acetaminophen, fentanNYL      Problem list:       Patient Active Problem List   Diagnosis Code    Down syndrome Q90.9    Down syndrome Q90.9    Down syndrome Q90.9    Dysphagia R13.10    Autistic disorder F84.0    PEG adjustment, replacement, or removal Z43.1    Hypotension I95.9    Sepsis ([address] A41.9    Acute hypoxemic respiratory failure (HCC) J96.01    AVELINO (acute kidney injury) ([address] N17.9    Aspiration pneumonia (HCC) J69.0    Septic shock (HCC) A41.9, R65.21    Moderate malnutrition ([address] E44.0    Streptococcal bacteremia R78.81, B95.5    Pneumonia of left lower lobe due to infectious organism J18.9    Parkinsonism ([address] G20    Anorexia R63.0    Pneumonia of both lower lobes due to methicillin susceptible Staphylococcus aureus (MSSA) ([facility] J15.211       Please note that this chart was generated using Dragon dictation software. Although every effort was made to ensure the accuracy of this automated transcription, some errors in transcription may have occurred inadvertently. If you may need any clarification, please do not hesitate to contact me through EPIC or at the phone number provided below with my electronic signature. Any pictures or media included in this note were obtained after taking informed verbal consent from the patient and with their approval to include those in the patient's medical record.       Devika Calvillo MD, MPH, [address]  5/18/2022, 1:21 PM  [facility] Infectious Disease   [address] [place] [address], [address] [place] [address]  Office: [phone]  Fax: [phone]  Clinic days:  Tuesday & Thursday

## 2022-05-18 NOTE — FLOWSHEET NOTE
05/17/22 2102   Encounter Summary   Encounter Overview/Reason  Spiritual/Emotional Needs   Referral/Consult From: [address] Family members   Last Encounter  05/17/22  (Spir care info / intro to pts sharda MARCANO)   Complexity of Encounter Low   Begin Time 1810   End Time  1820   Total Time Calculated 10 min   Encounter    Type Family Care   Spiritual/Emotional needs   Type Spiritual Support   Assessment/Intervention/Outcome   Intervention Active listening     Conversation with niece at bedside. Spiritual Support offered.  visited patient to provide introduction and information on Spiritual Care. Patient was encouraged to have nurse contact [facility] if there are any needs or ways we can support their spiritual journey.       Electronically signed by [place] Or on 5/17/2022 at 9:05 PM

## 2022-05-19 LAB
A/G RATIO: 0.5 (ref 1.1–2.2)
ALBUMIN SERPL-MCNC: 1.7 G/DL (ref 3.4–5)
ALP BLD-CCNC: 106 U/L (ref 40–129)
ALT SERPL-CCNC: 6 U/L (ref 10–40)
ANION GAP SERPL CALCULATED.3IONS-SCNC: 8 MMOL/L (ref 3–16)
AST SERPL-CCNC: 31 U/L (ref 15–37)
BASOPHILS ABSOLUTE: 0 K/UL (ref 0–0.2)
BASOPHILS RELATIVE PERCENT: 0.2 %
BILIRUB SERPL-MCNC: <0.2 MG/DL (ref 0–1)
BUN BLDV-MCNC: 16 MG/DL (ref 7–20)
CALCIUM SERPL-MCNC: 7.7 MG/DL (ref 8.3–10.6)
CHLORIDE BLD-SCNC: 105 MMOL/L (ref 99–110)
CO2: 23 MMOL/L (ref 21–32)
CREAT SERPL-MCNC: <0.5 MG/DL (ref 0.6–1.1)
EOSINOPHILS ABSOLUTE: 0.1 K/UL (ref 0–0.6)
EOSINOPHILS RELATIVE PERCENT: 1 %
GFR AFRICAN AMERICAN: >60
GFR NON-AFRICAN AMERICAN: >60
GLUCOSE BLD-MCNC: 86 MG/DL (ref 70–99)
HCT VFR BLD CALC: 33.1 % (ref 36–48)
HEMOGLOBIN: 11 G/DL (ref 12–16)
INR BLD: 1.1 (ref 0.88–1.12)
LYMPHOCYTES ABSOLUTE: 1.2 K/UL (ref 1–5.1)
LYMPHOCYTES RELATIVE PERCENT: 15.1 %
MAGNESIUM: 1.8 MG/DL (ref 1.8–2.4)
MCH RBC QN AUTO: 33.3 PG (ref 26–34)
MCHC RBC AUTO-ENTMCNC: 33.3 G/DL (ref 31–36)
MCV RBC AUTO: 99.9 FL (ref 80–100)
MONOCYTES ABSOLUTE: 0.5 K/UL (ref 0–1.3)
MONOCYTES RELATIVE PERCENT: 6.6 %
NEUTROPHILS ABSOLUTE: 5.9 K/UL (ref 1.7–7.7)
NEUTROPHILS RELATIVE PERCENT: 77.1 %
PDW BLD-RTO: 13.4 % (ref 12.4–15.4)
PHOSPHORUS: 1.9 MG/DL (ref 2.5–4.9)
PLATELET # BLD: 240 K/UL (ref 135–450)
PMV BLD AUTO: 9.2 FL (ref 5–10.5)
POTASSIUM SERPL-SCNC: 2.5 MMOL/L (ref 3.5–5.1)
POTASSIUM SERPL-SCNC: 4.5 MMOL/L (ref 3.5–5.1)
PROTHROMBIN TIME: 12.5 SEC (ref 9.9–12.7)
RBC # BLD: 3.31 M/UL (ref 4–5.2)
SODIUM BLD-SCNC: 136 MMOL/L (ref 136–145)
TOTAL PROTEIN: 5 G/DL (ref 6.4–8.2)
WBC # BLD: 7.7 K/UL (ref 4–11)

## 2022-05-19 PROCEDURE — 94760 N-INVAS EAR/PLS OXIMETRY 1: CPT

## 2022-05-19 PROCEDURE — 6360000002 HC RX W HCPCS: Performed by: INTERNAL MEDICINE

## 2022-05-19 PROCEDURE — 85610 PROTHROMBIN TIME: CPT

## 2022-05-19 PROCEDURE — 2500000003 HC RX 250 WO HCPCS: Performed by: STUDENT IN AN ORGANIZED HEALTH CARE EDUCATION/TRAINING PROGRAM

## 2022-05-19 PROCEDURE — 99232 SBSQ HOSP IP/OBS MODERATE 35: CPT | Performed by: INTERNAL MEDICINE

## 2022-05-19 PROCEDURE — 36415 COLL VENOUS BLD VENIPUNCTURE: CPT

## 2022-05-19 PROCEDURE — 2000000000 HC ICU R&B

## 2022-05-19 PROCEDURE — 84132 ASSAY OF SERUM POTASSIUM: CPT

## 2022-05-19 PROCEDURE — 6370000000 HC RX 637 (ALT 250 FOR IP): Performed by: INTERNAL MEDICINE

## 2022-05-19 PROCEDURE — 6360000002 HC RX W HCPCS: Performed by: STUDENT IN AN ORGANIZED HEALTH CARE EDUCATION/TRAINING PROGRAM

## 2022-05-19 PROCEDURE — 80053 COMPREHEN METABOLIC PANEL: CPT

## 2022-05-19 PROCEDURE — 6370000000 HC RX 637 (ALT 250 FOR IP): Performed by: STUDENT IN AN ORGANIZED HEALTH CARE EDUCATION/TRAINING PROGRAM

## 2022-05-19 PROCEDURE — 92526 ORAL FUNCTION THERAPY: CPT

## 2022-05-19 PROCEDURE — 2580000003 HC RX 258: Performed by: INTERNAL MEDICINE

## 2022-05-19 PROCEDURE — 84100 ASSAY OF PHOSPHORUS: CPT

## 2022-05-19 PROCEDURE — C9113 INJ PANTOPRAZOLE SODIUM, VIA: HCPCS | Performed by: INTERNAL MEDICINE

## 2022-05-19 PROCEDURE — 83735 ASSAY OF MAGNESIUM: CPT

## 2022-05-19 PROCEDURE — 2500000003 HC RX 250 WO HCPCS: Performed by: INTERNAL MEDICINE

## 2022-05-19 PROCEDURE — 2580000003 HC RX 258: Performed by: STUDENT IN AN ORGANIZED HEALTH CARE EDUCATION/TRAINING PROGRAM

## 2022-05-19 PROCEDURE — 85025 COMPLETE CBC W/AUTO DIFF WBC: CPT

## 2022-05-19 PROCEDURE — 94761 N-INVAS EAR/PLS OXIMETRY MLT: CPT

## 2022-05-19 RX ADMIN — POTASSIUM CHLORIDE 10 MEQ: 7.46 INJECTION, SOLUTION INTRAVENOUS at 12:16

## 2022-05-19 RX ADMIN — VALPROATE SODIUM 250 MG: 100 INJECTION, SOLUTION INTRAVENOUS at 09:39

## 2022-05-19 RX ADMIN — HYDROCORTISONE SODIUM SUCCINATE 50 MG: 100 INJECTION, POWDER, FOR SOLUTION INTRAMUSCULAR; INTRAVENOUS at 09:31

## 2022-05-19 RX ADMIN — SERTRALINE 100 MG: 50 TABLET, FILM COATED ORAL at 20:38

## 2022-05-19 RX ADMIN — POTASSIUM CHLORIDE 10 MEQ: 7.46 INJECTION, SOLUTION INTRAVENOUS at 10:56

## 2022-05-19 RX ADMIN — LEVOTHYROXINE SODIUM 100 MCG: 0.1 TABLET ORAL at 09:30

## 2022-05-19 RX ADMIN — HEPARIN SODIUM 5000 UNITS: 5000 INJECTION INTRAVENOUS; SUBCUTANEOUS at 09:29

## 2022-05-19 RX ADMIN — PANTOPRAZOLE SODIUM 40 MG: 40 INJECTION, POWDER, FOR SOLUTION INTRAVENOUS at 09:30

## 2022-05-19 RX ADMIN — SODIUM CHLORIDE, PRESERVATIVE FREE 10 ML: 5 INJECTION INTRAVENOUS at 20:40

## 2022-05-19 RX ADMIN — CEFTRIAXONE 2000 MG: 2 INJECTION, POWDER, FOR SOLUTION INTRAMUSCULAR; INTRAVENOUS at 15:28

## 2022-05-19 RX ADMIN — POTASSIUM CHLORIDE 10 MEQ: 7.46 INJECTION, SOLUTION INTRAVENOUS at 13:21

## 2022-05-19 RX ADMIN — VALPROATE SODIUM 250 MG: 100 INJECTION, SOLUTION INTRAVENOUS at 18:04

## 2022-05-19 RX ADMIN — VALPROATE SODIUM 250 MG: 100 INJECTION, SOLUTION INTRAVENOUS at 02:55

## 2022-05-19 RX ADMIN — METRONIDAZOLE 500 MG: 500 INJECTION, SOLUTION INTRAVENOUS at 22:03

## 2022-05-19 RX ADMIN — METRONIDAZOLE 500 MG: 500 INJECTION, SOLUTION INTRAVENOUS at 16:41

## 2022-05-19 RX ADMIN — POTASSIUM PHOSPHATE, MONOBASIC AND POTASSIUM PHOSPHATE, DIBASIC 30 MMOL: 224; 236 INJECTION, SOLUTION, CONCENTRATE INTRAVENOUS at 15:25

## 2022-05-19 RX ADMIN — CARBIDOPA AND LEVODOPA 1.5 TABLET: 25; 100 TABLET ORAL at 01:18

## 2022-05-19 RX ADMIN — MIDODRINE HYDROCHLORIDE 2.5 MG: 5 TABLET ORAL at 20:38

## 2022-05-19 RX ADMIN — MIDODRINE HYDROCHLORIDE 2.5 MG: 5 TABLET ORAL at 15:28

## 2022-05-19 RX ADMIN — CARBIDOPA AND LEVODOPA 1.5 TABLET: 25; 100 TABLET ORAL at 18:05

## 2022-05-19 RX ADMIN — METRONIDAZOLE 500 MG: 500 INJECTION, SOLUTION INTRAVENOUS at 06:30

## 2022-05-19 RX ADMIN — CARBIDOPA AND LEVODOPA 1.5 TABLET: 25; 100 TABLET ORAL at 09:30

## 2022-05-19 RX ADMIN — POTASSIUM CHLORIDE 10 MEQ: 7.46 INJECTION, SOLUTION INTRAVENOUS at 08:37

## 2022-05-19 RX ADMIN — MIDODRINE HYDROCHLORIDE 2.5 MG: 5 TABLET ORAL at 06:32

## 2022-05-19 ASSESSMENT — PAIN SCALES - GENERAL: PAINLEVEL_OUTOF10: 0

## 2022-05-19 NOTE — PROGRESS NOTES
Gastroenterology Progress Note      Walter Frankel is a 60 y.o. female patient. 1. Septic shock ([facility]    2. Aspiration pneumonia, unspecified aspiration pneumonia type, unspecified laterality, unspecified part of lung ([facility]    3. Acute hypoxemic respiratory failure (HCC)    4. Hypernatremia        SUBJECTIVE:  Pt nonverbal. Tolerating NG tube feeds. ROS:  Unable to obtain    Physical    VITALS:  /78   Pulse 74   Temp 97.7 °F (36.5 °C) (Bladder)   Resp 16   Ht 4' 9\" (1.448 m)   Wt 106 lb 4.2 oz (48.2 kg)   SpO2 93%   BMI 22.99 kg/m²   TEMPERATURE:  Current - Temp: 97.7 °F (36.5 °C); Max - Temp  Av °F (36.1 °C)  Min: 95.2 °F (35.1 °C)  Max: 97.7 °F (36.5 °C)    NAD  RRR   Abdomen soft, ND, NT, no HSM, Bowel sounds normal  Nonverbal  Anicteric no jaundice    Data    Data Review:    Recent Labs     22  1105 22  0622  045   WBC 6.3 9.1 7.7   HGB 12.1 11.8* 11.0*   HCT 37.1 36.4 33.1*   .7* 101.5* 99.9    220 240     Recent Labs     22  0907 22  0622  045   * 134* 136   K 3.7 3.9 2.5*    103 105   CO2 19* 24 23   PHOS 2.4* 2.1* 1.9*   BUN 28* 24* 16   CREATININE 0.6 <0.5* <0.5*     Recent Labs     22  0907 22  0622  045   AST 44* 40* 31   ALT <5* <5* 6*   BILITOT <0.2 <0.2 <0.2   ALKPHOS 96 110 106     No results for input(s): LIPASE, AMYLASE in the last 72 hours. Recent Labs     22  0622  045   PROTIME 13.4* 12.5   INR 1.18* 1.10     No results for input(s): PTT in the last 72 hours. ASSESSMENT     Oropharyngeal dysphagia -per SLP eval.  Recs were for n.p.o. with alternative means of nutrition. Patient did have prior PEG but apparently had pulled this in the past and it is no longer in place. Group B strep bacteremia  Pneumonia      PLAN    -Discussed indications, risks, benefits of EGD with PEG placement with KENY Rankin guardian.  She will call back with decision. Can place PEG tomorrow if POA in agreement.      Discussed with Dr. Marcus Peralta, [facility]  [address] attending addendum:     I have personally performed a face-to-face diagnostic evaluation on this patient. I have reviewed and agreed with the plan of care as documented by nurse practitioner.          Awaiting consent from POA before proceeding with PEG tube placement      Luan Osuna MD,   Attending Angel De

## 2022-05-19 NOTE — PROGRESS NOTES
Facility/Department: [address]  Speech Language Pathology   Dysphagia Treatment Note    Patient: Donna Montes   : 1965   MRN: [id number]      Evaluation Date: 2022      Admitting Dx: Hypernatremia [E87.0]  Septic shock ([address] [A41.9, R65.21]  Aspiration pneumonia, unspecified aspiration pneumonia type, unspecified laterality, unspecified part of lung ([address] [J69.0]  Acute hypoxemic respiratory failure ([address] [J96.01]  Sepsis with acute organ dysfunction without septic shock, due to unspecified organism, unspecified type ([address] [A41.9, R65.20]  Treatment Diagnosis: Oropharyngeal Dysphagia   Pain: Unable to state                                              Diet and Treatment Recommendations 2022:  Diet Solids Recommendation:  1.) If aggressive means of care are desired, recommend NPO with alternative means of hydration/nutrition.     2.) If comfort care is desired recommend Dysphagia I Pureed with Moderately Thick (honey) Liquids, meds crushed in puree Recommended form of Meds: Crushed as able in puree  or Via alternative means of nutrition      Compensatory strategies:   TBD     Assessment of Texture Tolerance:  Diet level prior to treatment: NPO    Tolerance of Current Diet Level: N/A     Impressions: Pt was repositioned Upright in bed . Awake and alert upon SLP entrance into room. . Per GI note, plan is for possible PEG tube placement on 22. Currently on room air. Trials of puree  were provided to assess swallow function. SLP facilitated trials due to pt inability to self feed. Clinical symptoms of limited oral cavity opening for trials via tsp, decreased oral control with reduced A-P propulsion. Continued signs of suspected pharyngeal pooling and delayed vs absent swallow initiation assessed across trials. Difficult to palpate to assess laryngeal elevation and feel for swallow initiation. After limited trials pt became orally defensive with tongue thrusting against stimulation.    Overall, Pt remains at HIGH risk for aspiration as well as nutritional compromise due to delayed swallow initiation, intermittent oral acceptance and need for total feeding assistance as well as hx of moderate-severe oropharyngeal dysphagia. Pt has demonstrated limited progress with SLP and is unable to follow commands for any therapeutic exercises with SLP. At this time recommend discharge from SLP caseload at this time as she is unable to participate in skilled dysphagia tx. Please re-consult if indicated. Recommend consideration of alternative means of hydration/nutrition pending goal of care. See above for recommendations.     Dysphagia Goals:   Pt will functionally tolerate ongoing assessment of swallow function with diet to be determined as indicated pending goals of care. (ongoing 5/19/2022)     Plan:    Recommend discharge from SLP caseload at this time as she is unable to participate in skilled dysphagia tx. Patient/Family Education:  Provided education regarding role of SLP, recommendations and general speech pathology plan of care. [] Pt verbalized understanding and agreement   [] Pt requires ongoing learning   [x] No evidence of comprehension     Discharge Recommendations:    Discharge recommendations to be determined pending ongoing follow-up during acute care stay    Timed Code Treatment: 0 minutes    Total Treatment Time: 13 minutes     If patient discharges prior to next session this note will serve as a discharge summary.      Signature:  Bakari Ibarra, [address]  Speech Language Pathologist

## 2022-05-19 NOTE — PLAN OF CARE
Problem: Discharge Planning  Goal: Discharge to home or other facility with appropriate resources  Outcome: Progressing     Return to group home when able    Problem: Pain  Goal: Verbalizes/displays adequate comfort level or baseline comfort level  Outcome: Progressing     Will continue to monitor      Problem: Nutrition Deficit:  Goal: Optimize nutritional status  Outcome: Progressing  Flowsheets (Taken 5/19/2022 1019 by Siva Mcdowell, RD, LD)  Nutrient intake appropriate for improving, restoring, or maintaining nutritional needs: Recommend, monitor, and adjust tube feedings and TPN/PPN based on assessed needs   Kenneth tube feeding. Plan PEG in AM.      Problem: Skin/Tissue Integrity  Goal: Absence of new skin breakdown  Description: 1. Monitor for areas of redness and/or skin breakdown  2. Assess vascular access sites hourly  3. Every 4-6 hours minimum:  Change oxygen saturation probe site  4. Every 4-6 hours:  If on nasal continuous positive airway pressure, respiratory therapy assess nares and determine need for appliance change or resting period. Outcome: Progressing   Urostomy pouch over leaking central line site. Dr Candy Thomas stated that he looked at it and there is nothing that we can do. Doing a good job of keeping skin dry.

## 2022-05-19 NOTE — PROGRESS NOTES
Infectious Diseases   Progress Note      Admission Date: 5/10/2022  Hospital Day: Hospital Day: 10   Attending: Harpreet Murray MD  Date of service: 5/19/2022     Chief complaint/ Reason for consult:     · Septic shock with high fever, worsening leukocytosis, hypotension requiring vasopressors and lactic acidosis  · Group B streptococcus bacteremia  · Acute respiratory failure with hypoxia  · Left lower lobe pneumonia, concern for aspiration    Microbiology:        I have reviewed allavailable micro lab data and cultures    · Blood culture (1/2)  - collected on 5/10/2022: Group B streptococcus  Tracheal aspirate culture  - collected on 5/12/2022: Group B streptococcus, MSSA    Susceptibility      Staphylococcus aureus (2)    Antibiotic Interpretation Microscan  Method Status    ceFAZolin Sensitive <=4 mcg/mL BACTERIAL SUSCEPTIBILITY PANEL BY PALLAVI     clindamycin Sensitive <=0.5 mcg/mL BACTERIAL SUSCEPTIBILITY PANEL BY PALLAVI     erythromycin Sensitive <=0.5 mcg/mL BACTERIAL SUSCEPTIBILITY PANEL BY PALLAVI     oxacillin Sensitive 0.5 mcg/mL BACTERIAL SUSCEPTIBILITY PANEL BY PALLAVI     tetracycline Sensitive <=4 mcg/mL BACTERIAL SUSCEPTIBILITY PANEL BY PALLAVI     trimethoprim-sulfamethoxazole Sensitive <=0.5/9.5 mcg/mL BACTERIAL SUSCEPTIBILITY PANEL BY PALLAVI           Antibiotics and immunizations:       Current antibiotics: All antibiotics and their doses were reviewed by me    Recent Abx Admin                   cefTRIAXone (ROCEPHIN) 2000 mg IVPB in D5W 50ml minibag (mg) 2,000 mg New Bag 05/19/22 1528    metronidazole (FLAGYL) 500 mg in 0.9% NaCl 100 mL IVPB premix (mg) 500 mg New Bag 05/19/22 0630     500 mg New Bag 05/18/22 2034                  Immunization History: All immunization history was reviewed by me today. There is no immunization history on file for this patient. Known drug allergies:      All allergies were reviewed and updated    Allergies   Allergen Reactions    Caffeine     Iodine        Social history:     Social History:  All social andepidemiologic history was reviewed and updated by me today as needed. · Tobacco use:   reports that she has never smoked. She has never used smokeless tobacco.  · Alcohol use:   reports no history of alcohol use. · Currently lives in: [facility]  ·  reports no history of drug use. COVID VACCINATION AND LAB RESULT RECORDS:     Internal Administration   First Dose      Second Dose           Last COVID Lab SARS-CoV-2 (no units)   Date Value   04/05/2022 Not Detected     SARS-CoV-2, PCR (no units)   Date Value   03/24/2022 Not Detected     SARS-CoV-2 RNA, RT PCR (no units)   Date Value   05/10/2022 NOT DETECTED            Assessment:     The patient is a 60 y.o. old female who  has a past medical history of Anemia, Anorexia, Autistic disorder, Convulsions ([address] Down syndrome, Dysphagia, Hypothyroid, Malnutrition ([address] Parkinson disease ([address] UTI (urinary tract infection), and Weakness. with following problems:      · Septic shock with high fever, worsening leukocytosis, hypotension requiring vasopressors and lactic acidosis-resolved  · Group B streptococcus bacteremia-covered with ceftriaxone  · Acute respiratory failure with hypoxia -respiratory culture grew MSSA - covered with ceftriaxone  · Left lower lobe pneumonia, concern for aspiration-tracheal aspirate culture has grown Streptococcus and MSSA- covered with ceftriaxone  · Parkinsonism  · History of Down syndrome  · Anorexia  ·       Discussion:      The patient had is afebrile. She remains on IV ceftriaxone and IV Flagyl. White cell count is 7700 today. Repeat blood cultures from 5/13/2022 remain    Serum creatinine is 0.5      Plan:     Diagnostic Workup:      · Continue to follow  fever curve, WBC count and blood cultures. · Continue to monitor blood counts, liver and renal function.     Antimicrobials:    · Will continue iv ceftriaxone 2 gm every 24 hours  · Will continue iv flagyl 500 mg every 8 hours  · We will follow up on the culture results and clinical progress and will make further recommendations accordingly. · Continue close vitals monitoring. · Maintain good glycemic control. · Fall precautions. · Aspiration precautions. · Continue to watch for new fever or diarrhea. · DVT prophylaxis. · Discussed all above with patient and RN. Drug Monitoring:    · Continue monitoring for antibiotic toxicity as follows: CBC, CMP   · Continue to watch for following: new or worsening fever, new hypotension, hives, lip swelling and redness or purulence at vascular access sites. I/v access Management:    · Continue to monitor i.v access sites for erythema, induration, discharge or tenderness. · As always, continue efforts to minimize tubes/lines/drains as clinically appropriate to reduce chances of line associated infections. Patient education and counseling:        · The patient was educated in detail about the side-effects of various antibiotics and things to watch for like new rashes, lip swelling, severe reaction, worsening diarrhea, break through fever etc.  · Discussed patient's condition and what to expect. All of the patient's questions were addressed in a satisfactory manner and patient verbalized understanding all instructions. Level of complexity of visit: High       Thank you for involving me in the care of your patient. I will continue to follow. If you have anyadditional questions, please do not hesitate to contact me. Subjective: Interval history: Interval history was obtained from chart review and RN. The patient is afebrile. Remains nonverbal.  Seems to be tolerating antibiotics okay     REVIEW OF SYSTEMS:      Review of Systems   Unable to perform ROS: Patient nonverbal         Past Medical History: All past medical history reviewed today.     Past Medical History:   Diagnosis Date    Anemia     Anorexia     Autistic disorder     [facility]     Down syndrome     Dysphagia     Hypothyroid     Mal[facility]     Parkinson disease ([facility]     UTI (urinary tract infection)     Weakness        Past Surgical History: All past surgical history was reviewed today. Past Surgical History:   Procedure Laterality Date    GASTROSTOMY TUBE PLACEMENT      GASTROSTOMY TUBE PLACEMENT  2/18/14       Family History: All family history was reviewed today. History reviewed. No pertinent family history. Objective:       PHYSICAL EXAM:      Vitals:   Vitals:    05/19/22 1000 05/19/22 1019 05/19/22 1149 05/19/22 1213   BP: (!) 105/56   93/77   Pulse: 74   75   Resp: 18 17 17   Temp:    97.4 °F (36.3 °C)   TempSrc:    Bladder   SpO2:   94% 95%   Weight:       Height:  4' 9\" (1.448 m)         Physical Exam     General: Encephalopathic but protecting the airway so far,   HEENT: normocephalic, atraumatic, sclera clear, pupils equal, light reflex preserved bilaterally  Cardiovascular: RRR, no murmurs/rubs/gallops detected  Pulmonary: CTABL, no rhonchi/rales   Abdomen/GI: soft, no organomegaly, bowel sounds positive  Neuro: Encephalopathic, pupils are equal and reactive to light, moves all extremities  Skin: no rash,   Musculoskeletal:  No obvious joint swelling, redness. No limitation of range of passive motion  Genitourinary: Yuan's catheter in place   Psych: could not assess   Lymphatic/Immunologic: No obvious bruising, no cervical lymphadenopathy    Lines: All vascular access sites are healthy with no local erythema, discharge or tenderness    Intake and output:    I/O last 3 completed shifts: In: 6969.9 [I.V.:3930.4; NG/GT:1260; IV Piggyback:1779.6]  Out: 800 [Urine:800]    Lab Data:   All available labs and old records have been reviewed by me.     CBC:  Recent Labs     05/17/22  1105 05/18/22  0626 05/19/22  0459   WBC 6.3 9.1 7.7   RBC 3.65* 3.59* 3.31*   HGB 12.1 11.8* 11.0*   HCT 37.1 36.4 33.1*    220 240   .7* 101.5* 99.9   MCH 33.1 32.8 33.3   MCHC 32.6 32.3 33.3   RDW 13.6 13.6 13.4        BMP:  Recent Labs     05/17/22  0907 05/18/22  0626 05/19/22  0459   * 134* 136   K 3.7 3.9 2.5*    103 105   CO2 19* 24 23   BUN 28* 24* 16   CREATININE 0.6 <0.5* <0.5*   CALCIUM 8.3 7.9* 7.7*   GLUCOSE 79 90 86        Hepatic Function Panel:   Lab Results   Component Value Date    ALKPHOS 106 05/19/2022    ALT 6 05/19/2022    AST 31 05/19/2022    PROT 5.0 05/19/2022    PROT 6.9 02/06/2013    BILITOT <0.2 05/19/2022    BILIDIR <0.2 12/23/2016    IBILI see below 12/23/2016    LABALBU 1.7 05/19/2022       CPK:   Lab Results   Component Value Date    CKTOTAL 78 08/22/2012     ESR: No results found for: SEDRATE  CRP: No results found for: CRP        Imaging: All pertinent images and reports for the current visit were reviewed by me during this visit. I reviewed the chest x-ray/CT scan/MRI images and independently interpreted the findings and results today. XR CHEST PORTABLE   Final Result   Moderate airspace disease right upper and lower lobe with volume loss. Pneumonia and or mucous plugging both considered. NG tube extends well into the stomach. XR CHEST PORTABLE   Final Result   1. Endotracheal tube terminates in the left mainstem bronchus, for which   retraction 5 cm is recommended. 2.  Enteric tube terminates at the level of the body of the stomach. 3.  No significant change in bilateral airspace disease. Probable small left   effusion. XR ABDOMEN FOR NG/OG/NE TUBE PLACEMENT   Final Result   NG tube position appears acceptable. Endotracheal tube tip is likely within 1 cm above the anita. Dense left basilar opacity and patchy opacities in both lungs. XR CHEST PORTABLE   Final Result   Endotracheal tube is present with tip projecting just proximal to the anita. Left basilar opacity and trace left pleural effusion.   Aspiration and   pneumonia are in the differential. Medications: All current and past medications were reviewed.      potassium phosphate IVPB  30 mmol IntraVENous Once    lidocaine 1 % injection  5 mL IntraDERmal Once    sodium chloride flush  5-40 mL IntraVENous 2 times per day    midodrine  2.5 mg Orogastric q8h    valproate sodium (DEPACON) IVPB  250 mg IntraVENous Q6H    cefTRIAXone (ROCEPHIN) IV  2,000 mg IntraVENous Q24H    metroNIDAZOLE  500 mg IntraVENous Q8H    levothyroxine  100 mcg Per NG tube Daily    carbidopa-levodopa  1.5 tablet Per NG tube q8h    sertraline  100 mg Per NG tube Nightly    pantoprazole  40 mg IntraVENous Daily    heparin (porcine)  5,000 Units SubCUTAneous BID    alteplase  2 mg IntraCATHeter Once        sodium chloride Stopped (05/19/22 1528)    sodium chloride      sodium chloride Stopped (05/19/22 1525)       [DISCONTINUED] potassium chloride **OR** potassium chloride, sodium chloride flush, sodium chloride, atropine, sodium chloride flush, sodium chloride, sodium phosphate IVPB **OR** sodium phosphate IVPB **OR** sodium phosphate IVPB, ondansetron, acetaminophen **OR** acetaminophen, fentanNYL      Problem list:       Patient Active Problem List   Diagnosis Code    Down syndrome Q90.9    Down syndrome Q90.9    Down syndrome Q90.9    Dysphagia R13.10    Autistic disorder F84.0    PEG adjustment, replacement, or removal Z43.1    Hypotension I95.9    Sepsis ([address] A41.9    Acute hypoxemic respiratory failure (HCC) J96.01    AVELINO (acute kidney injury) ([address] N17.9    Aspiration pneumonia (HCC) J69.0    Septic shock (HCC) A41.9, R65.21    Moderate malnutrition ([address] E44.0    Streptococcal bacteremia R78.81, B95.5    Pneumonia of left lower lobe due to infectious organism J18.9    Parkinsonism ([address] G20    Anorexia R63.0    Pneumonia of both lower lobes due to methicillin susceptible Staphylococcus aureus (MSSA) ([address] J15.211       Please note that this chart was generated using Dragon dictation software. Although every effort was made to ensure the accuracy of this automated transcription, some errors in transcription may have occurred inadvertently. If you may need any clarification, please do not hesitate to contact me through EPIC or at the phone number provided below with my electronic signature. Any pictures or media included in this note were obtained after taking informed verbal consent from the patient and with their approval to include those in the patient's medical record.       Devika Calvillo MD, MPH, [address] [place] [address]  5/19/2022, 4:07 PM  [facility] Infectious Disease   [address] [place] [address], [address] [place] [address]  Office: [phone]  Fax: [phone]  Clinic days:  Tuesday & Thursday

## 2022-05-19 NOTE — PROGRESS NOTES
Hospitalist Progress Note      PCP: Luna Jenkins MD    Date of Admission: 5/10/2022    Subjective:     Remains on a low-dose of Levophed, leukocytosis is improving, hypokalemia 3.2, no acute event overnight, remain bradycardia, sinus bradycardia, not following commands, palliative care on board. Overall not improving. Gi consulted for possible PEG. Medications:  Reviewed    Infusion Medications    sodium chloride 100 mL/hr at 05/18/22 2028    sodium chloride      sodium chloride 5 mL/hr at 05/17/22 1256     Scheduled Medications    hydrocortisone sodium succinate PF  50 mg IntraVENous Daily    lidocaine 1 % injection  5 mL IntraDERmal Once    sodium chloride flush  5-40 mL IntraVENous 2 times per day    midodrine  2.5 mg Orogastric q8h    valproate sodium (DEPACON) IVPB  250 mg IntraVENous Q6H    cefTRIAXone (ROCEPHIN) IV  2,000 mg IntraVENous Q24H    metroNIDAZOLE  500 mg IntraVENous Q8H    levothyroxine  100 mcg Per NG tube Daily    carbidopa-levodopa  1.5 tablet Per NG tube q8h    sertraline  100 mg Per NG tube Nightly    pantoprazole  40 mg IntraVENous Daily    heparin (porcine)  5,000 Units SubCUTAneous BID    alteplase  2 mg IntraCATHeter Once     PRN Meds: potassium chloride **OR** potassium chloride, sodium chloride flush, sodium chloride, atropine, sodium chloride flush, sodium chloride, sodium phosphate IVPB **OR** sodium phosphate IVPB **OR** sodium phosphate IVPB, ondansetron, acetaminophen **OR** acetaminophen, fentanNYL      Intake/Output Summary (Last 24 hours) at 5/18/2022 2120  Last data filed at 5/18/2022 1654  Gross per 24 hour   Intake 858 ml   Output 250 ml   Net 608 ml       Physical Exam Performed:    /75   Pulse 65   Temp 96.5 °F (35.8 °C) (Bladder)   Resp 17   Ht 4' 9\" (1.448 m)   Wt 100 lb 5 oz (45.5 kg)   SpO2 94%   BMI 21.71 kg/m²     General appearance: Extubated  HEENT: Pupils equal, round, and reactive to light.  Conjunctivae/corneas clear.  Neck: Supple, with full range of motion. No jugular venous distention. Trachea midline. Respiratory:  Normal respiratory effort. Clear to auscultation, bilaterally without Rales/Wheezes/Rhonchi. Cardiovascular: Regular rate and rhythm with normal S1/S2 without murmurs, rubs or gallops. Abdomen: Soft, non-tender, non-distended with normal bowel sounds. Musculoskeletal: No clubbing, cyanosis or edema bilaterally. Full range of motion without deformity. Skin: Skin color, texture, turgor normal.  No rashes or lesions. Neurologic: Alert  Psychiatric: Calm  Capillary Refill: Brisk,3 seconds, normal   Peripheral Pulses: +2 palpable, equal bilaterally       Labs:   Recent Labs     05/16/22  0710 05/17/22  1105 05/18/22  0626   WBC 5.3 6.3 9.1   HGB 10.0* 12.1 11.8*   HCT 30.9* 37.1 36.4    224 220     Recent Labs     05/16/22  0710 05/16/22  1320 05/17/22  0907 05/18/22  0626     --  130* 134*   K 3.2* 3.7 3.7 3.9     --  102 103   CO2 25  --  19* 24   BUN 27*  --  28* 24*   CREATININE 0.6  --  0.6 <0.5*   CALCIUM 8.0*  --  8.3 7.9*   PHOS 2.6  --  2.4* 2.1*     Recent Labs     05/16/22  0710 05/17/22  0907 05/18/22  0626   AST 37 44* 40*   ALT <5* <5* <5*   BILITOT <0.2 <0.2 <0.2   ALKPHOS 67 96 110     Recent Labs     05/16/22  0710 05/18/22  0626   INR 1.60* 1.18*     No results for input(s): Jennifer Carty in the last 72 hours. Urinalysis:      Lab Results   Component Value Date    NITRU Negative 05/10/2022    WBCUA 3 05/10/2022    BACTERIA None Seen 05/10/2022    RBCUA 3-4 05/10/2022    BLOODU Negative 05/10/2022    SPECGRAV 1.025 05/10/2022    GLUCOSEU Negative 05/10/2022       Radiology:  XR CHEST PORTABLE   Final Result   Moderate airspace disease right upper and lower lobe with volume loss. Pneumonia and or mucous plugging both considered. NG tube extends well into the stomach. XR CHEST PORTABLE   Final Result   1.   Endotracheal tube terminates in the left mainstem bronchus, for which   retraction 5 cm is recommended. 2.  Enteric tube terminates at the level of the body of the stomach. 3.  No significant change in bilateral airspace disease. Probable small left   effusion. XR ABDOMEN FOR NG/OG/NE TUBE PLACEMENT   Final Result   NG tube position appears acceptable. Endotracheal tube tip is likely within 1 cm above the anita. Dense left basilar opacity and patchy opacities in both lungs. XR CHEST PORTABLE   Final Result   Endotracheal tube is present with tip projecting just proximal to the anita. Left basilar opacity and trace left pleural effusion. Aspiration and   pneumonia are in the differential.             Assessment/Plan:    Active Hospital Problems    Diagnosis     Pneumonia of both lower lobes due to methicillin susceptible Staphylococcus aureus (MSSA) ([facility]) [J15.211]      Priority: Medium    Streptococcal bacteremia [R78.81, B95.5]      Priority: Medium    Pneumonia of left lower lobe due to infectious organism [J18.9]      Priority: Medium    Parkinsonism ([address] [G20]      Priority: Medium    Anorexia [R63.0]      Priority: Medium    Moderate malnutrition ([address] [E44.0]      Priority: Medium    AVELINO (acute kidney injury) ([address] [N17.9]      Priority: Medium    Aspiration pneumonia ([address] [J69.0]      Priority: Medium    Septic shock ([address] [A41.9, R65.21]      Priority: Medium    Acute hypoxemic respiratory failure ([address] [J96.01]     Down syndrome [Q90.9]       Acute hypoxic respiratory failure, secondary to multifocal pneumonia, patient was started on broad-spectrum antibiotic, intubated and sedated, started on Levophed, no guidance cultures so far, appreciate pulmonary input, liberated from mechanical ventilation 5/12 remain n.p.o., failed speech.    Sepsis, and septic shock, secondary to multifocal pneumonia,/and possible infective endocarditis with possible aortic root abscess started on vancomycin and cefepime antibiotic has been changed to Rocephin and Flagyl per ID, lactic acid is improving 3.2 continue current treatment, continue vent support leukocytosis has been worsening 21,000, blood culture growing Streptococcus, will repeat blood culture, send strep antigen urine concern for endocarditis on echocardiogram, per cardiology it would be unsafe to do STORMY via conscious sedation as patient cannot follow commands STORMY was canceled, plan to treat with antibiotic, likely patient will need a PICC line, repeated blood cultures are still negative so far.  Chronic hypotension, on midodrine, will continue now on low-dose of Levophed   Acute kidney injury likely prerenal, ATN, avoid nephrotoxic medication, continue fluid resuscitation.  Sinus bradycardia, will decrease dose of midodrine 2.5 mg 3 times daily. If develop any heart block,  will reconsult cardiology   History of Down syndrome,  May need  PEG tube   Lactic acidosis, improving with fluid resuscitation   Hypomagnesemia. Repleted, will continue monitor.  History of seizure disorder continue Keppra   Hypothyroidism, continue Synthroid   Failure to thrive  Overall prognosis poor,GI has been consulted for PEG. DVT Prophylaxis: Heparin subcu  Diet: Diet NPO  ADULT TUBE FEEDING; Nasogastric; 2.0 Calorie; Continuous; 25; No; 30; Q 4 hours  Code Status: DNR-CCA    Dispo -continue ICU care, may be transferred to progressive care unit tomorrow if she remain vitally stable and off Levophed    Due to the immediate potential for life-threatening deterioration due to acute hypoxic respiratory failure, multifocal pneumonia, I spent 30 minutes providing critical care. This time is excluding time spent performing procedures.       Severo Moon, MD

## 2022-05-19 NOTE — PROGRESS NOTES
Nutrition Note    RECOMMENDATIONS  PO Diet: NPO per SLP  ONS: NPO per SLP  Nutrition Support:   1. Continue 2 dayron HN (2.0 calorie formula) at goal rate 25 mL/hr x 23 hours to provide 575 mL total volume, 1150 calories, 48 grams protein, 403 mL free water. This allows feeds to be held x 1 hour for synthroid administration. 2. Consider stopping IV fluids and instead using water bolus 125 mL q 4 hours. 3. Replace electrolytes as appropriate per MD/pharmacy  4. Ensure head of bed is 30 - 45 degrees during continuous or bolus gastric feeding and for one hour after bolus. Turn off the feeding if head of bed is lowered less than 30 degrees. 5. Monitor for tolerance (bowel habits, N/V, cramping, abdominal exam findings: distended, firm, tense, guarded, discomfort). NUTRITION ASSESSMENT   SLP continues to recommend NPO. Pt tolerating 2 Dayron HN at goal rate 25 mL/hr. K+ 2.5. Phos 1.9. LBM 5/17. Note possible plans for PEG tube placement on Friday; concerning as pt has pulled out a prior PEG tube. Pt is receiving synthroid daily which requires feeds to be held x 1 hour for administration; 25 mL per hour over 23 hours meets 100% of calorie and protein needs. Will monitor for continued tolerance to EN at goal.      Nutrition Related Findings: +1 BLE edema. +19.1 liters per EMR. NS at 100 mL/hr.  Wounds: Stage II,Pressure Injury   Nutrition Education:  Education not indicated    Nutrition Goals: Tolerate nutrition support at goal rate     MALNUTRITION ASSESSMENT   Chronic Illness  Malnutrition Status:  Moderate malnutrition  Findings of the 6 clinical characteristics of malnutrition:  Energy Intake:  Unable to assess  Weight Loss:  No significant weight loss     Body Fat Loss:  Severe body fat loss Triceps   Muscle Mass Loss:  Severe muscle mass loss Clavicles (pectoralis & deltoids),Thigh (quadraceps),Calf (gastrocnemius)  Fluid Accumulation:  No significant fluid accumulation     Strength:  Not Performed      NUTRITION DIAGNOSIS   · Inadequate oral intake related to swallowing difficulty as evidenced by NPO or clear liquid status due to medical condition    · Moderate malnutrition related to inadequate protein-energy intake as evidenced by severe loss of subcutaneous fat,severe muscle loss      CURRENT NUTRITION THERAPIES  Diet NPO  ADULT TUBE FEEDING; Nasogastric; 2.0 Calorie; Continuous; 25; No; 30; Q 4 hours     PO Intake: NPO   PO Supplement Intake:NPO    Current Tube Feeding (TF) Orders:  · Feeding Route: Nasogastric  · Formula: 2.0 Calorie  · Schedule: Continuous  · Feeding Regimen: Tube feeds infusing over 23 hours to allow feeds to hold x 1 hour for synthroid administration  · Additives/Modulars: None  · Water Flushes: 30 mL q 4 hours  · Current TF & Flush Orders Provides: As below  · Goal TF & Flush Orders Provides: 2 paul HN at goal rate 25 mL/hr x 23 hours to provide 575 mL total volume, 1150 calories, 48 grams protein, 403 mL free water. ANTHROPOMETRICS   Current Height: 4' 9\" (144.8 cm)   Current Weight: 106 lb 4.2 oz (48.2 kg)     Admission weight: 74 lb 11.8 oz (33.9 kg)   Ideal Body Weight (IBW): 85 lbs  (39 kg)        BMI: 23    COMPARATIVE STANDARDS  Energy (kcal):  4240-7352     Protein (g):  47-78 grams       Fluid (mL/day):  1065-3494 mL    The patient will be monitored per nutrition standards of care. Consult dietitian if additional nutrition interventions are needed prior to RD reassessment.      Murray oJhn, [address] [place] [address],     Contact: [phone]

## 2022-05-19 NOTE — PROGRESS NOTES
Called Ehsan from group home back with updates. Chun concerns are low K+ and replacement, PEG may be placed tomorrow. Also called central for urostomy bag for profuse leakage at groin site from where fem line was pulled.

## 2022-05-20 ENCOUNTER — ANESTHESIA (OUTPATIENT)
Dept: ENDOSCOPY | Age: 57
DRG: 871 | End: 2022-05-20
Payer: MEDICARE

## 2022-05-20 ENCOUNTER — ANESTHESIA EVENT (OUTPATIENT)
Dept: ENDOSCOPY | Age: 57
DRG: 871 | End: 2022-05-20
Payer: MEDICARE

## 2022-05-20 LAB
A/G RATIO: 0.5 (ref 1.1–2.2)
ALBUMIN SERPL-MCNC: 1.7 G/DL (ref 3.4–5)
ALP BLD-CCNC: 101 U/L (ref 40–129)
ALT SERPL-CCNC: 8 U/L (ref 10–40)
ANION GAP SERPL CALCULATED.3IONS-SCNC: 8 MMOL/L (ref 3–16)
AST SERPL-CCNC: 24 U/L (ref 15–37)
BASOPHILS ABSOLUTE: 0 K/UL (ref 0–0.2)
BASOPHILS RELATIVE PERCENT: 0.1 %
BILIRUB SERPL-MCNC: <0.2 MG/DL (ref 0–1)
BUN BLDV-MCNC: 10 MG/DL (ref 7–20)
CALCIUM SERPL-MCNC: 7.9 MG/DL (ref 8.3–10.6)
CHLORIDE BLD-SCNC: 106 MMOL/L (ref 99–110)
CO2: 23 MMOL/L (ref 21–32)
CREAT SERPL-MCNC: <0.5 MG/DL (ref 0.6–1.1)
EOSINOPHILS ABSOLUTE: 0.1 K/UL (ref 0–0.6)
EOSINOPHILS RELATIVE PERCENT: 0.6 %
GFR AFRICAN AMERICAN: >60
GFR NON-AFRICAN AMERICAN: >60
GLUCOSE BLD-MCNC: 101 MG/DL (ref 70–99)
GLUCOSE BLD-MCNC: 103 MG/DL (ref 70–99)
GLUCOSE BLD-MCNC: 108 MG/DL (ref 70–99)
GLUCOSE BLD-MCNC: 68 MG/DL (ref 70–99)
GLUCOSE BLD-MCNC: 70 MG/DL (ref 70–99)
GLUCOSE BLD-MCNC: 75 MG/DL (ref 70–99)
GLUCOSE BLD-MCNC: 90 MG/DL (ref 70–99)
HCT VFR BLD CALC: 32.8 % (ref 36–48)
HEMOGLOBIN: 10.6 G/DL (ref 12–16)
INR BLD: 1.08 (ref 0.88–1.12)
LYMPHOCYTES ABSOLUTE: 1.3 K/UL (ref 1–5.1)
LYMPHOCYTES RELATIVE PERCENT: 11.5 %
MAGNESIUM: 2 MG/DL (ref 1.8–2.4)
MCH RBC QN AUTO: 32.7 PG (ref 26–34)
MCHC RBC AUTO-ENTMCNC: 32.4 G/DL (ref 31–36)
MCV RBC AUTO: 101 FL (ref 80–100)
MONOCYTES ABSOLUTE: 0.8 K/UL (ref 0–1.3)
MONOCYTES RELATIVE PERCENT: 7.5 %
NEUTROPHILS ABSOLUTE: 9.1 K/UL (ref 1.7–7.7)
NEUTROPHILS RELATIVE PERCENT: 80.3 %
PDW BLD-RTO: 13.7 % (ref 12.4–15.4)
PERFORMED ON: ABNORMAL
PERFORMED ON: NORMAL
PERFORMED ON: NORMAL
PHOSPHORUS: 2.1 MG/DL (ref 2.5–4.9)
PLATELET # BLD: 261 K/UL (ref 135–450)
PMV BLD AUTO: 9.4 FL (ref 5–10.5)
POTASSIUM REFLEX MAGNESIUM: 3.4 MMOL/L (ref 3.5–5.1)
PROTHROMBIN TIME: 12.3 SEC (ref 9.9–12.7)
RBC # BLD: 3.24 M/UL (ref 4–5.2)
SODIUM BLD-SCNC: 137 MMOL/L (ref 136–145)
TOTAL PROTEIN: 5.1 G/DL (ref 6.4–8.2)
WBC # BLD: 11.3 K/UL (ref 4–11)

## 2022-05-20 PROCEDURE — 85025 COMPLETE CBC W/AUTO DIFF WBC: CPT

## 2022-05-20 PROCEDURE — 2500000003 HC RX 250 WO HCPCS: Performed by: STUDENT IN AN ORGANIZED HEALTH CARE EDUCATION/TRAINING PROGRAM

## 2022-05-20 PROCEDURE — 6370000000 HC RX 637 (ALT 250 FOR IP): Performed by: INTERNAL MEDICINE

## 2022-05-20 PROCEDURE — 84100 ASSAY OF PHOSPHORUS: CPT

## 2022-05-20 PROCEDURE — 99232 SBSQ HOSP IP/OBS MODERATE 35: CPT | Performed by: INTERNAL MEDICINE

## 2022-05-20 PROCEDURE — 2500000003 HC RX 250 WO HCPCS: Performed by: INTERNAL MEDICINE

## 2022-05-20 PROCEDURE — 6360000002 HC RX W HCPCS: Performed by: INTERNAL MEDICINE

## 2022-05-20 PROCEDURE — 2500000003 HC RX 250 WO HCPCS: Performed by: NURSE ANESTHETIST, CERTIFIED REGISTERED

## 2022-05-20 PROCEDURE — 3E0G76Z INTRODUCTION OF NUTRITIONAL SUBSTANCE INTO UPPER GI, VIA NATURAL OR ARTIFICIAL OPENING: ICD-10-PCS | Performed by: STUDENT IN AN ORGANIZED HEALTH CARE EDUCATION/TRAINING PROGRAM

## 2022-05-20 PROCEDURE — 88305 TISSUE EXAM BY PATHOLOGIST: CPT

## 2022-05-20 PROCEDURE — 7100000001 HC PACU RECOVERY - ADDTL 15 MIN: Performed by: INTERNAL MEDICINE

## 2022-05-20 PROCEDURE — 36415 COLL VENOUS BLD VENIPUNCTURE: CPT

## 2022-05-20 PROCEDURE — 6360000002 HC RX W HCPCS: Performed by: STUDENT IN AN ORGANIZED HEALTH CARE EDUCATION/TRAINING PROGRAM

## 2022-05-20 PROCEDURE — 88112 CYTOPATH CELL ENHANCE TECH: CPT

## 2022-05-20 PROCEDURE — 6370000000 HC RX 637 (ALT 250 FOR IP): Performed by: STUDENT IN AN ORGANIZED HEALTH CARE EDUCATION/TRAINING PROGRAM

## 2022-05-20 PROCEDURE — 80053 COMPREHEN METABOLIC PANEL: CPT

## 2022-05-20 PROCEDURE — 2580000003 HC RX 258: Performed by: INTERNAL MEDICINE

## 2022-05-20 PROCEDURE — 2580000003 HC RX 258: Performed by: STUDENT IN AN ORGANIZED HEALTH CARE EDUCATION/TRAINING PROGRAM

## 2022-05-20 PROCEDURE — C9113 INJ PANTOPRAZOLE SODIUM, VIA: HCPCS | Performed by: INTERNAL MEDICINE

## 2022-05-20 PROCEDURE — 6360000002 HC RX W HCPCS: Performed by: NURSE ANESTHETIST, CERTIFIED REGISTERED

## 2022-05-20 PROCEDURE — 3700000001 HC ADD 15 MINUTES (ANESTHESIA): Performed by: INTERNAL MEDICINE

## 2022-05-20 PROCEDURE — 0DH63UZ INSERTION OF FEEDING DEVICE INTO STOMACH, PERCUTANEOUS APPROACH: ICD-10-PCS | Performed by: STUDENT IN AN ORGANIZED HEALTH CARE EDUCATION/TRAINING PROGRAM

## 2022-05-20 PROCEDURE — 83735 ASSAY OF MAGNESIUM: CPT

## 2022-05-20 PROCEDURE — 1200000000 HC SEMI PRIVATE

## 2022-05-20 PROCEDURE — 85610 PROTHROMBIN TIME: CPT

## 2022-05-20 PROCEDURE — 2580000003 HC RX 258: Performed by: ANESTHESIOLOGY

## 2022-05-20 PROCEDURE — 7100000000 HC PACU RECOVERY - FIRST 15 MIN: Performed by: INTERNAL MEDICINE

## 2022-05-20 PROCEDURE — 3609013300 HC EGD TUBE PLACEMENT: Performed by: INTERNAL MEDICINE

## 2022-05-20 PROCEDURE — 2709999900 HC NON-CHARGEABLE SUPPLY: Performed by: INTERNAL MEDICINE

## 2022-05-20 PROCEDURE — 3700000000 HC ANESTHESIA ATTENDED CARE: Performed by: INTERNAL MEDICINE

## 2022-05-20 RX ORDER — PHENYLEPHRINE HCL IN 0.9% NACL 1 MG/10 ML
SYRINGE (ML) INTRAVENOUS PRN
Status: DISCONTINUED | OUTPATIENT
Start: 2022-05-20 | End: 2022-05-20 | Stop reason: SDUPTHER

## 2022-05-20 RX ORDER — DEXTROSE MONOHYDRATE 100 MG/ML
INJECTION, SOLUTION INTRAVENOUS ONCE
Status: COMPLETED | OUTPATIENT
Start: 2022-05-20 | End: 2022-05-20

## 2022-05-20 RX ORDER — SODIUM CHLORIDE 9 MG/ML
INJECTION, SOLUTION INTRAVENOUS CONTINUOUS
Status: DISCONTINUED | OUTPATIENT
Start: 2022-05-20 | End: 2022-05-21

## 2022-05-20 RX ORDER — DEXTROSE AND SODIUM CHLORIDE 5; .9 G/100ML; G/100ML
INJECTION, SOLUTION INTRAVENOUS CONTINUOUS
Status: DISCONTINUED | OUTPATIENT
Start: 2022-05-20 | End: 2022-05-21

## 2022-05-20 RX ORDER — ONDANSETRON 2 MG/ML
4 INJECTION INTRAMUSCULAR; INTRAVENOUS
Status: DISCONTINUED | OUTPATIENT
Start: 2022-05-20 | End: 2022-05-20 | Stop reason: HOSPADM

## 2022-05-20 RX ORDER — HYDROMORPHONE HCL 110MG/55ML
0.5 PATIENT CONTROLLED ANALGESIA SYRINGE INTRAVENOUS EVERY 5 MIN PRN
Status: DISCONTINUED | OUTPATIENT
Start: 2022-05-20 | End: 2022-05-20 | Stop reason: HOSPADM

## 2022-05-20 RX ORDER — LIDOCAINE HYDROCHLORIDE 10 MG/ML
1 INJECTION, SOLUTION EPIDURAL; INFILTRATION; INTRACAUDAL; PERINEURAL
Status: DISCONTINUED | OUTPATIENT
Start: 2022-05-20 | End: 2022-05-20 | Stop reason: HOSPADM

## 2022-05-20 RX ORDER — OXYCODONE HYDROCHLORIDE 5 MG/1
5 TABLET ORAL
Status: DISCONTINUED | OUTPATIENT
Start: 2022-05-20 | End: 2022-05-20 | Stop reason: HOSPADM

## 2022-05-20 RX ORDER — SODIUM CHLORIDE 9 MG/ML
INJECTION, SOLUTION INTRAVENOUS CONTINUOUS
Status: DISCONTINUED | OUTPATIENT
Start: 2022-05-20 | End: 2022-05-20

## 2022-05-20 RX ORDER — LABETALOL HYDROCHLORIDE 5 MG/ML
5 INJECTION, SOLUTION INTRAVENOUS
Status: DISCONTINUED | OUTPATIENT
Start: 2022-05-20 | End: 2022-05-20 | Stop reason: HOSPADM

## 2022-05-20 RX ORDER — LIDOCAINE HYDROCHLORIDE 20 MG/ML
INJECTION, SOLUTION EPIDURAL; INFILTRATION; INTRACAUDAL; PERINEURAL PRN
Status: DISCONTINUED | OUTPATIENT
Start: 2022-05-20 | End: 2022-05-20 | Stop reason: SDUPTHER

## 2022-05-20 RX ORDER — HYDROMORPHONE HCL 110MG/55ML
0.25 PATIENT CONTROLLED ANALGESIA SYRINGE INTRAVENOUS EVERY 5 MIN PRN
Status: DISCONTINUED | OUTPATIENT
Start: 2022-05-20 | End: 2022-05-20 | Stop reason: HOSPADM

## 2022-05-20 RX ORDER — PROPOFOL 10 MG/ML
INJECTION, EMULSION INTRAVENOUS PRN
Status: DISCONTINUED | OUTPATIENT
Start: 2022-05-20 | End: 2022-05-20 | Stop reason: SDUPTHER

## 2022-05-20 RX ADMIN — VALPROATE SODIUM 250 MG: 100 INJECTION, SOLUTION INTRAVENOUS at 06:44

## 2022-05-20 RX ADMIN — SODIUM CHLORIDE: 9 INJECTION, SOLUTION INTRAVENOUS at 09:35

## 2022-05-20 RX ADMIN — CARBIDOPA AND LEVODOPA 1.5 TABLET: 25; 100 TABLET ORAL at 00:28

## 2022-05-20 RX ADMIN — POTASSIUM CHLORIDE 10 MEQ: 7.46 INJECTION, SOLUTION INTRAVENOUS at 07:52

## 2022-05-20 RX ADMIN — VALPROATE SODIUM 250 MG: 100 INJECTION, SOLUTION INTRAVENOUS at 00:21

## 2022-05-20 RX ADMIN — LIDOCAINE HYDROCHLORIDE 40 MG: 20 INJECTION, SOLUTION EPIDURAL; INFILTRATION; INTRACAUDAL; PERINEURAL at 09:46

## 2022-05-20 RX ADMIN — PROPOFOL 50 MCG/KG/MIN: 10 INJECTION, EMULSION INTRAVENOUS at 09:47

## 2022-05-20 RX ADMIN — HEPARIN SODIUM 5000 UNITS: 5000 INJECTION INTRAVENOUS; SUBCUTANEOUS at 21:19

## 2022-05-20 RX ADMIN — METRONIDAZOLE 500 MG: 500 INJECTION, SOLUTION INTRAVENOUS at 16:57

## 2022-05-20 RX ADMIN — DEXTROSE MONOHYDRATE: 100 INJECTION, SOLUTION INTRAVENOUS at 09:35

## 2022-05-20 RX ADMIN — DEXTROSE AND SODIUM CHLORIDE: 5; 900 INJECTION, SOLUTION INTRAVENOUS at 00:26

## 2022-05-20 RX ADMIN — CARBIDOPA AND LEVODOPA 1.5 TABLET: 25; 100 TABLET ORAL at 17:19

## 2022-05-20 RX ADMIN — PANTOPRAZOLE SODIUM 40 MG: 40 INJECTION, POWDER, FOR SOLUTION INTRAVENOUS at 08:03

## 2022-05-20 RX ADMIN — VALPROATE SODIUM 250 MG: 100 INJECTION, SOLUTION INTRAVENOUS at 15:18

## 2022-05-20 RX ADMIN — POTASSIUM PHOSPHATE, MONOBASIC AND POTASSIUM PHOSPHATE, DIBASIC 30 MMOL: 224; 236 INJECTION, SOLUTION, CONCENTRATE INTRAVENOUS at 11:50

## 2022-05-20 RX ADMIN — Medication 50 MCG: at 10:01

## 2022-05-20 RX ADMIN — SODIUM CHLORIDE, PRESERVATIVE FREE 10 ML: 5 INJECTION INTRAVENOUS at 08:07

## 2022-05-20 RX ADMIN — SODIUM CHLORIDE, PRESERVATIVE FREE 10 ML: 5 INJECTION INTRAVENOUS at 21:22

## 2022-05-20 RX ADMIN — VALPROATE SODIUM 250 MG: 100 INJECTION, SOLUTION INTRAVENOUS at 21:27

## 2022-05-20 RX ADMIN — MIDODRINE HYDROCHLORIDE 2.5 MG: 5 TABLET ORAL at 21:19

## 2022-05-20 RX ADMIN — Medication 100 MCG: at 09:53

## 2022-05-20 RX ADMIN — METRONIDAZOLE 500 MG: 500 INJECTION, SOLUTION INTRAVENOUS at 22:35

## 2022-05-20 RX ADMIN — Medication 100 MCG: at 10:18

## 2022-05-20 RX ADMIN — MIDODRINE HYDROCHLORIDE 2.5 MG: 5 TABLET ORAL at 06:35

## 2022-05-20 RX ADMIN — CARBIDOPA AND LEVODOPA 1.5 TABLET: 25; 100 TABLET ORAL at 08:03

## 2022-05-20 RX ADMIN — LEVOTHYROXINE SODIUM 100 MCG: 0.1 TABLET ORAL at 08:03

## 2022-05-20 RX ADMIN — ACETAMINOPHEN 650 MG: 325 TABLET ORAL at 15:39

## 2022-05-20 RX ADMIN — MIDODRINE HYDROCHLORIDE 2.5 MG: 5 TABLET ORAL at 15:36

## 2022-05-20 RX ADMIN — CEFTRIAXONE 2000 MG: 2 INJECTION, POWDER, FOR SOLUTION INTRAMUSCULAR; INTRAVENOUS at 16:37

## 2022-05-20 RX ADMIN — SERTRALINE 100 MG: 50 TABLET, FILM COATED ORAL at 21:19

## 2022-05-20 RX ADMIN — METRONIDAZOLE 500 MG: 500 INJECTION, SOLUTION INTRAVENOUS at 06:35

## 2022-05-20 RX ADMIN — VALPROATE SODIUM 250 MG: 100 INJECTION, SOLUTION INTRAVENOUS at 18:46

## 2022-05-20 RX ADMIN — PROPOFOL 40 MG: 10 INJECTION, EMULSION INTRAVENOUS at 09:46

## 2022-05-20 ASSESSMENT — PAIN SCALES - GENERAL
PAINLEVEL_OUTOF10: 0
PAINLEVEL_OUTOF10: 10
PAINLEVEL_OUTOF10: 0

## 2022-05-20 ASSESSMENT — PAIN DESCRIPTION - DESCRIPTORS: DESCRIPTORS: ACHING

## 2022-05-20 ASSESSMENT — PAIN DESCRIPTION - ORIENTATION: ORIENTATION: LEFT

## 2022-05-20 ASSESSMENT — PAIN DESCRIPTION - LOCATION: LOCATION: ABDOMEN

## 2022-05-20 NOTE — PROGRESS NOTES
Pt arrived from ENDO to PACU bay 8. Report received from ENDO staff. Pt not arousable to voice. Pt on RA, NSR, and VSS. Will continue to monitor.

## 2022-05-20 NOTE — PROGRESS NOTES
PT went to surgery for peg tube placement. returned at 10:45 AM on a stretcher and post OP nurse with no complain.

## 2022-05-20 NOTE — PROGRESS NOTES
Hospitalist Progress Note      PCP: Lizeth Foster MD    Date of Admission: 5/10/2022    Subjective:     Remains on a low-dose of Levophed, leukocytosis is improving, hypokalemia 3.2, no acute event overnight, remain bradycardia, sinus bradycardia, not following commands, palliative care on board. Overall not improving. PEG tube placed today.     Medications:  Reviewed    Infusion Medications    dextrose 5 % and 0.9 % NaCl 75 mL/hr at 05/20/22 0026    sodium chloride Stopped (05/20/22 1006)    sodium chloride Stopped (05/19/22 1528)    sodium chloride      sodium chloride Stopped (05/19/22 1525)     Scheduled Medications    lidocaine 1 % injection  5 mL IntraDERmal Once    sodium chloride flush  5-40 mL IntraVENous 2 times per day    midodrine  2.5 mg Orogastric q8h    valproate sodium (DEPACON) IVPB  250 mg IntraVENous Q6H    cefTRIAXone (ROCEPHIN) IV  2,000 mg IntraVENous Q24H    metroNIDAZOLE  500 mg IntraVENous Q8H    levothyroxine  100 mcg Per NG tube Daily    carbidopa-levodopa  1.5 tablet Per NG tube q8h    sertraline  100 mg Per NG tube Nightly    pantoprazole  40 mg IntraVENous Daily    heparin (porcine)  5,000 Units SubCUTAneous BID    alteplase  2 mg IntraCATHeter Once     PRN Meds: [DISCONTINUED] potassium chloride **OR** potassium chloride, sodium chloride flush, sodium chloride, atropine, sodium chloride flush, sodium chloride, sodium phosphate IVPB **OR** sodium phosphate IVPB **OR** sodium phosphate IVPB, ondansetron, acetaminophen **OR** acetaminophen, fentanNYL      Intake/Output Summary (Last 24 hours) at 5/20/2022 1643  Last data filed at 5/20/2022 1502  Gross per 24 hour   Intake 1600.94 ml   Output 2353 ml   Net -752.06 ml       Physical Exam Performed:    BP 91/63   Pulse 72   Temp 97 °F (36.1 °C) (Temporal)   Resp 16   Ht 4' 9\" (1.448 m)   Wt 107 lb 3.2 oz (48.6 kg)   SpO2 94%   BMI 23.20 kg/m²     General appearance: Extubated  HEENT: Pupils equal, round, and reactive to light. Conjunctivae/corneas clear. Neck: Supple, with full range of motion. No jugular venous distention. Trachea midline. Respiratory:  Normal respiratory effort. Clear to auscultation, bilaterally without Rales/Wheezes/Rhonchi. Cardiovascular: Regular rate and rhythm with normal S1/S2 without murmurs, rubs or gallops. Abdomen: Soft, non-tender, non-distended with normal bowel sounds. Now has PEG. Musculoskeletal: No clubbing, cyanosis or edema bilaterally. Full range of motion without deformity. Skin: Skin color, texture, turgor normal.  No rashes or lesions. Neurologic: Alert  Psychiatric: Calm  Capillary Refill: Brisk,3 seconds, normal   Peripheral Pulses: +2 palpable, equal bilaterally       Labs:   Recent Labs     05/18/22 0626 05/19/22 0459 05/20/22 0615   WBC 9.1 7.7 11.3*   HGB 11.8* 11.0* 10.6*   HCT 36.4 33.1* 32.8*    240 261     Recent Labs     05/18/22 0626 05/19/22 0459 05/19/22  2133 05/20/22 0615   * 136  --  137   K 3.9 2.5* 4.5 3.4*    105  --  106   CO2 24 23  --  23   BUN 24* 16  --  10   CREATININE <0.5* <0.5*  --  <0.5*   CALCIUM 7.9* 7.7*  --  7.9*   PHOS 2.1* 1.9*  --  2.1*     Recent Labs     05/18/22 0626 05/19/22 0459 05/20/22 0615   AST 40* 31 24   ALT <5* 6* 8*   BILITOT <0.2 <0.2 <0.2   ALKPHOS 110 106 101     Recent Labs     05/18/22 0626 05/19/22 0458 05/20/22 0615   INR 1.18* 1.10 1.08     No results for input(s): Nancy Ness in the last 72 hours. Urinalysis:      Lab Results   Component Value Date    NITRU Negative 05/10/2022    WBCUA 3 05/10/2022    BACTERIA None Seen 05/10/2022    RBCUA 3-4 05/10/2022    BLOODU Negative 05/10/2022    SPECGRAV 1.025 05/10/2022    GLUCOSEU Negative 05/10/2022       Radiology:  XR CHEST PORTABLE   Final Result   Moderate airspace disease right upper and lower lobe with volume loss. Pneumonia and or mucous plugging both considered. NG tube extends well into the stomach.          XR CHEST PORTABLE   Final Result   1. Endotracheal tube terminates in the left mainstem bronchus, for which   retraction 5 cm is recommended. 2.  Enteric tube terminates at the level of the body of the stomach. 3.  No significant change in bilateral airspace disease. Probable small left   effusion. XR ABDOMEN FOR NG/OG/NE TUBE PLACEMENT   Final Result   NG tube position appears acceptable. Endotracheal tube tip is likely within 1 cm above the anita. Dense left basilar opacity and patchy opacities in both lungs. XR CHEST PORTABLE   Final Result   Endotracheal tube is present with tip projecting just proximal to the anita. Left basilar opacity and trace left pleural effusion. Aspiration and   pneumonia are in the differential.             Assessment/Plan:    Active Hospital Problems    Diagnosis     Pneumonia of both lower lobes due to methicillin susceptible Staphylococcus aureus (MSSA) ([facility]) [J15.211]      Priority: Medium    Streptococcal bacteremia [R78.81, B95.5]      Priority: Medium    Pneumonia of left lower lobe due to infectious organism [J18.9]      Priority: Medium    Parkinsonism ([address] [G20]      Priority: Medium    Anorexia [R63.0]      Priority: Medium    Moderate malnutrition ([address] [E44.0]      Priority: Medium    AVELINO (acute kidney injury) ([address] [N17.9]      Priority: Medium    Aspiration pneumonia ([address] [J69.0]      Priority: Medium    Septic shock ([address] [A41.9, R65.21]      Priority: Medium    Acute hypoxemic respiratory failure ([address] [J96.01]     Down syndrome [Q90.9]       Acute hypoxic respiratory failure, secondary to multifocal pneumonia, patient was started on broad-spectrum antibiotic, intubated and sedated, started on Levophed, no guidance cultures so far, appreciate pulmonary input, liberated from mechanical ventilation 5/12 remain n.p.o., failed speech.    Sepsis, and septic shock, secondary to multifocal pneumonia,/and possible infective endocarditis with possible aortic root abscess started on vancomycin and cefepime antibiotic has been changed to Rocephin and Flagyl per ID, lactic acid is improving 3.2 continue current treatment, continue vent support leukocytosis has been worsening 21,000, blood culture growing Streptococcus, will repeat blood culture, send strep antigen urine concern for endocarditis on echocardiogram, per cardiology it would be unsafe to do STORMY via conscious sedation as patient cannot follow commands STORMY was canceled, plan to treat with antibiotic, likely patient will need a PICC line, repeated blood cultures are still negative so far.  Chronic hypotension, on midodrine, will continue now on low-dose of Levophed   Acute kidney injury likely prerenal, ATN, avoid nephrotoxic medication, continue fluid resuscitation.  Sinus bradycardia, will decrease dose of midodrine 2.5 mg 3 times daily. If develop any heart block,  will reconsult cardiology   History of Down syndrome,  PEG placed 5/20/2022   Lactic acidosis, improving with fluid resuscitation   Hypomagnesemia. Repleted, will continue monitor.  History of seizure disorder continue Keppra   Hypothyroidism, continue Synthroid   Failure to thrive  Overall prognosis poor,GI placed PEG today. Will   DVT Prophylaxis: Heparin subcu  Diet: Diet NPO Exceptions are: Sips of Water with Meds  Code Status: DNR-CCA    Dispo -continue ICU care, may be transferred, s/p PEG.           Bianca Ny MD

## 2022-05-20 NOTE — ANESTHESIA PRE PROCEDURE
Department of Anesthesiology  Preprocedure Note       Name:  Fei Morejon   Age:  60 y.o.  :  1965                                          MRN:  [id number]         Date:  2022      Surgeon: Virginie Palmer):  Valentina Payton MD    Procedure: Procedure(s):  EGD PEG TUBE PLACEMENT    Medications prior to admission:   Prior to Admission medications    Medication Sig Start Date End Date Taking? Authorizing Provider   midodrine (PROAMATINE) 2.5 MG tablet Take 1 tablet by mouth 3 times daily (with meals) 22   Brandon Farley PA-C   valproic acid (DEPAKENE) 250 MG/5ML SOLN oral solution Take 250 mg by mouth every 8 hours 250 mg in the AM, 250 mg in the afternoon, 500 mg nightly. Historical Provider, MD   levothyroxine (SYNTHROID) 100 MCG tablet Take 100 mcg by mouth daily     Historical Provider, MD   docusate sodium (COLACE) 100 MG capsule Take 100 mg by mouth daily    Historical Provider, MD   acetaminophen (TYLENOL) 325 MG tablet Take 650 mg by mouth every 6 hours as needed for Pain. Historical Provider, MD   carbidopa-levodopa (SINEMET)  MG per tablet Take 1.5 tablets by mouth 3 times daily Indications: Take one and a half tablets by mouth three times per day     Historical Provider, MD   sertraline (ZOLOFT) 20 MG/ML concentrated solution Take 100 mg by mouth nightly     Historical Provider, MD   Multiple Vitamins-Minerals (THERAPEUTIC MULTIVITAMIN-MINERALS) tablet Take 1 tablet by mouth daily. Historical Provider, MD   omeprazole (PRILOSEC) 20 MG capsule Take 20 mg by mouth daily.     Historical Provider, MD       Current medications:    Current Facility-Administered Medications   Medication Dose Route Frequency Provider Last Rate Last Admin    dextrose 5 % and 0.9 % sodium chloride infusion   IntraVENous Continuous Beth Sharp MD 75 mL/hr at 22 0026 New Bag at 22 0026    0.9 % sodium chloride infusion   IntraVENous Continuous Alex Navarro MD   Stopped at 22 1528    potassium chloride 10 mEq/100 mL IVPB (Peripheral Line)  10 mEq IntraVENous PRN Gail Schafer  mL/hr at 05/20/22 0752 10 mEq at 05/20/22 0752    lidocaine PF 1 % injection 5 mL  5 mL IntraDERmal Once Kush Kraus MD        sodium chloride flush 0.9 % injection 5-40 mL  5-40 mL IntraVENous 2 times per day Kush Kraus MD   10 mL at 05/20/22 0807    sodium chloride flush 0.9 % injection 5-40 mL  5-40 mL IntraVENous PRN Slava Maloney MD        0.9 % sodium chloride infusion  25 mL IntraVENous PRN Slava Maloney MD        midodrine (PROAMATINE) tablet 2.5 mg  2.5 mg Orogastric q8h Gail Schafer MD   2.5 mg at 05/20/22 4102    atropine injection 0.5 mg  0.5 mg IntraVENous Q30 Min PRN Asif Marie MD   0.5 mg at 05/17/22 0159    valproate (DEPACON) 250 mg in dextrose 5 % 100 mL IVPB  250 mg IntraVENous Q6H Gail Schafer MD   Stopped at 05/20/22 0808    cefTRIAXone (ROCEPHIN) 2000 mg IVPB in D5W 50ml minibag  2,000 mg IntraVENous Q24H Kush Kraus MD   Stopped at 05/19/22 1558    metronidazole (FLAGYL) 500 mg in 0.9% NaCl 100 mL IVPB premix  500 mg IntraVENous Q8H Kush Kraus MD   Stopped at 05/20/22 0814    levothyroxine (SYNTHROID) tablet 100 mcg  100 mcg Per NG tube Daily Asif Marie MD   100 mcg at 05/20/22 0803    carbidopa-levodopa (SINEMET)  MG per tablet 1.5 tablet  1.5 tablet Per NG tube q8h Asif Marie MD   1.5 tablet at 05/20/22 0803    sertraline (ZOLOFT) tablet 100 mg  100 mg Per NG tube Nightly Asif Marie MD   100 mg at 05/19/22 2038    pantoprazole (PROTONIX) injection 40 mg  40 mg IntraVENous Daily Asif Marie MD   40 mg at 05/20/22 0803    sodium chloride flush 0.9 % injection 10 mL  10 mL IntraVENous PRN Asif Marie MD   10 mL at 05/16/22 0909    0.9 % sodium chloride infusion   IntraVENous PRN Asif Marie MD   Stopped at 05/19/22 1525    sodium phosphate 10 mmol in sodium chloride 0.9 % 250 mL IVPB  10 mmol IntraVENous PRN Hugh Love MD   Stopped at 05/17/22 2224    Or    sodium phosphate 15 mmol in dextrose 5 % 250 mL IVPB  15 mmol IntraVENous PRN Hugh Love MD   Stopped at 05/18/22 1920    Or    sodium phosphate 20 mmol in dextrose 5 % 500 mL IVPB  20 mmol IntraVENous PRN Hugh Love MD        heparin (porcine) injection 5,000 Units  5,000 Units SubCUTAneous BID Hugh Love MD   5,000 Units at 05/19/22 0929    ondansetron (ZOFRAN) injection 4 mg  4 mg IntraVENous Q6H PRN Hugh Love MD        acetaminophen (TYLENOL) tablet 650 mg  650 mg Oral Q4H PRN Hugh Love MD   650 mg at 05/18/22 0023    Or    acetaminophen (TYLENOL) suppository 650 mg  650 mg Rectal Q4H PRN Hugh Love MD        alteplase (CATHFLO) injection 2 mg  2 mg IntraCATHeter Once Amanda Augustin MD        fentaNYL (SUBLIMAZE) injection 25 mcg  25 mcg IntraVENous Q1H PRN Hugh Love MD           Allergies:     Allergies   Allergen Reactions    Caffeine     Iodine        Problem List:    Patient Active Problem List   Diagnosis Code    Down syndrome Q90.9    Down syndrome Q90.9    Down syndrome Q90.9    Dysphagia R13.10    Autistic disorder F84.0    PEG adjustment, replacement, or removal Z43.1    Hypotension I95.9    Sepsis ([address] A41.9    Acute hypoxemic respiratory failure ([address] J96.01    AVELINO (acute kidney injury) ([address] N17.9    Aspiration pneumonia ([address] J69.0    Septic shock ([address] A41.9, R65.21    Moderate malnutrition ([address] E44.0    Streptococcal bacteremia R78.81, B95.5    Pneumonia of left lower lobe due to infectious organism J18.9    Parkinsonism ([address] G20    Anorexia R63.0    Pneumonia of both lower lobes due to methicillin susceptible Staphylococcus aureus (MSSA) ([address] U70.834       Past Medical History:        Diagnosis Date    Anemia     Anorexia     Autistic disorder     Convulsions ([address]     Down syndrome     Dysphagia     Hypothyroid     Malnutrition ([address]     [facility] disease [facility]     UTI (urinary tract infection)     Weakness        Past Surgical History:        Procedure Laterality Date    GASTROSTOMY TUBE PLACEMENT      GASTROSTOMY TUBE PLACEMENT  2/18/14       Social History:    Social History     Tobacco Use    Smoking status: Never Smoker    Smokeless tobacco: Never Used   Substance Use Topics    Alcohol use: No                                Counseling given: Not Answered      Vital Signs (Current):   Vitals:    05/19/22 2200 05/20/22 0000 05/20/22 0200 05/20/22 0400   BP: (!) 91/58 (!) 118/100 104/62 110/75   Pulse: 84 87 77 81   Resp:  21 17 20   Temp:  98.5 °F (36.9 °C)  98 °F (36.7 °C)   TempSrc:  Bladder  Bladder   SpO2:  96%  95%   Weight:    107 lb 3.2 oz (48.6 kg)   Height:                                                  BP Readings from Last 3 Encounters:   05/20/22 110/75   04/13/22 111/60   04/09/22 (!) 93/58       NPO Status:                                                                                 BMI:   Wt Readings from Last 3 Encounters:   05/20/22 107 lb 3.2 oz (48.6 kg)   04/13/22 75 lb (34 kg)   04/09/22 77 lb 6.4 oz (35.1 kg)     Body mass index is 23.2 kg/m².     CBC:   Lab Results   Component Value Date    WBC 11.3 05/20/2022    RBC 3.24 05/20/2022    HGB 10.6 05/20/2022    HCT 32.8 05/20/2022    .0 05/20/2022    RDW 13.7 05/20/2022     05/20/2022       CMP:   Lab Results   Component Value Date     05/20/2022    K 3.4 05/20/2022     05/20/2022    CO2 23 05/20/2022    BUN 10 05/20/2022    CREATININE <0.5 05/20/2022    GFRAA >60 05/20/2022    GFRAA >60 02/06/2013    AGRATIO 0.5 05/20/2022    LABGLOM >60 05/20/2022    GLUCOSE 90 05/20/2022    PROT 5.1 05/20/2022    PROT 6.9 02/06/2013    CALCIUM 7.9 05/20/2022    BILITOT <0.2 05/20/2022    ALKPHOS 101 05/20/2022    AST 24 05/20/2022    ALT 8 05/20/2022       POC Tests:   Recent Labs     05/20/22  0738   POCGLU 101*       Coags:   Lab Results   Component Value Date PROTIME 12.3 05/20/2022    INR 1.08 05/20/2022    APTT 28.3 03/31/2019       HCG (If Applicable):   Lab Results   Component Value Date    PREGTESTUR Negative 01/23/2018        ABGs:   Lab Results   Component Value Date    PHART 7.508 05/13/2022    PO2ART 62.3 05/13/2022    CGV3VUN 38.7 05/13/2022    DEP4RFM 30.8 05/13/2022    BEART 7.2 05/13/2022    V3EATPYW 93.6 05/13/2022        Type & Screen (If Applicable):  Lab Results   Component Value Date    LABABO O 08/17/2012    LABRH Positive 08/17/2012       Drug/Infectious Status (If Applicable):  No results found for: HIV, HEPCAB    COVID-19 Screening (If Applicable):   Lab Results   Component Value Date    COVID19 NOT DETECTED 05/10/2022    COVID19 Not Detected 03/24/2022           Anesthesia Evaluation  Patient summary reviewed and Nursing notes reviewed no history of anesthetic complications:   Airway: Mallampati: II        Dental:    (+) edentulous      Pulmonary:       (-) wheezes                          ROS comment: H/o hypoxic resp failure   Cardiovascular:    (+) dysrhythmias:,     (-) CABG/stent and  angina        Rate: abnormal                 ROS comment: Transthoracic Echocardiography Report (TTE)      Demographics      [facility]      Date of Study     05/13/2022          Gender              Female      Patient Number    [id number]          Date of Birth       1965      Visit Number      [id number]           Age                 60 year(s)      Accession Number  [id number]          Room Number         4169      Corporate ID      [id number]             Davi Estrada RVT      Ordering          Ayaz Castrejon MD  Interpreting        Slim Bush MD   Physician                             Physician     Procedure     Type of Study      TTE procedure:ECHOCARDIOGRAM COMPLETE 2D W DOPPLER W COLOR.      Procedure Date  Date: 05/13/2022 Start: 11:05 AM     Study Location: Portable  Technical Quality: Adequate visualization     Additional Indications:R/O endocarditis.     Patient Status: Routine     Height: 57 inches Weight: 83 pounds BSA: 1.24 m2 BMI: 17.96 kg/m2     BP: 87/51 mmHg      Conclusions      Summary   Normal left ventricle size, wall thickness, and systolic function with an   estimated ejection fraction of 65%. No regional wall motion abnormalities are seen. Normal diastolic function for age. The aortic valve is sclerotic but opens well. The mitral valve appears myxomatous. Trivial circumferential pericardial effusion. No obvious valve leaflet vegetations. However, aortic root appearance is concerning for possible root abscess. 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 Recommend STROMY for further evaluation given concerning findings on aortic   root and positive blood cultures with Group B strep. Signature      ------------------------------------------------------------------   Electronically signed by Mirna Fuentes MD (Interpreting   physician) on 05/13/2022 at 01:47 PM     Neuro/Psych:   (+) psychiatric history (Autstic, Downs synd):   (-) seizures, TIA and CVA           GI/Hepatic/Renal:             Endo/Other:    (+) hypothyroidism::., .                 Abdominal:             Vascular:     - DVT and PE. Other Findings:           Anesthesia Plan      MAC     ASA 3       Induction: intravenous. Anesthetic plan and risks discussed with patient and legal guardian. Plan discussed with CRNA.                   Jaycee Posada MD   5/20/2022

## 2022-05-20 NOTE — FLOWSHEET NOTE
05/20/22 1431   Treatment Team Notification   Reason for Communication Evaluate   Team Member Name [id number] [place] Team Role Attending Provider   Method of Communication Secure Message   Response Waiting for response   Notification Time 1431   Informed Dr Myrna Peoples of wound care RN suggestion for an order about the groin site drainage. 1451: Orders received.

## 2022-05-20 NOTE — ANESTHESIA POSTPROCEDURE EVALUATION
Department of Anesthesiology  Postprocedure Note    Patient: Wilian Laguna  MRN: [id number]  YOB: 1965  Date of evaluation: 5/20/2022  Time:  12:46 PM     Procedure Summary     Date: 05/20/22 Room / Location: [address] [place] [address]    Anesthesia Start: 2023 Anesthesia Stop: 3564    Procedure: EGD PEG TUBE PLACEMENT (N/A Abdomen) Diagnosis: (dysphagia)    Surgeons: Krista Rodriguez MD Responsible Provider: Rashel Tariq MD    Anesthesia Type: MAC ASA Status: 3          Anesthesia Type: No value filed. Jerman Phase I: Jerman Score: 10    Jerman Phase II:      Last vitals: Reviewed and per EMR flowsheets. Anesthesia Post Evaluation    Patient location during evaluation: PACU  Patient participation: complete - patient participated  Level of consciousness: awake  Airway patency: patent  Nausea & Vomiting: no vomiting  Complications: no  Cardiovascular status: hemodynamically stable  Respiratory status: acceptable  Hydration status: euvolemic  There was medical reason for not using a multimodal analgesia pain management approach.

## 2022-05-20 NOTE — PROGRESS NOTES
Pt stable and able to be transferred from PACU to room 5909. A&O , VSS, with no complaints at this time. 5C called and notified that pt is being transferred out of PACU and to room.

## 2022-05-20 NOTE — PROGRESS NOTES
Hospitalist Progress Note      PCP: Stacy Yeung MD    Date of Admission: 5/10/2022    Subjective:     Remains on a low-dose of Levophed, leukocytosis is improving, hypokalemia 3.2, no acute event overnight, remain bradycardia, sinus bradycardia, not following commands, palliative care on board. Overall not improving. Gi consulted for possible PEG. Medications:  Reviewed    Infusion Medications    sodium chloride Stopped (05/19/22 1528)    sodium chloride      sodium chloride Stopped (05/19/22 1525)     Scheduled Medications    lidocaine 1 % injection  5 mL IntraDERmal Once    sodium chloride flush  5-40 mL IntraVENous 2 times per day    midodrine  2.5 mg Orogastric q8h    valproate sodium (DEPACON) IVPB  250 mg IntraVENous Q6H    cefTRIAXone (ROCEPHIN) IV  2,000 mg IntraVENous Q24H    metroNIDAZOLE  500 mg IntraVENous Q8H    levothyroxine  100 mcg Per NG tube Daily    carbidopa-levodopa  1.5 tablet Per NG tube q8h    sertraline  100 mg Per NG tube Nightly    pantoprazole  40 mg IntraVENous Daily    heparin (porcine)  5,000 Units SubCUTAneous BID    alteplase  2 mg IntraCATHeter Once     PRN Meds: [DISCONTINUED] potassium chloride **OR** potassium chloride, sodium chloride flush, sodium chloride, atropine, sodium chloride flush, sodium chloride, sodium phosphate IVPB **OR** sodium phosphate IVPB **OR** sodium phosphate IVPB, ondansetron, acetaminophen **OR** acetaminophen, fentanNYL      Intake/Output Summary (Last 24 hours) at 5/19/2022 2035  Last data filed at 5/19/2022 2000  Gross per 24 hour   Intake 7850.22 ml   Output 1425 ml   Net 6425.22 ml       Physical Exam Performed:    BP 99/76   Pulse 85   Temp 98.3 °F (36.8 °C) (Bladder)   Resp 15   Ht 4' 9\" (1.448 m)   Wt 106 lb 4.2 oz (48.2 kg)   SpO2 96%   BMI 22.99 kg/m²     General appearance: Extubated  HEENT: Pupils equal, round, and reactive to light. Conjunctivae/corneas clear. Neck: Supple, with full range of motion.  No jugular venous distention. Trachea midline. Respiratory:  Normal respiratory effort. Clear to auscultation, bilaterally without Rales/Wheezes/Rhonchi. Cardiovascular: Regular rate and rhythm with normal S1/S2 without murmurs, rubs or gallops. Abdomen: Soft, non-tender, non-distended with normal bowel sounds. Musculoskeletal: No clubbing, cyanosis or edema bilaterally. Full range of motion without deformity. Skin: Skin color, texture, turgor normal.  No rashes or lesions. Neurologic: Alert  Psychiatric: Calm  Capillary Refill: Brisk,3 seconds, normal   Peripheral Pulses: +2 palpable, equal bilaterally       Labs:   Recent Labs     05/17/22  1105 05/18/22  0626 05/19/22  0459   WBC 6.3 9.1 7.7   HGB 12.1 11.8* 11.0*   HCT 37.1 36.4 33.1*    220 240     Recent Labs     05/17/22  0907 05/18/22  0626 05/19/22  0459   * 134* 136   K 3.7 3.9 2.5*    103 105   CO2 19* 24 23   BUN 28* 24* 16   CREATININE 0.6 <0.5* <0.5*   CALCIUM 8.3 7.9* 7.7*   PHOS 2.4* 2.1* 1.9*     Recent Labs     05/17/22  0907 05/18/22  0626 05/19/22  0459   AST 44* 40* 31   ALT <5* <5* 6*   BILITOT <0.2 <0.2 <0.2   ALKPHOS 96 110 106     Recent Labs     05/18/22  0626 05/19/22  0458   INR 1.18* 1.10     No results for input(s): CKTOTAL, TROPONINI in the last 72 hours. Urinalysis:      Lab Results   Component Value Date    NITRU Negative 05/10/2022    WBCUA 3 05/10/2022    BACTERIA None Seen 05/10/2022    RBCUA 3-4 05/10/2022    BLOODU Negative 05/10/2022    SPECGRAV 1.025 05/10/2022    GLUCOSEU Negative 05/10/2022       Radiology:  XR CHEST PORTABLE   Final Result   Moderate airspace disease right upper and lower lobe with volume loss. Pneumonia and or mucous plugging both considered. NG tube extends well into the stomach. XR CHEST PORTABLE   Final Result   1. Endotracheal tube terminates in the left mainstem bronchus, for which   retraction 5 cm is recommended.       2.  Enteric tube terminates at the level of the body of the stomach. 3.  No significant change in bilateral airspace disease. Probable small left   effusion. XR ABDOMEN FOR NG/OG/NE TUBE PLACEMENT   Final Result   NG tube position appears acceptable. Endotracheal tube tip is likely within 1 cm above the anita. Dense left basilar opacity and patchy opacities in both lungs. XR CHEST PORTABLE   Final Result   Endotracheal tube is present with tip projecting just proximal to the anita. Left basilar opacity and trace left pleural effusion. Aspiration and   pneumonia are in the differential.             Assessment/Plan:    Active Hospital Problems    Diagnosis     Pneumonia of both lower lobes due to methicillin susceptible Staphylococcus aureus (MSSA) ([facility]) [J15.211]      Priority: Medium    Streptococcal bacteremia [R78.81, B95.5]      Priority: Medium    Pneumonia of left lower lobe due to infectious organism [J18.9]      Priority: Medium    Parkinsonism ([address] [G20]      Priority: Medium    Anorexia [R63.0]      Priority: Medium    Moderate malnutrition ([address] [E44.0]      Priority: Medium    AVELINO (acute kidney injury) ([address] [N17.9]      Priority: Medium    Aspiration pneumonia ([address] [J69.0]      Priority: Medium    Septic shock ([address] [A41.9, R65.21]      Priority: Medium    Acute hypoxemic respiratory failure ([address] [J96.01]     Down syndrome [Q90.9]       Acute hypoxic respiratory failure, secondary to multifocal pneumonia, patient was started on broad-spectrum antibiotic, intubated and sedated, started on Levophed, no guidance cultures so far, appreciate pulmonary input, liberated from mechanical ventilation 5/12 remain n.p.o., failed speech.    Sepsis, and septic shock, secondary to multifocal pneumonia,/and possible infective endocarditis with possible aortic root abscess started on vancomycin and cefepime antibiotic has been changed to Rocephin and Flagyl per ID, lactic acid is improving 3.2 continue current treatment, continue vent support leukocytosis has been worsening 21,000, blood culture growing Streptococcus, will repeat blood culture, send strep antigen urine concern for endocarditis on echocardiogram, per cardiology it would be unsafe to do STORMY via conscious sedation as patient cannot follow commands STORMY was canceled, plan to treat with antibiotic, likely patient will need a PICC line, repeated blood cultures are still negative so far.  Chronic hypotension, on midodrine, will continue now on low-dose of Levophed   Acute kidney injury likely prerenal, ATN, avoid nephrotoxic medication, continue fluid resuscitation.  Sinus bradycardia, will decrease dose of midodrine 2.5 mg 3 times daily. If develop any heart block,  will reconsult cardiology   History of Down syndrome,  May need  PEG tube   Lactic acidosis, improving with fluid resuscitation   Hypomagnesemia. Repleted, will continue monitor.  History of seizure disorder continue Keppra   Hypothyroidism, continue Synthroid   Failure to thrive  Overall prognosis poor,GI has been consulted for PEG. DVT Prophylaxis: Heparin subcu  Diet: Diet NPO  Diet NPO Exceptions are: Sips of Water with Meds  ADULT TUBE FEEDING; Nasogastric; 2.0 Calorie; Continuous; 25; No; 30; Q 4 hours  Code Status: DNR-CCA    Dispo -continue ICU care, may be transferred, plan if for PEG tomorrow if guardian approved. Due to the immediate potential for life-threatening deterioration due to acute hypoxic respiratory failure, multifocal pneumonia, I spent 30 minutes providing critical care. This time is excluding time spent performing procedures.       Ryne Darling MD

## 2022-05-20 NOTE — PROCEDURES
PEG Operative Note    Patient: Tay Page MRN: [id number]     YOB: 1965  Age: 60 y.o. Sex: female    Unit: [facility] ENDOSCOPY Room/Bed: [facility] ENDO/NONE Location: [address] [place] [address]     Primary Care Physician: Carla Howard MD    HISTORY:  The patient is a 60 y.o. female with a history of   Past Medical History:   Diagnosis Date    Anemia     Anorexia     Autistic disorder     Convulsions [facility]     Down syndrome     Dysphagia     Hypothyroid     Malnutrition ([facility]     Parkinson disease ([facility]     UTI (urinary tract infection)     Weakness    . INDICATIONS: Poor p.o. intake/malnutrition and aspiration pneumonia. DATE OF OPERATION: 5/20/22    OPERATIVE SURGEON: oDminic Mendoza MD    ASA: 3    SEDATION: Propofol sedation      Procedure in Detail:   Informed consent was obtained for the procedure. Risks of infection, perforation, hemorrhage, adverse drug reaction, missed lesions and aspiration were discussed. The patient was placed in the left lateral decubitus position, a bite block was placed, and medications were administered with slow incremental doses to optimize patient safety and comfort. The patient was monitored continuously with electrocardiogram tracing, pulse oximetry, blood pressure monitoring, and direct observation. The patient was placed in the supine position. The gastroscope was inserted into the mouth and advanced under direct vision to second portion of the duodenum. While in the stomach there was significant amount of clear liquid with residue of tube feeds noted. This was totaling to an amount of 1 L which was suctioned off. A careful inspection was made as the gastroscope was withdrawn, including a retroflexed view of the proximal stomach; findings and interventions are described below. Appropriate photodocumentation was obtained.  The stomach was inflated with air, and by a combination of transillumination and manual palpation, the site for the gastrostomy tube placement was selected and marked on the anterior abdominal wall. Then the skin of the anterior abdomnen was surgically prepped and draped with sterile towels. 2% lidocaine was injected to numb the tissue. A 1 cm incision was made through the skin and subcutaneous tissue and the needle /cannula assembly was then passed through the abdominal wall and through the anterior wall of the stomach, maintaining visualization with the endoscope. A snare device previously placed in the instrument channel was then opened and placed around the cannula, the needle was removed, and the insertion wire was passed through the cannula and into the stomach lumen. The snare was then loosened from the cannula, and repositioned to snare the insertion wire. The snare was then pulled up to the endoscope distal tip and the scope was withdrawn, bringing with it the snare and insertion wire. The insertion wire was then released from the snare and the loop attached to the 20-Green[place]ic gastrostomy tube. Using the pull technique, the G-tube was then pulled into place by traction, the G-tube insertion site was cleansed and the external bolster was placed over the tube to secure it to the abdominal wall. The position of the site was verified by repeat endoscopy. The external bumper position on the anterior abdomen was located at 2 cms at skin level and 3 cms at external bumper level. A sterile dressing was applied. Normal appearing esophageal mucosa with no esophagitis, strictures, or Vazquez's. Scar in the gastric body and location of previous PEG tube. Otherwise normal-appearing stomach with no ulcers, erosions, or masses. Normal-appearing duodenal mucosa with no ulcers. Estimated Blood Loss: < 5 cc    Complications: No immediate complications.      Impression:   - Successful placement of 20 Greenlandic PEG tube  - Otherwise normal EGD    Recommendations:   - Ok to use PEG for medications and free water in 4 hours  - Okay to start tube feeds tomorrow  -Flush with 20mL of water after medications and tube feeds  - Change external PEG dressing 2x per day for 3 days starting tomorrow, applying betadine or antibiotic ointment to site      Signed By: Angie Peck MD  Office: [phone]

## 2022-05-20 NOTE — PROGRESS NOTES
Tube feeding stopped at 0000 for PEG placement tomorrow. BS was 68. MD notified and continuous fluids changed to D5 NS at 75ml/hr. Will continue to monitor.

## 2022-05-20 NOTE — PROGRESS NOTES
Pt transferred to room 5909 at this time. A&O with no signs of distress. Tele on, with visual on monitor. Report given to [facility] RN. V/u and denies questions or further needs at this time.

## 2022-05-20 NOTE — PROGRESS NOTES
Infectious Diseases   Progress Note      Admission Date: 5/10/2022  Hospital Day: Hospital Day: 11   Attending: Yisel Mcmahan MD  Date of service: 5/20/2022     Chief complaint/ Reason for consult:     · Septic shock with high fever, worsening leukocytosis, hypotension requiring vasopressors and lactic acidosis  · Group B streptococcus bacteremia  · Acute respiratory failure with hypoxia  · Left lower lobe pneumonia, concern for aspiration    Microbiology:        I have reviewed allavailable micro lab data and cultures    · Blood culture (1/2)  - collected on 5/10/2022: Group B streptococcus  Tracheal aspirate culture  - collected on 5/12/2022: Group B streptococcus, MSSA    Susceptibility      Staphylococcus aureus (2)    Antibiotic Interpretation Microscan  Method Status    ceFAZolin Sensitive <=4 mcg/mL BACTERIAL SUSCEPTIBILITY PANEL BY PALLAVI     clindamycin Sensitive <=0.5 mcg/mL BACTERIAL SUSCEPTIBILITY PANEL BY PALLAVI     erythromycin Sensitive <=0.5 mcg/mL BACTERIAL SUSCEPTIBILITY PANEL BY PALLAVI     oxacillin Sensitive 0.5 mcg/mL BACTERIAL SUSCEPTIBILITY PANEL BY PALLAVI     tetracycline Sensitive <=4 mcg/mL BACTERIAL SUSCEPTIBILITY PANEL BY PALLAVI     trimethoprim-sulfamethoxazole Sensitive <=0.5/9.5 mcg/mL BACTERIAL SUSCEPTIBILITY PANEL BY PALLAVI           Antibiotics and immunizations:       Current antibiotics: All antibiotics and their doses were reviewed by me    Recent Abx Admin                   metronidazole (FLAGYL) 500 mg in 0.9% NaCl 100 mL IVPB premix (mg) 500 mg New Bag 05/20/22 0635     500 mg New Bag 05/19/22 2203     500 mg New Bag  1641    cefTRIAXone (ROCEPHIN) 2000 mg IVPB in D5W 50ml minibag (mg) 2,000 mg New Bag 05/19/22 1528                  Immunization History: All immunization history was reviewed by me today. There is no immunization history on file for this patient. Known drug allergies:      All allergies were reviewed and updated    Allergies   Allergen Reactions    Caffeine     Iodine        Social history:     Social History:  All social andepidemiologic history was reviewed and updated by me today as needed. · Tobacco use:   reports that she has never smoked. She has never used smokeless tobacco.  · Alcohol use:   reports no history of alcohol use. · Currently lives in: [address] [place] [address] Road  ·  reports no history of drug use. COVID VACCINATION AND LAB RESULT RECORDS:     Internal Administration   First Dose      Second Dose           Last COVID Lab SARS-CoV-2 (no units)   Date Value   04/05/2022 Not Detected     SARS-CoV-2, PCR (no units)   Date Value   03/24/2022 Not Detected     SARS-CoV-2 RNA, RT PCR (no units)   Date Value   05/10/2022 NOT DETECTED            Assessment:     The patient is a 60 y.o. old female who  has a past medical history of Anemia, Anorexia, Autistic disorder, Convulsions ([address] Down syndrome, Dysphagia, Hypothyroid, Malnutrition ([address] Parkinson disease ([address] UTI (urinary tract infection), and Weakness. with following problems:      · Septic shock with high fever, worsening leukocytosis, hypotension requiring vasopressors and lactic acidosis-resolved  · Group B streptococcus bacteremia-currently on ceftriaxone  · Acute respiratory failure with hypoxia -respiratory culture grew MSSA -covered with ceftriaxone  · Left lower lobe pneumonia, concern for aspiration-tracheal aspirate culture has grown Streptococcus and MSSA-this is covered with ceftriaxone  · Parkinsonism  · History of Down syndrome  · Anorexia  ·       Discussion:      The patient remains on IV ceftriaxone and Flagyl. She is afebrile. Serum creatinine 0.5. White cell count is 11,300. It seems to have gone up today    Platelet count of 362,570. Plan:     Diagnostic Workup:    · Continue to follow  fever curve, WBC count and blood cultures. · Continue to monitor blood counts, liver and renal function.     Antimicrobials:    · Will continue IV ceftriaxone 2 g every 24 hours  · Continue IV Flagyl 500 mg every 8 hour  · The patient has undergone a PEG tube placement today  · Once the PEG tube is ready to be used, plan to switch her from ceftriaxone and Flagyl to oral Augmentin 875 mg every 12 hour   · Plan to continue oral Augmentin Until [address]  · Recommend oral probiotic twice daily via PEG tube while on Augmentin  · Continue close vitals monitoring. · Maintain good glycemic control. · Fall precautions. · Aspiration precautions. · Continue to watch for new fever or diarrhea. · DVT prophylaxis. · Discussed all above with patient and RN. Drug Monitoring:    · Continue monitoring for antibiotic toxicity as follows: CBC, CMP   · Continue to watch for following: new or worsening fever, new hypotension, hives, lip swelling and redness or purulence at vascular access sites. I/v access Management:    · Continue to monitor i.v access sites for erythema, induration, discharge or tenderness. · As always, continue efforts to minimize tubes/lines/drains as clinically appropriate to reduce chances of line associated infections. Patient education and counseling:        · The patient was educated in detail about the side-effects of various antibiotics and things to watch for like new rashes, lip swelling, severe reaction, worsening diarrhea, break through fever etc.  · Discussed patient's condition and what to expect. All of the patient's questions were addressed in a satisfactory manner and patient verbalized understanding all instructions. Level of complexity of visit: High     Thank you for involving me in the care of your patient. I will continue to follow. If you have anyadditional questions, please do not hesitate to contact me. Subjective: Interval history: Interval history was obtained from chart review and RN. She is afebrile. She is on IV ceftriaxone and Flagyl.   She is tolerating antibiotics okay     REVIEW OF SYSTEMS:      Review of Systems   Unable to perform ROS: Patient nonverbal         Past Medical History: All past medical history reviewed today. Past Medical History:   Diagnosis Date    Anemia     Anorexia     Autistic disorder     [address]ulsions ([facility] [address]     Down syndrome     Dysphagia     Hypothyroid     Malnutrition ([facility] [address]     Parkinson disease ([facility] [address] [facility]     UTI (urinary tract infection)     Weakness        Past Surgical History: All past surgical history was reviewed today. Past Surgical History:   Procedure Laterality Date    GASTROSTOMY TUBE PLACEMENT      GASTROSTOMY TUBE PLACEMENT  2/18/14    GASTROSTOMY TUBE PLACEMENT N/A 5/20/2022    EGD PEG TUBE PLACEMENT performed by Tammie Anderson MD at [address]       Family History: All family history was reviewed today. History reviewed. No pertinent family history. Objective:       PHYSICAL EXAM:      Vitals:   Vitals:    05/20/22 1020 05/20/22 1025 05/20/22 1030 05/20/22 1035   BP: (!) 90/58 (!) 90/58 (!) 89/58 91/63   Pulse: 65 69 72 72   Resp: 16 16 16 16   Temp:   97 °F (36.1 °C) 97 °F (36.1 °C)   TempSrc:   Temporal Temporal   SpO2: 96% 95% 94% 94%   Weight:       Height:           Physical Exam     General: Encephalopathic but protecting the airway so far,   HEENT: normocephalic, atraumatic, sclera clear, pupils equal, light reflex preserved bilaterally  Cardiovascular: RRR, no murmurs/rubs/gallops detected  Pulmonary: CTABL, no rhonchi/rales   Abdomen/GI: soft, no organomegaly, bowel sounds positive  Neuro: Encephalopathic, pupils are equal and reactive to light, moves all extremities  Skin: no rash,   Musculoskeletal:  No obvious joint swelling, redness. No limitation of range of passive motion  Genitourinary:  Yuan's catheter in place   Psych: could not assess   Lymphatic/Immunologic: No obvious bruising, no cervical lymphadenopathy    Lines: All vascular access sites are healthy with no local erythema, discharge or tenderness    Intake and output:    I/O last 3 completed shifts: In: 9021.2 [I.V.:4988. 2; NG/GT:860; IV Piggyback:3172.9]  Out: 3641 [Urine:1725; Emesis/NG output:3; Other:1150]    Lab Data:   All available labs and old records have been reviewed by me. CBC:  Recent Labs     05/18/22  0626 05/19/22 0459 05/20/22 0615   WBC 9.1 7.7 11.3*   RBC 3.59* 3.31* 3.24*   HGB 11.8* 11.0* 10.6*   HCT 36.4 33.1* 32.8*    240 261   .5* 99.9 101.0*   MCH 32.8 33.3 32.7   MCHC 32.3 33.3 32.4   RDW 13.6 13.4 13.7        BMP:  Recent Labs     05/18/22 0626 05/19/22 0459 05/19/22 2133 05/20/22 0615   * 136  --  137   K 3.9 2.5* 4.5 3.4*    105  --  106   CO2 24 23  --  23   BUN 24* 16  --  10   CREATININE <0.5* <0.5*  --  <0.5*   CALCIUM 7.9* 7.7*  --  7.9*   GLUCOSE 90 86  --  90        Hepatic Function Panel:   Lab Results   Component Value Date    ALKPHOS 101 05/20/2022    ALT 8 05/20/2022    AST 24 05/20/2022    PROT 5.1 05/20/2022    PROT 6.9 02/06/2013    BILITOT <0.2 05/20/2022    BILIDIR <0.2 12/23/2016    IBILI see below 12/23/2016    LABALBU 1.7 05/20/2022       CPK:   Lab Results   Component Value Date    CKTOTAL 78 08/22/2012     ESR: No results found for: SEDRATE  CRP: No results found for: CRP        Imaging: All pertinent images and reports for the current visit were reviewed by me during this visit. I reviewed the chest x-ray/CT scan/MRI images and independently interpreted the findings and results today. XR CHEST PORTABLE   Final Result   Moderate airspace disease right upper and lower lobe with volume loss. Pneumonia and or mucous plugging both considered. NG tube extends well into the stomach. XR CHEST PORTABLE   Final Result   1. Endotracheal tube terminates in the left mainstem bronchus, for which   retraction 5 cm is recommended. 2.  Enteric tube terminates at the level of the body of the stomach. 3.  No significant change in bilateral airspace disease. Probable small left   effusion.          XR ABDOMEN FOR NG/OG/NE TUBE PLACEMENT   Final Result   NG tube position appears acceptable. Endotracheal tube tip is likely within 1 cm above the anita. Dense left basilar opacity and patchy opacities in both lungs. XR CHEST PORTABLE   Final Result   Endotracheal tube is present with tip projecting just proximal to the anita. Left basilar opacity and trace left pleural effusion. Aspiration and   pneumonia are in the differential.             Medications: All current and past medications were reviewed.      potassium phosphate IVPB  30 mmol IntraVENous Once    lidocaine 1 % injection  5 mL IntraDERmal Once    sodium chloride flush  5-40 mL IntraVENous 2 times per day    midodrine  2.5 mg Orogastric q8h    valproate sodium (DEPACON) IVPB  250 mg IntraVENous Q6H    cefTRIAXone (ROCEPHIN) IV  2,000 mg IntraVENous Q24H    metroNIDAZOLE  500 mg IntraVENous Q8H    levothyroxine  100 mcg Per NG tube Daily    carbidopa-levodopa  1.5 tablet Per NG tube q8h    sertraline  100 mg Per NG tube Nightly    pantoprazole  40 mg IntraVENous Daily    heparin (porcine)  5,000 Units SubCUTAneous BID    alteplase  2 mg IntraCATHeter Once        dextrose 5 % and 0.9 % NaCl 75 mL/hr at 05/20/22 0026    sodium chloride Stopped (05/20/22 1006)    sodium chloride Stopped (05/19/22 1528)    sodium chloride      sodium chloride Stopped (05/19/22 1525)       [DISCONTINUED] potassium chloride **OR** potassium chloride, sodium chloride flush, sodium chloride, atropine, sodium chloride flush, sodium chloride, sodium phosphate IVPB **OR** sodium phosphate IVPB **OR** sodium phosphate IVPB, ondansetron, acetaminophen **OR** acetaminophen, fentanNYL      Problem list:       Patient Active Problem List   Diagnosis Code    Down syndrome Q90.9    Down syndrome Q90.9    Down syndrome Q90.9    Dysphagia R13.10    Autistic disorder F84.0    PEG adjustment, replacement, or removal Z43.1    Hypotension I95.9    Sepsis ([facility] A41.9    Acute hypoxemic respiratory failure (HCC) J96.01    AVELINO (acute kidney injury) ([facility] N17.9    Aspiration pneumonia (HCC) J69.0    Septic shock (HCC) A41.9, R65.21    Moderate malnutrition ([facility] E44.0    Streptococcal bacteremia R78.81, B95.5    Pneumonia of left lower lobe due to infectious organism J18.9    Parkinsonism ([facility] G20    Anorexia R63.0    Pneumonia of both lower lobes due to methicillin susceptible Staphylococcus aureus (MSSA) ([facility] J15.211       Please note that this chart was generated using Dragon dictation software. Although every effort was made to ensure the accuracy of this automated transcription, some errors in transcription may have occurred inadvertently. If you may need any clarification, please do not hesitate to contact me through EPIC or at the phone number provided below with my electronic signature. Any pictures or media included in this note were obtained after taking informed verbal consent from the patient and with their approval to include those in the patient's medical record.       Wilner Chin MD, MPH, FACP, [facility]  5/20/2022, 11:59 AM  [facility] Infectious Disease   [address] [place] [address], [address] [place] [address]  Office: [phone]  Fax: [phone]  Clinic days:  Tuesday & Thursday

## 2022-05-20 NOTE — H&P
Gastroenterology Note             Pre-operative History and Physical    Patient: Storm Washington  : 1965  CSN:     History Obtained From:  patient and/or guardian. HISTORY OF PRESENT ILLNESS:    The patient is a 60 y.o. female  here for EGD with possible PEG tube placement. For poor p.o. intake/malnutrition. Past Medical History:    Past Medical History:   Diagnosis Date    Anemia     Anorexia     Autistic disorder     Convulsions ([address]     Down syndrome     Dysphagia     Hypothyroid     Malnutrition ([facility]     Parkinson disease ([facility]     UTI (urinary tract infection)     Weakness      Past Surgical History:    Past Surgical History:   Procedure Laterality Date    GASTROSTOMY TUBE PLACEMENT      GASTROSTOMY TUBE PLACEMENT  14     Medications Prior to Admission:   No current facility-administered medications on file prior to encounter. Current Outpatient Medications on File Prior to Encounter   Medication Sig Dispense Refill    midodrine (PROAMATINE) 2.5 MG tablet Take 1 tablet by mouth 3 times daily (with meals) 90 tablet 1    valproic acid (DEPAKENE) 250 MG/5ML SOLN oral solution Take 250 mg by mouth every 8 hours 250 mg in the AM, 250 mg in the afternoon, 500 mg nightly.  levothyroxine (SYNTHROID) 100 MCG tablet Take 100 mcg by mouth daily       docusate sodium (COLACE) 100 MG capsule Take 100 mg by mouth daily      acetaminophen (TYLENOL) 325 MG tablet Take 650 mg by mouth every 6 hours as needed for Pain.  carbidopa-levodopa (SINEMET)  MG per tablet Take 1.5 tablets by mouth 3 times daily Indications: Take one and a half tablets by mouth three times per day       sertraline (ZOLOFT) 20 MG/ML concentrated solution Take 100 mg by mouth nightly       Multiple Vitamins-Minerals (THERAPEUTIC MULTIVITAMIN-MINERALS) tablet Take 1 tablet by mouth daily.  omeprazole (PRILOSEC) 20 MG capsule Take 20 mg by mouth daily.           Allergies:  Caffeine and Iodine      Social History:   Social History     Tobacco Use    Smoking status: Never Smoker    Smokeless tobacco: Never Used   Substance Use Topics    Alcohol use: No     Family History:   History reviewed. No pertinent family history. PHYSICAL EXAM:      /75   Pulse 81   Temp 98 °F (36.7 °C) (Bladder)   Resp 20   Ht 4' 9\" (1.448 m)   Wt 107 lb 3.2 oz (48.6 kg)   SpO2 95%   BMI 23.20 kg/m²  I        Heart:   RRR, normal s1s2    Lungs:  CTA bilat,  Normal effort    Abdomen:   NT, ND      ASA Grade:  ASA 3 - Patient with moderate systemic disease with functional limitations    Mallampati Class: 2          ASSESSMENT AND PLAN:    1. Patient is a 60 y.o. female here for EGD with possible PEG tube placement with MAC.   2.  Procedure options, risks and benefits reviewed with guardian. Guardian expresses understanding.     Harper Jade MD,   [facility]  5/20/2022

## 2022-05-20 NOTE — PLAN OF CARE
Problem: Discharge Planning  Goal: Discharge to home or other facility with appropriate resources  5/19/2022 2140 by Shiva Hardy RN  Outcome: Progressing  5/19/2022 1837 by Franco Valiente RN  Outcome: Progressing     Problem: Pain  Goal: Verbalizes/displays adequate comfort level or baseline comfort level  5/19/2022 2140 by Shiva Hardy RN  Outcome: Progressing  5/19/2022 1837 by Franco Valiente RN  Outcome: Progressing     Problem: Nutrition Deficit:  Goal: Optimize nutritional status  5/19/2022 2140 by Shiva Hardy RN  Outcome: Progressing  5/19/2022 1837 by Franco Valiente RN  Outcome: Progressing  Flowsheets (Taken 5/19/2022 1019 by Nancy Santana RD, LD)  Nutrient intake appropriate for improving, restoring, or maintaining nutritional needs: Recommend, monitor, and adjust tube feedings and TPN/PPN based on assessed needs     Problem: Skin/Tissue Integrity  Goal: Absence of new skin breakdown  Description: 1. Monitor for areas of redness and/or skin breakdown  2. Assess vascular access sites hourly  3. Every 4-6 hours minimum:  Change oxygen saturation probe site  4. Every 4-6 hours:  If on nasal continuous positive airway pressure, respiratory therapy assess nares and determine need for appliance change or resting period.   5/19/2022 2140 by Shiva Hardy RN  Outcome: Progressing  5/19/2022 1837 by Franco Valiente RN  Outcome: Progressing     Problem: Safety - Adult  Goal: Free from fall injury  5/19/2022 2140 by Shiva Hadry RN  Outcome: Progressing  Flowsheets (Taken 5/19/2022 2139)  Free From Fall Injury:   Instruct family/caregiver on patient safety   Based on caregiver fall risk screen, instruct family/caregiver to ask for assistance with transferring infant if caregiver noted to have fall risk factors  5/19/2022 1837 by Franco Valiente RN  Outcome: Progressing     Problem: ABCDS Injury Assessment  Goal: Absence of physical injury  Recent Flowsheet Documentation  Taken 5/19/2022 2139 by Myna Kobi, RN  Absence of Physical Injury: Implement safety measures based on patient assessment  5/19/2022 1837 by Simona Wiseman RN  Outcome: Progressing

## 2022-05-20 NOTE — CARE COORDINATION
[facility] Wound Ostomy Continence Nurse  Consult Note       NAME:  Juvenal Hannon  MEDICAL RECORD NUMBER:  [id number]  AGE: 60 y.o. GENDER: female  : 1965  TODAY'S DATE:  2022    Subjective   Reason for WOCN Evaluation and Assessment: serous fluid leaking from old fem line site      Juvenal Hannon is a 60 y.o. female referred by:   [x] Physician  [x] Nursing  [] Other:     Wound Identification:  Wound Type: undetermined  Contributing Factors: none    Wound History: patient has fem line placed on 5/10/22, unable to place picc line, line was removed 22  Current Wound Care Treatment:  Urostomy  Pouch placed to contain fluid    Patient Goal of Care:  [x] Wound Healing  [] Odor Control  [] Palliative Care  [] Pain Control   [] Other:         PAST MEDICAL HISTORY        Diagnosis Date    Anemia     Anorexia     Autistic disorder     Convulsions ([address]     Down syndrome     Dysphagia     Hypothyroid     Malnutrition ([address]     Parkinson disease ([address]     UTI (urinary tract infection)     Weakness        PAST SURGICAL HISTORY    Past Surgical History:   Procedure Laterality Date    GASTROSTOMY TUBE PLACEMENT      GASTROSTOMY TUBE PLACEMENT  14    GASTROSTOMY TUBE PLACEMENT N/A 2022    EGD PEG TUBE PLACEMENT performed by Cheryl Craig MD at [place] [address]    History reviewed. No pertinent family history. SOCIAL HISTORY    Social History     Tobacco Use    Smoking status: Never Smoker    Smokeless tobacco: Never Used   Substance Use Topics    Alcohol use: No    Drug use: No       ALLERGIES    Allergies   Allergen Reactions    Caffeine     Iodine        MEDICATIONS    No current facility-administered medications on file prior to encounter.      Current Outpatient Medications on File Prior to Encounter   Medication Sig Dispense Refill    midodrine (PROAMATINE) 2.5 MG tablet Take 1 tablet by mouth 3 times daily (with meals) 90 tablet 1    valproic acid (DEPAKENE) 250 MG/5ML SOLN oral solution Take 250 mg by mouth every 8 hours 250 mg in the AM, 250 mg in the afternoon, 500 mg nightly.  levothyroxine (SYNTHROID) 100 MCG tablet Take 100 mcg by mouth daily       docusate sodium (COLACE) 100 MG capsule Take 100 mg by mouth daily      acetaminophen (TYLENOL) 325 MG tablet Take 650 mg by mouth every 6 hours as needed for Pain.  carbidopa-levodopa (SINEMET)  MG per tablet Take 1.5 tablets by mouth 3 times daily Indications: Take one and a half tablets by mouth three times per day       sertraline (ZOLOFT) 20 MG/ML concentrated solution Take 100 mg by mouth nightly       Multiple Vitamins-Minerals (THERAPEUTIC MULTIVITAMIN-MINERALS) tablet Take 1 tablet by mouth daily.  omeprazole (PRILOSEC) 20 MG capsule Take 20 mg by mouth daily.          Objective    BP 91/63   Pulse 72   Temp 97 °F (36.1 °C) (Temporal)   Resp 16   Ht 4' 9\" (1.448 m)   Wt 107 lb 3.2 oz (48.6 kg)   SpO2 94%   BMI 23.20 kg/m²     LABS:  WBC:    Lab Results   Component Value Date    WBC 11.3 05/20/2022     H/H:    Lab Results   Component Value Date    HGB 10.6 05/20/2022    HCT 32.8 05/20/2022     PTT:    Lab Results   Component Value Date    APTT 28.3 03/31/2019   [APTT}  PT/INR:    Lab Results   Component Value Date    PROTIME 12.3 05/20/2022    INR 1.08 05/20/2022     HgBA1c:  No results found for: LABA1C    Assessment   Abdirizak Risk Score: Abdirizak Scale Score: 12    Patient Active Problem List   Diagnosis Code    Down syndrome Q90.9    Down syndrome Q90.9    Down syndrome Q90.9    Dysphagia R13.10    Autistic disorder F84.0    PEG adjustment, replacement, or removal Z43.1    Hypotension I95.9    Sepsis ([address] A41.9    Acute hypoxemic respiratory failure (HCC) J96.01    AVELINO (acute kidney injury) ([address] N17.9    Aspiration pneumonia (HCC) J69.0    Septic shock (HCC) A41.9, R65.21    Moderate malnutrition ([address] E44.0    Streptococcal bacteremia R78.81, B95.5    Pneumonia of left lower lobe due to infectious organism J18.9    Parkinsonism ([facility] G20    Anorexia R63.0    Pneumonia of both lower lobes due to methicillin susceptible Staphylococcus aureus (MSSA) ([facility] J15.211       Measurements:  Patient has open wound at former fem line site. Fem line removed on 5/18/22, patient draining serous fluid. Site open measures 0.4cm. denuded skin around opening, other skin intact. Spoke to nurse Shilpa Khan, hospitalist suspects maybe drainage from third spacing. Urostomy pouch working well. For now. Response to treatment:  Well tolerated by patient. Pain Assessment:  Severity:  0 / 10  Quality of pain: N/A  Wound Pain Timing/Severity: none  Premedicated: N/A    Plan   Plan of Care: wound to old fem line site, draining serous fluid. Continue urostomy pouch attach to night drainage bag, change every 3 to 5 days.     Specialty Bed Required : No   [] Low Air Loss   [] Pressure Redistribution  [] Fluid Immersion  [] Bariatric  [] Total Pressure Relief  [] Other:     Current Diet: Diet NPO Exceptions are: Sips of Water with Meds  Dietician consult:  yes    Discharge Plan:  Placement for patient upon discharge: skilled nursing    Patient appropriate for Outpatient [address]: No    Referrals:  []   [] [address] [employer] [facility]  [] Supplies  [] Other    Patient/Caregiver Teaching:  Level of patient/caregiver understanding able to:   [] Indicates understanding       [] Needs reinforcement  [] Unsuccessful      [] Verbal Understanding  [] Demonstrated understanding       [x] No evidence of learning  [] Refused teaching         [x] N/A patient non verbal, hx of mrdd       Electronically signed by  AMNA Parks, RN, [facility], [facility] [address] [facility]  [phone] on 5/20/2022 at 1:54 PM

## 2022-05-20 NOTE — PROGRESS NOTES
Assessment completed. VSS. Patient alert to voice. Nonverbal at baseline. Medications given per MAR. Yuan care provided. Urostomy bag still draining from old right femoral line. NG at 62 in left nare. Tube feeding continued. Will stop at [facility] for PEG placement tomorrow. Fluids continued. Safety precautions maintained. Call light within reach. Will continue to monitor.

## 2022-05-21 LAB
A/G RATIO: 0.6 (ref 1.1–2.2)
ALBUMIN SERPL-MCNC: 1.7 G/DL (ref 3.4–5)
ALP BLD-CCNC: 70 U/L (ref 40–129)
ALT SERPL-CCNC: <5 U/L (ref 10–40)
ANION GAP SERPL CALCULATED.3IONS-SCNC: 8 MMOL/L (ref 3–16)
ANISOCYTOSIS: ABNORMAL
AST SERPL-CCNC: 22 U/L (ref 15–37)
BASOPHILS ABSOLUTE: 0 K/UL (ref 0–0.2)
BASOPHILS RELATIVE PERCENT: 0 %
BILIRUB SERPL-MCNC: <0.2 MG/DL (ref 0–1)
BUN BLDV-MCNC: 8 MG/DL (ref 7–20)
CALCIUM SERPL-MCNC: 7.5 MG/DL (ref 8.3–10.6)
CHLORIDE BLD-SCNC: 105 MMOL/L (ref 99–110)
CO2: 23 MMOL/L (ref 21–32)
CREAT SERPL-MCNC: <0.5 MG/DL (ref 0.6–1.1)
EOSINOPHILS ABSOLUTE: 0 K/UL (ref 0–0.6)
EOSINOPHILS RELATIVE PERCENT: 0 %
GFR AFRICAN AMERICAN: >60
GFR NON-AFRICAN AMERICAN: >60
GLUCOSE BLD-MCNC: 133 MG/DL (ref 70–99)
GLUCOSE BLD-MCNC: 60 MG/DL (ref 70–99)
GLUCOSE BLD-MCNC: 65 MG/DL (ref 70–99)
GLUCOSE BLD-MCNC: 66 MG/DL (ref 70–99)
GLUCOSE BLD-MCNC: 68 MG/DL (ref 70–99)
GLUCOSE BLD-MCNC: 70 MG/DL (ref 70–99)
GLUCOSE BLD-MCNC: 70 MG/DL (ref 70–99)
GLUCOSE BLD-MCNC: 72 MG/DL (ref 70–99)
GLUCOSE BLD-MCNC: 74 MG/DL (ref 70–99)
GLUCOSE BLD-MCNC: 92 MG/DL (ref 70–99)
HCT VFR BLD CALC: 31.9 % (ref 36–48)
HEMOGLOBIN: 10.4 G/DL (ref 12–16)
INR BLD: 1.22 (ref 0.88–1.12)
LYMPHOCYTES ABSOLUTE: 1.3 K/UL (ref 1–5.1)
LYMPHOCYTES RELATIVE PERCENT: 13 %
MAGNESIUM: 1.9 MG/DL (ref 1.8–2.4)
MCH RBC QN AUTO: 33.4 PG (ref 26–34)
MCHC RBC AUTO-ENTMCNC: 32.7 G/DL (ref 31–36)
MCV RBC AUTO: 102 FL (ref 80–100)
MONOCYTES ABSOLUTE: 0.3 K/UL (ref 0–1.3)
MONOCYTES RELATIVE PERCENT: 3 %
NEUTROPHILS ABSOLUTE: 8.4 K/UL (ref 1.7–7.7)
NEUTROPHILS RELATIVE PERCENT: 84 %
PDW BLD-RTO: 13.9 % (ref 12.4–15.4)
PERFORMED ON: ABNORMAL
PERFORMED ON: NORMAL
PHOSPHORUS: 2.4 MG/DL (ref 2.5–4.9)
PLATELET # BLD: 254 K/UL (ref 135–450)
PLATELET SLIDE REVIEW: ADEQUATE
PMV BLD AUTO: 9.6 FL (ref 5–10.5)
POTASSIUM SERPL-SCNC: 4 MMOL/L (ref 3.5–5.1)
PROTHROMBIN TIME: 13.9 SEC (ref 9.9–12.7)
RBC # BLD: 3.13 M/UL (ref 4–5.2)
SLIDE REVIEW: ABNORMAL
SODIUM BLD-SCNC: 136 MMOL/L (ref 136–145)
TOTAL PROTEIN: 4.7 G/DL (ref 6.4–8.2)
WBC # BLD: 10 K/UL (ref 4–11)

## 2022-05-21 PROCEDURE — 6370000000 HC RX 637 (ALT 250 FOR IP): Performed by: INTERNAL MEDICINE

## 2022-05-21 PROCEDURE — 2500000003 HC RX 250 WO HCPCS: Performed by: STUDENT IN AN ORGANIZED HEALTH CARE EDUCATION/TRAINING PROGRAM

## 2022-05-21 PROCEDURE — 6360000002 HC RX W HCPCS: Performed by: INTERNAL MEDICINE

## 2022-05-21 PROCEDURE — 80053 COMPREHEN METABOLIC PANEL: CPT

## 2022-05-21 PROCEDURE — C9113 INJ PANTOPRAZOLE SODIUM, VIA: HCPCS | Performed by: INTERNAL MEDICINE

## 2022-05-21 PROCEDURE — 85025 COMPLETE CBC W/AUTO DIFF WBC: CPT

## 2022-05-21 PROCEDURE — 83735 ASSAY OF MAGNESIUM: CPT

## 2022-05-21 PROCEDURE — 2580000003 HC RX 258: Performed by: INTERNAL MEDICINE

## 2022-05-21 PROCEDURE — 6370000000 HC RX 637 (ALT 250 FOR IP): Performed by: STUDENT IN AN ORGANIZED HEALTH CARE EDUCATION/TRAINING PROGRAM

## 2022-05-21 PROCEDURE — 84100 ASSAY OF PHOSPHORUS: CPT

## 2022-05-21 PROCEDURE — 85610 PROTHROMBIN TIME: CPT

## 2022-05-21 PROCEDURE — 2580000003 HC RX 258: Performed by: STUDENT IN AN ORGANIZED HEALTH CARE EDUCATION/TRAINING PROGRAM

## 2022-05-21 PROCEDURE — 2500000003 HC RX 250 WO HCPCS: Performed by: INTERNAL MEDICINE

## 2022-05-21 PROCEDURE — 1200000000 HC SEMI PRIVATE

## 2022-05-21 PROCEDURE — 2580000003 HC RX 258: Performed by: PHYSICIAN ASSISTANT

## 2022-05-21 PROCEDURE — 36415 COLL VENOUS BLD VENIPUNCTURE: CPT

## 2022-05-21 RX ORDER — DEXTROSE MONOHYDRATE 50 MG/ML
100 INJECTION, SOLUTION INTRAVENOUS PRN
Status: DISCONTINUED | OUTPATIENT
Start: 2022-05-21 | End: 2022-05-26 | Stop reason: HOSPADM

## 2022-05-21 RX ORDER — FUROSEMIDE 10 MG/ML
40 INJECTION INTRAMUSCULAR; INTRAVENOUS ONCE
Status: COMPLETED | OUTPATIENT
Start: 2022-05-21 | End: 2022-05-21

## 2022-05-21 RX ADMIN — CARBIDOPA AND LEVODOPA 1.5 TABLET: 25; 100 TABLET ORAL at 18:50

## 2022-05-21 RX ADMIN — VALPROATE SODIUM 250 MG: 100 INJECTION, SOLUTION INTRAVENOUS at 21:40

## 2022-05-21 RX ADMIN — MIDODRINE HYDROCHLORIDE 2.5 MG: 5 TABLET ORAL at 22:43

## 2022-05-21 RX ADMIN — METRONIDAZOLE 500 MG: 500 INJECTION, SOLUTION INTRAVENOUS at 22:46

## 2022-05-21 RX ADMIN — CARBIDOPA AND LEVODOPA 1.5 TABLET: 25; 100 TABLET ORAL at 07:44

## 2022-05-21 RX ADMIN — ACETAMINOPHEN 650 MG: 325 TABLET ORAL at 01:46

## 2022-05-21 RX ADMIN — VALPROATE SODIUM 250 MG: 100 INJECTION, SOLUTION INTRAVENOUS at 03:22

## 2022-05-21 RX ADMIN — METRONIDAZOLE 500 MG: 500 INJECTION, SOLUTION INTRAVENOUS at 05:53

## 2022-05-21 RX ADMIN — HEPARIN SODIUM 5000 UNITS: 5000 INJECTION INTRAVENOUS; SUBCUTANEOUS at 07:44

## 2022-05-21 RX ADMIN — DEXTROSE MONOHYDRATE 125 ML: 100 INJECTION, SOLUTION INTRAVENOUS at 21:30

## 2022-05-21 RX ADMIN — SODIUM CHLORIDE, PRESERVATIVE FREE 10 ML: 5 INJECTION INTRAVENOUS at 07:45

## 2022-05-21 RX ADMIN — FUROSEMIDE 40 MG: 10 INJECTION, SOLUTION INTRAMUSCULAR; INTRAVENOUS at 13:04

## 2022-05-21 RX ADMIN — PANTOPRAZOLE SODIUM 40 MG: 40 INJECTION, POWDER, FOR SOLUTION INTRAVENOUS at 07:45

## 2022-05-21 RX ADMIN — CEFTRIAXONE 2000 MG: 2 INJECTION, POWDER, FOR SOLUTION INTRAMUSCULAR; INTRAVENOUS at 13:14

## 2022-05-21 RX ADMIN — MIDODRINE HYDROCHLORIDE 2.5 MG: 5 TABLET ORAL at 13:08

## 2022-05-21 RX ADMIN — CARBIDOPA AND LEVODOPA 1.5 TABLET: 25; 100 TABLET ORAL at 01:46

## 2022-05-21 RX ADMIN — METRONIDAZOLE 500 MG: 500 INJECTION, SOLUTION INTRAVENOUS at 14:39

## 2022-05-21 RX ADMIN — VALPROATE SODIUM 250 MG: 100 INJECTION, SOLUTION INTRAVENOUS at 16:02

## 2022-05-21 RX ADMIN — VALPROATE SODIUM 250 MG: 100 INJECTION, SOLUTION INTRAVENOUS at 09:32

## 2022-05-21 RX ADMIN — DEXTROSE AND SODIUM CHLORIDE: 5; 900 INJECTION, SOLUTION INTRAVENOUS at 12:28

## 2022-05-21 RX ADMIN — MIDODRINE HYDROCHLORIDE 2.5 MG: 5 TABLET ORAL at 05:51

## 2022-05-21 RX ADMIN — LEVOTHYROXINE SODIUM 100 MCG: 0.1 TABLET ORAL at 07:44

## 2022-05-21 RX ADMIN — SERTRALINE 100 MG: 50 TABLET, FILM COATED ORAL at 20:35

## 2022-05-21 RX ADMIN — HEPARIN SODIUM 5000 UNITS: 5000 INJECTION INTRAVENOUS; SUBCUTANEOUS at 20:35

## 2022-05-21 RX ADMIN — SODIUM CHLORIDE, PRESERVATIVE FREE 10 ML: 5 INJECTION INTRAVENOUS at 20:35

## 2022-05-21 ASSESSMENT — PAIN SCALES - GENERAL
PAINLEVEL_OUTOF10: 0

## 2022-05-21 NOTE — PLAN OF CARE
Problem: Discharge Planning  Goal: Discharge to home or other facility with appropriate resources  Outcome: Progressing     Problem: Pain  Goal: Verbalizes/displays adequate comfort level or baseline comfort level  Outcome: Progressing     Problem: Nutrition Deficit:  Goal: Optimize nutritional status  Outcome: Progressing     Problem: Skin/Tissue Integrity  Goal: Absence of new skin breakdown  Description: 1. Monitor for areas of redness and/or skin breakdown  2. Assess vascular access sites hourly  3. Every 4-6 hours minimum:  Change oxygen saturation probe site  4. Every 4-6 hours:  If on nasal continuous positive airway pressure, respiratory therapy assess nares and determine need for appliance change or resting period.   Outcome: Progressing     Problem: Safety - Adult  Goal: Free from fall injury  Outcome: Progressing     Problem: ABCDS Injury Assessment  Goal: Absence of physical injury  Outcome: Progressing  Flowsheets (Taken 5/20/2022 2159)  Absence of Physical Injury: Implement safety measures based on patient assessment

## 2022-05-21 NOTE — PLAN OF CARE
Problem: Discharge Planning  Goal: Discharge to home or other facility with appropriate resources  Outcome: Not Progressing  Flowsheets (Taken 5/21/2022 7394)  Discharge to home or other facility with appropriate resources: Arrange for interpreters to assist at discharge as needed     Problem: Pain  Goal: Verbalizes/displays adequate comfort level or baseline comfort level  Outcome: Not Progressing     Problem: Nutrition Deficit:  Goal: Optimize nutritional status  Outcome: Not Progressing     Problem: Skin/Tissue Integrity  Goal: Absence of new skin breakdown  Description: 1. Monitor for areas of redness and/or skin breakdown  2. Assess vascular access sites hourly  3. Every 4-6 hours minimum:  Change oxygen saturation probe site  4. Every 4-6 hours:  If on nasal continuous positive airway pressure, respiratory therapy assess nares and determine need for appliance change or resting period.   Outcome: Not Progressing     Problem: Safety - Adult  Goal: Free from fall injury  Outcome: Not Progressing     Problem: ABCDS Injury Assessment  Goal: Absence of physical injury  Outcome: Not Progressing

## 2022-05-21 NOTE — PROGRESS NOTES
Assessment completed. VSS. Patient awake, alert, and in bed. Medications given per MAR. Fluids maintained. PEG site clean, dry, and intact. PEG tube used for medications, flushed with 20cc, and is now clamped. Yuan care provided and patency maintained. Urostomy bag drained. Site intact. Safety precautions maintained. Call light within reach. Will continue to monitor.

## 2022-05-21 NOTE — PROGRESS NOTES
Hospitalist Progress Note      PCP: Julia Shelton MD    Date of Admission: 5/10/2022    Subjective:     Remains on a low-dose of Levophed, leukocytosis is improving, hypokalemia 3.2, no acute event overnight, remain bradycardia, sinus bradycardia, not following commands, palliative care on board. Overall not improving. PEG tube placed today. Consult placed to urology as it appears that the fluid coming out of the patient's area where her previous central line was his urine we did Yuan bag on there and it appears to be feeling as though this is a bladder emptying.     Medications:  Reviewed    Infusion Medications    sodium chloride      sodium chloride 5 mL/hr at 05/21/22 1032     Scheduled Medications    lidocaine 1 % injection  5 mL IntraDERmal Once    sodium chloride flush  5-40 mL IntraVENous 2 times per day    midodrine  2.5 mg Orogastric q8h    valproate sodium (DEPACON) IVPB  250 mg IntraVENous Q6H    cefTRIAXone (ROCEPHIN) IV  2,000 mg IntraVENous Q24H    metroNIDAZOLE  500 mg IntraVENous Q8H    levothyroxine  100 mcg Per NG tube Daily    carbidopa-levodopa  1.5 tablet Per NG tube q8h    sertraline  100 mg Per NG tube Nightly    pantoprazole  40 mg IntraVENous Daily    heparin (porcine)  5,000 Units SubCUTAneous BID    alteplase  2 mg IntraCATHeter Once     PRN Meds: [DISCONTINUED] potassium chloride **OR** potassium chloride, sodium chloride flush, sodium chloride, atropine, sodium chloride flush, sodium chloride, sodium phosphate IVPB **OR** sodium phosphate IVPB **OR** sodium phosphate IVPB, ondansetron, acetaminophen **OR** acetaminophen, fentanNYL      Intake/Output Summary (Last 24 hours) at 5/21/2022 1719  Last data filed at 5/21/2022 1314  Gross per 24 hour   Intake 2741.95 ml   Output 4075 ml   Net -1333.05 ml       Physical Exam Performed:    /64   Pulse 74   Temp 98 °F (36.7 °C) (Core)   Resp 19   Ht 4' 9\" (1.448 m)   Wt 106 lb 3.2 oz (48.2 kg)   SpO2 98%   BMI 22.98 kg/m²     General appearance: Extubated  HEENT: Pupils equal, round, and reactive to light. Conjunctivae/corneas clear. Neck: Supple, with full range of motion. No jugular venous distention. Trachea midline. Respiratory:  Normal respiratory effort. Clear to auscultation, bilaterally without Rales/Wheezes/Rhonchi. Cardiovascular: Regular rate and rhythm with normal S1/S2 without murmurs, rubs or gallops. Abdomen: Soft, non-tender, non-distended with normal bowel sounds. Now has PEG. Musculoskeletal: No clubbing, cyanosis or edema bilaterally. Full range of motion without deformity. Skin: Skin color, texture, turgor normal.  No rashes or lesions. Neurologic: Alert  Psychiatric: Calm  Capillary Refill: Brisk,3 seconds, normal   Peripheral Pulses: +2 palpable, equal bilaterally       Labs:   Recent Labs     05/19/22 0459 05/20/22 0615 05/21/22 0431   WBC 7.7 11.3* 10.0   HGB 11.0* 10.6* 10.4*   HCT 33.1* 32.8* 31.9*    261 254     Recent Labs     05/19/22 0459 05/19/22 0459 05/19/22  2133 05/20/22 0615 05/21/22  0431     --   --  137 136   K 2.5*   < > 4.5 3.4* 4.0     --   --  106 105   CO2 23  --   --  23 23   BUN 16  --   --  10 8   CREATININE <0.5*  --   --  <0.5* <0.5*   CALCIUM 7.7*  --   --  7.9* 7.5*   PHOS 1.9*  --   --  2.1* 2.4*    < > = values in this interval not displayed. Recent Labs     05/19/22 0459 05/20/22 0615 05/21/22  0431   AST 31 24 22   ALT 6* 8* <5*   BILITOT <0.2 <0.2 <0.2   ALKPHOS 106 101 70     Recent Labs     05/19/22 0458 05/20/22 0615 05/21/22  0431   INR 1.10 1.08 1.22*     No results for input(s): Uriel Shackle in the last 72 hours.     Urinalysis:      Lab Results   Component Value Date    NITRU Negative 05/10/2022    WBCUA 3 05/10/2022    BACTERIA None Seen 05/10/2022    RBCUA 3-4 05/10/2022    BLOODU Negative 05/10/2022    SPECGRAV 1.025 05/10/2022    GLUCOSEU Negative 05/10/2022       Radiology:  XR CHEST PORTABLE   Final Result Moderate airspace disease right upper and lower lobe with volume loss. Pneumonia and or mucous plugging both considered. NG tube extends well into the stomach. XR CHEST PORTABLE   Final Result   1. Endotracheal tube terminates in the left mainstem bronchus, for which   retraction 5 cm is recommended. 2.  Enteric tube terminates at the level of the body of the stomach. 3.  No significant change in bilateral airspace disease. Probable small left   effusion. XR ABDOMEN FOR NG/OG/NE TUBE PLACEMENT   Final Result   NG tube position appears acceptable. Endotracheal tube tip is likely within 1 cm above the anita. Dense left basilar opacity and patchy opacities in both lungs. XR CHEST PORTABLE   Final Result   Endotracheal tube is present with tip projecting just proximal to the anita. Left basilar opacity and trace left pleural effusion.   Aspiration and   pneumonia are in the differential.             Assessment/Plan:    Active Hospital Problems    Diagnosis     Pneumonia of both lower lobes due to methicillin susceptible Staphylococcus aureus (MSSA) ([facility]) [J15.211]      Priority: Medium    Streptococcal bacteremia [R78.81, B95.5]      Priority: Medium    Pneumonia of left lower lobe due to infectious organism [J18.9]      Priority: Medium    Parkinsonism ([address] [G20]      Priority: Medium    Anorexia [R63.0]      Priority: Medium    Moderate malnutrition ([address] [E44.0]      Priority: Medium    AVELINO (acute kidney injury) ([address] [N17.9]      Priority: Medium    Aspiration pneumonia ([address] [J69.0]      Priority: Medium    Septic shock ([address] [A41.9, R65.21]      Priority: Medium    Acute hypoxemic respiratory failure ([address] [J96.01]     Down syndrome [Q90.9]       Acute hypoxic respiratory failure, secondary to multifocal pneumonia, patient was started on broad-spectrum antibiotic, intubated and sedated, started on Levophed, no guidance cultures so far, appreciate pulmonary input, liberated from mechanical ventilation 5/12 remain n.p.o., failed speech.  Sepsis, and septic shock, secondary to multifocal pneumonia,/and possible infective endocarditis with possible aortic root abscess started on vancomycin and cefepime antibiotic has been changed to Rocephin and Flagyl per ID, lactic acid is improving 3.2 continue current treatment, continue vent support leukocytosis has been worsening 21,000, blood culture growing Streptococcus, will repeat blood culture, send strep antigen urine concern for endocarditis on echocardiogram, per cardiology it would be unsafe to do STORMY via conscious sedation as patient cannot follow commands STORMY was canceled, plan to treat with antibiotic, likely patient will need a PICC line, repeated blood cultures are still negative so far.  Chronic hypotension, on midodrine, will continue now on low-dose of Levophed   Acute kidney injury likely prerenal, ATN, avoid nephrotoxic medication, continue fluid resuscitation.  Sinus bradycardia, will decrease dose of midodrine 2.5 mg 3 times daily. If develop any heart block,  will reconsult cardiology   History of Down syndrome,  PEG placed 5/20/2022   Lactic acidosis, improving with fluid resuscitation   Hypomagnesemia. Repleted, will continue monitor. 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 Consult to urology has been placed      DVT Prophylaxis: Heparin subcu  Diet: Diet NPO Exceptions are: Sips of Water with Meds  ADULT TUBE FEEDING; PEG; 2.0 Calorie; Continuous; 25; No; 125; Q 4 hours  Code Status: DNR-CCA    Dispo -continue ICU care, may be transferred, s/p PEG.           Shawn Davis, MD

## 2022-05-21 NOTE — PROGRESS NOTES
Progress Note    Patient Ann Marie Andres  MRN: [id number]  YOB: 1965 Age: 60 y.o. Sex: female  Room: [place] [address]       Admitting Physician: Félix Cui MD   Date of Admission: 5/10/2022  8:24 PM   Primary Care Physician: Blake Sosa MD     Subjective:  Ann Marie Andres was seen and examined. We are following for post PEG tube placement. No new issues    ROS:  Constitutional: Denies fever, no change in appetite  Respiratory: Denies cough or shortness of breath  Cardiovascular: Denies chest pain or edema    Objective:  Vital Signs:   Vitals:    05/21/22 0743   BP: 138/73   Pulse: 64   Resp: 19   Temp:    SpO2: 98%         Physical Exam:  Constitutional: Alert and oriented x 4. No acute distress. Respiratory: Respirations nonlabored, no crepitus  GI: Abdomen nondistended, soft, and nontender. PEG site looks good  Neurological: No focal deficits noted. No asterixis.     Intake/Output:    Intake/Output Summary (Last 24 hours) at 5/21/2022 1133  Last data filed at 5/21/2022 0400  Gross per 24 hour   Intake 1580.53 ml   Output 2200 ml   Net -619.47 ml        Current Medications:  Current Facility-Administered Medications   Medication Dose Route Frequency Provider Last Rate Last Admin    dextrose 5 % and 0.9 % sodium chloride infusion   IntraVENous Continuous Beth Sharp MD 75 mL/hr at 05/20/22 1745 Rate Verify at 05/20/22 1745    potassium chloride 10 mEq/100 mL IVPB (Peripheral Line)  10 mEq IntraVENous PRN Leverne Homans, MD   Stopped at 05/20/22 0854    lidocaine PF 1 % injection 5 mL  5 mL IntraDERmal Once Rakan Hinton MD        sodium chloride flush 0.9 % injection 5-40 mL  5-40 mL IntraVENous 2 times per day Rakan Hinton MD   10 mL at 05/21/22 0745    sodium chloride flush 0.9 % injection 5-40 mL  5-40 mL IntraVENous PRN Slava Maloney MD        0.9 % sodium chloride infusion  25 mL IntraVENous PRN Slava Maloney MD        midodrine (PROAMATINE) tablet 2.5 mg  2.5 mg Orogastric q8h Milagros Her MD   2.5 mg at 05/21/22 0551    atropine injection 0.5 mg  0.5 mg IntraVENous Q30 Min PRN Ekaterina Breaux MD   0.5 mg at 05/17/22 0159    valproate (DEPACON) 250 mg in dextrose 5 % 100 mL IVPB  250 mg IntraVENous Q6H Milagros Her MD   Stopped at 05/21/22 1032    cefTRIAXone (ROCEPHIN) 2000 mg IVPB in D5W 50ml minibag  2,000 mg IntraVENous Q24H Katherine Bañuelos MD   Stopped at 05/20/22 1707    metronidazole (FLAGYL) 500 mg in 0.9% NaCl 100 mL IVPB premix  500 mg IntraVENous Q8H Katherine Bañuelos MD   Stopped at 05/21/22 0655    levothyroxine (SYNTHROID) tablet 100 mcg  100 mcg Per NG tube Daily Ekaterina Breaux MD   100 mcg at 05/21/22 0744    carbidopa-levodopa (SINEMET)  MG per tablet 1.5 tablet  1.5 tablet Per NG tube q8h Ekaterina Breaux MD   1.5 tablet at 05/21/22 0744    sertraline (ZOLOFT) tablet 100 mg  100 mg Per NG tube Nightly Ekaterina Breaux MD   100 mg at 05/20/22 2119    pantoprazole (PROTONIX) injection 40 mg  40 mg IntraVENous Daily Ekaterina Breaux MD   40 mg at 05/21/22 0745    sodium chloride flush 0.9 % injection 10 mL  10 mL IntraVENous PRN Ekaterina Breaux MD   10 mL at 05/16/22 0909    0.9 % sodium chloride infusion   IntraVENous PRN Ekaterina Breaux MD   Stopped at 05/20/22 1657    sodium phosphate 10 mmol in sodium chloride 0.9 % 250 mL IVPB  10 mmol IntraVENous PRN Ekaterina Breaux MD   Stopped at 05/17/22 2224    Or    sodium phosphate 15 mmol in dextrose 5 % 250 mL IVPB  15 mmol IntraVENous PRN Ekaterina Breaux MD   Stopped at 05/18/22 1920    Or    sodium phosphate 20 mmol in dextrose 5 % 500 mL IVPB  20 mmol IntraVENous PRN Ekaterina Breaux MD        heparin (porcine) injection 5,000 Units  5,000 Units SubCUTAneous BID Ekaterina Breaux MD   5,000 Units at 05/21/22 0744    ondansetron (ZOFRAN) injection 4 mg  4 mg IntraVENous Q6H PRN Ekaterina Breaux MD        acetaminophen (TYLENOL) tablet 650 mg  650 mg Oral Q4H PRN Brandin Keen Rayshawn Brown MD   650 mg at 05/21/22 0146    Or    acetaminophen (TYLENOL) suppository 650 mg  650 mg Rectal Q4H PRN Yue Junior MD        alteplase (CATHFLO) injection 2 mg  2 mg IntraCATHeter Once Celeste Lopez MD        fentaNYL (SUBLIMAZE) injection 25 mcg  25 mcg IntraVENous Q1H PRN Yue Junior MD             Recent labs and imaging reviewed. Assessment:  Status post PEG tube placed  PEG site looks good. Plan:  1. Continue tube feed  2. We will sign off call with questions      Yfn Ching MD    [facility]    [phone]. Also available via [employer]    This note was completed using dragon medical speech recognition software. Grammatical errors, random word insertions, pronoun errors and incomplete sentences are occasional consequences of this technology due to software limitations. If there are questions or concerns about the content of this note of information contained within the body of this dictation they should be addressed with the provider for clarification.

## 2022-05-22 LAB
A/G RATIO: 0.4 (ref 1.1–2.2)
ALBUMIN SERPL-MCNC: 1.3 G/DL (ref 3.4–5)
ALP BLD-CCNC: 97 U/L (ref 40–129)
ALT SERPL-CCNC: 6 U/L (ref 10–40)
ANION GAP SERPL CALCULATED.3IONS-SCNC: 7 MMOL/L (ref 3–16)
AST SERPL-CCNC: 19 U/L (ref 15–37)
BASOPHILS ABSOLUTE: 0 K/UL (ref 0–0.2)
BASOPHILS RELATIVE PERCENT: 0.3 %
BILIRUB SERPL-MCNC: <0.2 MG/DL (ref 0–1)
BUN BLDV-MCNC: 5 MG/DL (ref 7–20)
CALCIUM SERPL-MCNC: 7.9 MG/DL (ref 8.3–10.6)
CHLORIDE BLD-SCNC: 102 MMOL/L (ref 99–110)
CO2: 24 MMOL/L (ref 21–32)
CREAT SERPL-MCNC: <0.5 MG/DL (ref 0.6–1.1)
EOSINOPHILS ABSOLUTE: 0.1 K/UL (ref 0–0.6)
EOSINOPHILS RELATIVE PERCENT: 1.2 %
GFR AFRICAN AMERICAN: >60
GFR NON-AFRICAN AMERICAN: >60
GLUCOSE BLD-MCNC: 53 MG/DL (ref 70–99)
GLUCOSE BLD-MCNC: 64 MG/DL (ref 70–99)
GLUCOSE BLD-MCNC: 71 MG/DL (ref 70–99)
GLUCOSE BLD-MCNC: 74 MG/DL (ref 70–99)
GLUCOSE BLD-MCNC: 78 MG/DL (ref 70–99)
GLUCOSE BLD-MCNC: 89 MG/DL (ref 70–99)
GLUCOSE BLD-MCNC: 90 MG/DL (ref 70–99)
GLUCOSE BLD-MCNC: 90 MG/DL (ref 70–99)
HCT VFR BLD CALC: 35.5 % (ref 36–48)
HEMOGLOBIN: 11.6 G/DL (ref 12–16)
INR BLD: 1.35 (ref 0.88–1.12)
LYMPHOCYTES ABSOLUTE: 0.9 K/UL (ref 1–5.1)
LYMPHOCYTES RELATIVE PERCENT: 8.4 %
MAGNESIUM: 2 MG/DL (ref 1.8–2.4)
MCH RBC QN AUTO: 33.7 PG (ref 26–34)
MCHC RBC AUTO-ENTMCNC: 32.5 G/DL (ref 31–36)
MCV RBC AUTO: 103.5 FL (ref 80–100)
MONOCYTES ABSOLUTE: 0.6 K/UL (ref 0–1.3)
MONOCYTES RELATIVE PERCENT: 5.2 %
NEUTROPHILS ABSOLUTE: 9.6 K/UL (ref 1.7–7.7)
NEUTROPHILS RELATIVE PERCENT: 84.9 %
PDW BLD-RTO: 14.1 % (ref 12.4–15.4)
PERFORMED ON: ABNORMAL
PERFORMED ON: ABNORMAL
PERFORMED ON: NORMAL
PHOSPHORUS: 2.7 MG/DL (ref 2.5–4.9)
PLATELET # BLD: 330 K/UL (ref 135–450)
PMV BLD AUTO: 10.1 FL (ref 5–10.5)
POTASSIUM REFLEX MAGNESIUM: 4.2 MMOL/L (ref 3.5–5.1)
PROTHROMBIN TIME: 15.4 SEC (ref 9.9–12.7)
RBC # BLD: 3.43 M/UL (ref 4–5.2)
SODIUM BLD-SCNC: 133 MMOL/L (ref 136–145)
TOTAL PROTEIN: 5 G/DL (ref 6.4–8.2)
WBC # BLD: 11.3 K/UL (ref 4–11)

## 2022-05-22 PROCEDURE — 6370000000 HC RX 637 (ALT 250 FOR IP): Performed by: INTERNAL MEDICINE

## 2022-05-22 PROCEDURE — 6370000000 HC RX 637 (ALT 250 FOR IP): Performed by: STUDENT IN AN ORGANIZED HEALTH CARE EDUCATION/TRAINING PROGRAM

## 2022-05-22 PROCEDURE — 83735 ASSAY OF MAGNESIUM: CPT

## 2022-05-22 PROCEDURE — C9113 INJ PANTOPRAZOLE SODIUM, VIA: HCPCS | Performed by: INTERNAL MEDICINE

## 2022-05-22 PROCEDURE — 36415 COLL VENOUS BLD VENIPUNCTURE: CPT

## 2022-05-22 PROCEDURE — 2500000003 HC RX 250 WO HCPCS: Performed by: INTERNAL MEDICINE

## 2022-05-22 PROCEDURE — 82570 ASSAY OF URINE CREATININE: CPT

## 2022-05-22 PROCEDURE — 2500000003 HC RX 250 WO HCPCS: Performed by: STUDENT IN AN ORGANIZED HEALTH CARE EDUCATION/TRAINING PROGRAM

## 2022-05-22 PROCEDURE — 84100 ASSAY OF PHOSPHORUS: CPT

## 2022-05-22 PROCEDURE — 85610 PROTHROMBIN TIME: CPT

## 2022-05-22 PROCEDURE — 2500000003 HC RX 250 WO HCPCS: Performed by: PHYSICIAN ASSISTANT

## 2022-05-22 PROCEDURE — 2580000003 HC RX 258: Performed by: INTERNAL MEDICINE

## 2022-05-22 PROCEDURE — 85025 COMPLETE CBC W/AUTO DIFF WBC: CPT

## 2022-05-22 PROCEDURE — 6360000002 HC RX W HCPCS: Performed by: INTERNAL MEDICINE

## 2022-05-22 PROCEDURE — 2580000003 HC RX 258: Performed by: STUDENT IN AN ORGANIZED HEALTH CARE EDUCATION/TRAINING PROGRAM

## 2022-05-22 PROCEDURE — 80053 COMPREHEN METABOLIC PANEL: CPT

## 2022-05-22 PROCEDURE — 1200000000 HC SEMI PRIVATE

## 2022-05-22 RX ORDER — AMOXICILLIN AND CLAVULANATE POTASSIUM 875; 125 MG/1; MG/1
1 TABLET, FILM COATED ORAL EVERY 12 HOURS SCHEDULED
Status: DISCONTINUED | OUTPATIENT
Start: 2022-05-22 | End: 2022-05-26 | Stop reason: HOSPADM

## 2022-05-22 RX ADMIN — HEPARIN SODIUM 5000 UNITS: 5000 INJECTION INTRAVENOUS; SUBCUTANEOUS at 20:31

## 2022-05-22 RX ADMIN — LEVOTHYROXINE SODIUM 100 MCG: 0.1 TABLET ORAL at 09:23

## 2022-05-22 RX ADMIN — VALPROATE SODIUM 250 MG: 100 INJECTION, SOLUTION INTRAVENOUS at 04:29

## 2022-05-22 RX ADMIN — SODIUM CHLORIDE, PRESERVATIVE FREE 10 ML: 5 INJECTION INTRAVENOUS at 20:30

## 2022-05-22 RX ADMIN — PANTOPRAZOLE SODIUM 40 MG: 40 INJECTION, POWDER, FOR SOLUTION INTRAVENOUS at 09:23

## 2022-05-22 RX ADMIN — CARBIDOPA AND LEVODOPA 1.5 TABLET: 25; 100 TABLET ORAL at 01:30

## 2022-05-22 RX ADMIN — SERTRALINE 100 MG: 50 TABLET, FILM COATED ORAL at 20:31

## 2022-05-22 RX ADMIN — MIDODRINE HYDROCHLORIDE 2.5 MG: 5 TABLET ORAL at 20:31

## 2022-05-22 RX ADMIN — METRONIDAZOLE 500 MG: 500 INJECTION, SOLUTION INTRAVENOUS at 22:21

## 2022-05-22 RX ADMIN — CARBIDOPA AND LEVODOPA 1.5 TABLET: 25; 100 TABLET ORAL at 17:54

## 2022-05-22 RX ADMIN — CARBIDOPA AND LEVODOPA 1.5 TABLET: 25; 100 TABLET ORAL at 09:22

## 2022-05-22 RX ADMIN — AMOXICILLIN AND CLAVULANATE POTASSIUM 1 TABLET: 875; 125 TABLET, FILM COATED ORAL at 20:31

## 2022-05-22 RX ADMIN — VALPROATE SODIUM 250 MG: 100 INJECTION, SOLUTION INTRAVENOUS at 20:30

## 2022-05-22 RX ADMIN — GLUCAGON HYDROCHLORIDE 1 MG: KIT at 20:31

## 2022-05-22 RX ADMIN — MIDODRINE HYDROCHLORIDE 2.5 MG: 5 TABLET ORAL at 13:16

## 2022-05-22 RX ADMIN — VALPROATE SODIUM 250 MG: 100 INJECTION, SOLUTION INTRAVENOUS at 10:08

## 2022-05-22 RX ADMIN — SODIUM CHLORIDE, PRESERVATIVE FREE 10 ML: 5 INJECTION INTRAVENOUS at 09:23

## 2022-05-22 RX ADMIN — CEFTRIAXONE 2000 MG: 2 INJECTION, POWDER, FOR SOLUTION INTRAMUSCULAR; INTRAVENOUS at 13:16

## 2022-05-22 RX ADMIN — MIDODRINE HYDROCHLORIDE 2.5 MG: 5 TABLET ORAL at 05:55

## 2022-05-22 RX ADMIN — HEPARIN SODIUM 5000 UNITS: 5000 INJECTION INTRAVENOUS; SUBCUTANEOUS at 09:23

## 2022-05-22 RX ADMIN — METRONIDAZOLE 500 MG: 500 INJECTION, SOLUTION INTRAVENOUS at 15:01

## 2022-05-22 RX ADMIN — VALPROATE SODIUM 250 MG: 100 INJECTION, SOLUTION INTRAVENOUS at 16:08

## 2022-05-22 RX ADMIN — METRONIDAZOLE 500 MG: 500 INJECTION, SOLUTION INTRAVENOUS at 05:56

## 2022-05-22 ASSESSMENT — PAIN SCALES - GENERAL
PAINLEVEL_OUTOF10: 0

## 2022-05-22 NOTE — PLAN OF CARE
Problem: Nutrition Deficit:  Goal: Optimize nutritional status  5/22/2022 1021 by Heath Pantoja RN  Outcome: Progressing     Problem: Skin/Tissue Integrity  Goal: Absence of new skin breakdown  Description: 1. Monitor for areas of redness and/or skin breakdown  2. Assess vascular access sites hourly  3. Every 4-6 hours minimum:  Change oxygen saturation probe site  4. Every 4-6 hours:  If on nasal continuous positive airway pressure, respiratory therapy assess nares and determine need for appliance change or resting period.   5/22/2022 1021 by Heath Pantoja RN  Outcome: Progressing     Problem: Safety - Adult  Goal: Free from fall injury  5/22/2022 1021 by Heath Pantoja RN  Outcome: Progressing

## 2022-05-22 NOTE — PROGRESS NOTES
Shift assessment complete; see flowsheets for details. VSS. Patient on room air. Patient resting in bed content. Tube feed running at goal. PEG tube care complete. 3+ pitting edema seen in bilateral lower extremities. Legs elevated. Yuan with 900ml output. Femoral line site draining and 800 ml clear, yellow fluid collected. Patient repositioned.  Call light within reach

## 2022-05-22 NOTE — PROGRESS NOTES
Hospitalist Progress Note      PCP: Ani Santiago MD    Date of Admission: 5/10/2022    Subjective:     Remains on a low-dose of Levophed, leukocytosis is improving, hypokalemia 3.2, no acute event overnight, remain bradycardia, sinus bradycardia, not following commands, palliative care on board. Overall not improving. PEG tube placed and functioning  Consult placed to urology as it appears that the fluid coming out of the patient's area where her previous central line was his urine we did Yuan bag on there and it appears to be feeling as though this is a bladder emptying. Await urology input.     Medications:  Reviewed    Infusion Medications    dextrose      sodium chloride      sodium chloride 5 mL/hr at 05/22/22 1501     Scheduled Medications    lidocaine 1 % injection  5 mL IntraDERmal Once    sodium chloride flush  5-40 mL IntraVENous 2 times per day    midodrine  2.5 mg Orogastric q8h    valproate sodium (DEPACON) IVPB  250 mg IntraVENous Q6H    cefTRIAXone (ROCEPHIN) IV  2,000 mg IntraVENous Q24H    metroNIDAZOLE  500 mg IntraVENous Q8H    levothyroxine  100 mcg Per NG tube Daily    carbidopa-levodopa  1.5 tablet Per NG tube q8h    sertraline  100 mg Per NG tube Nightly    pantoprazole  40 mg IntraVENous Daily    heparin (porcine)  5,000 Units SubCUTAneous BID    alteplase  2 mg IntraCATHeter Once     PRN Meds: dextrose bolus **OR** dextrose bolus, glucagon (rDNA), dextrose, [DISCONTINUED] potassium chloride **OR** potassium chloride, sodium chloride flush, sodium chloride, atropine, sodium chloride flush, sodium chloride, sodium phosphate IVPB **OR** sodium phosphate IVPB **OR** sodium phosphate IVPB, ondansetron, acetaminophen **OR** acetaminophen, fentanNYL      Intake/Output Summary (Last 24 hours) at 5/22/2022 1502  Last data filed at 5/22/2022 1501  Gross per 24 hour   Intake 1721.53 ml   Output 3450 ml   Net -1728.47 ml       Physical Exam Performed:    BP (!) 113/58   Pulse 74   Temp 97.7 °F (36.5 °C) (Bladder)   Resp 17   Ht 4' 9\" (1.448 m)   Wt 104 lb 11.5 oz (47.5 kg)   SpO2 97%   BMI 22.66 kg/m²     General appearance: Extubated  HEENT: Pupils equal, round, and reactive to light. Conjunctivae/corneas clear. Neck: Supple, with full range of motion. No jugular venous distention. Trachea midline. Respiratory:  Normal respiratory effort. Clear to auscultation, bilaterally without Rales/Wheezes/Rhonchi. Cardiovascular: Regular rate and rhythm with normal S1/S2 without murmurs, rubs or gallops. Abdomen: Soft, non-tender, non-distended with normal bowel sounds. Now has PEG. Musculoskeletal: No clubbing, cyanosis or edema bilaterally. Full range of motion without deformity. Skin: Skin color, texture, turgor normal.  No rashes or lesions. Neurologic: Alert  Psychiatric: Calm  Capillary Refill: Brisk,3 seconds, normal   Peripheral Pulses: +2 palpable, equal bilaterally       Labs:   Recent Labs     05/20/22  0615 05/21/22  0431 05/22/22  0704   WBC 11.3* 10.0 11.3*   HGB 10.6* 10.4* 11.6*   HCT 32.8* 31.9* 35.5*    254 330     Recent Labs     05/20/22  0615 05/21/22  0431 05/22/22  0704    136 133*   K 3.4* 4.0 4.2    105 102   CO2 23 23 24   BUN 10 8 5*   CREATININE <0.5* <0.5* <0.5*   CALCIUM 7.9* 7.5* 7.9*   PHOS 2.1* 2.4* 2.7     Recent Labs     05/20/22  0615 05/21/22  0431 05/22/22  0704   AST 24 22 19   ALT 8* <5* 6*   BILITOT <0.2 <0.2 <0.2   ALKPHOS 101 70 97     Recent Labs     05/20/22  0615 05/21/22  0431 05/22/22  0704   INR 1.08 1.22* 1.35*     No results for input(s): Imani Mcmahon in the last 72 hours.     Urinalysis:      Lab Results   Component Value Date    NITRU Negative 05/10/2022    WBCUA 3 05/10/2022    BACTERIA None Seen 05/10/2022    RBCUA 3-4 05/10/2022    BLOODU Negative 05/10/2022    SPECGRAV 1.025 05/10/2022    GLUCOSEU Negative 05/10/2022       Radiology:  XR CHEST PORTABLE   Final Result   Moderate airspace disease right upper and lower lobe with volume loss. Pneumonia and or mucous plugging both considered. NG tube extends well into the stomach. XR CHEST PORTABLE   Final Result   1. Endotracheal tube terminates in the left mainstem bronchus, for which   retraction 5 cm is recommended. 2.  Enteric tube terminates at the level of the body of the stomach. 3.  No significant change in bilateral airspace disease. Probable small left   effusion. XR ABDOMEN FOR NG/OG/NE TUBE PLACEMENT   Final Result   NG tube position appears acceptable. Endotracheal tube tip is likely within 1 cm above the anita. Dense left basilar opacity and patchy opacities in both lungs. XR CHEST PORTABLE   Final Result   Endotracheal tube is present with tip projecting just proximal to the anita. Left basilar opacity and trace left pleural effusion.   Aspiration and   pneumonia are in the differential.             Assessment/Plan:    Active Hospital Problems    Diagnosis     Pneumonia of both lower lobes due to methicillin susceptible Staphylococcus aureus (MSSA) ([facility]) [J15.211]      Priority: Medium    Streptococcal bacteremia [R78.81, B95.5]      Priority: Medium    Pneumonia of left lower lobe due to infectious organism [J18.9]      Priority: Medium    Parkinsonism ([address] [G20]      Priority: Medium    Anorexia [R63.0]      Priority: Medium    Moderate malnutrition ([address] [E44.0]      Priority: Medium    AVELINO (acute kidney injury) ([address] [N17.9]      Priority: Medium    Aspiration pneumonia ([address] [J69.0]      Priority: Medium    Septic shock ([address] [A41.9, R65.21]      Priority: Medium    Acute hypoxemic respiratory failure ([address] [J96.01]     Down syndrome [Q90.9]       Acute hypoxic respiratory failure, secondary to multifocal pneumonia, patient was started on broad-spectrum antibiotic, intubated and sedated, started on Levophed, no guidance cultures so far, appreciate pulmonary input, liberated from mechanical ventilation 5/12 remain n.p.o., failed speech.  Sepsis, and septic shock, secondary to multifocal pneumonia,/and possible infective endocarditis with possible aortic root abscess started on vancomycin and cefepime antibiotic has been changed to Rocephin and Flagyl per ID, lactic acid is improving 3.2 continue current treatment, continue vent support leukocytosis has been worsening 21,000, blood culture growing Streptococcus, will repeat blood culture, send strep antigen urine concern for endocarditis on echocardiogram, per cardiology it would be unsafe to do STORMY via conscious sedation as patient cannot follow commands STORMY was canceled, plan to treat with antibiotic, likely patient will need a PICC line, repeated blood cultures are still negative so far.  Chronic hypotension, on midodrine, will continue now on low-dose of Levophed   Acute kidney injury likely prerenal, ATN, avoid nephrotoxic medication, continue fluid resuscitation.  Sinus bradycardia, will decrease dose of midodrine 2.5 mg 3 times daily. If develop any heart block,  will reconsult cardiology   History of Down syndrome,  PEG placed 5/20/2022   Lactic acidosis, improving with fluid resuscitation   Hypomagnesemia. Repleted, will continue monitor.  History of seizure disorder continue Keppra   Hypothyroidism, continue Synthroid   Failure to thrive  Overall prognosis poor,GI placed PEG      Bladder fistula ?  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 It has filled multiple beaulieu bags. Consult to urology has been placed. DVT Prophylaxis: Heparin subcu  Diet: Diet NPO Exceptions are: Sips of Water with Meds  ADULT TUBE FEEDING; PEG; 2.0 Calorie; Continuous; 25; No; 125; Q 4 hours  Code Status: DNR-CCA    Dispo -continue ICU care, may be transferred, s/p PEG.           Galilea Reddy, MD

## 2022-05-22 NOTE — DISCHARGE INSTR - COC
Continuity of Care Form    Patient Name: Keara Resendez   :  1965  MRN:  [id number]    Admit date:  5/10/2022  Discharge date:  22    Code Status Order: DNR-CCA   Advance Directives:   [place] Directive Type of Healthcare Directive Copy in [address] Agent's Name Healthcare Agent's Phone Number    22 0930 -- Durable power of  for health care -- -- [employer] agency --            Admitting Physician:  Yannick Haskins MD  PCP: Austin Carpenter MD    Discharging Nurse: Alonso Cannon Unit/Room#: J2F-9232/3701-65  Discharging Unit Phone Number: [phone]    Emergency Contact:   Extended Emergency Contact Information  Primary Emergency Contact: Harshil Frederick Phone: [phone]  Relation: Legal Guardian  Secondary Emergency Contact: Arthur Wyatt  [employer] Phone: [phone]  Relation: Lay Caregiver    Past Surgical History:  Past Surgical History:   Procedure Laterality Date    GASTROSTOMY TUBE PLACEMENT      GASTROSTOMY TUBE PLACEMENT  12    GASTROSTOMY TUBE PLACEMENT N/A 2022    EGD PEG TUBE PLACEMENT performed by Lex Nesbitt MD at [address] [facility] ENDOSCOPY       Immunization History: There is no immunization history on file for this patient.     Active Problems:  Patient Active Problem List   Diagnosis Code    Down syndrome Q90.9    Down syndrome Q90.9    Down syndrome Q90.9    Dysphagia R13.10    Autistic disorder F84.0    PEG adjustment, replacement, or removal Z43.1    Hypotension I95.9    Sepsis ([address] A41.9    Acute hypoxemic respiratory failure (HCC) J96.01    AVELINO (acute kidney injury) ([address] N17.9    Aspiration pneumonia (HCC) J69.0    Septic shock (HCC) A41.9, R65.21    Moderate malnutrition ([address] E44.0    Streptococcal bacteremia R78.81, B95.5    Pneumonia of left lower lobe due to infectious organism J18.9    Parkinsonism ([address] G20    Anorexia R63.0    Pneumonia of both lower lobes due to methicillin susceptible Staphylococcus aureus (MSSA) ([facility] J15.211       Isolation/Infection:   Isolation            No Isolation          Patient Infection Status       Infection Onset Added Last Indicated Last Indicated By Review Planned Expiration Resolved Resolved By    None active    Resolved    COVID-19 (Rule Out) 05/10/22 05/10/22 05/10/22 COVID-19 & Influenza Combo (Ordered)   05/10/22 Rule-Out Test Resulted    COVID-19 (Rule Out) 04/05/22 04/05/22 04/05/22 COVID-19 (Ordered)   04/06/22 Rule-Out Test Resulted    COVID-19 (Rule Out) 03/24/22 03/24/22 03/24/22 COVID-19 (Ordered)   03/25/22 Rule-Out Test Resulted            Nurse Assessment:  Last Vital Signs: BP (!) 113/58   Pulse 68   Temp 97.7 °F (36.5 °C) (Bladder)   Resp 17   Ht 4' 9\" (1.448 m)   Wt 104 lb 11.5 oz (47.5 kg)   SpO2 100%   BMI 22.66 kg/m²     Last documented pain score (0-10 scale): Pain Level: 0  Last Weight:   Wt Readings from Last 1 Encounters:   05/22/22 104 lb 11.5 oz (47.5 kg)     Mental Status:  disoriented and alert    IV Access:  - Midline to L upper arm. Nursing Mobility/ADLs:  Walking   Dependent  Transfer  Dependent  Bathing  Dependent  Dressing  Dependent  Toileting  Dependent  Feeding  Dependent  Med Admin  Dependent  Med Delivery   crushed    Wound Care Documentation and Therapy:  Wound 05/11/22 Coccyx (Active)   Wound Etiology Pressure Stage 2 05/22/22 1200   Dressing Status Clean;Dry; Intact 05/22/22 1200   Wound Cleansed Wound cleanser 05/22/22 0400   Dressing/Treatment Foam 05/22/22 1200   Dressing Change Due 05/25/22 05/22/22 1200   Wound Assessment Pink/red 05/22/22 1200   Drainage Amount None 05/22/22 1200   Odor None 05/22/22 1200   Hermila-wound Assessment Blanchable erythema; Excoriated 05/22/22 1200   Number of days: 11       Wound 05/18/22 Groin Right site of old femoral line, draining clear fluid (Active)   Wound Image   05/20/22 1403   Wound Etiology Other 05/22/22 1200   Dressing Status Clean;Dry; Intact 05/22/22 1200   Dressing/Treatment Other (comment) 05/22/22 1200   Wound Length (cm) 0.4 cm 05/20/22 1403   Wound Width (cm) 0.4 cm 05/20/22 1403   Wound Surface Area (cm^2) 0.16 cm^2 05/20/22 1403   Drainage Amount Small 05/22/22 1200   Drainage Description Clear 05/22/22 1200   Odor None 05/22/22 1200   Hermila-wound Assessment Intact 05/22/22 1200   Number of days: 3        Elimination:  Continence: Bowel: No  Bladder: No  Urinary Catheter: Insertion Date: 05/10/22    Colostomy/Ileostomy/Ileal Conduit: No       Date of Last BM: 05/26/22    Intake/Output Summary (Last 24 hours) at 5/22/2022 1537  Last data filed at 5/22/2022 1501  Gross per 24 hour   Intake 2215.53 ml   Output 3850 ml   Net -1634.47 ml     I/O last 3 completed shifts: In: 2064.7 [I.V.:682.3; NG/GT:753; IV Piggyback:629.4]  Out: 9858 [Urine:8925; Other:300]    Safety Concerns: At Risk for Falls, History of Seizures, and Aspiration Risk    Impairments/Disabilities:      Contractures - Non-verbal-legs and hands contracted    Nutrition Therapy:  Current Nutrition Therapy:   - Tube Feedings:  Standard with fiber Jevity 1.5 @ 35 cc/hr. Free water flush 90 cc every 4 hours. RECOMMENDATIONS  PO Diet: NPO  ONS: NPO  Nutrition Support:   Change tube feeds to Jevity 1.5 at goal rate 35 mL per hour x 23 hours to allow 1 hour hold time for synthroid administration. Recommend water bolus 90 mL q 4 hours. Ensure head of bed is 30 - 45 degrees during continuous or bolus gastric feeding and for one hour after bolus. Turn off the feeding if head of bed is lowered less than 30 degrees.    Monitor for tolerance (bowel habits, N/V, cramping, abdominal exam findings: distended, firm, tense, guarded, discomfort    Routes of Feeding: Gastrostomy Tube CRUSH ALL MEDICATIONS AND GIVE THROUGH PEG TUBE   Liquids: No Liquids  Daily Fluid Restriction: no  Last Modified Barium Swallow with Video (Video Swallowing Test): not done    Treatments at the Time of Hospital Discharge:   Respiratory Treatments: N/A on room air  Oxygen Therapy:  is not on home oxygen therapy. Ventilator:    - No ventilator support    Rehab Therapies: skilled nursing  Weight Bearing Status/Restrictions: No weight bearing restrictions  Other Medical Equipment (for information only, NOT a DME order):  hospital bed  Other Treatments: n/a    Patient's personal belongings (please select all that are sent with patient):  None    RN SIGNATURE:  Electronically signed by Clarita Starkey RN on 5/26/22 at 12:40 PM EDT    CASE MANAGEMENT/SOCIAL WORK SECTION    Inpatient Status Date: 5/10/2022    Readmission Risk Assessment Score:  Readmission Risk              Risk of Unplanned Readmission:  32           Discharging to Facility/ [address]    [facility])  [address]  Admissions: [phone] [id number]:  [phone]  [phone]           [address] [facility], [address] [facility]    / signature: Electronically signed by MACK Dominguez on 5/23/2022 at 10:37 AM        PHYSICIAN SECTION    Prognosis: Good    Condition at Discharge: Stable    Rehab Potential (if transferring to Rehab): Fair    Recommended Labs or Other Treatments After Discharge: Follow-up with PCP within 1 week of discharge,    Physician Certification: I certify the above information and transfer of Cady Loo  is necessary for the continuing treatment of the diagnosis listed and that she requires LONG TERM CARE for greater 30 days.      Update Admission H&P: No change in H&P    PHYSICIAN SIGNATURE:  Electronically signed by Balta Tamayo MD on 5/26/22 at 11:02 AM EDT

## 2022-05-22 NOTE — PLAN OF CARE
Problem: Discharge Planning  Goal: Discharge to home or other facility with appropriate resources  5/22/2022 0442 by May Domingo RN  Outcome: Progressing  5/21/2022 1513 by Mckenna Calvo RN  Outcome: Not Progressing  Flowsheets (Taken 5/21/2022 9496)  Discharge to home or other facility with appropriate resources: Arrange for interpreters to assist at discharge as needed     Problem: Pain  Goal: Verbalizes/displays adequate comfort level or baseline comfort level  5/22/2022 0442 by May Domingo RN  Outcome: Progressing  5/21/2022 1513 by Mckenna Calvo RN  Outcome: Not Progressing     Problem: Nutrition Deficit:  Goal: Optimize nutritional status  5/22/2022 0442 by May Domingo RN  Outcome: Progressing  5/21/2022 1513 by Mckenna Calvo RN  Outcome: Not Progressing     Problem: Skin/Tissue Integrity  Goal: Absence of new skin breakdown  Description: 1. Monitor for areas of redness and/or skin breakdown  2. Assess vascular access sites hourly  3. Every 4-6 hours minimum:  Change oxygen saturation probe site  4. Every 4-6 hours:  If on nasal continuous positive airway pressure, respiratory therapy assess nares and determine need for appliance change or resting period.   5/22/2022 0442 by May Domingo RN  Outcome: Progressing  5/21/2022 1513 by Mckenna Calvo RN  Outcome: Not Progressing     Problem: Safety - Adult  Goal: Free from fall injury  5/22/2022 0442 by May Domingo RN  Outcome: Progressing  5/21/2022 1513 by Mckenna Calvo RN  Outcome: Not Progressing

## 2022-05-22 NOTE — PROGRESS NOTES
AM assessment complete. Patient nonverbal. Opens eyes spontaneously. Unable to follow commands. Respirations regular and unlabored. Brittni heard throughout. NSR on tele. PEG dressing intact. TF running at goal. Urostomy bag to old right fem TLC site, draining clear fluid. Edema to bilateral legs. Yuan in place.

## 2022-05-22 NOTE — PROGRESS NOTES
Reassessment complete. No acute changes. Incontinent of stool. Hermila care completed. Received call from urology, new order for body fluid creatinine.

## 2022-05-23 LAB
A/G RATIO: 0.4 (ref 1.1–2.2)
ALBUMIN SERPL-MCNC: 1.4 G/DL (ref 3.4–5)
ALP BLD-CCNC: 95 U/L (ref 40–129)
ALT SERPL-CCNC: <5 U/L (ref 10–40)
ANION GAP SERPL CALCULATED.3IONS-SCNC: 6 MMOL/L (ref 3–16)
AST SERPL-CCNC: 19 U/L (ref 15–37)
BASOPHILS ABSOLUTE: 0.1 K/UL (ref 0–0.2)
BASOPHILS RELATIVE PERCENT: 0.4 %
BILIRUB SERPL-MCNC: <0.2 MG/DL (ref 0–1)
BUN BLDV-MCNC: 7 MG/DL (ref 7–20)
CALCIUM SERPL-MCNC: 8 MG/DL (ref 8.3–10.6)
CHLORIDE BLD-SCNC: 101 MMOL/L (ref 99–110)
CO2: 24 MMOL/L (ref 21–32)
CREAT SERPL-MCNC: <0.5 MG/DL (ref 0.6–1.1)
CREATININE FLUID: <0.5 MG/DL
EOSINOPHILS ABSOLUTE: 0.1 K/UL (ref 0–0.6)
EOSINOPHILS RELATIVE PERCENT: 0.6 %
FLUID TYPE: NORMAL
GFR AFRICAN AMERICAN: >60
GFR NON-AFRICAN AMERICAN: >60
GLUCOSE BLD-MCNC: 104 MG/DL (ref 70–99)
GLUCOSE BLD-MCNC: 118 MG/DL (ref 70–99)
GLUCOSE BLD-MCNC: 121 MG/DL (ref 70–99)
GLUCOSE BLD-MCNC: 123 MG/DL (ref 70–99)
GLUCOSE BLD-MCNC: 48 MG/DL (ref 70–99)
GLUCOSE BLD-MCNC: 52 MG/DL (ref 70–99)
GLUCOSE BLD-MCNC: 63 MG/DL (ref 70–99)
GLUCOSE BLD-MCNC: 76 MG/DL (ref 70–99)
GLUCOSE BLD-MCNC: 88 MG/DL (ref 70–99)
GLUCOSE BLD-MCNC: 93 MG/DL (ref 70–99)
HCT VFR BLD CALC: 31.3 % (ref 36–48)
HEMOGLOBIN: 10.1 G/DL (ref 12–16)
INR BLD: 1.2 (ref 0.88–1.12)
LYMPHOCYTES ABSOLUTE: 1.1 K/UL (ref 1–5.1)
LYMPHOCYTES RELATIVE PERCENT: 8 %
MAGNESIUM: 2 MG/DL (ref 1.8–2.4)
MCH RBC QN AUTO: 34.1 PG (ref 26–34)
MCHC RBC AUTO-ENTMCNC: 32.3 G/DL (ref 31–36)
MCV RBC AUTO: 105.4 FL (ref 80–100)
MONOCYTES ABSOLUTE: 0.9 K/UL (ref 0–1.3)
MONOCYTES RELATIVE PERCENT: 6.7 %
NEUTROPHILS ABSOLUTE: 11.4 K/UL (ref 1.7–7.7)
NEUTROPHILS RELATIVE PERCENT: 84.3 %
PDW BLD-RTO: 14.9 % (ref 12.4–15.4)
PERFORMED ON: ABNORMAL
PERFORMED ON: NORMAL
PERFORMED ON: NORMAL
PHOSPHORUS: 2.8 MG/DL (ref 2.5–4.9)
PLATELET # BLD: 378 K/UL (ref 135–450)
PMV BLD AUTO: 9.9 FL (ref 5–10.5)
POTASSIUM REFLEX MAGNESIUM: 4 MMOL/L (ref 3.5–5.1)
PROTHROMBIN TIME: 13.7 SEC (ref 9.9–12.7)
RBC # BLD: 2.97 M/UL (ref 4–5.2)
SODIUM BLD-SCNC: 131 MMOL/L (ref 136–145)
TOTAL PROTEIN: 4.6 G/DL (ref 6.4–8.2)
WBC # BLD: 13.6 K/UL (ref 4–11)

## 2022-05-23 PROCEDURE — 85025 COMPLETE CBC W/AUTO DIFF WBC: CPT

## 2022-05-23 PROCEDURE — 6370000000 HC RX 637 (ALT 250 FOR IP): Performed by: STUDENT IN AN ORGANIZED HEALTH CARE EDUCATION/TRAINING PROGRAM

## 2022-05-23 PROCEDURE — 80053 COMPREHEN METABOLIC PANEL: CPT

## 2022-05-23 PROCEDURE — 87040 BLOOD CULTURE FOR BACTERIA: CPT

## 2022-05-23 PROCEDURE — 99233 SBSQ HOSP IP/OBS HIGH 50: CPT | Performed by: INTERNAL MEDICINE

## 2022-05-23 PROCEDURE — 6360000002 HC RX W HCPCS: Performed by: INTERNAL MEDICINE

## 2022-05-23 PROCEDURE — 2580000003 HC RX 258: Performed by: INTERNAL MEDICINE

## 2022-05-23 PROCEDURE — C9113 INJ PANTOPRAZOLE SODIUM, VIA: HCPCS | Performed by: INTERNAL MEDICINE

## 2022-05-23 PROCEDURE — 84100 ASSAY OF PHOSPHORUS: CPT

## 2022-05-23 PROCEDURE — 83735 ASSAY OF MAGNESIUM: CPT

## 2022-05-23 PROCEDURE — 6370000000 HC RX 637 (ALT 250 FOR IP): Performed by: INTERNAL MEDICINE

## 2022-05-23 PROCEDURE — 2580000003 HC RX 258: Performed by: STUDENT IN AN ORGANIZED HEALTH CARE EDUCATION/TRAINING PROGRAM

## 2022-05-23 PROCEDURE — 1200000000 HC SEMI PRIVATE

## 2022-05-23 PROCEDURE — 2500000003 HC RX 250 WO HCPCS: Performed by: PHYSICIAN ASSISTANT

## 2022-05-23 PROCEDURE — 2580000003 HC RX 258: Performed by: PHYSICIAN ASSISTANT

## 2022-05-23 PROCEDURE — 36415 COLL VENOUS BLD VENIPUNCTURE: CPT

## 2022-05-23 PROCEDURE — 2500000003 HC RX 250 WO HCPCS: Performed by: STUDENT IN AN ORGANIZED HEALTH CARE EDUCATION/TRAINING PROGRAM

## 2022-05-23 PROCEDURE — 2500000003 HC RX 250 WO HCPCS: Performed by: INTERNAL MEDICINE

## 2022-05-23 PROCEDURE — 2580000003 HC RX 258

## 2022-05-23 PROCEDURE — 85610 PROTHROMBIN TIME: CPT

## 2022-05-23 RX ORDER — LACTOBACILLUS RHAMNOSUS GG 10B CELL
1 CAPSULE ORAL 2 TIMES DAILY WITH MEALS
Status: DISCONTINUED | OUTPATIENT
Start: 2022-05-23 | End: 2022-05-26 | Stop reason: HOSPADM

## 2022-05-23 RX ADMIN — Medication 1 CAPSULE: at 17:19

## 2022-05-23 RX ADMIN — MIDODRINE HYDROCHLORIDE 2.5 MG: 5 TABLET ORAL at 14:07

## 2022-05-23 RX ADMIN — MIDODRINE HYDROCHLORIDE 2.5 MG: 5 TABLET ORAL at 06:36

## 2022-05-23 RX ADMIN — CARBIDOPA AND LEVODOPA 1.5 TABLET: 25; 100 TABLET ORAL at 17:19

## 2022-05-23 RX ADMIN — PANTOPRAZOLE SODIUM 40 MG: 40 INJECTION, POWDER, FOR SOLUTION INTRAVENOUS at 09:26

## 2022-05-23 RX ADMIN — GLUCAGON HYDROCHLORIDE 1 MG: KIT at 07:55

## 2022-05-23 RX ADMIN — HEPARIN SODIUM 5000 UNITS: 5000 INJECTION INTRAVENOUS; SUBCUTANEOUS at 19:38

## 2022-05-23 RX ADMIN — DEXTROSE MONOHYDRATE 250 ML: 10 INJECTION, SOLUTION INTRAVENOUS at 00:10

## 2022-05-23 RX ADMIN — MIDODRINE HYDROCHLORIDE 2.5 MG: 5 TABLET ORAL at 19:39

## 2022-05-23 RX ADMIN — CARBIDOPA AND LEVODOPA 1.5 TABLET: 25; 100 TABLET ORAL at 09:26

## 2022-05-23 RX ADMIN — VALPROATE SODIUM 250 MG: 100 INJECTION, SOLUTION INTRAVENOUS at 16:28

## 2022-05-23 RX ADMIN — SODIUM CHLORIDE, PRESERVATIVE FREE 10 ML: 5 INJECTION INTRAVENOUS at 09:28

## 2022-05-23 RX ADMIN — CARBIDOPA AND LEVODOPA 1.5 TABLET: 25; 100 TABLET ORAL at 01:06

## 2022-05-23 RX ADMIN — HEPARIN SODIUM 5000 UNITS: 5000 INJECTION INTRAVENOUS; SUBCUTANEOUS at 09:27

## 2022-05-23 RX ADMIN — AMOXICILLIN AND CLAVULANATE POTASSIUM 1 TABLET: 875; 125 TABLET, FILM COATED ORAL at 19:37

## 2022-05-23 RX ADMIN — AMOXICILLIN AND CLAVULANATE POTASSIUM 1 TABLET: 875; 125 TABLET, FILM COATED ORAL at 09:26

## 2022-05-23 RX ADMIN — VALPROATE SODIUM 250 MG: 100 INJECTION, SOLUTION INTRAVENOUS at 09:28

## 2022-05-23 RX ADMIN — SODIUM CHLORIDE, PRESERVATIVE FREE 10 ML: 5 INJECTION INTRAVENOUS at 19:37

## 2022-05-23 RX ADMIN — VALPROATE SODIUM 250 MG: 100 INJECTION, SOLUTION INTRAVENOUS at 03:32

## 2022-05-23 RX ADMIN — WATER 10 ML: 1 INJECTION INTRAMUSCULAR; INTRAVENOUS; SUBCUTANEOUS at 07:55

## 2022-05-23 RX ADMIN — METRONIDAZOLE 500 MG: 500 INJECTION, SOLUTION INTRAVENOUS at 06:38

## 2022-05-23 RX ADMIN — VALPROATE SODIUM 250 MG: 100 INJECTION, SOLUTION INTRAVENOUS at 21:52

## 2022-05-23 RX ADMIN — LEVOTHYROXINE SODIUM 100 MCG: 0.1 TABLET ORAL at 09:26

## 2022-05-23 RX ADMIN — SERTRALINE 100 MG: 50 TABLET, FILM COATED ORAL at 19:38

## 2022-05-23 ASSESSMENT — PAIN SCALES - WONG BAKER
WONGBAKER_NUMERICALRESPONSE: 2

## 2022-05-23 ASSESSMENT — PAIN SCALES - PAIN ASSESSMENT IN ADVANCED DEMENTIA (PAINAD)
TOTALSCORE: 2
NEGVOCALIZATION: 0
CONSOLABILITY: 1
CONSOLABILITY: 1
BREATHING: 0
TOTALSCORE: 2
BODYLANGUAGE: 1
CONSOLABILITY: 1
TOTALSCORE: 2
BREATHING: 0
BREATHING: 0
CONSOLABILITY: 1
TOTALSCORE: 2
BODYLANGUAGE: 1
BREATHING: 0
TOTALSCORE: 2
NEGVOCALIZATION: 0
BREATHING: 0
NEGVOCALIZATION: 0
FACIALEXPRESSION: 0
NEGVOCALIZATION: 0
FACIALEXPRESSION: 0
TOTALSCORE: 2
BODYLANGUAGE: 1
BODYLANGUAGE: 1
FACIALEXPRESSION: 0
BODYLANGUAGE: 1
FACIALEXPRESSION: 0
CONSOLABILITY: 1
BREATHING: 0
CONSOLABILITY: 1
BODYLANGUAGE: 1
FACIALEXPRESSION: 0
FACIALEXPRESSION: 0

## 2022-05-23 ASSESSMENT — PAIN SCALES - GENERAL
PAINLEVEL_OUTOF10: 2
PAINLEVEL_OUTOF10: 1
PAINLEVEL_OUTOF10: 0
PAINLEVEL_OUTOF10: 1

## 2022-05-23 NOTE — CARE COORDINATION
Discharge Planning:     (DAMIAN) attempted to call patient's Caregiver at [facility] (on emergency contact list), x2 but was unable to leave a voicemail due to the mailbox being full. CM called patient's Legal Guardian Representative through [employer] ([phone]), and informed of patient's discharge today and need to be picked up at 4:00pm. Giles Perales stated that she would call [facility] staff and inform of this and then have staff call CM to confirm transportation. CM provided contact information. Arlene GIRON, EDILSON, [facility] -   [phone]    Electronically signed by MACK Cameron on 5/23/2022 at 10:35 AM        Update at 039 0229:  DAMIAN spoke with [facility] staff, Clark Biswas ([phone]), who reported that she needs the following for patient's discharge:    - New fully electric hospital bed as patient's is broken. Clark Biswas confirmed that the patient can still return to the group home without one being delivered as they can make short-term adjustments to accommodate the patient. - [facility]) for Nursing. Niru agreeable to [facility]) servicing the patient as they have serviced other patient's in the group home. - For all medications to specify to either \"crush all medications\" or \"open capsule\" as the patient's medications will have to be given through her G-Tube. - For the [address] to state whether the patient is NPO or what the Tube Feed instructions are. Clark Biswas confirmed that group home staff will be at the hospital today at 4:00pm to pick the patient up for discharge. CM informed Charge Nurse, Ladonna, of the above requirements for patient. Charge Nurse agreed to work on [employer] for the [address] and discuss needs with Dr. Qamar Lemos. CM called Noreen Parr with [facility] ([phone]) and provided referral for the patient for [facility].     CM called Coni with [facility] ([phone]) and left  a voicemail for a referral for patient's Tube Feed supplies. CM called Yesenia Quintana with [facility] ([phone]) and informed of needed fully electric hospital bed. Yesenia Quintana confirmed he will work on this. EDILSON Duff, [facility] -   [phone]    Electronically signed by MACK Lorenzo on 5/23/2022 at 11:27 AM          Update at [address] [facility]:  Karyna Alonso with [facility] informed CM that they could not do start of care with patient for Tube Feeds till tomorrow. CM called and informed group home staff, Chucho Irwin, of this information. Chucho Irwin agreed to have group home staff pick the patient up tomorrow at 4:00pm. Chucho Irwin requested that CM fax the patient discharge summary with medications to the group home tomorrow morning at [phone]. EDILSON Duff, [facility] -   [phone]    Electronically signed by MACK Lorenzo on 5/23/2022 at 11:48 AM        Update at 1250:  Coni at [facility] confirmed that she received the referral and is working on the [employer] supplies cost/benefit coverage. Legal Guardian Representative through [employer] ([phone]), and informed that patient will not be discharging today and that group home staff confirmed they will pick the patient up tomorrow at 4:00pm. Anna verbalized understanding.       EDILSON Duff, [facility] -   [phone]    Electronically signed by MACK Lorenzo on 5/23/2022 at 12:55 PM

## 2022-05-23 NOTE — PROGRESS NOTES
Nutrition Note    RECOMMENDATIONS  PO Diet: NPO  ONS: NPO  Nutrition Support:   1. Change tube feeds to Jevity 1.5 at goal rate 35 mL per hour x 23 hours to allow 1 hour hold time for synthroid administration. 2. Recommend water bolus 90 mL q 4 hours. 3. Ensure head of bed is 30 - 45 degrees during continuous or bolus gastric feeding and for one hour after bolus. Turn off the feeding if head of bed is lowered less than 30 degrees. 4. Monitor for tolerance (bowel habits, N/V, cramping, abdominal exam findings: distended, firm, tense, guarded, discomfort). NUTRITION ASSESSMENT   Pt now s/p PEG tube placement 5/20. Pt tolerating 2 Dayron HN at 25 mL/hr. However, note several instances of hypoglycemia with glucose as low as 48 mg/dL. Will change tube feeds to Jevity 1.5 at goal rate 35 mL per hour to provide an additional 48 grams of carbohydrate daily and see if this helps to stabilize blood sugar.  Nutrition Related Findings: +2 pitting BLE edema. LBM 5/22. Na+ 131. +13 liters per EMR. NS at 100 mL/hr has been d/c'd.  Wounds: Stage II,Pressure Injury   Nutrition Education:  Education not indicated    Nutrition Goals: Tolerate nutrition support at goal rate     MALNUTRITION ASSESSMENT   Chronic Illness  Malnutrition Status:  Moderate malnutrition  Findings of the 6 clinical characteristics of malnutrition:  Energy Intake:  Unable to assess  Weight Loss:  No significant weight loss     Body Fat Loss:  Severe body fat loss Triceps   Muscle Mass Loss:  Severe muscle mass loss Clavicles (pectoralis & deltoids)  Fluid Accumulation:  No significant fluid accumulation     Strength:  Not Performed      NUTRITION DIAGNOSIS   · Inadequate oral intake related to swallowing difficulty as evidenced by NPO or clear liquid status due to medical condition    · Moderate malnutrition related to inadequate protein-energy intake as evidenced by severe muscle loss,severe loss of subcutaneous fat        CURRENT NUTRITION THERAPIES  Diet NPO Exceptions are: Sips of Water with Meds  ADULT TUBE FEEDING; PEG; 2.0 Calorie; Continuous; 25; No; 125; Q 4 hours     PO Intake: NPO   PO Supplement Intake:NPO    Current Tube Feeding (TF) Orders:  · Feeding Route: PEG  · Formula: Standard with Fiber  · Schedule: Continuous  · Feeding Regimen: Tube feeds infusing over 23 hours to allow feeds to hold x 1 hour for synthroid administration  · Additives/Modulars: None  · Water Flushes: 90 mL q 4 hours  · Current TF & Flush Orders Provides: 2 Dayron HN at goal rate 25 mL/hr x 23 hours to provide 575 mL total volume, 1150 calories, 48 grams protein, 403 mL free water. · Goal TF & Flush Orders Provides: Jevity 1.5 at 35 mL/hr x 23 hours to provide 805 mL total volume, 1208 calories, 51 grams protein, 612 mL free water. ANTHROPOMETRICS   Current Height: 4' 9\" (144.8 cm)   Current Weight: 102 lb 15.3 oz (46.7 kg)     Admission weight: 74 lb 11.8 oz (33.9 kg)   Ideal Body Weight (IBW): 85 lbs  (39 kg)        BMI: 22.3    COMPARATIVE STANDARDS  Energy (kcal):  7697-4516     Protein (g):  47-78 grams       Fluid (mL/day):  4669-3037 mL    The patient will be monitored per nutrition standards of care. Consult dietitian if additional nutrition interventions are needed prior to RD reassessment.      Alanna Jenkins, [address] [place] [address],     Contact: [phone]

## 2022-05-23 NOTE — PROGRESS NOTES
Infectious Diseases   Progress Note      Admission Date: 5/10/2022  Hospital Day: Hospital Day: 14   Attending: Diandra Macedo MD  Date of service: 5/23/2022     Chief complaint/ Reason for consult:     · Septic shock with high fever, worsening leukocytosis, hypotension requiring vasopressors and lactic acidosis  · Group B streptococcus bacteremia  · Acute respiratory failure with hypoxia  · Left lower lobe pneumonia, concern for aspiration    Microbiology:        I have reviewed allavailable micro lab data and cultures    · Blood culture (1/2)  - collected on 5/10/2022: Group B streptococcus  Tracheal aspirate culture  - collected on 5/12/2022: Group B streptococcus, MSSA    Susceptibility      Staphylococcus aureus (2)    Antibiotic Interpretation Microscan  Method Status    ceFAZolin Sensitive <=4 mcg/mL BACTERIAL SUSCEPTIBILITY PANEL BY PALLAVI     clindamycin Sensitive <=0.5 mcg/mL BACTERIAL SUSCEPTIBILITY PANEL BY PALLAVI     erythromycin Sensitive <=0.5 mcg/mL BACTERIAL SUSCEPTIBILITY PANEL BY PALLAVI     oxacillin Sensitive 0.5 mcg/mL BACTERIAL SUSCEPTIBILITY PANEL BY PALLAVI     tetracycline Sensitive <=4 mcg/mL BACTERIAL SUSCEPTIBILITY PANEL BY PALLAVI     trimethoprim-sulfamethoxazole Sensitive <=0.5/9.5 mcg/mL BACTERIAL SUSCEPTIBILITY PANEL BY PALLAVI           Antibiotics and immunizations:       Current antibiotics: All antibiotics and their doses were reviewed by me    Recent Abx Admin                   amoxicillin-clavulanate (AUGMENTIN) 875-125 MG per tablet 1 tablet (tablet) 1 tablet Given 05/23/22 0926     1 tablet Given 05/22/22 2031    metronidazole (FLAGYL) 500 mg in 0.9% NaCl 100 mL IVPB premix (mg) 500 mg New Bag 05/23/22 0638     500 mg New Bag 05/22/22 2221     500 mg New Bag  1501                  Immunization History: All immunization history was reviewed by me today. There is no immunization history on file for this patient. Known drug allergies:      All allergies were reviewed and counts, liver and renal function. Antimicrobials:    · Will continue oral Augmentin 875 mg every 12 hours  · If the white cell count improves and her blood cultures from today remain negative, plan to continue oral Augmentin until [address]  · Augmentin has adequate anaerobic coverage. We will discontinue Flagyl  · Start oral probiotic twice daily via PEG tube while on Augmentin  · We will follow up on the culture results and clinical progress and will make further recommendations accordingly. · Continue close vitals monitoring. · Maintain good glycemic control. · Fall precautions. · Aspiration precautions. · Continue to watch for new fever or diarrhea. · DVT prophylaxis. · Discussed all above with patient and RN. Drug Monitoring:    · Continue monitoring for antibiotic toxicity as follows: CBC, CMP   · Continue to watch for following: new or worsening fever, new hypotension, hives, lip swelling and redness or purulence at vascular access sites. I/v access Management:    · Continue to monitor i.v access sites for erythema, induration, discharge or tenderness. · As always, continue efforts to minimize tubes/lines/drains as clinically appropriate to reduce chances of line associated infections. Patient education and counseling:        · The patient was educated in detail about the side-effects of various antibiotics and things to watch for like new rashes, lip swelling, severe reaction, worsening diarrhea, break through fever etc.  · Discussed patient's condition and what to expect. All of the patient's questions were addressed in a satisfactory manner and patient verbalized understanding all instructions. Level of complexity of visit: High     TIME SPENT TODAY:     - Spent over  36 minutes on visit (including interval history, physical exam, review of data including labs, cultures, imaging, development and implementation of treatment plan and coordination of complex care).  More than 50 percent of this includes face-to-face time spent with the patient for counseling and coordination of care. Thank you for involving me in the care of your patient. I will continue to follow. If you have anyadditional questions, please do not hesitate to contact me. Subjective: Interval history: Interval history was obtained from chart review and RN. The patient is afebrile. Remains nonverbal.  She is now on oral Augmentin via PEG tube     REVIEW OF SYSTEMS:      Review of Systems   Unable to perform ROS: Patient nonverbal         Past Medical History: All past medical history reviewed today. Past Medical History:   Diagnosis Date    Anemia     Anorexia     Autistic disorder     [facility]     Down syndrome     Dysphagia     Hypothyroid     Malnutrition ([facility]     Parkinson disease ([facility]     UTI (urinary tract infection)     Weakness        Past Surgical History: All past surgical history was reviewed today. Past Surgical History:   Procedure Laterality Date    GASTROSTOMY TUBE PLACEMENT      GASTROSTOMY TUBE PLACEMENT  2/18/14    GASTROSTOMY TUBE PLACEMENT N/A 5/20/2022    EGD PEG TUBE PLACEMENT performed by Mica Keys MD at [address] [place] [address]       Family History: All family history was reviewed today. History reviewed. No pertinent family history.     Objective:       PHYSICAL EXAM:      Vitals:   Vitals:    05/23/22 0800 05/23/22 0830 05/23/22 1048 05/23/22 1210   BP: 89/70   90/66   Pulse: 87 81  90   Resp: 18   30   Temp: 98.1 °F (36.7 °C)   97 °F (36.1 °C)   TempSrc: Bladder   Tympanic   SpO2: 95%   100%   Weight:       Height:   4' 9\" (1.448 m)        Physical Exam     General: Encephalopathic but protecting the airway so far,   HEENT: normocephalic, atraumatic, sclera clear, pupils equal, light reflex preserved bilaterally  Cardiovascular: RRR, no murmurs/rubs/gallops detected  Pulmonary: CTABL, no rhonchi/rales   Abdomen/GI: soft, no organomegaly, bowel sounds positive. PEG tube noted. Neuro: Encephalopathic, pupils are equal and reactive to light, moves all extremities  Skin: no rash,   Musculoskeletal:  No obvious joint swelling, redness. No limitation of range of passive motion  Genitourinary: Yuan's catheter in place   Psych: could not assess   Lymphatic/Immunologic: No obvious bruising, no cervical lymphadenopathy    Lines: All vascular access sites are healthy with no local erythema, discharge or tenderness    Intake and output:    I/O last 3 completed shifts: In: 0548 [I.V.:795; NG/GT:1403; IV Piggyback:1193]  Out: 8121 [Urine:4750; Other:100]    Lab Data:   All available labs and old records have been reviewed by me. CBC:  Recent Labs     05/21/22  0431 05/22/22  0704 05/23/22  0456   WBC 10.0 11.3* 13.6*   RBC 3.13* 3.43* 2.97*   HGB 10.4* 11.6* 10.1*   HCT 31.9* 35.5* 31.3*    330 378   .0* 103.5* 105.4*   MCH 33.4 33.7 34.1*   MCHC 32.7 32.5 32.3   RDW 13.9 14.1 14.9        BMP:  Recent Labs     05/21/22  0431 05/22/22  0704 05/23/22  0456    133* 131*   K 4.0 4.2 4.0    102 101   CO2 23 24 24   BUN 8 5* 7   CREATININE <0.5* <0.5* <0.5*   CALCIUM 7.5* 7.9* 8.0*   GLUCOSE 72 78 93        Hepatic Function Panel:   Lab Results   Component Value Date    ALKPHOS 95 05/23/2022    ALT <5 05/23/2022    AST 19 05/23/2022    PROT 4.6 05/23/2022    PROT 6.9 02/06/2013    BILITOT <0.2 05/23/2022    BILIDIR <0.2 12/23/2016    IBILI see below 12/23/2016    LABALBU 1.4 05/23/2022       CPK:   Lab Results   Component Value Date    CKTOTAL 78 08/22/2012     ESR: No results found for: SEDRATE  CRP: No results found for: CRP        Imaging: All pertinent images and reports for the current visit were reviewed by me during this visit. I reviewed the chest x-ray/CT scan/MRI images and independently interpreted the findings and results today.     XR CHEST PORTABLE   Final Result   Moderate airspace disease right upper and lower lobe with volume loss.   Pneumonia and or mucous plugging both considered. NG tube extends well into the stomach. XR CHEST PORTABLE   Final Result   1. Endotracheal tube terminates in the left mainstem bronchus, for which   retraction 5 cm is recommended. 2.  Enteric tube terminates at the level of the body of the stomach. 3.  No significant change in bilateral airspace disease. Probable small left   effusion. XR ABDOMEN FOR NG/OG/NE TUBE PLACEMENT   Final Result   NG tube position appears acceptable. Endotracheal tube tip is likely within 1 cm above the anita. Dense left basilar opacity and patchy opacities in both lungs. XR CHEST PORTABLE   Final Result   Endotracheal tube is present with tip projecting just proximal to the anita. Left basilar opacity and trace left pleural effusion. Aspiration and   pneumonia are in the differential.             Medications: All current and past medications were reviewed.      amoxicillin-clavulanate  1 tablet Oral 2 times per day    lidocaine 1 % injection  5 mL IntraDERmal Once    sodium chloride flush  5-40 mL IntraVENous 2 times per day    midodrine  2.5 mg Orogastric q8h    valproate sodium (DEPACON) IVPB  250 mg IntraVENous Q6H    metroNIDAZOLE  500 mg IntraVENous Q8H    levothyroxine  100 mcg Per NG tube Daily    carbidopa-levodopa  1.5 tablet Per NG tube q8h    sertraline  100 mg Per NG tube Nightly    pantoprazole  40 mg IntraVENous Daily    heparin (porcine)  5,000 Units SubCUTAneous BID    alteplase  2 mg IntraCATHeter Once        dextrose      sodium chloride      sodium chloride 5 mL/hr at 05/23/22 0619       dextrose bolus **OR** dextrose bolus, glucagon (rDNA), dextrose, [DISCONTINUED] potassium chloride **OR** potassium chloride, sodium chloride flush, sodium chloride, atropine, sodium chloride flush, sodium chloride, sodium phosphate IVPB **OR** sodium phosphate IVPB **OR** sodium phosphate IVPB, ondansetron, acetaminophen **OR** acetaminophen, fentanNYL      Problem list:       Patient Active Problem List   Diagnosis Code    Down syndrome Q90.9    Down syndrome Q90.9    Down syndrome Q90.9    Dysphagia R13.10    Autistic disorder F84.0    PEG adjustment, replacement, or removal Z43.1    Hypotension I95.9    Sepsis ([address] A41.9    Acute hypoxemic respiratory failure ([address] J96.01    AVELINO (acute kidney injury) ([address] N17.9    Aspiration pneumonia ([address] J69.0    Septic shock ([address] A41.9, R65.21    Moderate malnutrition ([address] E44.0    Streptococcal bacteremia R78.81, B95.5    Pneumonia of left lower lobe due to infectious organism J18.9    Parkinsonism ([address] G20    Anorexia R63.0    Pneumonia of both lower lobes due to methicillin susceptible Staphylococcus aureus (MSSA) ([address] J15.211       Please note that this chart was generated using Dragon dictation software. Although every effort was made to ensure the accuracy of this automated transcription, some errors in transcription may have occurred inadvertently. If you may need any clarification, please do not hesitate to contact me through EPIC or at the phone number provided below with my electronic signature. Any pictures or media included in this note were obtained after taking informed verbal consent from the patient and with their approval to include those in the patient's medical record.       Destini Alvarez MD, MPH, [address] [place] [address]  5/23/2022, 1:28 PM  [facility] Infectious Disease   [address] [place] [address] [address] [facility], [address] [place] [address]  Office: [phone]  Fax: [phone]  Clinic days:  Tuesday & Thursday

## 2022-05-23 NOTE — PROGRESS NOTES
Received a call from  to run tube feed benefits. Given referal for home care as well. Mark Saha Received home care referral. Will follow.

## 2022-05-23 NOTE — CONSULTS
[facility] Consult Note  Inpatient Setting - [address] [facility]    Provider: Naomi Hutchinson MD MD Patient ID:  Admission Date: 5/10/2022 Name: Emy Rivera Date: n/a MRN: [id number]   Patient Location: [id number] : 1965  Attending: Agueda Abraham MD Date of Service: 2022  PCP: Skyler Haney MD     Diagnoses:  1. Septic shock ([address]    2. Aspiration pneumonia, unspecified aspiration pneumonia type, unspecified laterality, unspecified part of lung ([address]    3. Acute hypoxemic respiratory failure (HCC)    4. Hypernatremia        Assessment/Plan:  59 yo F with respiratory failure, 2/2 PNA and infective endocarditis. Patient has what appears to be urine draining from previous central line site per nursing. There is an ostomy bag now placed over that and continues to be higher output. Serum Cr WNL    - Fluid creatinine <0.5 consistent with serum levels and not consistent with a fistula from the bladder   - irrigated ~200 cc through the beaulieu into the bladder with no increase or change in output to right groin site  - call with questions        All the patients questions were answered in detail. She understands the plan as listed above.     · Review/order of labs  · Review/order of radiology studies    Total time spent face-to-face with the patient 20 min, including greater than 50% of this time in discussion with the patient/family concerning the following:  · Recommended tests  · management options  · risks/benefits of management options  · importance of compliance  · Prognosis  · Risk factor reduction  · Patient/family education                                                                                                                                                      CC:   Chief Complaint   Patient presents with    Shortness of Breath     Pt presents to the ED from home in RESP distress, on NRB 15L, Recently been treated for pneumonia, family stopped giving PO antibiotics due to not thickened liquid medication       HPI: Yennifer Guajardo is a 60 y.o. female. I saw the patient today for drainage from right central line site     Past Medical History:   She has a past medical history of Anemia, Anorexia, Autistic disorder, Convulsions ([address] Down syndrome, Dysphagia, Hypothyroid, Malnutrition ([address] Parkinson disease ([address] UTI (urinary tract infection), and Weakness. Past Surgical History:  She has a past surgical history that includes Gastrostomy tube placement; Gastrostomy tube placement (2/18/14); and Gastrostomy tube placement (N/A, 5/20/2022). Allergies: Allergies   Allergen Reactions    Caffeine     Iodine        Social History:  She reports that she has never smoked. She has never used smokeless tobacco. She reports that she does not drink alcohol and does not use drugs. Family History:  family history is not on file.     Medications:   Scheduled Meds:   lactobacillus  1 capsule Oral BID WC    amoxicillin-clavulanate  1 tablet Oral 2 times per day    lidocaine 1 % injection  5 mL IntraDERmal Once    sodium chloride flush  5-40 mL IntraVENous 2 times per day    midodrine  2.5 mg Orogastric q8h    valproate sodium (DEPACON) IVPB  250 mg IntraVENous Q6H    levothyroxine  100 mcg Per NG tube Daily    carbidopa-levodopa  1.5 tablet Per NG tube q8h    sertraline  100 mg Per NG tube Nightly    pantoprazole  40 mg IntraVENous Daily    heparin (porcine)  5,000 Units SubCUTAneous BID    alteplase  2 mg IntraCATHeter Once     Continuous Infusions:   dextrose      sodium chloride      sodium chloride 5 mL/hr at 05/23/22 0619     PRN Meds:dextrose bolus **OR** dextrose bolus, glucagon (rDNA), dextrose, [DISCONTINUED] potassium chloride **OR** potassium chloride, sodium chloride flush, sodium chloride, atropine, sodium chloride flush, sodium chloride, sodium phosphate IVPB **OR** sodium phosphate IVPB **OR** sodium phosphate IVPB, ondansetron, acetaminophen **OR** acetaminophen, fentanNYL    Review of Systems:  UTO 2/2 AMS     Physical Exam:  Vitals:    05/23/22 1400   BP:    Pulse: 77   Resp: 20   Temp:    SpO2:      Constitutional: ill appearing  HEENT: MMM. Hearing intact. PERRL  Neck: no thyroid masses appreciated. Trachea is midline. Neck appears unremarkable   Lymph: no palpable adenopathy in supraclavicular, or axillary lymph nodes  Cardiovascular: Regular rate. No peripheral edema  Respiratory: Respirations are even and non-labored. No audible breath sounds. Genitourinary: beaulieu in place with CYU  Abdomen: Soft. No distension, tenderness, hernias, masses or guarding. No CVA tenderness. No hernias appreciated. Liver and spleen appear normal  Psychiatric: A + O x 0   Muskuloskeletal: PHU x 4   Skin: Pink, warm and dry. No rashes on face and arms.     Labs:  Lab Results   Component Value Date    WBC 13.6 (H) 05/23/2022    HGB 10.1 (L) 05/23/2022    HCT 31.3 (L) 05/23/2022    .4 (H) 05/23/2022     05/23/2022     Lab Results   Component Value Date    CREATININE <0.5 (L) 05/23/2022    BUN 7 05/23/2022     (L) 05/23/2022    K 4.0 05/23/2022     05/23/2022    CO2 24 05/23/2022       Imaging:   none    Jermaine Nguyen MD, MD  5/23/2022

## 2022-05-23 NOTE — PROGRESS NOTES
Currently patient resting quietly in bed with eyes closed until she hears movement in room then opens eyes. Vital signs are stable, remains on room air with O2 sats running in mid 90\"s. Urostomy bag at the right groin site drained 575 mL of pale yellow clear fluid. Since 1400 I/O documentation the urostomy has drained scant amount of fluid compared to previous 8 hour collection. Will continue to monitor.

## 2022-05-23 NOTE — PROGRESS NOTES
Hospitalist Progress Note      PCP: Nata Kelly MD    Date of Admission: 5/10/2022    Subjective:     Remains on a low-dose of Levophed, leukocytosis is improving, hypokalemia 3.2, no acute event overnight, remain bradycardia, sinus bradycardia, not following commands, palliative care on board. Overall not improving. PEG tube placed and functioning  Consult placed to urology as it appears that the fluid coming out of the patient's area where her previous central line was is urine,we did Yuan bag on there and it appears to be fillling as though this is a bladder emptying. Await urology input.     Medications:  Reviewed    Infusion Medications    dextrose      sodium chloride      sodium chloride 5 mL/hr at 05/23/22 0619     Scheduled Medications    lactobacillus  1 capsule Oral BID WC    amoxicillin-clavulanate  1 tablet Oral 2 times per day    lidocaine 1 % injection  5 mL IntraDERmal Once    sodium chloride flush  5-40 mL IntraVENous 2 times per day    midodrine  2.5 mg Orogastric q8h    valproate sodium (DEPACON) IVPB  250 mg IntraVENous Q6H    levothyroxine  100 mcg Per NG tube Daily    carbidopa-levodopa  1.5 tablet Per NG tube q8h    sertraline  100 mg Per NG tube Nightly    pantoprazole  40 mg IntraVENous Daily    heparin (porcine)  5,000 Units SubCUTAneous BID    alteplase  2 mg IntraCATHeter Once     PRN Meds: dextrose bolus **OR** dextrose bolus, glucagon (rDNA), dextrose, [DISCONTINUED] potassium chloride **OR** potassium chloride, sodium chloride flush, sodium chloride, atropine, sodium chloride flush, sodium chloride, sodium phosphate IVPB **OR** sodium phosphate IVPB **OR** sodium phosphate IVPB, ondansetron, acetaminophen **OR** acetaminophen, fentanNYL      Intake/Output Summary (Last 24 hours) at 5/23/2022 1508  Last data filed at 5/23/2022 1400  Gross per 24 hour   Intake 1853.05 ml   Output 2025 ml   Net -171.95 ml       Physical Exam Performed:    BP 90/66   Pulse 77 Temp 97 °F (36.1 °C) (Tympanic)   Resp 20   Ht 4' 9\" (1.448 m)   Wt 102 lb 15.3 oz (46.7 kg)   SpO2 100%   BMI 22.28 kg/m²     General appearance: Extubated  HEENT: Pupils equal, round, and reactive to light. Conjunctivae/corneas clear. Neck: Supple, with full range of motion. No jugular venous distention. Trachea midline. Respiratory:  Normal respiratory effort. Clear to auscultation, bilaterally without Rales/Wheezes/Rhonchi. Cardiovascular: Regular rate and rhythm with normal S1/S2 without murmurs, rubs or gallops. Abdomen: Soft, non-tender, non-distended with normal bowel sounds. Now has PEG. Musculoskeletal: No clubbing, cyanosis or edema bilaterally. Full range of motion without deformity. Skin: Skin color, texture, turgor normal.  No rashes or lesions. Neurologic: Alert  Psychiatric: Calm  Capillary Refill: Brisk,3 seconds, normal   Peripheral Pulses: +2 palpable, equal bilaterally       Labs:   Recent Labs     05/21/22 0431 05/22/22  0704 05/23/22  0456   WBC 10.0 11.3* 13.6*   HGB 10.4* 11.6* 10.1*   HCT 31.9* 35.5* 31.3*    330 378     Recent Labs     05/21/22 0431 05/22/22  0704 05/23/22  0456    133* 131*   K 4.0 4.2 4.0    102 101   CO2 23 24 24   BUN 8 5* 7   CREATININE <0.5* <0.5* <0.5*   CALCIUM 7.5* 7.9* 8.0*   PHOS 2.4* 2.7 2.8     Recent Labs     05/21/22 0431 05/22/22  0704 05/23/22  0456   AST 22 19 19   ALT <5* 6* <5*   BILITOT <0.2 <0.2 <0.2   ALKPHOS 70 97 95     Recent Labs     05/21/22 0431 05/22/22  0704 05/23/22  0639   INR 1.22* 1.35* 1.20*     No results for input(s): CKTOTAL, TROPONINI in the last 72 hours.     Urinalysis:      Lab Results   Component Value Date    NITRU Negative 05/10/2022    WBCUA 3 05/10/2022    BACTERIA None Seen 05/10/2022    RBCUA 3-4 05/10/2022    BLOODU Negative 05/10/2022    SPECGRAV 1.025 05/10/2022    GLUCOSEU Negative 05/10/2022       Radiology:  XR CHEST PORTABLE   Final Result   Moderate airspace disease right upper and lower lobe with volume loss. Pneumonia and or mucous plugging both considered. NG tube extends well into the stomach. XR CHEST PORTABLE   Final Result   1. Endotracheal tube terminates in the left mainstem bronchus, for which   retraction 5 cm is recommended. 2.  Enteric tube terminates at the level of the body of the stomach. 3.  No significant change in bilateral airspace disease. Probable small left   effusion. XR ABDOMEN FOR NG/OG/NE TUBE PLACEMENT   Final Result   NG tube position appears acceptable. Endotracheal tube tip is likely within 1 cm above the anita. Dense left basilar opacity and patchy opacities in both lungs. XR CHEST PORTABLE   Final Result   Endotracheal tube is present with tip projecting just proximal to the anita. Left basilar opacity and trace left pleural effusion.   Aspiration and   pneumonia are in the differential.             Assessment/Plan:    Active Hospital Problems    Diagnosis     Pneumonia of both lower lobes due to methicillin susceptible Staphylococcus aureus (MSSA) ([facility]) [J15.211]      Priority: Medium    Streptococcal bacteremia [R78.81, B95.5]      Priority: Medium    Pneumonia of left lower lobe due to infectious organism [J18.9]      Priority: Medium    Parkinsonism ([address] [G20]      Priority: Medium    Anorexia [R63.0]      Priority: Medium    Moderate malnutrition ([address] [E44.0]      Priority: Medium    AVELINO (acute kidney injury) ([address] [N17.9]      Priority: Medium    Aspiration pneumonia ([address] [J69.0]      Priority: Medium    Septic shock ([address] [A41.9, R65.21]      Priority: Medium    Acute hypoxemic respiratory failure ([address] [J96.01]     Down syndrome [Q90.9]       Acute hypoxic respiratory failure, secondary to multifocal pneumonia, patient was started on broad-spectrum antibiotic, intubated and sedated, started on Levophed, no guidance cultures so far, appreciate pulmonary input, liberated from mechanical ventilation 5/12 remain n.p.o., failed speech.  Sepsis, and septic shock, secondary to multifocal pneumonia,/and possible infective endocarditis with possible aortic root abscess started on vancomycin and cefepime antibiotic has been changed to Rocephin and Flagyl per ID, lactic acid is improving 3.2 continue current treatment, continue vent support leukocytosis has been worsening 21,000, blood culture growing Streptococcus, will repeat blood culture, send strep antigen urine concern for endocarditis on echocardiogram, per cardiology it would be unsafe to do STORMY via conscious sedation as patient cannot follow commands STORMY was canceled, plan to treat with antibiotic, likely patient will need a PICC line, repeated blood cultures are still negative so far.  Chronic hypotension, on midodrine, will continue now on low-dose of Levophed   Acute kidney injury likely prerenal, ATN, avoid nephrotoxic medication, continue fluid resuscitation.  Sinus bradycardia, will decrease dose of midodrine 2.5 mg 3 times daily. If develop any heart block,  will reconsult cardiology   History of Down syndrome,  PEG placed 5/20/2022   Lactic acidosis, improving with fluid resuscitation   Hypomagnesemia. Repleted, will continue monitor.  History of seizure disorder continue Keppra   Hypothyroidism, continue Synthroid   Failure to thrive  Overall prognosis poor,GI placed PEG      Bladder fistula ?  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 It has filled multiple beaulieu bags. Consult to urology has been placed. If negative not fistula suspect 3rd spacing and can be discharged.     Patient requires frequent or immediate change in body position in ways not feasible with ordinary bed in order to alleviate pain due to high risk for aspiration and not being able to position on her own.       DVT Prophylaxis: Heparin subcu  Diet: Diet NPO Exceptions are: Sips of Water with Meds  ADULT TUBE FEEDING; PEG; Standard with Fiber; Continuous; 35; No; 90; Q 4 hours  Code Status: DNR-CCA    Dispo -continue ICU care, may be transferred, s/p PEG.           Anjelica Pagan MD

## 2022-05-24 LAB
A/G RATIO: 0.4 (ref 1.1–2.2)
ALBUMIN SERPL-MCNC: 1.4 G/DL (ref 3.4–5)
ALP BLD-CCNC: 98 U/L (ref 40–129)
ALT SERPL-CCNC: 7 U/L (ref 10–40)
ANION GAP SERPL CALCULATED.3IONS-SCNC: 8 MMOL/L (ref 3–16)
AST SERPL-CCNC: 17 U/L (ref 15–37)
BASOPHILS ABSOLUTE: 0 K/UL (ref 0–0.2)
BASOPHILS RELATIVE PERCENT: 0.1 %
BILIRUB SERPL-MCNC: <0.2 MG/DL (ref 0–1)
BUN BLDV-MCNC: 7 MG/DL (ref 7–20)
CALCIUM SERPL-MCNC: 8 MG/DL (ref 8.3–10.6)
CHLORIDE BLD-SCNC: 101 MMOL/L (ref 99–110)
CO2: 24 MMOL/L (ref 21–32)
CREAT SERPL-MCNC: <0.5 MG/DL (ref 0.6–1.1)
CREATININE FLUID: <0.5 MG/DL
EOSINOPHILS ABSOLUTE: 0.1 K/UL (ref 0–0.6)
EOSINOPHILS RELATIVE PERCENT: 0.7 %
FLUID TYPE: NORMAL
GFR AFRICAN AMERICAN: >60
GFR NON-AFRICAN AMERICAN: >60
GLUCOSE BLD-MCNC: 84 MG/DL (ref 70–99)
GLUCOSE BLD-MCNC: 88 MG/DL (ref 70–99)
GLUCOSE BLD-MCNC: 89 MG/DL (ref 70–99)
GLUCOSE BLD-MCNC: 95 MG/DL (ref 70–99)
GLUCOSE BLD-MCNC: 97 MG/DL (ref 70–99)
GLUCOSE BLD-MCNC: 99 MG/DL (ref 70–99)
HCT VFR BLD CALC: 32.6 % (ref 36–48)
HEMOGLOBIN: 10.4 G/DL (ref 12–16)
INR BLD: 1.19 (ref 0.88–1.12)
LYMPHOCYTES ABSOLUTE: 0.9 K/UL (ref 1–5.1)
LYMPHOCYTES RELATIVE PERCENT: 8.1 %
MAGNESIUM: 2.2 MG/DL (ref 1.8–2.4)
MCH RBC QN AUTO: 33.4 PG (ref 26–34)
MCHC RBC AUTO-ENTMCNC: 31.9 G/DL (ref 31–36)
MCV RBC AUTO: 104.7 FL (ref 80–100)
MONOCYTES ABSOLUTE: 0.9 K/UL (ref 0–1.3)
MONOCYTES RELATIVE PERCENT: 8.5 %
NEUTROPHILS ABSOLUTE: 9 K/UL (ref 1.7–7.7)
NEUTROPHILS RELATIVE PERCENT: 82.6 %
PDW BLD-RTO: 15.4 % (ref 12.4–15.4)
PERFORMED ON: NORMAL
PHOSPHORUS: 2.6 MG/DL (ref 2.5–4.9)
PLATELET # BLD: 396 K/UL (ref 135–450)
PMV BLD AUTO: 9.8 FL (ref 5–10.5)
POTASSIUM REFLEX MAGNESIUM: 4.1 MMOL/L (ref 3.5–5.1)
PROTHROMBIN TIME: 13.6 SEC (ref 9.9–12.7)
RBC # BLD: 3.12 M/UL (ref 4–5.2)
REASON FOR REJECTION: NORMAL
REJECTED TEST: NORMAL
SODIUM BLD-SCNC: 133 MMOL/L (ref 136–145)
TOTAL PROTEIN: 5 G/DL (ref 6.4–8.2)
WBC # BLD: 10.9 K/UL (ref 4–11)

## 2022-05-24 PROCEDURE — 2500000003 HC RX 250 WO HCPCS: Performed by: STUDENT IN AN ORGANIZED HEALTH CARE EDUCATION/TRAINING PROGRAM

## 2022-05-24 PROCEDURE — 85025 COMPLETE CBC W/AUTO DIFF WBC: CPT

## 2022-05-24 PROCEDURE — 84100 ASSAY OF PHOSPHORUS: CPT

## 2022-05-24 PROCEDURE — 6370000000 HC RX 637 (ALT 250 FOR IP): Performed by: INTERNAL MEDICINE

## 2022-05-24 PROCEDURE — 2580000003 HC RX 258: Performed by: INTERNAL MEDICINE

## 2022-05-24 PROCEDURE — 36415 COLL VENOUS BLD VENIPUNCTURE: CPT

## 2022-05-24 PROCEDURE — 6360000002 HC RX W HCPCS: Performed by: INTERNAL MEDICINE

## 2022-05-24 PROCEDURE — 82570 ASSAY OF URINE CREATININE: CPT

## 2022-05-24 PROCEDURE — 1200000000 HC SEMI PRIVATE

## 2022-05-24 PROCEDURE — 85610 PROTHROMBIN TIME: CPT

## 2022-05-24 PROCEDURE — 6370000000 HC RX 637 (ALT 250 FOR IP): Performed by: STUDENT IN AN ORGANIZED HEALTH CARE EDUCATION/TRAINING PROGRAM

## 2022-05-24 PROCEDURE — 80053 COMPREHEN METABOLIC PANEL: CPT

## 2022-05-24 PROCEDURE — 99232 SBSQ HOSP IP/OBS MODERATE 35: CPT | Performed by: INTERNAL MEDICINE

## 2022-05-24 PROCEDURE — 83735 ASSAY OF MAGNESIUM: CPT

## 2022-05-24 PROCEDURE — C9113 INJ PANTOPRAZOLE SODIUM, VIA: HCPCS | Performed by: INTERNAL MEDICINE

## 2022-05-24 PROCEDURE — 2580000003 HC RX 258: Performed by: STUDENT IN AN ORGANIZED HEALTH CARE EDUCATION/TRAINING PROGRAM

## 2022-05-24 RX ORDER — LACTOBACILLUS RHAMNOSUS GG 10B CELL
1 CAPSULE ORAL 2 TIMES DAILY WITH MEALS
Qty: 20 CAPSULE | Refills: 0 | Status: CANCELLED | OUTPATIENT
Start: 2022-05-24 | End: 2022-06-03

## 2022-05-24 RX ORDER — VALPROIC ACID 250 MG/5ML
500 SOLUTION ORAL NIGHTLY
Status: DISCONTINUED | OUTPATIENT
Start: 2022-05-24 | End: 2022-05-26 | Stop reason: HOSPADM

## 2022-05-24 RX ORDER — VALPROIC ACID 250 MG/5ML
250 SOLUTION ORAL 2 TIMES DAILY
Status: DISCONTINUED | OUTPATIENT
Start: 2022-05-25 | End: 2022-05-26 | Stop reason: HOSPADM

## 2022-05-24 RX ORDER — AMOXICILLIN AND CLAVULANATE POTASSIUM 875; 125 MG/1; MG/1
1 TABLET, FILM COATED ORAL EVERY 12 HOURS SCHEDULED
Qty: 20 TABLET | Refills: 0 | Status: CANCELLED | OUTPATIENT
Start: 2022-05-24 | End: 2022-06-03

## 2022-05-24 RX ADMIN — MIDODRINE HYDROCHLORIDE 2.5 MG: 5 TABLET ORAL at 06:14

## 2022-05-24 RX ADMIN — VALPROATE SODIUM 250 MG: 100 INJECTION, SOLUTION INTRAVENOUS at 08:44

## 2022-05-24 RX ADMIN — VALPROATE SODIUM 250 MG: 100 INJECTION, SOLUTION INTRAVENOUS at 03:42

## 2022-05-24 RX ADMIN — AMOXICILLIN AND CLAVULANATE POTASSIUM 1 TABLET: 875; 125 TABLET, FILM COATED ORAL at 20:17

## 2022-05-24 RX ADMIN — LEVOTHYROXINE SODIUM 100 MCG: 0.1 TABLET ORAL at 08:32

## 2022-05-24 RX ADMIN — VALPROIC ACID 500 MG: 250 SOLUTION ORAL at 20:17

## 2022-05-24 RX ADMIN — HEPARIN SODIUM 5000 UNITS: 5000 INJECTION INTRAVENOUS; SUBCUTANEOUS at 08:32

## 2022-05-24 RX ADMIN — MIDODRINE HYDROCHLORIDE 2.5 MG: 5 TABLET ORAL at 20:17

## 2022-05-24 RX ADMIN — PANTOPRAZOLE SODIUM 40 MG: 40 INJECTION, POWDER, FOR SOLUTION INTRAVENOUS at 08:32

## 2022-05-24 RX ADMIN — AMOXICILLIN AND CLAVULANATE POTASSIUM 1 TABLET: 875; 125 TABLET, FILM COATED ORAL at 08:32

## 2022-05-24 RX ADMIN — Medication 1 CAPSULE: at 17:38

## 2022-05-24 RX ADMIN — Medication 1 CAPSULE: at 08:32

## 2022-05-24 RX ADMIN — HEPARIN SODIUM 5000 UNITS: 5000 INJECTION INTRAVENOUS; SUBCUTANEOUS at 20:17

## 2022-05-24 RX ADMIN — CARBIDOPA AND LEVODOPA 1.5 TABLET: 25; 100 TABLET ORAL at 08:30

## 2022-05-24 RX ADMIN — VALPROATE SODIUM 250 MG: 100 INJECTION, SOLUTION INTRAVENOUS at 15:06

## 2022-05-24 RX ADMIN — SERTRALINE 100 MG: 50 TABLET, FILM COATED ORAL at 20:17

## 2022-05-24 RX ADMIN — CARBIDOPA AND LEVODOPA 1.5 TABLET: 25; 100 TABLET ORAL at 17:38

## 2022-05-24 RX ADMIN — SODIUM CHLORIDE, PRESERVATIVE FREE 10 ML: 5 INJECTION INTRAVENOUS at 08:32

## 2022-05-24 RX ADMIN — MIDODRINE HYDROCHLORIDE 2.5 MG: 5 TABLET ORAL at 13:21

## 2022-05-24 ASSESSMENT — PAIN SCALES - PAIN ASSESSMENT IN ADVANCED DEMENTIA (PAINAD)
NEGVOCALIZATION: 0
BREATHING: 0
FACIALEXPRESSION: 0
CONSOLABILITY: 1
NEGVOCALIZATION: 0
BODYLANGUAGE: 1
CONSOLABILITY: 1
BREATHING: 0
BREATHING: 0
TOTALSCORE: 2
TOTALSCORE: 2
BODYLANGUAGE: 1
TOTALSCORE: 2
CONSOLABILITY: 1
BODYLANGUAGE: 1
NEGVOCALIZATION: 0

## 2022-05-24 ASSESSMENT — PAIN SCALES - WONG BAKER
WONGBAKER_NUMERICALRESPONSE: 0

## 2022-05-24 NOTE — PROGRESS NOTES
Hospitalist Progress Note      PCP: Julia Shelton MD    Date of Admission: 5/10/2022    Subjective: No acute event overnight. Medications:  Reviewed    Infusion Medications    dextrose      sodium chloride      sodium chloride 5 mL/hr at 05/23/22 0619     Scheduled Medications    lactobacillus  1 capsule Oral BID WC    amoxicillin-clavulanate  1 tablet Oral 2 times per day    lidocaine 1 % injection  5 mL IntraDERmal Once    sodium chloride flush  5-40 mL IntraVENous 2 times per day    midodrine  2.5 mg Orogastric q8h    valproate sodium (DEPACON) IVPB  250 mg IntraVENous Q6H    levothyroxine  100 mcg Per NG tube Daily    carbidopa-levodopa  1.5 tablet Per NG tube q8h    sertraline  100 mg Per NG tube Nightly    pantoprazole  40 mg IntraVENous Daily    heparin (porcine)  5,000 Units SubCUTAneous BID    alteplase  2 mg IntraCATHeter Once     PRN Meds: dextrose bolus **OR** dextrose bolus, glucagon (rDNA), dextrose, [DISCONTINUED] potassium chloride **OR** potassium chloride, sodium chloride flush, sodium chloride, atropine, sodium chloride flush, sodium chloride, sodium phosphate IVPB **OR** sodium phosphate IVPB **OR** sodium phosphate IVPB, ondansetron, acetaminophen **OR** acetaminophen, fentanNYL      Intake/Output Summary (Last 24 hours) at 5/24/2022 1326  Last data filed at 5/24/2022 0602  Gross per 24 hour   Intake 972.59 ml   Output 2225 ml   Net -1252.41 ml       Physical Exam Performed:    BP 87/61   Pulse 68   Temp 98.4 °F (36.9 °C) (Bladder)   Resp 18   Ht 4' 9\" (1.448 m)   Wt 102 lb 15.3 oz (46.7 kg)   SpO2 93%   BMI 22.28 kg/m²     General appearance: Nonverbal  HEENT: Pupils equal, round, and reactive to light. Conjunctivae/corneas clear. Neck: Supple, with full range of motion. No jugular venous distention. Trachea midline. Respiratory:  Normal respiratory effort. Clear to auscultation, bilaterally without Rales/Wheezes/Rhonchi.   Cardiovascular: Regular rate and rhythm with normal S1/S2 without murmurs, rubs or gallops. Abdomen: Soft, non-tender, non-distended with normal bowel sounds. Musculoskeletal: No clubbing, cyanosis or edema bilaterally. Full range of motion without deformity. Skin: Skin color, texture, turgor normal.  No rashes or lesions. Neurologic:  Neurovascularly intact without any focal sensory/motor deficits. Cranial nerves: II-XII intact, grossly non-focal.  Psychiatric: Alert   Capillary Refill: Brisk,3 seconds, normal   Peripheral Pulses: +2 palpable, equal bilaterally       Labs:   Recent Labs     05/22/22  0704 05/23/22  0456 05/24/22  1054   WBC 11.3* 13.6* 10.9   HGB 11.6* 10.1* 10.4*   HCT 35.5* 31.3* 32.6*    378 396     Recent Labs     05/22/22  0704 05/23/22  0456 05/24/22  0445   * 131* 133*   K 4.2 4.0 4.1    101 101   CO2 24 24 24   BUN 5* 7 7   CREATININE <0.5* <0.5* <0.5*   CALCIUM 7.9* 8.0* 8.0*   PHOS 2.7 2.8 2.6     Recent Labs     05/22/22  0704 05/23/22  0456 05/24/22  0445   AST 19 19 17   ALT 6* <5* 7*   BILITOT <0.2 <0.2 <0.2   ALKPHOS 97 95 98     Recent Labs     05/22/22  0704 05/23/22  0639 05/24/22  0445   INR 1.35* 1.20* 1.19*     No results for input(s): CKTOTAL, TROPONINI in the last 72 hours. Urinalysis:      Lab Results   Component Value Date    NITRU Negative 05/10/2022    WBCUA 3 05/10/2022    BACTERIA None Seen 05/10/2022    RBCUA 3-4 05/10/2022    BLOODU Negative 05/10/2022    SPECGRAV 1.025 05/10/2022    GLUCOSEU Negative 05/10/2022       Radiology:  XR CHEST PORTABLE   Final Result   Moderate airspace disease right upper and lower lobe with volume loss. Pneumonia and or mucous plugging both considered. NG tube extends well into the stomach. XR CHEST PORTABLE   Final Result   1. Endotracheal tube terminates in the left mainstem bronchus, for which   retraction 5 cm is recommended. 2.  Enteric tube terminates at the level of the body of the stomach.       3.  No significant change in bilateral airspace disease. Probable small left   effusion. XR ABDOMEN FOR NG/OG/NE TUBE PLACEMENT   Final Result   NG tube position appears acceptable. Endotracheal tube tip is likely within 1 cm above the anita. Dense left basilar opacity and patchy opacities in both lungs. XR CHEST PORTABLE   Final Result   Endotracheal tube is present with tip projecting just proximal to the anita. Left basilar opacity and trace left pleural effusion. Aspiration and   pneumonia are in the differential.                 Assessment/Plan:    Active Hospital Problems    Diagnosis     Pneumonia of both lower lobes due to methicillin susceptible Staphylococcus aureus (MSSA) ([facility]) [J15.211]      Priority: Medium    Streptococcal bacteremia [R78.81, B95.5]      Priority: Medium    Pneumonia of left lower lobe due to infectious organism [J18.9]      Priority: Medium    Parkinsonism ([address] [G20]      Priority: Medium    Anorexia [R63.0]      Priority: Medium    Moderate malnutrition ([address] [E44.0]      Priority: Medium    AVELINO (acute kidney injury) ([address] [N17.9]      Priority: Medium    Aspiration pneumonia ([address] [J69.0]      Priority: Medium    Septic shock ([address] [A41.9, R65.21]      Priority: Medium    Acute hypoxemic respiratory failure ([address] [J96.01]     Down syndrome [Q90.9]       Acute hypoxic respiratory failure secondary to multifocal pneumonia, was started on broad-spectrum antibiotic, intubated, extubated, failed speech, now with PEG tube, continue tube feeding   Concern for bladder fistula right groin colostomy bag draining 573 cc on 5/23, urology has been consulted. Continue monitor output   Sepsis, septic shock, secondary to multifocal pneumonia, possible infective endocarditis,? Possible aortic root abscess, seen by cardiology and ID, unsafe to do STORMY per cardiology. Repeated blood cultures so far negative.   Appreciate ID input now on Augmentin   Chronic hypotension, continue midodrine was on a low-dose of Levophed now stopped   History of Down syndrome, status post PEG tube placement 5/20 continue tube feeding   Lactic acidosis has been improving   Hypomagnesemia repleted improving   History of seizure continue Keppra   Hypothyroidism continue Synthroid   Failure to thrive    Discharge planning     DVT Prophylaxis: Heparin subcu  Diet: Diet NPO Exceptions are: Sips of Water with Meds  ADULT TUBE FEEDING; PEG; Standard with Fiber; Continuous; 35; No; 90; Q 4 hours  Code Status: DNR-CCA    Dispo -continue inpatient care, can be transferred out to progressive care unit, discharge planning    Celina Tian MD

## 2022-05-24 NOTE — CARE COORDINATION
Discharge Planning:     (DAMIAN) was informed by patient's Nurse, Jane Jara, that patient will not be discharging today. CM called and spoke with [address] [place] [address] staff, Margoth Tico ([phone]), and informed that patient will not be discharging today. CM also informed that patient's MAR was faxed to [address] [place] [address] earlier today. Margoth Doshi confirmed that she will check the fax once she gets out of the class that she is in.    CM called patient's Legal Guardian Representative through [facility] ([phone]), and informed that patient will not be discharging today. CM informed Torie Rafal at [address] [place] [address] and Ayush Loving at [facility]) that patient will not be discharging today.       Brennan GIRON, EDILSON, [facility] -   [phone]    Electronically signed by MACK Chaudhari on 5/24/2022 at 10:16 AM

## 2022-05-24 NOTE — CARE COORDINATION
Discharge Planning:     (CM) called Ayala Jessica with [facility] ([phone]) and left a voicemail providing LTAC referral for patient due to patient's critical care needs.        Francisco GIRON, EDILSON, [facility] -   [phone]    Electronically signed by MCAK Ordaz on 5/24/2022 at 2:54 PM

## 2022-05-24 NOTE — CARE COORDINATION
Discharge Planning:     (CM) faxed patient's Facesheet and MAR to [address] [place] [address] at [phone].       Bernardo GIRON, [facility] -   [phone]    Electronically signed by MACK Crockett on 5/24/2022 at 9:21 AM

## 2022-05-24 NOTE — PROGRESS NOTES
today as needed. · Tobacco use:   reports that she has never smoked. She has never used smokeless tobacco.  · Alcohol use:   reports no history of alcohol use. · Currently lives in: [address]  ·  reports no history of drug use. COVID VACCINATION AND LAB RESULT RECORDS:     Internal Administration   First Dose      Second Dose           Last COVID Lab SARS-CoV-2 (no units)   Date Value   04/05/2022 Not Detected     SARS-CoV-2, PCR (no units)   Date Value   03/24/2022 Not Detected     SARS-CoV-2 RNA, RT PCR (no units)   Date Value   05/10/2022 NOT DETECTED            Assessment:     The patient is a 60 y.o. old female who  has a past medical history of Anemia, Anorexia, Autistic disorder, Convulsions ([address] Down syndrome, Dysphagia, Hypothyroid, Malnutrition ([address] Parkinson disease ([address] UTI (urinary tract infection), and Weakness. with following problems:      · Septic shock with high fever, worsening leukocytosis, hypotension requiring vasopressors and lactic acidosis-resolved  · Group B streptococcus bacteremia-currently on Augmentin  · Acute respiratory failure with hypoxia -respiratory culture grew MSSA -covered with Augmentin  · Left lower lobe pneumonia, concern for aspiration-tracheal aspirate culture has grown Streptococcus and MSSA-covered with Augmentin  · Parkinsonism  · History of Down syndrome  · Anorexia  ·       Discussion:      The patient is afebrile. She remains on oral Augmentin. She is tolerating antibiotic okay. She is having some drainage from the right femoral line removal site. She remains nonverbal.    Serum creatinine 0.5 today. Liver functions are okay. Plan:     Diagnostic Workup:      · Continue to follow  fever curve, WBC count and blood cultures. · Continue to monitor blood counts, liver and renal function.     Antimicrobials:    · Will continue oral Augmentin 875 mg every 12 hour  · Continue to monitor her vitals closely  · Right groin femoral line removal site care  · Continue close vitals monitoring. · Maintain good glycemic control. · Fall precautions. · Aspiration precautions. · Continue to watch for new fever or diarrhea. · DVT prophylaxis. · Discussed all above with patient and RN. Drug Monitoring:    · Continue monitoring for antibiotic toxicity as follows: CBC, CMP   · Continue to watch for following: new or worsening fever, new hypotension, hives, lip swelling and redness or purulence at vascular access sites. I/v access Management:    · Continue to monitor i.v access sites for erythema, induration, discharge or tenderness. · As always, continue efforts to minimize tubes/lines/drains as clinically appropriate to reduce chances of line associated infections. Patient education and counseling:        · The patient was educated in detail about the side-effects of various antibiotics and things to watch for like new rashes, lip swelling, severe reaction, worsening diarrhea, break through fever etc.  · Discussed patient's condition and what to expect. All of the patient's questions were addressed in a satisfactory manner and patient verbalized understanding all instructions. Thank you for involving me in the care of your patient. I will continue to follow. If you have anyadditional questions, please do not hesitate to contact me. Subjective: Interval history: Interval history was obtained from chart review and RN. She is afebrile. She is tolerating antibiotics okay. No diarrhea     REVIEW OF SYSTEMS:      Review of Systems   Unable to perform ROS: Patient nonverbal         Past Medical History: All past medical history reviewed today.     Past Medical History:   Diagnosis Date    Anemia     Anorexia     Autistic disorder     [facility] ([facility]     Down syndrome     Dysphagia     Hypothyroid     Malnutrition ([facility]     Parkinson disease ([facility]     UTI (urinary tract infection)     Weakness        Past Surgical History: All past surgical history was reviewed today. Past Surgical History:   Procedure Laterality Date    GASTROSTOMY TUBE PLACEMENT      GASTROSTOMY TUBE PLACEMENT  2/18/14    GASTROSTOMY TUBE PLACEMENT N/A 5/20/2022    EGD PEG TUBE PLACEMENT performed by Hi Minaya MD at [address] [place] [address]       Family History: All family history was reviewed today. History reviewed. No pertinent family history. Objective:       PHYSICAL EXAM:      Vitals:   Vitals:    05/24/22 0612 05/24/22 0800 05/24/22 1200 05/24/22 1600   BP: 87/61  102/69    Pulse: 68      Resp: 18  18    Temp:  98.6 °F (37 °C) 98.4 °F (36.9 °C) 98.6 °F (37 °C)   TempSrc:   Bladder Bladder   SpO2:       Weight:       Height:           Physical Exam     General: Encephalopathic but protecting the airway so far,   HEENT: normocephalic, atraumatic, sclera clear, pupils equal, light reflex preserved bilaterally  Cardiovascular: RRR, no murmurs/rubs/gallops detected  Pulmonary: CTABL, no rhonchi/rales   Abdomen/GI: soft, no organomegaly, bowel sounds positive  Neuro: Encephalopathic, pupils are equal and reactive to light, moves all extremities  Skin: no rash, urostomy bag noted at the right groin femoral catheter removal site due to ongoing drainage  Musculoskeletal:  No obvious joint swelling, redness. No limitation of range of passive motion  Genitourinary: Yuan's catheter in place   Psych: could not assess   Lymphatic/Immunologic: No obvious bruising, no cervical lymphadenopathy    Lines: All vascular access sites are healthy with no local erythema, discharge or tenderness    Intake and output:    I/O last 3 completed shifts: In: 2273.1 [I.V.:168.3; NG/GT:1405; IV Piggyback:699.8]  Out: 9734 [Urine:3225]    Lab Data:   All available labs and old records have been reviewed by me.     CBC:  Recent Labs     05/22/22  0704 05/23/22  0456 05/24/22  1054   WBC 11.3* 13.6* 10.9   RBC 3.43* 2.97* 3.12*   HGB 11.6* 10.1* 10.4*   HCT 35.5* 31.3* 32.6*  378 396   .5* 105.4* 104.7*   MCH 33.7 34.1* 33.4   MCHC 32.5 32.3 31.9   RDW 14.1 14.9 15.4        BMP:  Recent Labs     05/22/22  0704 05/23/22  0456 05/24/22  0445   * 131* 133*   K 4.2 4.0 4.1    101 101   CO2 24 24 24   BUN 5* 7 7   CREATININE <0.5* <0.5* <0.5*   CALCIUM 7.9* 8.0* 8.0*   GLUCOSE 78 93 95        Hepatic Function Panel:   Lab Results   Component Value Date    ALKPHOS 98 05/24/2022    ALT 7 05/24/2022    AST 17 05/24/2022    PROT 5.0 05/24/2022    PROT 6.9 02/06/2013    BILITOT <0.2 05/24/2022    BILIDIR <0.2 12/23/2016    IBILI see below 12/23/2016    LABALBU 1.4 05/24/2022       CPK:   Lab Results   Component Value Date    CKTOTAL 78 08/22/2012     ESR: No results found for: SEDRATE  CRP: No results found for: CRP        Imaging: All pertinent images and reports for the current visit were reviewed by me during this visit. I reviewed the chest x-ray/CT scan/MRI images and independently interpreted the findings and results today. XR CHEST PORTABLE   Final Result   Moderate airspace disease right upper and lower lobe with volume loss. Pneumonia and or mucous plugging both considered. NG tube extends well into the stomach. XR CHEST PORTABLE   Final Result   1. Endotracheal tube terminates in the left mainstem bronchus, for which   retraction 5 cm is recommended. 2.  Enteric tube terminates at the level of the body of the stomach. 3.  No significant change in bilateral airspace disease. Probable small left   effusion. XR ABDOMEN FOR NG/OG/NE TUBE PLACEMENT   Final Result   NG tube position appears acceptable. Endotracheal tube tip is likely within 1 cm above the anita. Dense left basilar opacity and patchy opacities in both lungs. XR CHEST PORTABLE   Final Result   Endotracheal tube is present with tip projecting just proximal to the anita. Left basilar opacity and trace left pleural effusion.   Aspiration and   pneumonia are in the differential.             Medications: All current and past medications were reviewed.      lactobacillus  1 capsule Oral BID WC    amoxicillin-clavulanate  1 tablet Oral 2 times per day    lidocaine 1 % injection  5 mL IntraDERmal Once    sodium chloride flush  5-40 mL IntraVENous 2 times per day    midodrine  2.5 mg Orogastric q8h    valproate sodium (DEPACON) IVPB  250 mg IntraVENous Q6H    levothyroxine  100 mcg Per NG tube Daily    carbidopa-levodopa  1.5 tablet Per NG tube q8h    sertraline  100 mg Per NG tube Nightly    pantoprazole  40 mg IntraVENous Daily    heparin (porcine)  5,000 Units SubCUTAneous BID    alteplase  2 mg IntraCATHeter Once        dextrose      sodium chloride      sodium chloride 5 mL/hr at 05/24/22 1200       dextrose bolus **OR** dextrose bolus, glucagon (rDNA), dextrose, [DISCONTINUED] potassium chloride **OR** potassium chloride, sodium chloride flush, sodium chloride, atropine, sodium chloride flush, sodium chloride, sodium phosphate IVPB **OR** sodium phosphate IVPB **OR** sodium phosphate IVPB, ondansetron, acetaminophen **OR** acetaminophen, fentanNYL      Problem list:       Patient Active Problem List   Diagnosis Code    Down syndrome Q90.9    Down syndrome Q90.9    Down syndrome Q90.9    Dysphagia R13.10    Autistic disorder F84.0    PEG adjustment, replacement, or removal Z43.1    Hypotension I95.9    Sepsis ([address] A41.9    Acute hypoxemic respiratory failure (HCC) J96.01    AVELINO (acute kidney injury) ([address] N17.9    Aspiration pneumonia (HCC) J69.0    Septic shock (HCC) A41.9, R65.21    Moderate malnutrition ([address] E44.0    Streptococcal bacteremia R78.81, B95.5    Pneumonia of left lower lobe due to infectious organism J18.9    Parkinsonism ([address] G20    Anorexia R63.0    Pneumonia of both lower lobes due to methicillin susceptible Staphylococcus aureus (MSSA) ([address] J15.211       Please note that this chart was generated using Dragon dictation software. Although every effort was made to ensure the accuracy of this automated transcription, some errors in transcription may have occurred inadvertently. If you may need any clarification, please do not hesitate to contact me through EPIC or at the phone number provided below with my electronic signature. Any pictures or media included in this note were obtained after taking informed verbal consent from the patient and with their approval to include those in the patient's medical record.       Vee Avalos MD, MPH, [address] [place] [address]  5/24/2022, 4:25 PM  [facility]   [address] [place] [address], [address] [place] [address]  Office: [phone]  Fax: [phone]  Clinic days:  Tuesday & Thursday

## 2022-05-25 LAB
A/G RATIO: 0.4 (ref 1.1–2.2)
ALBUMIN SERPL-MCNC: 1.5 G/DL (ref 3.4–5)
ALP BLD-CCNC: 110 U/L (ref 40–129)
ALT SERPL-CCNC: <5 U/L (ref 10–40)
ANION GAP SERPL CALCULATED.3IONS-SCNC: 7 MMOL/L (ref 3–16)
AST SERPL-CCNC: 21 U/L (ref 15–37)
BACTERIA: ABNORMAL /HPF
BASOPHILS ABSOLUTE: 0 K/UL (ref 0–0.2)
BASOPHILS RELATIVE PERCENT: 0.4 %
BILIRUB SERPL-MCNC: <0.2 MG/DL (ref 0–1)
BILIRUBIN URINE: NEGATIVE
BLOOD, URINE: NEGATIVE
BUN BLDV-MCNC: 6 MG/DL (ref 7–20)
CALCIUM SERPL-MCNC: 8.1 MG/DL (ref 8.3–10.6)
CHLORIDE BLD-SCNC: 103 MMOL/L (ref 99–110)
CLARITY: CLEAR
CO2: 25 MMOL/L (ref 21–32)
COLOR: ABNORMAL
COMMENT UA: ABNORMAL
CREAT SERPL-MCNC: <0.5 MG/DL (ref 0.6–1.1)
CREATININE URINE: <4.2 MG/DL (ref 28–259)
EOSINOPHILS ABSOLUTE: 0.1 K/UL (ref 0–0.6)
EOSINOPHILS RELATIVE PERCENT: 1.1 %
EPITHELIAL CELLS, UA: 0 /HPF (ref 0–5)
GFR AFRICAN AMERICAN: >60
GFR NON-AFRICAN AMERICAN: >60
GLUCOSE BLD-MCNC: 109 MG/DL (ref 70–99)
GLUCOSE BLD-MCNC: 74 MG/DL (ref 70–99)
GLUCOSE BLD-MCNC: 76 MG/DL (ref 70–99)
GLUCOSE BLD-MCNC: 84 MG/DL (ref 70–99)
GLUCOSE BLD-MCNC: 84 MG/DL (ref 70–99)
GLUCOSE URINE: NEGATIVE MG/DL
HCT VFR BLD CALC: 30.3 % (ref 36–48)
HEMOGLOBIN: 9.9 G/DL (ref 12–16)
HYALINE CASTS: 2 /LPF (ref 0–8)
INR BLD: 1.06 (ref 0.87–1.14)
KETONES, URINE: ABNORMAL MG/DL
LEUKOCYTE ESTERASE, URINE: ABNORMAL
LYMPHOCYTES ABSOLUTE: 0.7 K/UL (ref 1–5.1)
LYMPHOCYTES RELATIVE PERCENT: 7.7 %
MAGNESIUM: 2.1 MG/DL (ref 1.8–2.4)
MCH RBC QN AUTO: 33.8 PG (ref 26–34)
MCHC RBC AUTO-ENTMCNC: 32.7 G/DL (ref 31–36)
MCV RBC AUTO: 103.4 FL (ref 80–100)
MICROSCOPIC EXAMINATION: YES
MONOCYTES ABSOLUTE: 0.7 K/UL (ref 0–1.3)
MONOCYTES RELATIVE PERCENT: 7.7 %
NEUTROPHILS ABSOLUTE: 7.5 K/UL (ref 1.7–7.7)
NEUTROPHILS RELATIVE PERCENT: 83.1 %
NITRITE, URINE: NEGATIVE
PDW BLD-RTO: 16.5 % (ref 12.4–15.4)
PERFORMED ON: ABNORMAL
PERFORMED ON: NORMAL
PH UA: 6 (ref 5–8)
PHOSPHORUS: 3 MG/DL (ref 2.5–4.9)
PLATELET # BLD: 437 K/UL (ref 135–450)
PMV BLD AUTO: 9.8 FL (ref 5–10.5)
POTASSIUM REFLEX MAGNESIUM: 4.8 MMOL/L (ref 3.5–5.1)
PROTEIN UA: NEGATIVE MG/DL
PROTHROMBIN TIME: 13.7 SEC (ref 11.7–14.5)
RBC # BLD: 2.93 M/UL (ref 4–5.2)
RBC UA: ABNORMAL /HPF (ref 0–4)
SODIUM BLD-SCNC: 135 MMOL/L (ref 136–145)
SPECIFIC GRAVITY UA: 1.01 (ref 1–1.03)
TOTAL PROTEIN: 5.3 G/DL (ref 6.4–8.2)
UREA NITROGEN, UR: 112 MG/DL (ref 800–1666)
URINE TYPE: ABNORMAL
UROBILINOGEN, URINE: 0.2 E.U./DL
WBC # BLD: 9 K/UL (ref 4–11)
WBC UA: 59 /HPF (ref 0–5)
YEAST: PRESENT /HPF

## 2022-05-25 PROCEDURE — 6370000000 HC RX 637 (ALT 250 FOR IP): Performed by: INTERNAL MEDICINE

## 2022-05-25 PROCEDURE — APPNB30 APP NON BILLABLE TIME 0-30 MINS: Performed by: NURSE PRACTITIONER

## 2022-05-25 PROCEDURE — 2580000003 HC RX 258: Performed by: STUDENT IN AN ORGANIZED HEALTH CARE EDUCATION/TRAINING PROGRAM

## 2022-05-25 PROCEDURE — 85025 COMPLETE CBC W/AUTO DIFF WBC: CPT

## 2022-05-25 PROCEDURE — 82570 ASSAY OF URINE CREATININE: CPT

## 2022-05-25 PROCEDURE — 6360000002 HC RX W HCPCS: Performed by: INTERNAL MEDICINE

## 2022-05-25 PROCEDURE — 6370000000 HC RX 637 (ALT 250 FOR IP): Performed by: STUDENT IN AN ORGANIZED HEALTH CARE EDUCATION/TRAINING PROGRAM

## 2022-05-25 PROCEDURE — C1751 CATH, INF, PER/CENT/MIDLINE: HCPCS

## 2022-05-25 PROCEDURE — 83735 ASSAY OF MAGNESIUM: CPT

## 2022-05-25 PROCEDURE — 05HA33Z INSERTION OF INFUSION DEVICE INTO LEFT BRACHIAL VEIN, PERCUTANEOUS APPROACH: ICD-10-PCS | Performed by: STUDENT IN AN ORGANIZED HEALTH CARE EDUCATION/TRAINING PROGRAM

## 2022-05-25 PROCEDURE — 36569 INSJ PICC 5 YR+ W/O IMAGING: CPT

## 2022-05-25 PROCEDURE — 85610 PROTHROMBIN TIME: CPT

## 2022-05-25 PROCEDURE — 84100 ASSAY OF PHOSPHORUS: CPT

## 2022-05-25 PROCEDURE — 99232 SBSQ HOSP IP/OBS MODERATE 35: CPT | Performed by: INTERNAL MEDICINE

## 2022-05-25 PROCEDURE — 36415 COLL VENOUS BLD VENIPUNCTURE: CPT

## 2022-05-25 PROCEDURE — 84540 ASSAY OF URINE/UREA-N: CPT

## 2022-05-25 PROCEDURE — 99233 SBSQ HOSP IP/OBS HIGH 50: CPT | Performed by: SURGERY

## 2022-05-25 PROCEDURE — 81001 URINALYSIS AUTO W/SCOPE: CPT

## 2022-05-25 PROCEDURE — 80053 COMPREHEN METABOLIC PANEL: CPT

## 2022-05-25 PROCEDURE — 1200000000 HC SEMI PRIVATE

## 2022-05-25 RX ORDER — SODIUM CHLORIDE 9 MG/ML
25 INJECTION, SOLUTION INTRAVENOUS PRN
Status: DISCONTINUED | OUTPATIENT
Start: 2022-05-25 | End: 2022-05-26 | Stop reason: HOSPADM

## 2022-05-25 RX ORDER — LIDOCAINE HYDROCHLORIDE 10 MG/ML
5 INJECTION, SOLUTION EPIDURAL; INFILTRATION; INTRACAUDAL; PERINEURAL ONCE
Status: DISCONTINUED | OUTPATIENT
Start: 2022-05-25 | End: 2022-05-26 | Stop reason: HOSPADM

## 2022-05-25 RX ORDER — SODIUM CHLORIDE 0.9 % (FLUSH) 0.9 %
5-40 SYRINGE (ML) INJECTION PRN
Status: DISCONTINUED | OUTPATIENT
Start: 2022-05-25 | End: 2022-05-26 | Stop reason: HOSPADM

## 2022-05-25 RX ORDER — SODIUM CHLORIDE 0.9 % (FLUSH) 0.9 %
5-40 SYRINGE (ML) INJECTION EVERY 12 HOURS SCHEDULED
Status: DISCONTINUED | OUTPATIENT
Start: 2022-05-25 | End: 2022-05-26 | Stop reason: HOSPADM

## 2022-05-25 RX ORDER — LIDOCAINE HYDROCHLORIDE AND EPINEPHRINE 10; 10 MG/ML; UG/ML
20 INJECTION, SOLUTION INFILTRATION; PERINEURAL ONCE
Status: DISCONTINUED | OUTPATIENT
Start: 2022-05-25 | End: 2022-05-26 | Stop reason: HOSPADM

## 2022-05-25 RX ADMIN — CARBIDOPA AND LEVODOPA 1.5 TABLET: 25; 100 TABLET ORAL at 02:23

## 2022-05-25 RX ADMIN — SERTRALINE 100 MG: 50 TABLET, FILM COATED ORAL at 21:30

## 2022-05-25 RX ADMIN — VALPROIC ACID 250 MG: 250 SOLUTION ORAL at 05:29

## 2022-05-25 RX ADMIN — Medication 1 CAPSULE: at 17:28

## 2022-05-25 RX ADMIN — VALPROIC ACID 500 MG: 250 SOLUTION ORAL at 21:30

## 2022-05-25 RX ADMIN — AMOXICILLIN AND CLAVULANATE POTASSIUM 1 TABLET: 875; 125 TABLET, FILM COATED ORAL at 21:30

## 2022-05-25 RX ADMIN — CARBIDOPA AND LEVODOPA 1.5 TABLET: 25; 100 TABLET ORAL at 17:28

## 2022-05-25 RX ADMIN — SODIUM CHLORIDE, PRESERVATIVE FREE 10 ML: 5 INJECTION INTRAVENOUS at 21:33

## 2022-05-25 RX ADMIN — AMOXICILLIN AND CLAVULANATE POTASSIUM 1 TABLET: 875; 125 TABLET, FILM COATED ORAL at 09:06

## 2022-05-25 RX ADMIN — MIDODRINE HYDROCHLORIDE 2.5 MG: 5 TABLET ORAL at 21:30

## 2022-05-25 RX ADMIN — HEPARIN SODIUM 5000 UNITS: 5000 INJECTION INTRAVENOUS; SUBCUTANEOUS at 21:30

## 2022-05-25 RX ADMIN — LEVOTHYROXINE SODIUM 100 MCG: 0.1 TABLET ORAL at 09:06

## 2022-05-25 RX ADMIN — CARBIDOPA AND LEVODOPA 1.5 TABLET: 25; 100 TABLET ORAL at 09:06

## 2022-05-25 RX ADMIN — VALPROIC ACID 250 MG: 250 SOLUTION ORAL at 13:16

## 2022-05-25 RX ADMIN — Medication 1 CAPSULE: at 09:10

## 2022-05-25 RX ADMIN — MIDODRINE HYDROCHLORIDE 2.5 MG: 5 TABLET ORAL at 05:29

## 2022-05-25 RX ADMIN — HEPARIN SODIUM 5000 UNITS: 5000 INJECTION INTRAVENOUS; SUBCUTANEOUS at 09:06

## 2022-05-25 RX ADMIN — MIDODRINE HYDROCHLORIDE 2.5 MG: 5 TABLET ORAL at 13:16

## 2022-05-25 ASSESSMENT — PAIN SCALES - WONG BAKER
WONGBAKER_NUMERICALRESPONSE: 0

## 2022-05-25 ASSESSMENT — PAIN SCALES - GENERAL
PAINLEVEL_OUTOF10: 1
PAINLEVEL_OUTOF10: 1

## 2022-05-25 NOTE — CARE COORDINATION
Discharge Planning. The CM received a call from [facility] from [address] who stated they have reviewed the patient and is able to accept. The  called the  patient's Legal Guardian Representative through Fabienne Daileys ([phone]), and informed them that the patient has been accepted at the [facility]. The CM gave Jovanni Berman ([phone]) information on why there was a referral and why the patient is approriate for a LTAC. [place] Santizoraghavendra ([phone]), stated she has to review with her team and will call the CM back today if they would like to move forward wit the [facility]. CM called and spoke with [facility] staff, Jefferson Armenta ([phone]), and informing her of this information.     Electronically signed by MACK Haider on 5/25/2022 at 2:09 PM

## 2022-05-25 NOTE — CARE COORDINATION
Discharge Planning. The SW received a call from the patient's Legal Guardian Representative through Todd Morgan ([phone]) who stated they would like to move forward with the LTAC. The SW called Mikey Monteirokayli [phone] from [address] her once a discharge order is in place transport will be set up to transfer the patient to [facility] at [facility].      Electronically signed by MACK Blackman on 5/25/2022 at 3:35 PM

## 2022-05-25 NOTE — PROGRESS NOTES
Infectious Diseases   Progress Note      Admission Date: 5/10/2022  Hospital Day: Hospital Day: 16   Attending: Harpal Abad MD  Date of service: 5/25/2022     Chief complaint/ Reason for consult:     · Septic shock with high fever, worsening leukocytosis, hypotension requiring vasopressors and lactic acidosis  · Group B streptococcus bacteremia  · Acute respiratory failure with hypoxia  · Left lower lobe pneumonia, concern for aspiration    Microbiology:        I have reviewed allavailable micro lab data and cultures    · Blood culture (1/2)  - collected on 5/10/2022: Group B streptococcus  Tracheal aspirate culture  - collected on 5/12/2022: Group B streptococcus, MSSA    Susceptibility      Staphylococcus aureus (2)    Antibiotic Interpretation Microscan  Method Status    ceFAZolin Sensitive <=4 mcg/mL BACTERIAL SUSCEPTIBILITY PANEL BY PALLAVI     clindamycin Sensitive <=0.5 mcg/mL BACTERIAL SUSCEPTIBILITY PANEL BY PALLAVI     erythromycin Sensitive <=0.5 mcg/mL BACTERIAL SUSCEPTIBILITY PANEL BY PALLAVI     oxacillin Sensitive 0.5 mcg/mL BACTERIAL SUSCEPTIBILITY PANEL BY PALLAVI     tetracycline Sensitive <=4 mcg/mL BACTERIAL SUSCEPTIBILITY PANEL BY PALLAVI     trimethoprim-sulfamethoxazole Sensitive <=0.5/9.5 mcg/mL BACTERIAL SUSCEPTIBILITY PANEL BY PALLAVI           Antibiotics and immunizations:       Current antibiotics: All antibiotics and their doses were reviewed by me    Recent Abx Admin                   amoxicillin-clavulanate (AUGMENTIN) 875-125 MG per tablet 1 tablet (tablet) 1 tablet Given 05/25/22 0906     1 tablet Given 05/24/22 2017                  Immunization History: All immunization history was reviewed by me today. There is no immunization history on file for this patient. Known drug allergies:      All allergies were reviewed and updated    Allergies   Allergen Reactions    Caffeine     Iodine        Social history:     Social History:  All social andepidemiologic history was reviewed and updated by me today as needed. · Tobacco use:   reports that she has never smoked. She has never used smokeless tobacco.  · Alcohol use:   reports no history of alcohol use. · Currently lives in: [address]  ·  reports no history of drug use. COVID VACCINATION AND LAB RESULT RECORDS:     Internal Administration   First Dose      Second Dose           Last COVID Lab SARS-CoV-2 (no units)   Date Value   04/05/2022 Not Detected     SARS-CoV-2, PCR (no units)   Date Value   03/24/2022 Not Detected     SARS-CoV-2 RNA, RT PCR (no units)   Date Value   05/10/2022 NOT DETECTED            Assessment:     The patient is a 60 y.o. old female who  has a past medical history of Anemia, Anorexia, Autistic disorder, Convulsions ([address] Down syndrome, Dysphagia, Hypothyroid, Malnutrition ([address] Parkinson disease ([address] UTI (urinary tract infection), and Weakness. with following problems:      · Septic shock with high fever, worsening leukocytosis, hypotension requiring vasopressors and lactic acidosis-this is resolved  · Group B streptococcus bacteremia-covered with Augmentin  · Acute respiratory failure with hypoxia -respiratory culture grew MSSA -this is covered with Augmentin  · Left lower lobe pneumonia, concern for aspiration-tracheal aspirate culture has grown Streptococcus and MSSA-improving on Augmentin  · Parkinsonism  · History of Down syndrome  · Anorexia  ·       Discussion:      The patient is afebrile. The patient is on oral Augmentin for past 4 days. Serum creatinine 0.5. White cell count is 9000. Plan:     Diagnostic Workup:    · Recommend considering CT scan of pelvis with IV contrast to determine origin of the fluid drainage, if the drainage continues. Will defer to urology and primary teams  · Continue to follow  fever curve, WBC count and blood cultures. · Continue to monitor blood counts, liver and renal function.     Antimicrobials:    · Will continue oral Augmentin 875 mg every 12 hours  · Plan to continue oral Augmentin until [address]  · The patient has ongoing significant drainage of yellow-colored fluid from the prior right femoral line site. Urology is following we will determine the origin  · Little further to add from ID standpoint. · Continue close vitals monitoring. · Maintain good glycemic control. · Fall precautions. · Aspiration precautions. · Continue to watch for new fever or diarrhea. · DVT prophylaxis. · Discussed all above with patient and RN. Drug Monitoring:    · Continue monitoring for antibiotic toxicity as follows: CBC, CMP   · Continue to watch for following: new or worsening fever, new hypotension, hives, lip swelling and redness or purulence at vascular access sites. I/v access Management:    · Continue to monitor i.v access sites for erythema, induration, discharge or tenderness. · As always, continue efforts to minimize tubes/lines/drains as clinically appropriate to reduce chances of line associated infections. Patient education and counseling:        · The patient was educated in detail about the side-effects of various antibiotics and things to watch for like new rashes, lip swelling, severe reaction, worsening diarrhea, break through fever etc.  · Discussed patient's condition and what to expect. All of the patient's questions were addressed in a satisfactory manner and patient verbalized understanding all instructions. Level of complexity of visit: High     Thanks for allowing me to participate in your patient's care. I will sign off today, but will be available to answer any further questions or concerns that may arise during patient's stay in the hospital.      Subjective: Interval history: Interval history was obtained from chart review and RN. Patient is afebrile. She is tolerating antibiotic okay.   No diarrhea     REVIEW OF SYSTEMS:      Review of Systems   Unable to perform ROS: Patient nonverbal         Past Medical History: All past [id number]]  Out: 3800 [Urine:3800]    Lab Data:   All available labs and old records have been reviewed by me. CBC:  Recent Labs     05/23/22  0456 05/24/22  1054 05/25/22  0843   WBC 13.6* 10.9 9.0   RBC 2.97* 3.12* 2.93*   HGB 10.1* 10.4* 9.9*   HCT 31.3* 32.6* 30.3*    396 437   .4* 104.7* 103.4*   MCH 34.1* 33.4 33.8   MCHC 32.3 31.9 32.7   RDW 14.9 15.4 16.5*        BMP:  Recent Labs     05/23/22  0456 05/24/22  0445 05/25/22  0553   * 133* 135*   K 4.0 4.1 4.8    101 103   CO2 24 24 25   BUN 7 7 6*   CREATININE <0.5* <0.5* <0.5*   CALCIUM 8.0* 8.0* 8.1*   GLUCOSE 93 95 84        Hepatic Function Panel:   Lab Results   Component Value Date    ALKPHOS 110 05/25/2022    ALT <5 05/25/2022    AST 21 05/25/2022    PROT 5.3 05/25/2022    PROT 6.9 02/06/2013    BILITOT <0.2 05/25/2022    BILIDIR <0.2 12/23/2016    IBILI see below 12/23/2016    LABALBU 1.5 05/25/2022       CPK:   Lab Results   Component Value Date    CKTOTAL 78 08/22/2012     ESR: No results found for: SEDRATE  CRP: No results found for: CRP        Imaging: All pertinent images and reports for the current visit were reviewed by me during this visit. I reviewed the chest x-ray/CT scan/MRI images and independently interpreted the findings and results today. XR CHEST PORTABLE   Final Result   Moderate airspace disease right upper and lower lobe with volume loss. Pneumonia and or mucous plugging both considered. NG tube extends well into the stomach. XR CHEST PORTABLE   Final Result   1. Endotracheal tube terminates in the left mainstem bronchus, for which   retraction 5 cm is recommended. 2.  Enteric tube terminates at the level of the body of the stomach. 3.  No significant change in bilateral airspace disease. Probable small left   effusion. XR ABDOMEN FOR NG/OG/NE TUBE PLACEMENT   Final Result   NG tube position appears acceptable.       Endotracheal tube tip is likely within 1 cm above the anita. Dense left basilar opacity and patchy opacities in both lungs. XR CHEST PORTABLE   Final Result   Endotracheal tube is present with tip projecting just proximal to the anita. Left basilar opacity and trace left pleural effusion. Aspiration and   pneumonia are in the differential.             Medications: All current and past medications were reviewed.      lidocaine 1 % injection  5 mL IntraDERmal Once    sodium chloride flush  5-40 mL IntraVENous 2 times per day    lidocaine-EPINEPHrine  20 mL IntraDERmal Once    valproic acid  250 mg Oral BID    valproic acid  500 mg Oral Nightly    lactobacillus  1 capsule Oral BID WC    amoxicillin-clavulanate  1 tablet Oral 2 times per day    lidocaine 1 % injection  5 mL IntraDERmal Once    sodium chloride flush  5-40 mL IntraVENous 2 times per day    midodrine  2.5 mg Orogastric q8h    levothyroxine  100 mcg Per NG tube Daily    carbidopa-levodopa  1.5 tablet Per NG tube q8h    sertraline  100 mg Per NG tube Nightly    pantoprazole  40 mg IntraVENous Daily    heparin (porcine)  5,000 Units SubCUTAneous BID    alteplase  2 mg IntraCATHeter Once        sodium chloride      dextrose      sodium chloride      sodium chloride 5 mL/hr at 05/24/22 1200       sodium chloride flush, sodium chloride, dextrose bolus **OR** dextrose bolus, glucagon (rDNA), dextrose, [DISCONTINUED] potassium chloride **OR** potassium chloride, sodium chloride flush, sodium chloride, atropine, sodium chloride flush, sodium chloride, sodium phosphate IVPB **OR** sodium phosphate IVPB **OR** sodium phosphate IVPB, ondansetron, acetaminophen **OR** acetaminophen, fentanNYL      Problem list:       Patient Active Problem List   Diagnosis Code    Down syndrome Q90.9    Down syndrome Q90.9    Down syndrome Q90.9    Dysphagia R13.10    Autistic disorder F84.0    PEG adjustment, replacement, or removal Z43.1    Hypotension I95.9    Sepsis ([facility] A41.9    Acute hypoxemic respiratory failure (HCC) J96.01    AVELINO (acute kidney injury) ([facility] N17.9    Aspiration pneumonia (HCC) J69.0    Septic shock (HCC) A41.9, R65.21    Moderate malnutrition ([facility] E44.0    Streptococcal bacteremia R78.81, B95.5    Pneumonia of left lower lobe due to infectious organism J18.9    Parkinsonism ([facility] G20    Anorexia R63.0    Pneumonia of both lower lobes due to methicillin susceptible Staphylococcus aureus (MSSA) ([facility] J15.211       Please note that this chart was generated using Dragon dictation software. Although every effort was made to ensure the accuracy of this automated transcription, some errors in transcription may have occurred inadvertently. If you may need any clarification, please do not hesitate to contact me through EPIC or at the phone number provided below with my electronic signature. Any pictures or media included in this note were obtained after taking informed verbal consent from the patient and with their approval to include those in the patient's medical record.       Natalie Spear MD, MPH, [facility]  5/25/2022, 1:12 PM  [facility]   [address] [place] [address]., [address] [facility], [address] [place] [address]  Office: [phone]  Fax: [phone]  Clinic days:  Tuesday & Thursday

## 2022-05-25 NOTE — PROGRESS NOTES
Hospitalist Progress Note      PCP: Itzel Lee MD    Date of Admission: 5/10/2022    Subjective: No acute event overnight.   Still draining from central line site about 500 cc alexander-yellow fluid yesterday, and today 150 cc    Medications:  Reviewed    Infusion Medications    sodium chloride      dextrose      sodium chloride      sodium chloride 5 mL/hr at 05/24/22 1200     Scheduled Medications    lidocaine 1 % injection  5 mL IntraDERmal Once    sodium chloride flush  5-40 mL IntraVENous 2 times per day    lidocaine-EPINEPHrine  20 mL IntraDERmal Once    valproic acid  250 mg Oral BID    valproic acid  500 mg Oral Nightly    lactobacillus  1 capsule Oral BID WC    amoxicillin-clavulanate  1 tablet Oral 2 times per day    lidocaine 1 % injection  5 mL IntraDERmal Once    sodium chloride flush  5-40 mL IntraVENous 2 times per day    midodrine  2.5 mg Orogastric q8h    levothyroxine  100 mcg Per NG tube Daily    carbidopa-levodopa  1.5 tablet Per NG tube q8h    sertraline  100 mg Per NG tube Nightly    pantoprazole  40 mg IntraVENous Daily    heparin (porcine)  5,000 Units SubCUTAneous BID    alteplase  2 mg IntraCATHeter Once     PRN Meds: sodium chloride flush, sodium chloride, dextrose bolus **OR** dextrose bolus, glucagon (rDNA), dextrose, [DISCONTINUED] potassium chloride **OR** potassium chloride, sodium chloride flush, sodium chloride, atropine, sodium chloride flush, sodium chloride, sodium phosphate IVPB **OR** sodium phosphate IVPB **OR** sodium phosphate IVPB, ondansetron, acetaminophen **OR** acetaminophen, fentanNYL      Intake/Output Summary (Last 24 hours) at 5/25/2022 1121  Last data filed at 5/25/2022 0500  Gross per 24 hour   Intake 2733.99 ml   Output 2600 ml   Net 133.99 ml       Physical Exam Performed:    BP (!) 107/57   Pulse 83   Temp 98.9 °F (37.2 °C) (Bladder)   Resp 19   Ht 4' 9\" (1.448 m)   Wt 104 lb 0.9 oz (47.2 kg)   SpO2 94%   BMI 22.52 kg/m² 5/23, urology has been consulted. Continue monitor output. I will 500 cc of fluid output yesterday, today under 50 cc. We will consult general surgery, also will send UA, fluid analysis, urine creatinine, fluid urea at the same time   Sepsis, septic shock, secondary to multifocal pneumonia, possible infective endocarditis,? Possible aortic root abscess, seen by cardiology and ID, unsafe to do STORMY per cardiology. Repeated blood cultures so far negative.   Appreciate ID input now on Augmentin   Chronic hypotension, continue midodrine was on a low-dose of Levophed now stopped   History of Down syndrome, status post PEG tube placement 5/20 continue tube feeding   Lactic acidosis has been improving   Hypomagnesemia repleted improving   History of seizure continue Keppra   Hypothyroidism continue Synthroid   Failure to thrive    Discharge planning     DVT Prophylaxis: Heparin subcu  Diet: Diet NPO Exceptions are: Sips of Water with Meds  ADULT TUBE FEEDING; PEG; Standard with Fiber; Continuous; 35; No; 90; Q 4 hours  Code Status: DNR-CCA    Dispo -continue inpatient care, can be transferred out to progressive care unit, discharge planning    Tc Garcia MD

## 2022-05-25 NOTE — FLOWSHEET NOTE
Vascular Access Note:   A Power Midline has been placed in the left  Brachial vein using sterile technique. A power midline is an   long peripheral catheter and may receive medications that are allowed through a peripheral vein. Power midline is approved by the FDA for blood draws. As of now it draws blood briskly without a tourniquet. It is approved to be in place for 29 days. It may, in time, be more difficult to draw blood from it and you may place a tourniquet above it to assist with blood draws- remembering it is 12 cm long and a tourniquet needs to be placed well above the tip of the catheter in order not to cut off flow or fracture the line.              Thank you,  Roberto Esparza RN, BSN, MSHI,  VA-BC, [address]  Vascular Access/PICC Team RN

## 2022-05-26 VITALS
SYSTOLIC BLOOD PRESSURE: 91 MMHG | HEART RATE: 76 BPM | WEIGHT: 105.38 LBS | OXYGEN SATURATION: 93 % | BODY MASS INDEX: 22.73 KG/M2 | DIASTOLIC BLOOD PRESSURE: 48 MMHG | TEMPERATURE: 98 F | HEIGHT: 57 IN | RESPIRATION RATE: 17 BRPM

## 2022-05-26 LAB
A/G RATIO: 0.4 (ref 1.1–2.2)
ALBUMIN SERPL-MCNC: 1.6 G/DL (ref 3.4–5)
ALP BLD-CCNC: 84 U/L (ref 40–129)
ALT SERPL-CCNC: <5 U/L (ref 10–40)
ANION GAP SERPL CALCULATED.3IONS-SCNC: 6 MMOL/L (ref 3–16)
AST SERPL-CCNC: 23 U/L (ref 15–37)
BASOPHILS ABSOLUTE: 0.1 K/UL (ref 0–0.2)
BASOPHILS RELATIVE PERCENT: 1 %
BILIRUB SERPL-MCNC: <0.2 MG/DL (ref 0–1)
BUN BLDV-MCNC: 7 MG/DL (ref 7–20)
CALCIUM SERPL-MCNC: 8.4 MG/DL (ref 8.3–10.6)
CHLORIDE BLD-SCNC: 102 MMOL/L (ref 99–110)
CO2: 30 MMOL/L (ref 21–32)
CREAT SERPL-MCNC: <0.5 MG/DL (ref 0.6–1.1)
EOSINOPHILS ABSOLUTE: 0.1 K/UL (ref 0–0.6)
EOSINOPHILS RELATIVE PERCENT: 1.2 %
GFR AFRICAN AMERICAN: >60
GFR NON-AFRICAN AMERICAN: >60
GLUCOSE BLD-MCNC: 61 MG/DL (ref 70–99)
GLUCOSE BLD-MCNC: 65 MG/DL (ref 70–99)
GLUCOSE BLD-MCNC: 70 MG/DL (ref 70–99)
GLUCOSE BLD-MCNC: 79 MG/DL (ref 70–99)
GLUCOSE BLD-MCNC: 84 MG/DL (ref 70–99)
GLUCOSE BLD-MCNC: 98 MG/DL (ref 70–99)
HCT VFR BLD CALC: 27.8 % (ref 36–48)
HEMOGLOBIN: 9.2 G/DL (ref 12–16)
INR BLD: 1.01 (ref 0.87–1.14)
LYMPHOCYTES ABSOLUTE: 0.9 K/UL (ref 1–5.1)
LYMPHOCYTES RELATIVE PERCENT: 12.8 %
MAGNESIUM: 2 MG/DL (ref 1.8–2.4)
MCH RBC QN AUTO: 34.1 PG (ref 26–34)
MCHC RBC AUTO-ENTMCNC: 33 G/DL (ref 31–36)
MCV RBC AUTO: 103.3 FL (ref 80–100)
MONOCYTES ABSOLUTE: 0.6 K/UL (ref 0–1.3)
MONOCYTES RELATIVE PERCENT: 9 %
NEUTROPHILS ABSOLUTE: 5.2 K/UL (ref 1.7–7.7)
NEUTROPHILS RELATIVE PERCENT: 76 %
PDW BLD-RTO: 15.7 % (ref 12.4–15.4)
PERFORMED ON: ABNORMAL
PERFORMED ON: ABNORMAL
PERFORMED ON: NORMAL
PHOSPHORUS: 3.5 MG/DL (ref 2.5–4.9)
PLATELET # BLD: 405 K/UL (ref 135–450)
PMV BLD AUTO: 9.6 FL (ref 5–10.5)
POTASSIUM SERPL-SCNC: 4.9 MMOL/L (ref 3.5–5.1)
PROTHROMBIN TIME: 13.2 SEC (ref 11.7–14.5)
RBC # BLD: 2.69 M/UL (ref 4–5.2)
SODIUM BLD-SCNC: 138 MMOL/L (ref 136–145)
TOTAL PROTEIN: 5.5 G/DL (ref 6.4–8.2)
WBC # BLD: 6.9 K/UL (ref 4–11)

## 2022-05-26 PROCEDURE — 2580000003 HC RX 258: Performed by: INTERNAL MEDICINE

## 2022-05-26 PROCEDURE — 6370000000 HC RX 637 (ALT 250 FOR IP): Performed by: INTERNAL MEDICINE

## 2022-05-26 PROCEDURE — 80053 COMPREHEN METABOLIC PANEL: CPT

## 2022-05-26 PROCEDURE — 84100 ASSAY OF PHOSPHORUS: CPT

## 2022-05-26 PROCEDURE — 85025 COMPLETE CBC W/AUTO DIFF WBC: CPT

## 2022-05-26 PROCEDURE — 6370000000 HC RX 637 (ALT 250 FOR IP): Performed by: STUDENT IN AN ORGANIZED HEALTH CARE EDUCATION/TRAINING PROGRAM

## 2022-05-26 PROCEDURE — C9113 INJ PANTOPRAZOLE SODIUM, VIA: HCPCS | Performed by: INTERNAL MEDICINE

## 2022-05-26 PROCEDURE — 6360000002 HC RX W HCPCS: Performed by: INTERNAL MEDICINE

## 2022-05-26 PROCEDURE — 85610 PROTHROMBIN TIME: CPT

## 2022-05-26 PROCEDURE — 83735 ASSAY OF MAGNESIUM: CPT

## 2022-05-26 PROCEDURE — 99232 SBSQ HOSP IP/OBS MODERATE 35: CPT | Performed by: SURGERY

## 2022-05-26 RX ORDER — LACTOBACILLUS RHAMNOSUS GG 10B CELL
1 CAPSULE ORAL 2 TIMES DAILY WITH MEALS
Qty: 14 CAPSULE | Refills: 0 | Status: SHIPPED | OUTPATIENT
Start: 2022-05-26

## 2022-05-26 RX ORDER — FUROSEMIDE 20 MG/1
20 TABLET ORAL 2 TIMES DAILY
Qty: 180 TABLET | Refills: 1 | Status: SHIPPED | OUTPATIENT
Start: 2022-05-26

## 2022-05-26 RX ORDER — AMOXICILLIN AND CLAVULANATE POTASSIUM 875; 125 MG/1; MG/1
1 TABLET, FILM COATED ORAL EVERY 12 HOURS SCHEDULED
Qty: 16 TABLET | Refills: 0 | Status: SHIPPED | OUTPATIENT
Start: 2022-05-26 | End: 2022-06-03

## 2022-05-26 RX ADMIN — SODIUM CHLORIDE, PRESERVATIVE FREE 10 ML: 5 INJECTION INTRAVENOUS at 09:58

## 2022-05-26 RX ADMIN — CARBIDOPA AND LEVODOPA 1.5 TABLET: 25; 100 TABLET ORAL at 02:19

## 2022-05-26 RX ADMIN — PANTOPRAZOLE SODIUM 40 MG: 40 INJECTION, POWDER, FOR SOLUTION INTRAVENOUS at 09:40

## 2022-05-26 RX ADMIN — VALPROIC ACID 250 MG: 250 SOLUTION ORAL at 06:41

## 2022-05-26 RX ADMIN — LEVOTHYROXINE SODIUM 100 MCG: 0.1 TABLET ORAL at 09:38

## 2022-05-26 RX ADMIN — HEPARIN SODIUM 5000 UNITS: 5000 INJECTION INTRAVENOUS; SUBCUTANEOUS at 09:39

## 2022-05-26 RX ADMIN — MIDODRINE HYDROCHLORIDE 2.5 MG: 5 TABLET ORAL at 06:42

## 2022-05-26 RX ADMIN — Medication 1 CAPSULE: at 09:37

## 2022-05-26 RX ADMIN — AMOXICILLIN AND CLAVULANATE POTASSIUM 1 TABLET: 875; 125 TABLET, FILM COATED ORAL at 09:34

## 2022-05-26 RX ADMIN — CARBIDOPA AND LEVODOPA 1.5 TABLET: 25; 100 TABLET ORAL at 09:36

## 2022-05-26 RX ADMIN — MIDODRINE HYDROCHLORIDE 2.5 MG: 5 TABLET ORAL at 13:34

## 2022-05-26 RX ADMIN — VALPROIC ACID 250 MG: 250 SOLUTION ORAL at 11:23

## 2022-05-26 ASSESSMENT — PAIN SCALES - PAIN ASSESSMENT IN ADVANCED DEMENTIA (PAINAD)
FACIALEXPRESSION: 0
FACIALEXPRESSION: 0
BREATHING: 0
FACIALEXPRESSION: 0
CONSOLABILITY: 1
CONSOLABILITY: 1
BREATHING: 0
TOTALSCORE: 2
CONSOLABILITY: 1
BODYLANGUAGE: 1
BREATHING: 0
NEGVOCALIZATION: 0
FACIALEXPRESSION: 0
NEGVOCALIZATION: 0
NEGVOCALIZATION: 0
BODYLANGUAGE: 1
TOTALSCORE: 2
BODYLANGUAGE: 1
CONSOLABILITY: 1
CONSOLABILITY: 1
BREATHING: 0
FACIALEXPRESSION: 0
NEGVOCALIZATION: 0
NEGVOCALIZATION: 0
TOTALSCORE: 2
BREATHING: 0

## 2022-05-26 ASSESSMENT — PAIN SCALES - WONG BAKER
WONGBAKER_NUMERICALRESPONSE: 0

## 2022-05-26 ASSESSMENT — PAIN SCALES - GENERAL
PAINLEVEL_OUTOF10: 0

## 2022-05-26 ASSESSMENT — PAIN - FUNCTIONAL ASSESSMENT: PAIN_FUNCTIONAL_ASSESSMENT: ACTIVITIES ARE NOT PREVENTED

## 2022-05-26 NOTE — PROGRESS NOTES
.4 Eyes Skin Assessment     NAME:  Ann Marei Andres  YOB: 1965  MEDICAL RECORD NUMBER:  [id number]    The patient is being assess for  Admission    I agree that 2 RN's have performed a thorough Head to Toe Skin Assessment on the patient. ALL assessment sites listed below have been assessed. Areas assessed by both nurses:    Head, Face, Ears, Shoulders, Back, Chest, Arms, Elbows, Hands, Sacrum. Buttock, Coccyx, Ischium and Legs. Feet and Heels        Does the Patient have a Wound? Yes wound(s) were present on assessment.  LDA wound assessment was Initiated and completed        Abdirizak Prevention initiated:  Yes   Wound Care Orders initiated:  Yes    Pressure Injury (Stage 3,4, Unstageable, DTI, NWPT, and Complex wounds) if present place consult order under [de-identified] No    New and Established Ostomies if present place consult order under : No      Nurse 1 eSignature: Electronically signed by Ralph Kaur RN on 5/26/22 at 3:46 AM EDT    **SHARE this note so that the co-signing nurse is able to place an eSignature**    Nurse 2 eSignature: Electronically signed by Britta Oliver RN on 5/26/22 at 4:01 AM EDT

## 2022-05-26 NOTE — PLAN OF CARE
Problem: Discharge Planning  Goal: Discharge to home or other facility with appropriate resources  5/26/2022 0039 by Tay Woodson RN  Outcome: Progressing  5/25/2022 1719 by Rupinder Kasper RN  Outcome: Progressing     Problem: Pain  Goal: Verbalizes/displays adequate comfort level or baseline comfort level  5/26/2022 0039 by Tay Woodson RN  Outcome: Progressing  5/25/2022 1719 by Rupinder Kasper RN  Outcome: Progressing     Problem: Nutrition Deficit:  Goal: Optimize nutritional status  5/26/2022 0039 by Tay Woodson RN  Outcome: Progressing  5/25/2022 1719 by Rupinder Kasper RN  Outcome: Progressing     Problem: Skin/Tissue Integrity  Goal: Absence of new skin breakdown  Description: 1. Monitor for areas of redness and/or skin breakdown  2. Assess vascular access sites hourly  3. Every 4-6 hours minimum:  Change oxygen saturation probe site  4. Every 4-6 hours:  If on nasal continuous positive airway pressure, respiratory therapy assess nares and determine need for appliance change or resting period.   5/26/2022 0039 by Tay Woodson RN  Outcome: Progressing  5/25/2022 1719 by Rupinder Kasper RN  Outcome: Progressing     Problem: Safety - Adult  Goal: Free from fall injury  5/26/2022 0039 by Tay Woodson RN  Outcome: Progressing  Flowsheets (Taken 5/26/2022 0020)  Free From Fall Injury:   Instruct family/caregiver on patient safety   Based on caregiver fall risk screen, instruct family/caregiver to ask for assistance with transferring infant if caregiver noted to have fall risk factors  5/25/2022 1719 by Rupinder Kasper RN  Outcome: Progressing     Problem: ABCDS Injury Assessment  Goal: Absence of physical injury  5/26/2022 0039 by Tay Woodson RN  Outcome: Progressing  Flowsheets (Taken 5/26/2022 0020)  Absence of Physical Injury: Implement safety measures based on patient assessment  5/25/2022 1719 by Rupinder Kasper RN  Outcome: Progressing

## 2022-05-26 NOTE — CARE COORDINATION
Will discharge to:  [facility]  [address] [facility], [address] [place] [address]  Admissions: [phone]  Main:  [phone] (report)  [phone]      [address] [place] [address] Rd  ([phone]) @ 4:45pm  [facility] @ [facility] Cholo Davenport aware of discharge to . [address] @ [facility]

## 2022-05-26 NOTE — PLAN OF CARE
Problem: Discharge Planning  Goal: Discharge to home or other facility with appropriate resources  5/26/2022 0944 by Sergey Chavez RN  Outcome: Progressing  Flowsheets (Taken 5/26/2022 0800)  Discharge to home or other facility with appropriate resources: Arrange for needed discharge resources and transportation as appropriate  5/26/2022 0039 by Kaylie Hoffman RN  Outcome: Progressing     Problem: Pain  Goal: Verbalizes/displays adequate comfort level or baseline comfort level  5/26/2022 0944 by Sergey Chavez RN  Outcome: Progressing  Flowsheets (Taken 5/26/2022 0800)  Verbalizes/displays adequate comfort level or baseline comfort level: Encourage patient to monitor pain and request assistance  5/26/2022 0039 by Kaylie Hoffman RN  Outcome: Progressing     Problem: Nutrition Deficit:  Goal: Optimize nutritional status  5/26/2022 0944 by Sergey Chavez RN  Outcome: Progressing  5/26/2022 0039 by Kaylie Hoffman RN  Outcome: Progressing     Problem: Skin/Tissue Integrity  Goal: Absence of new skin breakdown  Description: 1. Monitor for areas of redness and/or skin breakdown  2. Assess vascular access sites hourly  3. Every 4-6 hours minimum:  Change oxygen saturation probe site  4. Every 4-6 hours:  If on nasal continuous positive airway pressure, respiratory therapy assess nares and determine need for appliance change or resting period.   5/26/2022 0944 by eSrgey Chavez RN  Outcome: Progressing  5/26/2022 0039 by Kaylie Hoffman RN  Outcome: Progressing     Problem: Safety - Adult  Goal: Free from fall injury  5/26/2022 0944 by Sergey Chavez RN  Outcome: Progressing  5/26/2022 0039 by Kaylie Hoffman RN  Outcome: Progressing  Flowsheets (Taken 5/26/2022 0020)  Free From Fall Injury:   Instruct family/caregiver on patient safety   Based on caregiver fall risk screen, instruct family/caregiver to ask for assistance with transferring infant if caregiver noted to have fall risk factors Problem: ABCDS Injury Assessment  Goal: Absence of physical injury  5/26/2022 0944 by Olena Banks RN  Outcome: Progressing  5/26/2022 0039 by Rich Holt RN  Outcome: Progressing  Flowsheets (Taken 5/26/2022 0020)  Absence of Physical Injury: Implement safety measures based on patient assessment

## 2022-05-26 NOTE — DISCHARGE SUMMARY
Hospital Medicine Discharge Summary    Patient ID: Deniz Rivera      Patient's PCP: Luna Jenkins MD    Admit Date: 5/10/2022     Discharge Date:   5/26/2022    Admitting Provider: Hunter Dominguez MD     Discharge Provider: Tc Garica MD     Discharge Diagnoses: Active Hospital Problems    Diagnosis     Pneumonia of both lower lobes due to methicillin susceptible Staphylococcus aureus (MSSA) ([facility]) [J15.211]      Priority: Medium    Streptococcal bacteremia [R78.81, B95.5]      Priority: Medium    Pneumonia of left lower lobe due to infectious organism [J18.9]      Priority: Medium    Parkinsonism ([address] [G20]      Priority: Medium    Anorexia [R63.0]      Priority: Medium    Moderate malnutrition ([address] [E44.0]      Priority: Medium    AVELINO (acute kidney injury) ([address] [N17.9]      Priority: Medium    Aspiration pneumonia ([address] [J69.0]      Priority: Medium    Septic shock ([address] [A41.9, R65.21]      Priority: Medium    Acute hypoxemic respiratory failure ([address] [J96.01]     Down syndrome [Q90.9]        The patient was seen and examined on day of discharge and this discharge summary is in conjunction with any daily progress note from day of discharge. Hospital Course:      · Acute hypoxic respiratory failure secondary to multifocal pneumonia, was started on broad-spectrum antibiotic, intubated, extubated, failed speech, now with PEG tube, continue tube feeding  · Concern for bladder fistula right groin colostomy bag draining 573 cc on 5/23, urology has been consulted. Continue monitor output. I will 500 cc of fluid output yesterday, today under 50 cc. Surgery has been consulted, site was stitched, no draining per nursing  · Sepsis, septic shock, secondary to multifocal pneumonia, possible infective endocarditis,? Possible aortic root abscess, seen by cardiology and ID, unsafe to do STORMY per cardiology. Repeated blood cultures so far negative.   Appreciate ID input now on Augmentin  · Chronic hypotension, continue midodrine was on a low-dose of Levophed now stopped  · History of Down syndrome, status post PEG tube placement 5/20 continue tube feeding  · Lactic acidosis has been improving  · Hypomagnesemia repleted improving  · History of seizure continue Keppra  · Hypothyroidism continue Synthroid  · Failure to thrive   · Discharge planning, LTAC, continue antibiotic until [address]    Physical Exam Performed:     BP (!) 92/57   Pulse 64   Temp 97.3 °F (36.3 °C) (Axillary)   Resp 15   Ht 4' 9\" (1.448 m)   Wt 104 lb 0.9 oz (47.2 kg)   SpO2 93%   BMI 22.52 kg/m²       General appearance:  No apparent distress  HEENT:  Normal cephalic, atraumatic without obvious deformity. Pupils equal, round, and reactive to light. Extra ocular muscles intact. Conjunctivae/corneas clear. Neck: Supple, with full range of motion. No jugular venous distention. Trachea midline. Respiratory:  Normal respiratory effort. Clear to auscultation, bilaterally without Rales/Wheezes/Rhonchi. Cardiovascular:  Regular rate and rhythm with normal S1/S2 without murmurs, rubs or gallops. Abdomen: Soft, non-tender, non-distended with normal bowel sounds. Musculoskeletal:  No clubbing, cyanosis or edema bilaterally. Full range of motion without deformity. Skin: Skin color, texture, turgor normal.  No rashes or lesions. Neurologic:  Neurovascularly intact without any focal sensory/motor deficits. Cranial nerves: II-XII intact, grossly non-focal.  Psychiatric:  Alert  Capillary Refill: Brisk,< 3 seconds   Peripheral Pulses: +2 palpable, equal bilaterally       Labs:  For convenience and continuity at follow-up the following most recent labs are provided:      CBC:    Lab Results   Component Value Date    WBC 6.9 05/26/2022    HGB 9.2 05/26/2022    HCT 27.8 05/26/2022     05/26/2022       Renal:    Lab Results   Component Value Date     05/26/2022    K 4.9 05/26/2022    K 4.8 05/25/2022     05/26/2022    CO2 30 05/26/2022    BUN 7 05/26/2022    CREATININE <0.5 05/26/2022    CALCIUM 8.4 05/26/2022    PHOS 3.5 05/26/2022         Significant Diagnostic Studies    Radiology:   XR CHEST PORTABLE   Final Result   Moderate airspace disease right upper and lower lobe with volume loss. Pneumonia and or mucous plugging both considered. NG tube extends well into the stomach. XR CHEST PORTABLE   Final Result   1. Endotracheal tube terminates in the left mainstem bronchus, for which   retraction 5 cm is recommended. 2.  Enteric tube terminates at the level of the body of the stomach. 3.  No significant change in bilateral airspace disease. Probable small left   effusion. XR ABDOMEN FOR NG/OG/NE TUBE PLACEMENT   Final Result   NG tube position appears acceptable. Endotracheal tube tip is likely within 1 cm above the anita. Dense left basilar opacity and patchy opacities in both lungs. XR CHEST PORTABLE   Final Result   Endotracheal tube is present with tip projecting just proximal to the anita. Left basilar opacity and trace left pleural effusion.   Aspiration and   pneumonia are in the differential.                Consults:     IP CONSULT TO CRITICAL CARE  IP CONSULT TO DIETITIAN  PHARMACY TO DOSE MEDICATION  IP CONSULT TO PALLIATIVE CARE  IP CONSULT TO INFECTIOUS DISEASES  IP CONSULT TO CARDIOLOGY  IP CONSULT TO GI  IP CONSULT TO UROLOGY  IP CONSULT TO HOME CARE NEEDS  IP CONSULT TO GENERAL SURGERY    Disposition: LTAC    Condition at Discharge: Stable    Discharge Instructions/Follow-up: Follow-up with PCP within 1 week of discharge, continue taking Augmentin until [address]    Code Status:  DNR-CCA     Activity: activity as tolerated    Diet: Tube feeding       Discharge Medications:     Current Discharge Medication List           Details   furosemide (LASIX) 20 MG tablet Take 1 tablet by mouth 2 times daily  Qty: 180 tablet, Refills: 1      lactobacillus (CULTURELLE) capsule Take 1 capsule by mouth 2 times daily (with meals)  Qty: 14 capsule, Refills: 0      amoxicillin-clavulanate (AUGMENTIN) 875-125 MG per tablet Take 1 tablet by mouth every 12 hours for 8 days  Qty: 16 tablet, Refills: 0              Details   midodrine (PROAMATINE) 2.5 MG tablet Take 1 tablet by mouth 3 times daily (with meals)  Qty: 90 tablet, Refills: 1      valproic acid (DEPAKENE) 250 MG/5ML SOLN oral solution Take 250 mg by mouth every 8 hours 250 mg in the AM, 250 mg in the afternoon, 500 mg nightly. levothyroxine (SYNTHROID) 100 MCG tablet Take 100 mcg by mouth daily       docusate sodium (COLACE) 100 MG capsule Take 100 mg by mouth daily      acetaminophen (TYLENOL) 325 MG tablet Take 650 mg by mouth every 6 hours as needed for Pain. carbidopa-levodopa (SINEMET)  MG per tablet Take 1.5 tablets by mouth 3 times daily Indications: Take one and a half tablets by mouth three times per day       sertraline (ZOLOFT) 20 MG/ML concentrated solution Take 100 mg by mouth nightly       Multiple Vitamins-Minerals (THERAPEUTIC MULTIVITAMIN-MINERALS) tablet Take 1 tablet by mouth daily. omeprazole (PRILOSEC) 20 MG capsule Take 20 mg by mouth daily. Time Spent on discharge is more than 30 minutes in the examination, evaluation, counseling and review of medications and discharge plan. Signed:    Leverne Homans, MD   5/26/2022      Thank you Blake Sosa MD for the opportunity to be involved in this patient's care. If you have any questions or concerns, please feel free to contact me at [phone].

## 2022-05-26 NOTE — CARE COORDINATION
[facility] @ [facility] made aware of [facility] and to continue to follow. Electronically signed by Lenard Barba LPN on 0/75/09 at 6:13 PM EDT

## 2022-05-26 NOTE — PROGRESS NOTES
Report called over to [facility].  Report given to [facility].

## 2022-05-26 NOTE — PROGRESS NOTES
Carolina Staples is a 60 y.o. female patient.     Chief complaint-right groin wound    HPI-60year-old female seen in follow-up for right groin wound  Current Facility-Administered Medications   Medication Dose Route Frequency Provider Last Rate Last Admin    lidocaine PF 1 % injection 5 mL  5 mL IntraDERmal Once Celina Tian MD        sodium chloride flush 0.9 % injection 5-40 mL  5-40 mL IntraVENous 2 times per day Celina Tian MD   10 mL at 05/25/22 2133    sodium chloride flush 0.9 % injection 5-40 mL  5-40 mL IntraVENous PRN Celina Tian MD        0.9 % sodium chloride infusion  25 mL IntraVENous PRN Celina Tian MD        lidocaine-EPINEPHrine 1 %-[id number] injection 20 mL  20 mL IntraDERmal Once NICHOLE Santana - CNP        valproic acid (DEPAKENE) 250 MG/5ML oral solution 250 mg  250 mg Oral BID Celina Tian MD   250 mg at 05/26/22 1123    valproic acid (DEPAKENE) 250 MG/5ML oral solution 500 mg  500 mg Oral Nightly Celina Tian MD   500 mg at 05/25/22 2130    lactobacillus (CULTURELLE) capsule 1 capsule  1 capsule Oral BID  Slava Maloney MD   1 capsule at 05/26/22 0937    amoxicillin-clavulanate (AUGMENTIN) 875-125 MG per tablet 1 tablet  1 tablet Oral 2 times per day Dawson Joshi MD   1 tablet at 05/26/22 0934    dextrose bolus 10% 125 mL  125 mL IntraVENous PRN Cyndra Coma, PA-C   Stopped at 05/21/22 2138    Or    dextrose bolus 10% 250 mL  250 mL IntraVENous PRN Cyndra Coma, PA-C   Stopped at 05/23/22 0247    glucagon (rDNA) injection 1 mg  1 mg IntraMUSCular PRN Cyndra Coma, PA-C   1 mg at 05/23/22 0755    dextrose 5 % solution  100 mL/hr IntraVENous PRN Cyndra Coma, PA-C        potassium chloride 10 mEq/100 mL IVPB (Peripheral Line)  10 mEq IntraVENous PRN Celina Tian MD   Stopped at 05/20/22 0854    midodrine (PROAMATINE) tablet 2.5 mg  2.5 mg Orogastric q8h Celina Tian MD   2.5 mg at 05/26/22 2592    atropine injection 0.5 mg  0.5 mg IntraVENous Q30 Min PRN Marco Adhikari MD   0.5 mg at 05/17/22 0159    levothyroxine (SYNTHROID) tablet 100 mcg  100 mcg Per NG tube Daily Marco Adhikari MD   100 mcg at 05/26/22 5581    carbidopa-levodopa (SINEMET)  MG per tablet 1.5 tablet  1.5 tablet Per NG tube q8h Marco Adhikari MD   1.5 tablet at 05/26/22 0936    sertraline (ZOLOFT) tablet 100 mg  100 mg Per NG tube Nightly Marco Adhikari MD   100 mg at 05/25/22 2130    pantoprazole (PROTONIX) injection 40 mg  40 mg IntraVENous Daily Marco Adhikari MD   40 mg at 05/26/22 0940    sodium phosphate 10 mmol in sodium chloride 0.9 % 250 mL IVPB  10 mmol IntraVENous PRN Marco Adhikari MD   Stopped at 05/17/22 2224    Or    sodium phosphate 15 mmol in dextrose 5 % 250 mL IVPB  15 mmol IntraVENous PRN Marco Adhikari MD   Stopped at 05/18/22 1920    Or    sodium phosphate 20 mmol in dextrose 5 % 500 mL IVPB  20 mmol IntraVENous PRN Marco Adhikari MD        heparin (porcine) injection 5,000 Units  5,000 Units SubCUTAneous BID Marco Adhikari MD   5,000 Units at 05/26/22 0939    ondansetron (ZOFRAN) injection 4 mg  4 mg IntraVENous Q6H PRN Marco Adhikari MD        acetaminophen (TYLENOL) tablet 650 mg  650 mg Oral Q4H PRN Marco Adhikari MD   650 mg at 05/21/22 0146    Or    acetaminophen (TYLENOL) suppository 650 mg  650 mg Rectal Q4H PRN Marco Adhikari MD         Allergies   Allergen Reactions    Caffeine     Iodine      Principal Problem:    Septic shock ([address]  Active Problems:    AVELINO (acute kidney injury) ([address]    Aspiration pneumonia ([address]    Moderate malnutrition ([address]    Streptococcal bacteremia    Pneumonia of left lower lobe due to infectious organism    Parkinsonism ([address]    Anorexia    Pneumonia of both lower lobes due to methicillin susceptible Staphylococcus aureus (MSSA) ([address]    Non-healing open wound of right groin    Down syndrome    Acute hypoxemic respiratory failure ([address]  Resolved Problems:    * No resolved hospital problems. *    Blood pressure (!) 92/57, pulse 62, temperature 97.3 °F (36.3 °C), temperature source Axillary, resp. rate 15, height 4' 9\" (1.448 m), weight 104 lb 0.9 oz (47.2 kg), SpO2 93 %. Subjective:  Symptoms:  Resolved. Activity level: Normal.    Pain:  She reports no pain. Objective:  General Appearance:  Comfortable. Vital signs: (most recent): Blood pressure (!) 92/57, pulse 62, temperature 97.3 °F (36.3 °C), temperature source Axillary, resp. rate 15, height 4' 9\" (1.448 m), weight 104 lb 0.9 oz (47.2 kg), SpO2 93 %. Lungs:  Normal effort and normal respiratory rate. Abdomen: Abdomen is soft and non-distended. Skin:  Warm and dry. Assessment & Plan 78-year-old female seen in follow-up for a wound at the site of a previous right femoral line which was leaking clear fluid. A suture was placed in the area yesterday. There has been no further drainage of fluid since that time. There is no swelling of the surrounding tissue. Recommend that the suture is left in place for 2 weeks.   Will follow as needed while in the hospital.    Desiree Kamara MD  5/26/2022

## 2022-05-26 NOTE — PROGRESS NOTES
Shift assessment completed. Routine vitals obtained. Pt does not seem to be in any distress. Bed alarm on.

## 2022-05-26 NOTE — PROGRESS NOTES
Amie Justin is a 60 y.o. female patient.     Chief complaint-leaking right groin central line site    HPI-60year-old female who we are asked to see regarding a leaking site from a previous right femoral catheter  Current Facility-Administered Medications   Medication Dose Route Frequency Provider Last Rate Last Admin    lidocaine PF 1 % injection 5 mL  5 mL IntraDERmal Once Osmin Gupta MD        sodium chloride flush 0.9 % injection 5-40 mL  5-40 mL IntraVENous 2 times per day Osmin Gupta MD   10 mL at 05/25/22 2133    sodium chloride flush 0.9 % injection 5-40 mL  5-40 mL IntraVENous PRN Osmin Gupta MD        0.9 % sodium chloride infusion  25 mL IntraVENous PRN Osmin Gupta MD        lidocaine-EPINEPHrine 1 %-[id number] injection 20 mL  20 mL IntraDERmal Once [place] Mj, APRN - CNP        valproic acid (DEPAKENE) 250 MG/5ML oral solution 250 mg  250 mg Oral BID Osmin Gupta MD   250 mg at 05/26/22 1123    valproic acid (DEPAKENE) 250 MG/5ML oral solution 500 mg  500 mg Oral Nightly Osmin Gupta MD   500 mg at 05/25/22 2130    lactobacillus (CULTURELLE) capsule 1 capsule  1 capsule Oral BID  Slava Maloney MD   1 capsule at 05/26/22 0937    amoxicillin-clavulanate (AUGMENTIN) 875-125 MG per tablet 1 tablet  1 tablet Oral 2 times per day Yfn Adame MD   1 tablet at 05/26/22 0934    dextrose bolus 10% 125 mL  125 mL IntraVENous PRN Spencer Quintana PA-C   Stopped at 05/21/22 2138    Or    dextrose bolus 10% 250 mL  250 mL IntraVENous PRN Spencer Quintana PA-C   Stopped at 05/23/22 0247    glucagon (rDNA) injection 1 mg  1 mg IntraMUSCular PRN Prmilli Quintana PA-C   1 mg at 05/23/22 0755    dextrose 5 % solution  100 mL/hr IntraVENous PRN Spencer Quintana PA-C        potassium chloride 10 mEq/100 mL IVPB (Peripheral Line)  10 mEq IntraVENous PRN Osmin Gupta MD   Stopped at 05/20/22 0854    midodrine (PROAMATINE) tablet 2.5 mg  2.5 mg Orogastric q8h Osmin Gupta MD   2.5 mg at 05/26/22 5297    atropine injection 0.5 mg  0.5 mg IntraVENous Q30 Min PRN Hunter Dominguez MD   0.5 mg at 05/17/22 0159    levothyroxine (SYNTHROID) tablet 100 mcg  100 mcg Per NG tube Daily Hunter Dominguez MD   100 mcg at 05/26/22 2075    carbidopa-levodopa (SINEMET)  MG per tablet 1.5 tablet  1.5 tablet Per NG tube q8h Hunter Dominguez MD   1.5 tablet at 05/26/22 0936    sertraline (ZOLOFT) tablet 100 mg  100 mg Per NG tube Nightly Hunter Dominguez MD   100 mg at 05/25/22 2130    pantoprazole (PROTONIX) injection 40 mg  40 mg IntraVENous Daily Hunter Dominguez MD   40 mg at 05/26/22 0940    sodium phosphate 10 mmol in sodium chloride 0.9 % 250 mL IVPB  10 mmol IntraVENous PRN Hunter Dominguez MD   Stopped at 05/17/22 2224    Or    sodium phosphate 15 mmol in dextrose 5 % 250 mL IVPB  15 mmol IntraVENous PRN Hunter Dominguez MD   Stopped at 05/18/22 1920    Or    sodium phosphate 20 mmol in dextrose 5 % 500 mL IVPB  20 mmol IntraVENous PRN Hunter Dominguez MD        heparin (porcine) injection 5,000 Units  5,000 Units SubCUTAneous BID Hunter Dominguez MD   5,000 Units at 05/26/22 0939    ondansetron (ZOFRAN) injection 4 mg  4 mg IntraVENous Q6H PRN Hunter Dominguez MD        acetaminophen (TYLENOL) tablet 650 mg  650 mg Oral Q4H PRN Hunter Dominguez MD   650 mg at 05/21/22 0146    Or    acetaminophen (TYLENOL) suppository 650 mg  650 mg Rectal Q4H PRN Hunter Dominguez MD         Allergies   Allergen Reactions    Caffeine     Iodine      Principal Problem:    Septic shock ([address]  Active Problems:    AVLEINO (acute kidney injury) ([address]    Aspiration pneumonia ([address]    Moderate malnutrition ([address]    Streptococcal bacteremia    Pneumonia of left lower lobe due to infectious organism    Parkinsonism ([address]    Anorexia    Pneumonia of both lower lobes due to methicillin susceptible Staphylococcus aureus (MSSA) ([address]    Down syndrome    Acute hypoxemic respiratory failure ([address]  Resolved Problems:    * No resolved hospital problems. *    Blood pressure (!) 92/57, pulse 64, temperature 97.3 °F (36.3 °C), temperature source Axillary, resp. rate 15, height 4' 9\" (1.448 m), weight 104 lb 0.9 oz (47.2 kg), SpO2 93 %. Subjective:  Symptoms:  Stable. Activity level: Normal.    Pain:  She reports no pain. Objective:  General Appearance:  Comfortable. Vital signs: (most recent): Blood pressure (!) 92/57, pulse 64, temperature 97.3 °F (36.3 °C), temperature source Axillary, resp. rate 15, height 4' 9\" (1.448 m), weight 104 lb 0.9 oz (47.2 kg), SpO2 93 %. Output: Producing urine. Lungs:  Normal effort and normal respiratory rate. Abdomen: Abdomen is soft and non-distended. Neurological: Patient is alert and oriented to person, place and time. Skin:  Warm and dry. Assessment & Plan 38-year-old female admitted with aspiration pneumonia. She had a right femoral line in place. The line has subsequently been removed and is draining copious clear fluid. On physical examination, there is a 4 mm opening. There is a central necrotic area within the 4 mm opening. The fluid is likely related to leakage of ascites or from the lymphatic system. After the area was injected with local anesthetic, the necrotic central portion the wound was excised. We then placed a 3-0 nylon figure 8 suture. She tolerated this well. A dressing was then applied.     Ashley Dunn MD  5/26/2022

## 2022-05-27 LAB
BLOOD CULTURE, ROUTINE: NORMAL
CULTURE, BLOOD 2: NORMAL

## 2022-08-16 ENCOUNTER — HOSPITAL ENCOUNTER (EMERGENCY)
Age: 57
Discharge: HOME | End: 2022-08-17
Attending: STUDENT IN AN ORGANIZED HEALTH CARE EDUCATION/TRAINING PROGRAM
Payer: MEDICARE

## 2022-08-16 DIAGNOSIS — Z93.1 STATUS POST INSERTION OF PERCUTANEOUS ENDOSCOPIC GASTROSTOMY (PEG) TUBE (HCC): Primary | ICD-10-CM

## 2022-08-16 PROCEDURE — 99283 EMERGENCY DEPT VISIT LOW MDM: CPT

## 2022-08-16 PROCEDURE — 43762 RPLC GTUBE NO REVJ TRC: CPT

## 2022-08-16 ASSESSMENT — PAIN - FUNCTIONAL ASSESSMENT: PAIN_FUNCTIONAL_ASSESSMENT: WONG-BAKER FACES

## 2022-08-16 ASSESSMENT — PAIN SCALES - WONG BAKER: WONGBAKER_NUMERICALRESPONSE: 0

## 2022-08-17 ENCOUNTER — APPOINTMENT (OUTPATIENT)
Dept: GENERAL RADIOLOGY | Age: 57
End: 2022-08-17
Payer: MEDICARE

## 2022-08-17 VITALS
DIASTOLIC BLOOD PRESSURE: 68 MMHG | SYSTOLIC BLOOD PRESSURE: 112 MMHG | WEIGHT: 89.8 LBS | OXYGEN SATURATION: 96 % | HEART RATE: 60 BPM | BODY MASS INDEX: 19.37 KG/M2 | RESPIRATION RATE: 16 BRPM | TEMPERATURE: 98.1 F | HEIGHT: 57 IN

## 2022-08-17 PROCEDURE — 49465 FLUORO EXAM OF G/COLON TUBE: CPT

## 2022-08-17 NOTE — ED PROVIDER NOTES
encounter, please see documentation by advanced practice provider      Angel Watkins MD  08/17/22 1841

## 2022-08-17 NOTE — ED PROVIDER NOTES
[facility]        Pt Name: Kenny Garsia  MRN: [id number]  [place] 1965  Date of evaluation: 8/16/2022  Provider: VICKY Hoffmann  PCP: Julia Shelton MD  Note Started: 2:00 AM EDT       SILVERIO. I have evaluated this patient. My supervising physician was available for consultation. CHIEF COMPLAINT       Chief Complaint   Patient presents with    G Tube Complications     Pt from home, per home care RN pt pulled her g-tube out. Pt non-verbal, hx of Downs syndrome and autism. HISTORY OF PRESENT ILLNESS   (Location, Timing/Onset, Context/Setting, Quality, Duration, Modifying Factors, Severity, Associated Signs and Symptoms)  Note limiting factors. Chief Complaint: Tatiana Ann is a 59 y.o. female with documented history of autism, anorexia, anemia, Down syndrome and malnutrition who presents to the ED with complaint of a G-tube complication. Patient arrives from home with reports that she pulled out her G-tube. Apparently this happened today. Patient cannot provide subsequent history. We attempted to call nursing staff and they could not tell us what time the tube was pulled out, what size the tube was or when the tubing was placed. Upon review of records it appears that the tube was placed around May 2022 for some malnutrition. It appears that it was a 20 [place] G-tube. No reported bleeding or drainage from the site. Otherwise acting at baseline per report. Nursing Notes were all reviewed and agreed with or any disagreements were addressed in the HPI. REVIEW OF SYSTEMS    (2-9 systems for level 4, 10 or more for level 5)     Review of Systems   Unable to perform ROS: Other     Positives and Pertinent negatives as per HPI. Except as noted above in the ROS, all other systems were reviewed and negative.        PAST MEDICAL HISTORY     Past Medical History:   Diagnosis Date    Anemia Anorexia     Autistic disorder     Convulsions [facility]     Down syndrome     Dysphagia     Hypothyroid     Malnutrition (HCC)     Parkinson disease ([facility]     UTI (urinary tract infection)     Weakness          SURGICAL HISTORY     Past Surgical History:   Procedure Laterality Date    GASTROSTOMY TUBE PLACEMENT      GASTROSTOMY TUBE PLACEMENT  2/18/14    GASTROSTOMY TUBE PLACEMENT N/A 5/20/2022    EGD PEG TUBE PLACEMENT performed by Lynda Morris MD at [address] [place] [address]       Discharge Medication List as of 8/17/2022  6:10 AM        CONTINUE these medications which have NOT CHANGED    Details   furosemide (LASIX) 20 MG tablet Take 1 tablet by mouth 2 times daily, Disp-180 tablet, R-1Normal      lactobacillus (CULTURELLE) capsule Take 1 capsule by mouth 2 times daily (with meals), Disp-14 capsule, R-0Normal      midodrine (PROAMATINE) 2.5 MG tablet Take 1 tablet by mouth 3 times daily (with meals), Disp-90 tablet, R-1Print      valproic acid (DEPAKENE) 250 MG/5ML SOLN oral solution Take 250 mg by mouth every 8 hours 250 mg in the AM, 250 mg in the afternoon, 500 mg nightly. Historical Med      levothyroxine (SYNTHROID) 100 MCG tablet Take 100 mcg by mouth daily Historical Med      docusate sodium (COLACE) 100 MG capsule Take 100 mg by mouth daily      acetaminophen (TYLENOL) 325 MG tablet Take 650 mg by mouth every 6 hours as needed for Pain. carbidopa-levodopa (SINEMET)  MG per tablet Take 1.5 tablets by mouth 3 times daily Indications: Take one and a half tablets by mouth three times per day       sertraline (ZOLOFT) 20 MG/ML concentrated solution Take 100 mg by mouth nightly Historical Med      Multiple Vitamins-Minerals (THERAPEUTIC MULTIVITAMIN-MINERALS) tablet Take 1 tablet by mouth daily. omeprazole (PRILOSEC) 20 MG capsule Take 20 mg by mouth daily. ALLERGIES     Caffeine and Iodine    FAMILYHISTORY     History reviewed. No pertinent family history. SOCIAL HISTORY       Social History     Tobacco Use    Smoking status: Never    Smokeless tobacco: Never   Substance Use Topics    Alcohol use: No    Drug use: No       SCREENINGS    [place] Coma Scale  Eye Opening: Spontaneous  Best Verbal Response: None  Best Motor Response: Withdraws from pain  [place] Coma Scale Score: 9        PHYSICAL EXAM    (up to 7 for level 4, 8 or more for level 5)     ED Triage Vitals   BP Temp Temp Source Heart Rate Resp SpO2 Height Weight   08/16/22 2333 08/16/22 2333 08/16/22 2333 08/16/22 2333 08/16/22 2333 08/16/22 2333 08/16/22 2337 08/16/22 2337   111/73 98 °F (36.7 °C) Oral 58 18 95 % 4' 9\" (1.448 m) 89 lb 12.8 oz (40.7 kg)       Physical Exam  Constitutional:       General: She is not in acute distress. Appearance: Normal appearance. She is well-developed. She is not ill-appearing, toxic-appearing or diaphoretic. HENT:      Head: Normocephalic and atraumatic. Right Ear: External ear normal.      Left Ear: External ear normal.   Eyes:      General:         Right eye: No discharge. Left eye: No discharge. Conjunctiva/sclera: Conjunctivae normal.   Cardiovascular:      Rate and Rhythm: Normal rate and regular rhythm. Pulses: Normal pulses. Heart sounds: Normal heart sounds. No murmur heard. No friction rub. No gallop. Pulmonary:      Effort: Pulmonary effort is normal. No respiratory distress. Breath sounds: Normal breath sounds. No stridor. No wheezing, rhonchi or rales. Chest:      Chest wall: No tenderness. Abdominal:      General: Abdomen is flat. Bowel sounds are normal. There is no distension. Palpations: Abdomen is soft. There is no mass. Tenderness: There is no abdominal tenderness. There is no right CVA tenderness, left CVA tenderness, guarding or rebound. Hernia: No hernia is present. Comments: G-tube site noted to the epigastric abdomen without any bleeding or drainage.   No discomfort with palpation over the area. Musculoskeletal:         General: Normal range of motion. Cervical back: Normal range of motion and neck supple. No rigidity or tenderness. Lymphadenopathy:      Cervical: No cervical adenopathy. Skin:     General: Skin is warm and dry. Coloration: Skin is not pale. Findings: No erythema or rash. Neurological:      Mental Status: She is alert and oriented to person, place, and time. Psychiatric:         Behavior: Behavior normal.       DIAGNOSTIC RESULTS   LABS:    Labs Reviewed - No data to display    When ordered only abnormal lab results are displayed. All other labs were within normal range or not returned as of this dictation. EKG: When ordered, EKG's are interpreted by the Emergency Department Physician in the absence of a cardiologist.  Please see their note for interpretation of EKG. RADIOLOGY:   Non-plain film images such as CT, Ultrasound and MRI are read by the radiologist. Plain radiographic images are visualized and preliminarily interpreted by the ED Provider with the below findings:        Interpretation per the Radiologist below, if available at the time of this note:    XR INJ CONTRAST GASTRIC TUBE PERC   Final Result   Contrast injection through the PEG tube with contrast into the gastric lumen   aligning the mucosal folds. The balloon appears to be at the body of the   stomach. Nonobstructive bowel gas pattern. Some constipation is noted. No results found. PROCEDURES   Unless otherwise noted below, none     Feeding Tube    Date/Time: 8/17/2022 2:03 AM  Performed by: VICKY Perez  Authorized by: Gaby Hopkins MD     [place] protocol:     Patient identity confirmed:  Arm band  Pre-procedure details: Old tube type:  Gastrostomy    Old tube size: 20Fr.   Sedation:     Sedation type:  None  Anesthesia:     Anesthesia method:  None  Procedure details:     Patient position:  Supine    Procedure type:  Replacement    Tube type:  Gastrostomy    Tube size:  18 Fr    Bulb inflation volume:  10cc    Bulb inflation fluid:  Normal saline  Post-procedure details:     Placement/position confirmation:  Contrast and x-ray    Placement difficulty:  None    Bleeding:  None    Procedure completion:  Tolerated well, no immediate complications    CRITICAL CARE TIME       CONSULTS:  None      EMERGENCY DEPARTMENT COURSE and DIFFERENTIAL DIAGNOSIS/MDM:   Vitals:    Vitals:    08/16/22 2333 08/16/22 2337 08/17/22 0330   BP: 111/73  112/68   Pulse: 58  60   Resp: 18  16   Temp: 98 °F (36.7 °C)  98.1 °F (36.7 °C)   TempSrc: Oral  Oral   SpO2: 95%  96%   Weight:  89 lb 12.8 oz (40.7 kg)    Height:  4' 9\" (1.448 m)        Patient was given the following medications:  Medications - No data to display      Is this patient to be included in the SEP-1 Core Measure due to severe sepsis or septic shock? No   Exclusion criteria - the patient is NOT to be included for SEP-1 Core Measure due to: Infection is not suspected    59 female who presents to the ED with complaint of G-tube dislodgment. Replacement myself here in the emergency department without difficulty. Patient tolerated the procedure well. X-ray to confirm placement currently pending at end of shift. Please see attending provider note for further disposition and treatment patient anticipate discharge home with x-ray confirming appropriate placement. FINAL IMPRESSION      1. Status post insertion of percutaneous endoscopic gastrostomy (PEG) tube [facility])          DISPOSITION/PLAN   DISPOSITION Decision To Discharge 08/17/2022 03:13:24 AM      PATIENT REFERRED TO:  No follow-up provider specified.     DISCHARGE MEDICATIONS:  Discharge Medication List as of 8/17/2022  6:10 AM          DISCONTINUED MEDICATIONS:  Discharge Medication List as of 8/17/2022  6:10 AM                 (Please note that portions of this note were completed with a voice recognition program.  Efforts were made to edit the dictations but occasionally words are mis-transcribed.)    VICKY Vinson (electronically signed)           VICKY Sanford  08/17/22 4568

## 2022-10-09 ENCOUNTER — APPOINTMENT (OUTPATIENT)
Dept: GENERAL RADIOLOGY | Age: 57
End: 2022-10-09
Payer: MEDICARE

## 2022-10-09 ENCOUNTER — HOSPITAL ENCOUNTER (EMERGENCY)
Age: 57
Discharge: HOME | End: 2022-10-10
Payer: MEDICARE

## 2022-10-09 VITALS
DIASTOLIC BLOOD PRESSURE: 73 MMHG | BODY MASS INDEX: 19.37 KG/M2 | RESPIRATION RATE: 16 BRPM | OXYGEN SATURATION: 100 % | TEMPERATURE: 97.7 F | WEIGHT: 89.5 LBS | HEART RATE: 67 BPM | SYSTOLIC BLOOD PRESSURE: 119 MMHG

## 2022-10-09 DIAGNOSIS — K94.23 GASTROSTOMY TUBE DYSFUNCTION (HCC): Primary | ICD-10-CM

## 2022-10-09 PROCEDURE — 49465 FLUORO EXAM OF G/COLON TUBE: CPT

## 2022-10-09 PROCEDURE — 43762 RPLC GTUBE NO REVJ TRC: CPT

## 2022-10-09 PROCEDURE — 6360000004 HC RX CONTRAST MEDICATION: Performed by: PHYSICIAN ASSISTANT

## 2022-10-09 PROCEDURE — 99283 EMERGENCY DEPT VISIT LOW MDM: CPT

## 2022-10-09 RX ADMIN — IOHEXOL 20 ML: 350 INJECTION, SOLUTION INTRAVENOUS at 23:50

## 2022-10-10 NOTE — ED PROVIDER NOTES
[address] [facility]        Pt Name: Nu Peña  MRN: [id number]  [place] 1965  Date of evaluation: 10/9/2022  Provider: Tahira Telles PA-C  PCP: Anton Benjamin MD  Note Started: 11:33 PM EDT       SILVERIO. I have evaluated this patient. My supervising physician was available for consultation. CHIEF COMPLAINT       Chief Complaint   Patient presents with    G Tube Complications     Pt brought to ed by [facility] from a group home, pt mrdd, non-verbal. Staff told ems her g-tube is clogged and has been since 2000 this evening       HISTORY OF PRESENT ILLNESS   (Location, Timing/Onset, Context/Setting, Quality, Duration, Modifying Factors, Severity, Associated Signs and Symptoms)  Note limiting factors. Chief Complaint: G tube complications      Nu Peña is a 59 y.o. female who presents to the emergency department today from group home for evaluation for a G-tube disorder. The patient has a history of autism, MRDD, she is nonverbal.  Apparently the G-tube was noted to be clogged around 8 PM tonight. There is not been any fevers. No vomiting. Patient is otherwise acting at baseline, no other history is able to be obtained at this time. Nursing Notes were all reviewed and agreed with or any disagreements were addressed in the HPI. REVIEW OF SYSTEMS    (2-9 systems for level 4, 10 or more for level 5)     Review of Systems   Unable to perform ROS: Patient nonverbal     Positives and Pertinent negatives as per HPI. Except as noted above in the ROS, all other systems were reviewed and negative.        PAST MEDICAL HISTORY     Past Medical History:   Diagnosis Date    Anemia     Anorexia     Autistic disorder     Convulsions ([facility]     Down syndrome     Dysphagia     Hypothyroid     Malnutrition ([facility] [facility]     Parkinson disease ([facility] [address] [facility]     UTI (urinary tract infection)     Weakness          SURGICAL HISTORY     Past Surgical History: Procedure Laterality Date    GASTROSTOMY TUBE PLACEMENT      GASTROSTOMY TUBE PLACEMENT  2/18/14    GASTROSTOMY TUBE PLACEMENT N/A 5/20/2022    EGD PEG TUBE PLACEMENT performed by Kristie Mckeon MD at [facility]       Previous Medications    ACETAMINOPHEN (TYLENOL) 325 MG TABLET    Take 650 mg by mouth every 6 hours as needed for Pain. CARBIDOPA-LEVODOPA (SINEMET)  MG PER TABLET    Take 1.5 tablets by mouth 3 times daily Indications: Take one and a half tablets by mouth three times per day     DOCUSATE SODIUM (COLACE) 100 MG CAPSULE    Take 100 mg by mouth daily    FUROSEMIDE (LASIX) 20 MG TABLET    Take 1 tablet by mouth 2 times daily    LACTOBACILLUS (CULTURELLE) CAPSULE    Take 1 capsule by mouth 2 times daily (with meals)    LEVOTHYROXINE (SYNTHROID) 100 MCG TABLET    Take 100 mcg by mouth daily     MIDODRINE (PROAMATINE) 2.5 MG TABLET    Take 1 tablet by mouth 3 times daily (with meals)    MULTIPLE VITAMINS-MINERALS (THERAPEUTIC MULTIVITAMIN-MINERALS) TABLET    Take 1 tablet by mouth daily. OMEPRAZOLE (PRILOSEC) 20 MG CAPSULE    Take 20 mg by mouth daily. SERTRALINE (ZOLOFT) 20 MG/ML CONCENTRATED SOLUTION    Take 100 mg by mouth nightly     VALPROIC ACID (DEPAKENE) 250 MG/5ML SOLN ORAL SOLUTION    Take 250 mg by mouth every 8 hours 250 mg in the AM, 250 mg in the afternoon, 500 mg nightly. ALLERGIES     Caffeine and Iodine    FAMILYHISTORY     History reviewed. No pertinent family history. SOCIAL HISTORY       Social History     Tobacco Use    Smoking status: Never    Smokeless tobacco: Never   Substance Use Topics    Alcohol use: No    Drug use: No       SCREENINGS    Constance Coma Scale  Eye Opening: Spontaneous  Best Verbal Response: None  Best Motor Response:  Withdraws from pain  Constance Coma Scale Score: 9        PHYSICAL EXAM    (up to 7 for level 4, 8 or more for level 5)     ED Triage Vitals [10/09/22 2300]   BP Temp Temp Source Heart Rate Resp SpO2 Height Weight   119/73 97.7 °F (36.5 °C) Temporal 67 16 100 % -- 89 lb 8 oz (40.6 kg)       Physical Exam  Vitals and nursing note reviewed. Constitutional:       Appearance: She is well-developed. She is not diaphoretic. HENT:      Head: Normocephalic and atraumatic. Right Ear: External ear normal.      Left Ear: External ear normal.      Nose: Nose normal.   Eyes:      General:         Right eye: No discharge. Left eye: No discharge. Neck:      Trachea: No tracheal deviation. Cardiovascular:      Rate and Rhythm: Normal rate and regular rhythm. Pulmonary:      Effort: Pulmonary effort is normal. No respiratory distress. Breath sounds: Normal breath sounds. No wheezing or rales. Abdominal:      General: Bowel sounds are normal. There is no distension. Palpations: Abdomen is soft. Tenderness: no abdominal tenderness There is no guarding. Comments: G-tube in place   Musculoskeletal:         General: Normal range of motion. Cervical back: Normal range of motion and neck supple. Skin:     General: Skin is warm and dry. Neurological:      General: No focal deficit present. Mental Status: She is alert. Mental status is at baseline. Psychiatric:         Behavior: Behavior normal.       DIAGNOSTIC RESULTS   LABS:    Labs Reviewed - No data to display    When ordered only abnormal lab results are displayed. All other labs were within normal range or not returned as of this dictation. EKG: When ordered, EKG's are interpreted by the Emergency Department Physician in the absence of a cardiologist.  Please see their note for interpretation of EKG.     RADIOLOGY:   Non-plain film images such as CT, Ultrasound and MRI are read by the radiologist. Plain radiographic images are visualized and preliminarily interpreted by the ED Provider with the below findings:        Interpretation per the Radiologist below, if available at the time of this note:    XR INJ CONTRAST GASTRIC TUBE PERC   Final Result   Gastrostomy tube appears in appropriate position. No results found. PROCEDURES   Unless otherwise noted below, none     Feeding Tube    Date/Time: 10/9/2022 11:36 PM  Performed by: River Doss PA-C  Authorized by: River Doss PA-C     Consent:     Consent obtained:  Verbal    Consent given by:  Healthcare agent    Alternatives discussed:  No treatment  Universal protocol:     Patient identity confirmed:  Verbally with patient  Pre-procedure details: Old tube type:  Gastrostomy    Old tube size:  18 Fr  Sedation:     Sedation type:  None  Anesthesia:     Anesthesia method:  None  Procedure details:     Patient position:  Supine    Procedure type:  Replacement    Tube type:  Gastrostomy    Tube size:  18 Fr    Bulb inflation volume:  10    Bulb inflation fluid:  Sterile water  Post-procedure details:     Placement/position confirmation:  X-ray and contrast    Placement difficulty:  None    Bleeding:  None    Procedure completion:  Tolerated well, no immediate complications    CRITICAL CARE TIME       CONSULTS:  None      EMERGENCY DEPARTMENT COURSE and DIFFERENTIAL DIAGNOSIS/MDM:   Vitals:    Vitals:    10/09/22 2300   BP: 119/73   Pulse: 67   Resp: 16   Temp: 97.7 °F (36.5 °C)   TempSrc: Temporal   SpO2: 100%   Weight: 89 lb 8 oz (40.6 kg)       Patient was given the following medications:  Medications   iohexol (OMNIPAQUE 350) solution 20 mL (20 mLs Other Given 10/9/22 2350)         Is this patient to be included in the SEP-1 Core Measure due to severe sepsis or septic shock? No   Exclusion criteria - the patient is NOT to be included for SEP-1 Core Measure due to: Infection is not suspected    Briefly, this is a 61-year-old female who presents emergency department today for evaluation for G-tube complications. The patient has a history of MRDD, and is acting at baseline.   Apparently G-tube was noted to be clogged today and was sent to the ED.    Physical exam, G-tube is in place, with some material in the tube. Attempted to have this flushed by RN, and was unsuccessful. The therefore the G-tube was placed by myself, please see procedure note above. X-ray does confirm    Vital signs are stable she is otherwise acting at baseline, she can manage as an outpatient. She will be transferred to nursing facility in stable condition. No results found for this visit on 10/09/22. I estimate there is LOW risk acute appendicitis, acute cholecystitis, cholangitis, pancreatitis, diverticulitis, perforated viscus, perforated ulcer, colitis, C. diff colitis, ischemic colitis, bowel obstruction, acute dissection, thus I consider the discharge disposition reasonable. Also, there is no evidence or peritonitis, sepsis, or toxicity. Jessica Chisholm and I have discussed the diagnosis and risks, and we agree with discharging home to follow-up with their primary doctor. We also discussed returning to the Emergency Department immediately if new or worsening symptoms occur. We have discussed the symptoms which are most concerning (e.g., bloody stool, fever, changing or worsening pain, vomiting) that necessitate immediate return. FINAL IMPRESSION      1.  Gastrostomy tube dysfunction [facility])          DISPOSITION/PLAN   DISPOSITION Decision To Discharge 10/10/2022 12:14:40 AM      PATIENT REFERRED TO:  Tramaine Bush MD  [address] [address]  [phone]    Schedule an appointment as soon as possible for a visit in 2 days      [facility] Emergency Department  [address] [facility]  [phone]    As needed    DISCHARGE MEDICATIONS:  New Prescriptions    No medications on file       DISCONTINUED MEDICATIONS:  Discontinued Medications    No medications on file              (Please note that portions of this note were completed with a voice recognition program.  Efforts were made to edit the dictations but occasionally words are mis-transcribed.)    Trev Diaz PA-C (electronically signed)            Trev Diaz PA-C  10/10/22 0015

## 2022-11-07 ENCOUNTER — HOSPITAL ENCOUNTER (OUTPATIENT)
Dept: GENERAL RADIOLOGY | Age: 57
Discharge: HOME | End: 2022-11-07
Payer: MEDICARE

## 2022-11-07 ENCOUNTER — HOSPITAL ENCOUNTER (OUTPATIENT)
Age: 57
Discharge: HOME | End: 2022-11-07
Payer: MEDICARE

## 2022-11-07 DIAGNOSIS — R05.1 ACUTE COUGH: ICD-10-CM

## 2022-11-07 PROCEDURE — 71046 X-RAY EXAM CHEST 2 VIEWS: CPT

## 2022-11-15 ENCOUNTER — HOSPITAL ENCOUNTER (EMERGENCY)
Age: 57
Discharge: HOME | End: 2022-11-15
Attending: EMERGENCY MEDICINE
Payer: MEDICARE

## 2022-11-15 ENCOUNTER — APPOINTMENT (OUTPATIENT)
Dept: GENERAL RADIOLOGY | Age: 57
End: 2022-11-15
Payer: MEDICARE

## 2022-11-15 VITALS
WEIGHT: 90 LBS | SYSTOLIC BLOOD PRESSURE: 123 MMHG | HEART RATE: 65 BPM | BODY MASS INDEX: 19.48 KG/M2 | DIASTOLIC BLOOD PRESSURE: 78 MMHG | OXYGEN SATURATION: 100 % | RESPIRATION RATE: 18 BRPM

## 2022-11-15 DIAGNOSIS — K94.23 MALFUNCTION OF GASTROSTOMY TUBE (HCC): Primary | ICD-10-CM

## 2022-11-15 PROCEDURE — 43762 RPLC GTUBE NO REVJ TRC: CPT

## 2022-11-15 PROCEDURE — 49465 FLUORO EXAM OF G/COLON TUBE: CPT

## 2022-11-15 PROCEDURE — 99283 EMERGENCY DEPT VISIT LOW MDM: CPT

## 2022-11-15 NOTE — ED PROVIDER NOTES
[facility]) Emergency [address] [facility]    Jeyosn Nielson MD, am the primary clinician of record. CHIEF COMPLAINT  Chief Complaint   Patient presents with    G Tube Complications     Pt in via [address] [facility], per ems pt gtube not working properly. Pt mrdd. Lives at home with caregiver. HISTORY OF PRESENT ILLNESS  Michell Landon is a 59 y.o. female who presents to the ED complaining of reportedly malfunctioning G-tube. It is still in place. She is a history of Down syndrome and gets tube feeds. History is essentially unobtainable from the patient herself as she is nonverbal but appears nondistressed. I have no reports of acute illness except for G-tube malfunction to address today. No other complaints, modifying factors or associated symptoms. Nursing notes reviewed. Past Medical History:   Diagnosis Date    Anemia     Anorexia     Autistic disorder     Convulsions ([facility] [address]     Down syndrome     Dysphagia     Hypothyroid     Malnutrition ([facility])     Parkinson disease ([facility] [address]     UTI (urinary tract infection)     Weakness      Past Surgical History:   Procedure Laterality Date    GASTROSTOMY TUBE PLACEMENT      GASTROSTOMY TUBE PLACEMENT  2/18/14    GASTROSTOMY TUBE PLACEMENT N/A 5/20/2022    EGD PEG TUBE PLACEMENT performed by Rk Vieira MD at [address] [place] [address]     No family history on file.   Social History     Socioeconomic History    Marital status: Single     Spouse name: Not on file    Number of children: Not on file    Years of education: Not on file    Highest education level: Not on file   Occupational History    Not on file   Tobacco Use    Smoking status: Never    Smokeless tobacco: Never   Substance and Sexual Activity    Alcohol use: No    Drug use: No    Sexual activity: Never   Other Topics Concern    Not on file   Social History Narrative    Not on file     Social Determinants of Health     Financial Resource Strain: Not on file   Food Insecurity: Not on file Transportation Needs: Not on file   Physical Activity: Not on file   Stress: Not on file   Social Connections: Not on file   Intimate Partner Violence: Not on file   Housing Stability: Not on file     No current facility-administered medications for this encounter. Current Outpatient Medications   Medication Sig Dispense Refill    furosemide (LASIX) 20 MG tablet Take 1 tablet by mouth 2 times daily 180 tablet 1    lactobacillus (CULTURELLE) capsule Take 1 capsule by mouth 2 times daily (with meals) 14 capsule 0    midodrine (PROAMATINE) 2.5 MG tablet Take 1 tablet by mouth 3 times daily (with meals) 90 tablet 1    valproic acid (DEPAKENE) 250 MG/5ML SOLN oral solution Take 250 mg by mouth every 8 hours 250 mg in the AM, 250 mg in the afternoon, 500 mg nightly. levothyroxine (SYNTHROID) 100 MCG tablet Take 100 mcg by mouth daily       docusate sodium (COLACE) 100 MG capsule Take 100 mg by mouth daily      acetaminophen (TYLENOL) 325 MG tablet Take 650 mg by mouth every 6 hours as needed for Pain. carbidopa-levodopa (SINEMET)  MG per tablet Take 1.5 tablets by mouth 3 times daily Indications: Take one and a half tablets by mouth three times per day       sertraline (ZOLOFT) 20 MG/ML concentrated solution Take 100 mg by mouth nightly       Multiple Vitamins-Minerals (THERAPEUTIC MULTIVITAMIN-MINERALS) tablet Take 1 tablet by mouth daily. omeprazole (PRILOSEC) 20 MG capsule Take 20 mg by mouth daily. Allergies   Allergen Reactions    Caffeine     Iodine        REVIEW OF SYSTEMS  6 systems reviewed, pertinent positives per HPI otherwise noted to be negative    PHYSICAL EXAM   BP (!) 107/90   Pulse 55   Resp 18   Wt 90 lb (40.8 kg)   SpO2 99%   BMI 19.48 kg/m²    GENERAL APPEARANCE: Awake and alert. Cooperative. Chronically ill appearing but no acute distress. HEAD: Normocephalic. Atraumatic. EYES: PERRL. EOM's grossly intact. ENT: Mucous membranes are moist.   NECK: Supple. Normal ROM. CHEST: Equal symmetric chest rise. RRR. LUNGS: Breathing is unlabored. CTAB  ABDOMEN: Nondistended, nontender, G-tube site without inflammation or drainage noted. EXTREMITIES: MAEE. No acute deformities. SKIN: Warm and dry. No acute rashes. NEUROLOGICAL: Alert and at MRDD baseline. Strength is 5/5 in all extremities and sensation is intact. RADIOLOGY    XR INJ CONTRAST GASTRIC TUBE PERC    Result Date: 11/15/2022  EXAMINATION: ONE SUPINE XRAY VIEW(S) OF THE ABDOMEN 11/15/2022 1:50 pm COMPARISON: 11/07/2022, 10/09/2022 and 05/15/2022. HISTORY: ORDERING SYSTEM PROVIDED HISTORY: replaced G tube TECHNOLOGIST PROVIDED HISTORY: Reason for exam:->replaced G tube Reason for Exam: replaced G tube FINDINGS: Prominent thoracolumbar dextroscoliosis is again noted. No airspace consolidation is identified. A percutaneous gastrostomy tube appears normally located in the stomach. Contrast injected through the tube is noted within the stomach. No extravasation is identified. There is no dilated bowel. Normally located percutaneous gastrostomy tube. ED COURSE/MDM  The patient's ED course was notable for G-tube malfunction. This was replaced as per the procedure note below. No other acute issues today and vitals are stable and reassuring. X-ray confirms position and okay to use new tube. Gastrostomy Tube Replacement Procedure Note    Indication: gastrostomy tube malfunction, tube is blocked, and tube not flushing    Procedure: The patient was placed in the semi recumbent position and the patient's gastric tube was replaced using an 18 [place]ian gastrostomy tube and the bulb was inflated using 10 cc of normal saline. The placement was verified by injection of air with auscultation, injection and aspiration of fluid, and x-ray with contrast injection. The patient tolerated the procedure well. Complications: None      CLINICAL IMPRESSION  1.  Malfunction of gastrostomy tube ([address] DISPOSITION    I have discussed the findings of today's workup with the patient and addressed the patient's questions and concerns. Important warning signs as well as new or worsening symptoms which would necessitate immediate return to the ED were discussed. The plan is to discharge from the ED at this time, and the patient is in stable condition. The patient acknowledged understanding is agreeable with this plan. Follow-up with:  [facility] Emergency Department  [address]  [facility]  [phone]  Go to   For symptom re-evaluation, As needed    This chart was created using Dragon dictation software. Efforts were made by me to ensure accuracy, however some errors may be present due to limitations of this technology.         Sara Figueroa MD  11/15/22 2376

## 2022-11-16 NOTE — ED NOTES
Report given to transport facility, v/u and denies further questions at this time.       Hector Lopez RN  11/15/22 2004

## 2023-02-06 NOTE — PLAN OF CARE
Problem: Discharge Planning  Goal: Discharge to home or other facility with appropriate resources  Outcome: Progressing     Problem: Pain  Goal: Verbalizes/displays adequate comfort level or baseline comfort level  Outcome: Progressing     Problem: Nutrition Deficit:  Goal: Optimize nutritional status  Outcome: Progressing     Problem: Skin/Tissue Integrity  Goal: Absence of new skin breakdown  Description: 1. Monitor for areas of redness and/or skin breakdown  2. Assess vascular access sites hourly  3. Every 4-6 hours minimum:  Change oxygen saturation probe site  4. Every 4-6 hours:  If on nasal continuous positive airway pressure, respiratory therapy assess nares and determine need for appliance change or resting period.   Outcome: Progressing none

## 2023-03-18 ENCOUNTER — HOSPITAL ENCOUNTER (EMERGENCY)
Age: 58
Discharge: HOME | End: 2023-03-18
Attending: EMERGENCY MEDICINE
Payer: MEDICARE

## 2023-03-18 ENCOUNTER — APPOINTMENT (OUTPATIENT)
Dept: CT IMAGING | Age: 58
End: 2023-03-18
Payer: MEDICARE

## 2023-03-18 VITALS
OXYGEN SATURATION: 100 % | TEMPERATURE: 98 F | DIASTOLIC BLOOD PRESSURE: 82 MMHG | SYSTOLIC BLOOD PRESSURE: 93 MMHG | HEART RATE: 64 BPM | RESPIRATION RATE: 20 BRPM

## 2023-03-18 DIAGNOSIS — K56.7 ILEUS (HCC): Primary | ICD-10-CM

## 2023-03-18 LAB
ALBUMIN SERPL-MCNC: 3.3 G/DL (ref 3.4–5)
ALBUMIN/GLOB SERPL: 0.7 {RATIO} (ref 1.1–2.2)
ALP SERPL-CCNC: 92 U/L (ref 40–129)
ALT SERPL-CCNC: 15 U/L (ref 10–40)
ANION GAP SERPL CALCULATED.3IONS-SCNC: 10 MMOL/L (ref 3–16)
AST SERPL-CCNC: 33 U/L (ref 15–37)
BASOPHILS # BLD: 0 K/UL (ref 0–0.2)
BASOPHILS NFR BLD: 0.3 %
BILIRUB SERPL-MCNC: 0.3 MG/DL (ref 0–1)
BUN SERPL-MCNC: 27 MG/DL (ref 7–20)
CALCIUM SERPL-MCNC: 9.6 MG/DL (ref 8.3–10.6)
CHLORIDE SERPL-SCNC: 105 MMOL/L (ref 99–110)
CO2 SERPL-SCNC: 26 MMOL/L (ref 21–32)
CREAT SERPL-MCNC: 0.6 MG/DL (ref 0.6–1.1)
DEPRECATED RDW RBC AUTO: 14 % (ref 12.4–15.4)
EOSINOPHIL # BLD: 0.1 K/UL (ref 0–0.6)
EOSINOPHIL NFR BLD: 0.6 %
GFR SERPLBLD CREATININE-BSD FMLA CKD-EPI: >60 ML/MIN/{1.73_M2}
GLUCOSE SERPL-MCNC: 77 MG/DL (ref 70–99)
HCT VFR BLD AUTO: 42.3 % (ref 36–48)
HGB BLD-MCNC: 13.9 G/DL (ref 12–16)
LYMPHOCYTES # BLD: 1.1 K/UL (ref 1–5.1)
LYMPHOCYTES NFR BLD: 11.4 %
MCH RBC QN AUTO: 33.6 PG (ref 26–34)
MCHC RBC AUTO-ENTMCNC: 32.9 G/DL (ref 31–36)
MCV RBC AUTO: 101.9 FL (ref 80–100)
MONOCYTES # BLD: 1 K/UL (ref 0–1.3)
MONOCYTES NFR BLD: 10.7 %
NEUTROPHILS # BLD: 7.5 K/UL (ref 1.7–7.7)
NEUTROPHILS NFR BLD: 77 %
PLATELET # BLD AUTO: 177 K/UL (ref 135–450)
PMV BLD AUTO: 9.7 FL (ref 5–10.5)
POTASSIUM SERPL-SCNC: 4.4 MMOL/L (ref 3.5–5.1)
PROT SERPL-MCNC: 7.9 G/DL (ref 6.4–8.2)
RBC # BLD AUTO: 4.16 M/UL (ref 4–5.2)
SODIUM SERPL-SCNC: 141 MMOL/L (ref 136–145)
WBC # BLD AUTO: 9.8 K/UL (ref 4–11)

## 2023-03-18 PROCEDURE — 74176 CT ABD & PELVIS W/O CONTRAST: CPT

## 2023-03-18 PROCEDURE — 99284 EMERGENCY DEPT VISIT MOD MDM: CPT

## 2023-03-18 PROCEDURE — 80053 COMPREHEN METABOLIC PANEL: CPT

## 2023-03-18 PROCEDURE — 85025 COMPLETE CBC W/AUTO DIFF WBC: CPT

## 2023-03-18 ASSESSMENT — PAIN - FUNCTIONAL ASSESSMENT
PAIN_FUNCTIONAL_ASSESSMENT: WONG-BAKER FACES
PAIN_FUNCTIONAL_ASSESSMENT: NONE - DENIES PAIN

## 2023-03-18 ASSESSMENT — PAIN SCALES - WONG BAKER: WONGBAKER_NUMERICALRESPONSE: 0

## 2023-03-18 ASSESSMENT — LIFESTYLE VARIABLES
HOW OFTEN DO YOU HAVE A DRINK CONTAINING ALCOHOL: NEVER
HOW MANY STANDARD DRINKS CONTAINING ALCOHOL DO YOU HAVE ON A TYPICAL DAY: PATIENT DOES NOT DRINK

## 2023-03-18 NOTE — ED NOTES
Hermila care provided d/t small loose yellow incontinence of stool     Eleno Cote, RN  03/18/23 4722

## 2023-03-18 NOTE — ED NOTES
Report given to [facility] transport at this time.  Caregiver at bedside and given discharge paperwork     Minus DESIREE Phillip  03/18/23 5003

## 2023-03-18 NOTE — ED PROVIDER NOTES
[address] [facility]  [facility]      Pt Name: Vitaly Massey  MRN: [id number]  [place] 1965  Date ofevaluation: 3/18/2023  Provider: Jaya Barfield MD    CHIEF COMPLAINT       Chief Complaint   Patient presents with    Abdominal Pain     Patient in by [facility] ems from [facility], staff states patient was being fed through her G tube, they felt what they though was a mass that traveled towards ribcage, distended abdomen at present time, patient nonverbal and contracted at baseline       HPI    HISTORY OF PRESENT ILLNESS   (Location/Symptom, Timing/Onset,Context/Setting, Quality, Duration, Modifying Factors, Severity)  Note limiting factors. Vitaly Massey is a 59 y.o. female who presents to the emergency department with a possible \"mass\". This is a nonverbal patient with a history of MR who presents from her group home because the staff noticed with a thought was a mass in her abdomen. The patient has not been vomiting. She has a G-tube in place. There is no history of any fevers or chills. NursingNotes were reviewed. Review of Systems    REVIEW OF SYSTEMS    (2-9 systems for level 4, 10 or more for level 5)     Review of Systems   Constitutional: Negative for fever. HENT: Negative for rhinorrhea and sore throat. Eyes: Negative for redness. Respiratory: Negative for shortness of breath. Cardiovascular: Negative for chest pain. Gastrointestinal: Negative for abdominal pain. Genitourinary: Negative for flank pain. Neurological: Negative for headaches. Hematological: Negative for adenopathy. Psychiatric/Behavioral: Negative for confusion. Except as noted above the remainder of the review of systems was reviewed and negative.        PAST MEDICAL HISTORY     Past Medical History:   Diagnosis Date    Anemia     Anorexia     Autistic disorder     Convulsions ([facility] [address]     Down syndrome     Dysphagia     Hypothyroid Malnutrition ([facility] [address]     Parkinson disease ([facility] [address]     UTI (urinary tract infection)     Weakness          SURGICALHISTORY       Past Surgical History:   Procedure Laterality Date    GASTROSTOMY TUBE PLACEMENT      GASTROSTOMY TUBE PLACEMENT  2/18/14    GASTROSTOMY TUBE PLACEMENT N/A 5/20/2022    EGD PEG TUBE PLACEMENT performed by Amber Rich MD at [facility]       Previous Medications    ACETAMINOPHEN (TYLENOL) 325 MG TABLET    Take 650 mg by mouth every 6 hours as needed for Pain. CARBIDOPA-LEVODOPA (SINEMET)  MG PER TABLET    Take 1.5 tablets by mouth 3 times daily Indications: Take one and a half tablets by mouth three times per day     DOCUSATE SODIUM (COLACE) 100 MG CAPSULE    Take 100 mg by mouth daily    FUROSEMIDE (LASIX) 20 MG TABLET    Take 1 tablet by mouth 2 times daily    LACTOBACILLUS (CULTURELLE) CAPSULE    Take 1 capsule by mouth 2 times daily (with meals)    LEVOTHYROXINE (SYNTHROID) 100 MCG TABLET    Take 100 mcg by mouth daily     MIDODRINE (PROAMATINE) 2.5 MG TABLET    Take 1 tablet by mouth 3 times daily (with meals)    MULTIPLE VITAMINS-MINERALS (THERAPEUTIC MULTIVITAMIN-MINERALS) TABLET    Take 1 tablet by mouth daily. OMEPRAZOLE (PRILOSEC) 20 MG CAPSULE    Take 20 mg by mouth daily. SERTRALINE (ZOLOFT) 20 MG/ML CONCENTRATED SOLUTION    Take 100 mg by mouth nightly     VALPROIC ACID (DEPAKENE) 250 MG/5ML SOLN ORAL SOLUTION    Take 250 mg by mouth every 8 hours 250 mg in the AM, 250 mg in the afternoon, 500 mg nightly. ALLERGIES     Caffeine and Iodine    FAMILY HISTORY     History reviewed. No pertinent family history. SOCIAL HISTORY       Social History     Socioeconomic History    Marital status: Single     Spouse name: None    Number of children: None    Years of education: None    Highest education level: None   Tobacco Use    Smoking status: Never    Smokeless tobacco: Never   Substance and Sexual Activity    Alcohol use:  No Drug use: No    Sexual activity: Never       SCREENINGS    [place] Coma Scale  Eye Opening: Spontaneous  Best Verbal Response: Oriented  Best Motor Response: Obeys commands  [place] Coma Scale Score: 15        PHYSICAL EXAM    (up to 7 for level 4, 8 or more for level 5)     ED Triage Vitals [03/18/23 1401]   BP Temp Temp Source Heart Rate Resp SpO2 Height Weight   (!) 113/93 98 °F (36.7 °C) Axillary 63 16 98 % -- --       Physical Exam:      General Appearance:  Alert, cooperative, appears stated age. Head:  Normocephalic, without obvious abnormality, atraumatic. Eyes:  conjunctiva/corneas clear, EOM's intact. Sclera anicteric. ENT: Mucous remains moist and pink   Neck: Supple, symmetrical, trachea midline, no adenopathy. No jugular venous distention. Lungs:   Clear to auscultation bilaterally   Chest Wall:  No pain to palpation   Heart:   Genitourinary: Regular rate rhythm with no murmurs rubs gallops  Deferred   Abdomen:   Soft and benign. G-tube in place. No pulsatile masses noted. No significant masses noted. Extremities: No clubbing cyanosis or edema with contractures   Pulses: Good throughout   Skin:  No rashes or lesions to exposed skin. Neurologic: Alert and oriented X 3. DIAGNOSTIC RESULTS         RADIOLOGY:   Non-plain filmimages such as CT, Ultrasound and MRI are read by the radiologist. Plain radiographic images are visualized and preliminarily interpreted by the emergency physician with the below findings:    See below    Interpretation per the Radiologist below, if available at the time ofthis note: All incidental findings were discussed with the patient. CT ABDOMEN PELVIS WO CONTRAST Additional Contrast? None   Final Result   1. Nonspecific colonic findings can be seen with adynamic ileus; suspect   diarrhea given the amount of fluid within the colon. No obstructive process   evident.    2. Unremarkable decompressed appearance of the stomach, gastrostomy tube tethering along the ventral aspect of the mid to distal gastric body. 3. Prominent right anterior superior urinary bladder diverticulum. 4. Focal subsegmental atelectasis posterior right lung base. ED BEDSIDE ULTRASOUND:   Performed by ED Physician - none    LABS:  Labs Reviewed   CBC WITH AUTO DIFFERENTIAL - Abnormal; Notable for the following components:       Result Value    .9 (*)     All other components within normal limits   COMPREHENSIVE METABOLIC PANEL W/ REFLEX TO MG FOR LOW K - Abnormal; Notable for the following components:    BUN 27 (*)     Albumin 3.3 (*)     Albumin/Globulin Ratio 0.7 (*)     All other components within normal limits       All other labs were within normal range or not returned as of this dictation. EMERGENCY DEPARTMENT COURSE and DIFFERENTIAL DIAGNOSIS/MDM:   Vitals:    Vitals:    03/18/23 1401 03/18/23 1402   BP: (!) 113/93 (!) 113/93   Pulse: 63    Resp: 16    Temp: 98 °F (36.7 °C)    TempSrc: Axillary    SpO2: 98% 96%           MDM      The patient has remained stable throughout her hospital course. I did a CT of the patient's abdomen pelvis that shows a possible adynamic ileus of her colon. There are no signs of any masses or any obstructions noted. No signs of bowel obstruction. The patients laboratory results are unremarkable. She will be discharged appropriate follow-up with instructions to return if worse. REASSESSMENT              CONSULTS:  None    PROCEDURES:  Unless otherwise noted below, none     Procedures    FINAL IMPRESSION      1. Ileus [facility])          DISPOSITION/PLAN   DISPOSITION Decision To Discharge 03/18/2023 04:17:51 PM      PATIENT REFERREDTO:  Joes Osullivan MD  [id number] [place] [address] [address] [phone]    Call in 2 days  As needed      DISCHARGEMEDICATIONS:  New Prescriptions    No medications on file     Controlled Substances Monitoring:     No flowsheet data found.     (Please note that portions of this note were completed with a voice recognition program.  Efforts were made to edit the dictations but occasionally words are mis-transcribed.)    Arron Concepcion MD (electronically signed)  Attending Emergency Physician          Arron Concepcion MD  03/18/23 5669

## 2023-03-25 ENCOUNTER — APPOINTMENT (OUTPATIENT)
Dept: CT IMAGING | Age: 58
End: 2023-03-25
Payer: MEDICARE

## 2023-03-25 ENCOUNTER — HOSPITAL ENCOUNTER (EMERGENCY)
Age: 58
Discharge: HOME | End: 2023-03-25
Payer: MEDICARE

## 2023-03-25 VITALS
SYSTOLIC BLOOD PRESSURE: 125 MMHG | WEIGHT: 90 LBS | HEIGHT: 57 IN | TEMPERATURE: 98.1 F | RESPIRATION RATE: 16 BRPM | DIASTOLIC BLOOD PRESSURE: 52 MMHG | OXYGEN SATURATION: 94 % | BODY MASS INDEX: 19.41 KG/M2

## 2023-03-25 DIAGNOSIS — K59.00 CONSTIPATION, UNSPECIFIED CONSTIPATION TYPE: ICD-10-CM

## 2023-03-25 DIAGNOSIS — Z43.1 ATTENTION TO G-TUBE (HCC): Primary | ICD-10-CM

## 2023-03-25 PROCEDURE — 99284 EMERGENCY DEPT VISIT MOD MDM: CPT

## 2023-03-25 PROCEDURE — 74176 CT ABD & PELVIS W/O CONTRAST: CPT

## 2023-03-25 NOTE — ED PROVIDER NOTES
[address] [facility]        Pt Name: Vee Goodwin  MRN: [id number]  [place] 1965  Date of evaluation: 3/25/2023  Provider: VICKY Velasco  PCP: Wing Lara MD  Note Started: 4:04 PM EDT 3/25/23      SILVERIO. I have evaluated this patient. My supervising physician was available for consultation. CHIEF COMPLAINT       Chief Complaint   Patient presents with    G Tube Complications     Pt sent in by squad from group home with c/o possible black discharge from g-tube site. HISTORY OF PRESENT ILLNESS: 1 or more Elements     History From: Report/Notes  Limitations to history : MRDD/Nonverbal    Vee Goodwin is a 59 y.o. female with past medical history of autism, Down syndrome, Parkinson's, malnutrition with G-tube dependence who presents to the ED with complaint of possible obstruction. Apparently was seen here last week and had CT scan which showed concern for ileus. Facility reports that patient has had some black discharge from the G-tube and they are concerned for an obstruction. Sent to the ED for further evaluation and treatment. Patient unable to provide history at this time. No documented fever or chills. No reported difficulty with G-tube feeds/medicines. No reported vomiting. No reported changes in bowel movements or decreased urine output. Nursing Notes were all reviewed and agreed with or any disagreements were addressed in the HPI. REVIEW OF SYSTEMS :      Review of Systems   Unable to perform ROS: Patient nonverbal     Positives and Pertinent negatives as per HPI.      SURGICAL HISTORY     Past Surgical History:   Procedure Laterality Date    GASTROSTOMY TUBE PLACEMENT      GASTROSTOMY TUBE PLACEMENT  2/18/14    GASTROSTOMY TUBE PLACEMENT N/A 5/20/2022    EGD PEG TUBE PLACEMENT performed by Margarita Horton MD at [address] [place] [address]       Previous Medications    ACETAMINOPHEN interpretation of EKG. RADIOLOGY:   Non-plain film images such as CT, Ultrasound and MRI are read by the radiologist. Plain radiographic images are visualized and preliminarily interpreted by the ED Provider with the below findings:        Interpretation per the Radiologist below, if available at the time of this note:    CT ABDOMEN PELVIS WO CONTRAST Additional Contrast? None   Final Result   1. No acute findings. A percutaneous gastrostomy tube appears normally   located. No fluid collection is noted along the tract. 2. Moderate stool in the colon and mild rectal distension suggesting   constipation. No results found. No results found. PROCEDURES   Unless otherwise noted below, none     Procedures    CRITICAL CARE TIME (.cctime)       PAST MEDICAL HISTORY      has a past medical history of Anemia, Anorexia, Autistic disorder, Convulsions ([address] Down syndrome, Dysphagia, Hypothyroid, Malnutrition ([address] Parkinson disease ([address] UTI (urinary tract infection), and Weakness. EMERGENCY DEPARTMENT COURSE and DIFFERENTIAL DIAGNOSIS/MDM:   Vitals:    Vitals:    03/25/23 1536 03/25/23 1545   BP: 93/79 93/73   Resp: 16    Temp: 98.1 °F (36.7 °C)    TempSrc: Infrared    SpO2: 96% 95%   Weight: 90 lb (40.8 kg)    Height: 4' 9\" (1.448 m)        Patient was given the following medications:  Medications - No data to display          Is this patient to be included in the SEP-1 Core Measure due to severe sepsis or septic shock? No   Exclusion criteria - the patient is NOT to be included for SEP-1 Core Measure due to:  2+ SIRS criteria are not met    Chronic Conditions affecting care:    has a past medical history of Anemia, Anorexia, Autistic disorder, Convulsions ([address] Down syndrome, Dysphagia, Hypothyroid, Malnutrition ([address] Parkinson disease ([address] UTI (urinary tract infection), and Weakness.     CONSULTS: (Who and What was discussed)  None      Social Determinants Significantly Affecting Health :

## 2023-03-30 ENCOUNTER — APPOINTMENT (OUTPATIENT)
Dept: GENERAL RADIOLOGY | Age: 58
End: 2023-03-30
Payer: MEDICARE

## 2023-03-30 ENCOUNTER — HOSPITAL ENCOUNTER (EMERGENCY)
Age: 58
Discharge: HOME | End: 2023-03-30
Attending: STUDENT IN AN ORGANIZED HEALTH CARE EDUCATION/TRAINING PROGRAM
Payer: MEDICARE

## 2023-03-30 VITALS
SYSTOLIC BLOOD PRESSURE: 101 MMHG | HEART RATE: 55 BPM | OXYGEN SATURATION: 95 % | DIASTOLIC BLOOD PRESSURE: 83 MMHG | TEMPERATURE: 97.6 F | RESPIRATION RATE: 17 BRPM

## 2023-03-30 DIAGNOSIS — Z43.1 ATTENTION TO GASTROSTOMY TUBE (HCC): Primary | ICD-10-CM

## 2023-03-30 PROCEDURE — 49465 FLUORO EXAM OF G/COLON TUBE: CPT

## 2023-03-30 PROCEDURE — 36415 COLL VENOUS BLD VENIPUNCTURE: CPT

## 2023-03-30 PROCEDURE — 6360000004 HC RX CONTRAST MEDICATION: Performed by: STUDENT IN AN ORGANIZED HEALTH CARE EDUCATION/TRAINING PROGRAM

## 2023-03-30 PROCEDURE — 99284 EMERGENCY DEPT VISIT MOD MDM: CPT

## 2023-03-30 RX ORDER — ONDANSETRON 4 MG/1
4 TABLET, FILM COATED ORAL EVERY 8 HOURS PRN
Qty: 20 TABLET | Refills: 0 | Status: SHIPPED | OUTPATIENT
Start: 2023-03-30

## 2023-03-30 RX ADMIN — IOHEXOL 20 ML: 350 INJECTION, SOLUTION INTRAVENOUS at 19:08

## 2023-03-30 NOTE — ED NOTES
Report given to [facility] RN, all questions answered during handoff report, and VSS at this time.      Aravind Viramontes RN  03/30/23 9023

## 2023-03-30 NOTE — ED PROVIDER NOTES
ACETAMINOPHEN (TYLENOL) 325 MG TABLET    Take 650 mg by mouth every 6 hours as needed for Pain. CARBIDOPA-LEVODOPA (SINEMET)  MG PER TABLET    Take 1.5 tablets by mouth 3 times daily Indications: Take one and a half tablets by mouth three times per day     DOCUSATE SODIUM (COLACE) 100 MG CAPSULE    Take 100 mg by mouth daily    FUROSEMIDE (LASIX) 20 MG TABLET    Take 1 tablet by mouth 2 times daily    LACTOBACILLUS (CULTURELLE) CAPSULE    Take 1 capsule by mouth 2 times daily (with meals)    LEVOTHYROXINE (SYNTHROID) 100 MCG TABLET    Take 100 mcg by mouth daily     MIDODRINE (PROAMATINE) 2.5 MG TABLET    Take 1 tablet by mouth 3 times daily (with meals)    MULTIPLE VITAMINS-MINERALS (THERAPEUTIC MULTIVITAMIN-MINERALS) TABLET    Take 1 tablet by mouth daily. OMEPRAZOLE (PRILOSEC) 20 MG CAPSULE    Take 20 mg by mouth daily. SERTRALINE (ZOLOFT) 20 MG/ML CONCENTRATED SOLUTION    Take 100 mg by mouth nightly     VALPROIC ACID (DEPAKENE) 250 MG/5ML SOLN ORAL SOLUTION    Take 250 mg by mouth every 8 hours 250 mg in the AM, 250 mg in the afternoon, 500 mg nightly. ALLERGIES     Caffeine and Iodine    FAMILY HISTORY     History reviewed. No pertinent family history. SOCIAL HISTORY       Social History     Socioeconomic History    Marital status: Single     Spouse name: None    Number of children: None    Years of education: None    Highest education level: None   Tobacco Use    Smoking status: Never    Smokeless tobacco: Never   Substance and Sexual Activity    Alcohol use: No    Drug use: No    Sexual activity: Never       SCREENINGS        [place] Coma Scale  Eye Opening: Spontaneous  Best Verbal Response:  Incomprehensible speech  Best Motor Response: Localizes pain  [place] Coma Scale Score: 11               PHYSICAL EXAM    (up to 7 for level 4, 8 or more for level 5)     ED Triage Vitals   BP Temp Temp src Pulse Resp SpO2 Height Weight   -- -- -- -- -- -- -- --       Physical Given 3/30/23 3039)       Is this patient to be included in the SEP-1 Core Measure due to severe sepsis or septic shock? No   Exclusion criteria - the patient is NOT to be included for SEP-1 Core Measure due to: Infection is not suspected        Course and MDM:  Patient presents for G-tube evaluation. She is nonverbal at baseline. Abdomen is soft, nondistended. G-tube appears in place, I am able to aspirate gastric contents. This tube was secured with a dressing. Plain film injection study was performed showing G-tube in appropriate location. On xray there is no sign of bowel dilation or free air under the diaphragm. Low suspicion for acute obstruction today. Zofran prescribed for vomiting. She has had 2 CT scans in the last 2 weeks showing none surgical process in the abdomen, I do not believe repeat CT imaging is appropriate today. Social Determinants : Patient is illiterate      PROCEDURES:  Unless otherwise noted below, none     Procedures      FINAL IMPRESSION      1. Attention to gastrostomy tube [facility])          DISPOSITION/PLAN   DISPOSITION Decision To Discharge 03/30/2023 07:51:19 PM      PATIENT REFERRED TO:  No follow-up provider specified. DISCHARGE MEDICATIONS:      New Prescriptions    ONDANSETRON (ZOFRAN) 4 MG TABLET    1 tablet by Per G Tube route every 8 hours as needed for Nausea       Controlled Substances Monitoring:    If the patient was prescribed a controlled substance today, the PDMP was reviewed as documented below. No flowsheet data found.     (Please note that portions of this note were completed with a voice recognition program.  Efforts were made to edit the dictations but occasionally words are mis-transcribed.)    Naye Eid MD (electronically signed)  Attending Emergency Physician           Joyce Trivedi MD  03/30/23 1294

## 2023-05-09 ENCOUNTER — APPOINTMENT (OUTPATIENT)
Dept: CT IMAGING | Age: 58
End: 2023-05-09
Payer: MEDICARE

## 2023-05-09 ENCOUNTER — HOSPITAL ENCOUNTER (EMERGENCY)
Age: 58
Discharge: HOME | End: 2023-05-09
Payer: MEDICARE

## 2023-05-09 ENCOUNTER — APPOINTMENT (OUTPATIENT)
Dept: GENERAL RADIOLOGY | Age: 58
End: 2023-05-09
Payer: MEDICARE

## 2023-05-09 VITALS
SYSTOLIC BLOOD PRESSURE: 101 MMHG | DIASTOLIC BLOOD PRESSURE: 65 MMHG | OXYGEN SATURATION: 100 % | RESPIRATION RATE: 18 BRPM | TEMPERATURE: 97.8 F | HEART RATE: 75 BPM

## 2023-05-09 DIAGNOSIS — J18.9 PNEUMONIA OF LEFT LOWER LOBE DUE TO INFECTIOUS ORGANISM: Primary | ICD-10-CM

## 2023-05-09 DIAGNOSIS — N32.3 BLADDER DIVERTICULUM: ICD-10-CM

## 2023-05-09 DIAGNOSIS — K80.20 ASYMPTOMATIC CHOLELITHIASIS: ICD-10-CM

## 2023-05-09 PROCEDURE — 71250 CT THORAX DX C-: CPT

## 2023-05-09 PROCEDURE — 6370000000 HC RX 637 (ALT 250 FOR IP): Performed by: PHYSICIAN ASSISTANT

## 2023-05-09 PROCEDURE — 99284 EMERGENCY DEPT VISIT MOD MDM: CPT

## 2023-05-09 PROCEDURE — 71045 X-RAY EXAM CHEST 1 VIEW: CPT

## 2023-05-09 RX ORDER — DOXYCYCLINE HYCLATE 100 MG/1
100 CAPSULE ORAL 2 TIMES DAILY
Qty: 20 CAPSULE | Refills: 0 | Status: SHIPPED | OUTPATIENT
Start: 2023-05-09 | End: 2023-05-19

## 2023-05-09 RX ORDER — DOXYCYCLINE 25 MG/5ML
100 POWDER, FOR SUSPENSION ORAL ONCE
Status: COMPLETED | OUTPATIENT
Start: 2023-05-09 | End: 2023-05-09

## 2023-05-09 RX ADMIN — DOXYCYCLINE 100 MG: 25 FOR SUSPENSION ORAL at 20:22

## 2023-05-09 NOTE — ED PROVIDER NOTES
pneumonia which is early stage. We will treat with doxycycline. Did not prescribe steroids. Consider Mucinex to be administered by the home care team.  I did recommend PCP reevaluation later this week. The caregivers are aware to return patient should symptoms worsen. At this time there is no sign of sepsis or acute illness. I am the Primary Clinician of Record. FINAL IMPRESSION      1. Pneumonia of left lower lobe due to infectious organism    2. Bladder diverticulum    3. Asymptomatic cholelithiasis          DISPOSITION/PLAN     DISPOSITION Decision To Discharge 05/09/2023 07:42:08 PM      PATIENT REFERRED TO:  Nella Wright MD  [address] [address] [phone]    Schedule an appointment as soon as possible for a visit in 3 days      [facility] Emergency Department  [place] [address]  [phone]  Go to   If symptoms worsen      DISCHARGE MEDICATIONS:  Discharge Medication List as of 5/9/2023  8:26 PM        START taking these medications    Details   doxycycline hyclate (VIBRAMYCIN) 100 MG capsule Take 1 capsule by mouth 2 times daily for 10 days, Disp-20 capsule, R-0Normal             DISCONTINUED MEDICATIONS:  Discharge Medication List as of 5/9/2023  8:26 PM                 (Please note that portions of this note were completed with a voice recognition program.  Efforts were made to edit the dictations but occasionally words are mis-transcribed. )    Olena Stoll PA-C (electronically signed)        Olena Stoll PA-C  05/09/23 0675

## 2023-05-09 NOTE — DISCHARGE INSTRUCTIONS
X-ray showing no abnormality. CT scan obtained due to suspicious cough. Changes noted left lower lung. This could represent early onset of pneumonia. We will treat with antibiotic doxycycline. Incidental finding of bladder diverticulum and recommend follow-up with urology. Incidental finding of asymptomatic cholelithiasis. Recommend observation for abdominal pain right upper quadrant. If pain right upper quadrant recommend further evaluation by general surgery. Impression:    1. Extensive secretions within the tracheobronchial tree with left lower lobe   bronchiolitis and small right pleural effusion. 2. Cholelithiasis.    3. A diverticulum along the right side of the urinary bladder has enlarged

## 2023-05-15 ENCOUNTER — APPOINTMENT (OUTPATIENT)
Dept: GENERAL RADIOLOGY | Age: 58
End: 2023-05-15
Payer: MEDICARE

## 2023-05-15 ENCOUNTER — HOSPITAL ENCOUNTER (INPATIENT)
Age: 58
LOS: 4 days | Discharge: NURSING, REHAB OR LONG-TERM CARE | End: 2023-05-19
Attending: INTERNAL MEDICINE | Admitting: INTERNAL MEDICINE
Payer: MEDICARE

## 2023-05-15 DIAGNOSIS — R19.7 DIARRHEA, UNSPECIFIED TYPE: ICD-10-CM

## 2023-05-15 DIAGNOSIS — J18.9 PNEUMONIA OF LEFT LOWER LOBE DUE TO INFECTIOUS ORGANISM: Primary | ICD-10-CM

## 2023-05-15 PROBLEM — J15.9 BACTERIAL PNEUMONIA: Status: ACTIVE | Noted: 2023-05-15

## 2023-05-15 LAB
ALBUMIN SERPL-MCNC: 3.6 G/DL (ref 3.4–5)
ALP SERPL-CCNC: 71 U/L (ref 40–129)
ALT SERPL-CCNC: 15 U/L (ref 10–40)
ANION GAP SERPL CALCULATED.3IONS-SCNC: 11 MMOL/L (ref 3–16)
AST SERPL-CCNC: 27 U/L (ref 15–37)
BILIRUB DIRECT SERPL-MCNC: <0.2 MG/DL (ref 0–0.3)
BILIRUB INDIRECT SERPL-MCNC: NORMAL MG/DL (ref 0–1)
BILIRUB SERPL-MCNC: 0.3 MG/DL (ref 0–1)
BUN SERPL-MCNC: 35 MG/DL (ref 7–20)
C DIFF TOX A+B STL QL IA: NORMAL
CALCIUM SERPL-MCNC: 9.2 MG/DL (ref 8.3–10.6)
CHLORIDE SERPL-SCNC: 110 MMOL/L (ref 99–110)
CO2 SERPL-SCNC: 24 MMOL/L (ref 21–32)
CREAT SERPL-MCNC: 0.7 MG/DL (ref 0.6–1.1)
GFR SERPLBLD CREATININE-BSD FMLA CKD-EPI: >60 ML/MIN/{1.73_M2}
GLUCOSE SERPL-MCNC: 100 MG/DL (ref 70–99)
LACTATE BLDV-SCNC: 1.5 MMOL/L (ref 0.4–2)
LIPASE SERPL-CCNC: 27 U/L (ref 13–60)
POTASSIUM SERPL-SCNC: 4 MMOL/L (ref 3.5–5.1)
PROCALCITONIN SERPL IA-MCNC: 0.06 NG/ML (ref 0–0.15)
PROT SERPL-MCNC: 8.2 G/DL (ref 6.4–8.2)
SARS-COV-2 RDRP RESP QL NAA+PROBE: NOT DETECTED
SODIUM SERPL-SCNC: 145 MMOL/L (ref 136–145)

## 2023-05-15 PROCEDURE — 6370000000 HC RX 637 (ALT 250 FOR IP): Performed by: INTERNAL MEDICINE

## 2023-05-15 PROCEDURE — 99285 EMERGENCY DEPT VISIT HI MDM: CPT

## 2023-05-15 PROCEDURE — 71045 X-RAY EXAM CHEST 1 VIEW: CPT

## 2023-05-15 PROCEDURE — 87506 IADNA-DNA/RNA PROBE TQ 6-11: CPT

## 2023-05-15 PROCEDURE — 87324 CLOSTRIDIUM AG IA: CPT

## 2023-05-15 PROCEDURE — 83605 ASSAY OF LACTIC ACID: CPT

## 2023-05-15 PROCEDURE — 84145 PROCALCITONIN (PCT): CPT

## 2023-05-15 PROCEDURE — 80048 BASIC METABOLIC PNL TOTAL CA: CPT

## 2023-05-15 PROCEDURE — 80076 HEPATIC FUNCTION PANEL: CPT

## 2023-05-15 PROCEDURE — 85025 COMPLETE CBC W/AUTO DIFF WBC: CPT

## 2023-05-15 PROCEDURE — 1200000000 HC SEMI PRIVATE

## 2023-05-15 PROCEDURE — 6360000002 HC RX W HCPCS: Performed by: INTERNAL MEDICINE

## 2023-05-15 PROCEDURE — 87635 SARS-COV-2 COVID-19 AMP PRB: CPT

## 2023-05-15 PROCEDURE — 2580000003 HC RX 258: Performed by: INTERNAL MEDICINE

## 2023-05-15 PROCEDURE — 96365 THER/PROPH/DIAG IV INF INIT: CPT

## 2023-05-15 PROCEDURE — 87449 NOS EACH ORGANISM AG IA: CPT

## 2023-05-15 PROCEDURE — 83690 ASSAY OF LIPASE: CPT

## 2023-05-15 PROCEDURE — 87040 BLOOD CULTURE FOR BACTERIA: CPT

## 2023-05-15 RX ORDER — M-VIT,TX,IRON,MINS/CALC/FOLIC 27MG-0.4MG
1 TABLET ORAL DAILY
Status: DISCONTINUED | OUTPATIENT
Start: 2023-05-16 | End: 2023-05-20 | Stop reason: HOSPADM

## 2023-05-15 RX ORDER — 0.9 % SODIUM CHLORIDE 0.9 %
500 INTRAVENOUS SOLUTION INTRAVENOUS ONCE
Status: COMPLETED | OUTPATIENT
Start: 2023-05-15 | End: 2023-05-15

## 2023-05-15 RX ORDER — LEVOTHYROXINE SODIUM 0.15 MG/1
150 TABLET ORAL DAILY
Status: ON HOLD | COMMUNITY

## 2023-05-15 RX ORDER — ACETAMINOPHEN 650 MG/1
650 SUPPOSITORY RECTAL EVERY 6 HOURS PRN
Status: DISCONTINUED | OUTPATIENT
Start: 2023-05-15 | End: 2023-05-20 | Stop reason: HOSPADM

## 2023-05-15 RX ORDER — POLYETHYLENE GLYCOL 3350 17 G/17G
17 POWDER, FOR SOLUTION ORAL DAILY PRN
Status: DISCONTINUED | OUTPATIENT
Start: 2023-05-15 | End: 2023-05-20 | Stop reason: HOSPADM

## 2023-05-15 RX ORDER — SODIUM CHLORIDE 9 MG/ML
INJECTION, SOLUTION INTRAVENOUS PRN
Status: DISCONTINUED | OUTPATIENT
Start: 2023-05-15 | End: 2023-05-20 | Stop reason: HOSPADM

## 2023-05-15 RX ORDER — SODIUM CHLORIDE 0.9 % (FLUSH) 0.9 %
5-40 SYRINGE (ML) INJECTION EVERY 12 HOURS SCHEDULED
Status: DISCONTINUED | OUTPATIENT
Start: 2023-05-15 | End: 2023-05-20 | Stop reason: HOSPADM

## 2023-05-15 RX ORDER — MIDODRINE HYDROCHLORIDE 5 MG/1
2.5 TABLET ORAL
Status: DISCONTINUED | OUTPATIENT
Start: 2023-05-16 | End: 2023-05-20 | Stop reason: HOSPADM

## 2023-05-15 RX ORDER — ONDANSETRON 4 MG/1
4 TABLET, FILM COATED ORAL EVERY 8 HOURS PRN
Status: DISCONTINUED | OUTPATIENT
Start: 2023-05-15 | End: 2023-05-15 | Stop reason: SDUPTHER

## 2023-05-15 RX ORDER — DOCUSATE SODIUM 100 MG/1
100 CAPSULE, LIQUID FILLED ORAL DAILY PRN
Status: DISCONTINUED | OUTPATIENT
Start: 2023-05-15 | End: 2023-05-20 | Stop reason: HOSPADM

## 2023-05-15 RX ORDER — ONDANSETRON 4 MG/1
4 TABLET, ORALLY DISINTEGRATING ORAL EVERY 8 HOURS PRN
Status: DISCONTINUED | OUTPATIENT
Start: 2023-05-15 | End: 2023-05-20 | Stop reason: HOSPADM

## 2023-05-15 RX ORDER — VALPROIC ACID 250 MG/5ML
250 SOLUTION ORAL EVERY 8 HOURS SCHEDULED
Status: DISCONTINUED | OUTPATIENT
Start: 2023-05-15 | End: 2023-05-15

## 2023-05-15 RX ORDER — SODIUM CHLORIDE 0.9 % (FLUSH) 0.9 %
5-40 SYRINGE (ML) INJECTION PRN
Status: DISCONTINUED | OUTPATIENT
Start: 2023-05-15 | End: 2023-05-20 | Stop reason: HOSPADM

## 2023-05-15 RX ORDER — ONDANSETRON 2 MG/ML
4 INJECTION INTRAMUSCULAR; INTRAVENOUS EVERY 6 HOURS PRN
Status: DISCONTINUED | OUTPATIENT
Start: 2023-05-15 | End: 2023-05-20 | Stop reason: HOSPADM

## 2023-05-15 RX ORDER — VALPROIC ACID 250 MG/5ML
250 SOLUTION ORAL 2 TIMES DAILY
Status: DISCONTINUED | OUTPATIENT
Start: 2023-05-16 | End: 2023-05-20 | Stop reason: HOSPADM

## 2023-05-15 RX ORDER — ENOXAPARIN SODIUM 100 MG/ML
40 INJECTION SUBCUTANEOUS DAILY
Status: DISCONTINUED | OUTPATIENT
Start: 2023-05-16 | End: 2023-05-20 | Stop reason: HOSPADM

## 2023-05-15 RX ORDER — PANTOPRAZOLE SODIUM 40 MG/1
40 TABLET, DELAYED RELEASE ORAL
Status: DISCONTINUED | OUTPATIENT
Start: 2023-05-16 | End: 2023-05-20 | Stop reason: HOSPADM

## 2023-05-15 RX ORDER — FAMOTIDINE 20 MG/1
20 TABLET, FILM COATED ORAL 2 TIMES DAILY
Status: ON HOLD | COMMUNITY
End: 2023-05-19 | Stop reason: HOSPADM

## 2023-05-15 RX ORDER — VALPROIC ACID 250 MG/5ML
500 SOLUTION ORAL NIGHTLY
Status: DISCONTINUED | OUTPATIENT
Start: 2023-05-15 | End: 2023-05-20 | Stop reason: HOSPADM

## 2023-05-15 RX ORDER — LEVOTHYROXINE SODIUM 0.15 MG/1
150 TABLET ORAL DAILY
Status: DISCONTINUED | OUTPATIENT
Start: 2023-05-16 | End: 2023-05-20 | Stop reason: HOSPADM

## 2023-05-15 RX ORDER — ACETAMINOPHEN 325 MG/1
650 TABLET ORAL EVERY 6 HOURS PRN
Status: DISCONTINUED | OUTPATIENT
Start: 2023-05-15 | End: 2023-05-20 | Stop reason: HOSPADM

## 2023-05-15 RX ADMIN — PIPERACILLIN AND TAZOBACTAM 3375 MG: 3; .375 INJECTION, POWDER, LYOPHILIZED, FOR SOLUTION INTRAVENOUS at 18:18

## 2023-05-15 RX ADMIN — SODIUM CHLORIDE 500 ML: 9 INJECTION, SOLUTION INTRAVENOUS at 17:26

## 2023-05-15 RX ADMIN — VALPROIC ACID 500 MG: 250 SOLUTION ORAL at 23:50

## 2023-05-15 ASSESSMENT — PAIN DESCRIPTION - LOCATION: LOCATION: GENERALIZED

## 2023-05-15 ASSESSMENT — PAIN - FUNCTIONAL ASSESSMENT: PAIN_FUNCTIONAL_ASSESSMENT: WONG-BAKER FACES

## 2023-05-15 ASSESSMENT — LIFESTYLE VARIABLES
HOW OFTEN DO YOU HAVE A DRINK CONTAINING ALCOHOL: NEVER
HOW OFTEN DO YOU HAVE A DRINK CONTAINING ALCOHOL: NEVER
HOW MANY STANDARD DRINKS CONTAINING ALCOHOL DO YOU HAVE ON A TYPICAL DAY: PATIENT DOES NOT DRINK

## 2023-05-15 NOTE — H&P
HOSPITALISTS HISTORY AND PHYSICAL    5/15/2023 6:52 PM    Patient Information:  Lisa Miranda is a 59 y.o. female [id number]  PCP:  Gogo Monge MD (Tel: [phone] )    Chief complaint:    Chief Complaint   Patient presents with    Diarrhea     Arrived per caregiver brought pt in d/t severe diarrhea that started 2 wks ago; was here last week for same c/o     History of Present Illness:  Michell Landon is a 59 y.o. female who has MRDD and autism and is nonverbal has a PEG tube was diagnosed with pneumonia 1 week ago started on doxycycline patient developed multiple episode of diarrhea history taking from the caregiver cannot give me much further history. In the ED x-ray shows worsening pneumonia but he 92% on room air heart rate is 102, count is up to 12.7. REVIEW OF SYSTEMS:   Nonverbal unabel to give further hx    Past Medical History:   has a past medical history of Anemia, Anorexia, Autistic disorder, Convulsions ([address] Down syndrome, Dysphagia, Hypothyroid, Malnutrition ([address] Parkinson disease ([address] UTI (urinary tract infection), and Weakness. Past Surgical History:   has a past surgical history that includes Gastrostomy tube placement; Gastrostomy tube placement (2/18/14); and Gastrostomy tube placement (N/A, 5/20/2022). Medications:  No current facility-administered medications on file prior to encounter.      Current Outpatient Medications on File Prior to Encounter   Medication Sig Dispense Refill    famotidine (PEPCID) 20 MG tablet Take 1 tablet by mouth 2 times daily      levothyroxine (SYNTHROID) 150 MCG tablet Take 1 tablet by mouth Daily      doxycycline hyclate (VIBRAMYCIN) 100 MG capsule Take 1 capsule by mouth 2 times daily for 10 days 20 capsule 0    ondansetron (ZOFRAN) 4 MG tablet 1 tablet by Per G Tube route every 8 hours as needed for Nausea 20 tablet 0    furosemide (LASIX) 20 MG

## 2023-05-15 NOTE — ED NOTES
Patient dependent at baseline. Pt is from group home. PT's caregiver at bedside.       Kiley Rodriguez RN  05/15/23 5827

## 2023-05-15 NOTE — ED PROVIDER NOTES
EMERGENCY MEDICINE PROVIDER NOTE    Patient Identification  Pt Name: Benny Kruse  MRN: [id number]  [place] 1965  Date of evaluation: 5/15/2023  Provider: Heraclio Whitmore DO  PCP: Rebekah Beth MD    Chief Complaint  Diarrhea (Arrived per caregiver brought pt in d/t severe diarrhea that started 2 wks ago; was here last week for same c/o)      HPI  (History provided by caregiver)  This is a 59 y.o. female who was brought in by self for diarrhea. Patient has a history of Down syndrome and autism. Patient is not able to communicate so the history was obtained from the caregiver. Patient was recently treated for pneumonia using doxycycline. Over the past couple days she has had severe diarrhea. Patient does receive feedings via G-tube. I have reviewed the following nursing documentation:  Allergies: Caffeine and Iodine    Past medical history:   Past Medical History:   Diagnosis Date    Anemia     Anorexia     Autistic disorder     MRDD    Convulsions ([address]     Down syndrome     Dysphagia     G tube feedings ([address]     PEG tube    Hypothyroid     Malnutrition ([address]     Parkinson disease ([address]     UTI (urinary tract infection)     Weakness      Past surgical history:   Past Surgical History:   Procedure Laterality Date    GASTROSTOMY TUBE PLACEMENT      GASTROSTOMY TUBE PLACEMENT  2/18/14    GASTROSTOMY TUBE PLACEMENT N/A 5/20/2022    EGD PEG TUBE PLACEMENT performed by Lisa Fregoso MD at [facility] medications:   Previous Medications    ACETAMINOPHEN (TYLENOL) 325 MG TABLET    Take 2 tablets by mouth every 6 hours as needed for Pain    CARBIDOPA-LEVODOPA (SINEMET)  MG PER TABLET    Take 1.5 tablets by mouth 3 times daily Indications:  Take one and a half tablets by mouth three times per day    DOCUSATE SODIUM (COLACE) 100 MG CAPSULE    Take 1 capsule by mouth daily    DOXYCYCLINE HYCLATE (VIBRAMYCIN) 100 MG CAPSULE    Take 1 capsule by mouth 2 times daily for 10 days    FAMOTIDINE

## 2023-05-15 NOTE — ED TRIAGE NOTES
Pt arrived per caregiver d/t severe diarrhea. Per caregiver pt was here last week d/t same c/o. No colace was given per medication regimen. On tube feedings scheduled.

## 2023-05-16 PROBLEM — Z93.1 GASTROSTOMY TUBE IN PLACE (HCC): Status: ACTIVE | Noted: 2023-05-16

## 2023-05-16 PROBLEM — R65.10 SIRS (SYSTEMIC INFLAMMATORY RESPONSE SYNDROME) (HCC): Status: ACTIVE | Noted: 2023-05-16

## 2023-05-16 PROBLEM — R19.7 DIARRHEA: Status: ACTIVE | Noted: 2023-05-16

## 2023-05-16 PROBLEM — E03.9 HYPOTHYROIDISM: Status: ACTIVE | Noted: 2023-05-16

## 2023-05-16 LAB
ANION GAP SERPL CALCULATED.3IONS-SCNC: 5 MMOL/L (ref 3–16)
ANISOCYTOSIS BLD QL SMEAR: ABNORMAL
BASOPHILS # BLD: 0 K/UL (ref 0–0.2)
BASOPHILS # BLD: 0.1 K/UL (ref 0–0.2)
BASOPHILS NFR BLD: 0 %
BASOPHILS NFR BLD: 0.7 %
BUN SERPL-MCNC: 32 MG/DL (ref 7–20)
CALCIUM SERPL-MCNC: 9.1 MG/DL (ref 8.3–10.6)
CHLORIDE SERPL-SCNC: 109 MMOL/L (ref 99–110)
CO2 SERPL-SCNC: 28 MMOL/L (ref 21–32)
CREAT SERPL-MCNC: <0.5 MG/DL (ref 0.6–1.1)
DEPRECATED RDW RBC AUTO: 12.5 % (ref 12.4–15.4)
DEPRECATED RDW RBC AUTO: 12.8 % (ref 12.4–15.4)
EOSINOPHIL # BLD: 0 K/UL (ref 0–0.6)
EOSINOPHIL # BLD: 0 K/UL (ref 0–0.6)
EOSINOPHIL NFR BLD: 0 %
EOSINOPHIL NFR BLD: 0 %
GFR SERPLBLD CREATININE-BSD FMLA CKD-EPI: >60 ML/MIN/{1.73_M2}
GI PATHOGENS PNL STL NAA+PROBE: NORMAL
GLUCOSE BLD-MCNC: 95 MG/DL (ref 70–99)
GLUCOSE BLD-MCNC: 97 MG/DL (ref 70–99)
GLUCOSE SERPL-MCNC: 87 MG/DL (ref 70–99)
HCT VFR BLD AUTO: 41.8 % (ref 36–48)
HCT VFR BLD AUTO: 48.5 % (ref 36–48)
HGB BLD-MCNC: 13.7 G/DL (ref 12–16)
HGB BLD-MCNC: 16 G/DL (ref 12–16)
LYMPHOCYTES # BLD: 1.7 K/UL (ref 1–5.1)
LYMPHOCYTES # BLD: 2.3 K/UL (ref 1–5.1)
LYMPHOCYTES NFR BLD: 13.3 %
LYMPHOCYTES NFR BLD: 21 %
MCH RBC QN AUTO: 33.7 PG (ref 26–34)
MCH RBC QN AUTO: 34.1 PG (ref 26–34)
MCHC RBC AUTO-ENTMCNC: 32.7 G/DL (ref 31–36)
MCHC RBC AUTO-ENTMCNC: 32.9 G/DL (ref 31–36)
MCV RBC AUTO: 103 FL (ref 80–100)
MCV RBC AUTO: 103.7 FL (ref 80–100)
METAMYELOCYTES NFR BLD MANUAL: 1 %
MONOCYTES # BLD: 0.5 K/UL (ref 0–1.3)
MONOCYTES # BLD: 1.9 K/UL (ref 0–1.3)
MONOCYTES NFR BLD: 15 %
MONOCYTES NFR BLD: 5 %
MRSA DNA SPEC QL NAA+PROBE: NORMAL
NEUTROPHILS # BLD: 8.1 K/UL (ref 1.7–7.7)
NEUTROPHILS # BLD: 9 K/UL (ref 1.7–7.7)
NEUTROPHILS NFR BLD: 71 %
NEUTROPHILS NFR BLD: 71 %
NEUTS BAND NFR BLD MANUAL: 2 % (ref 0–7)
PATH INTERP BLD-IMP: NO
PERFORMED ON: NORMAL
PERFORMED ON: NORMAL
PLATELET # BLD AUTO: 184 K/UL (ref 135–450)
PLATELET # BLD AUTO: 236 K/UL (ref 135–450)
PLATELET BLD QL SMEAR: ADEQUATE
PMV BLD AUTO: 9.5 FL (ref 5–10.5)
PMV BLD AUTO: 9.7 FL (ref 5–10.5)
POTASSIUM SERPL-SCNC: 4.3 MMOL/L (ref 3.5–5.1)
RBC # BLD AUTO: 4.06 M/UL (ref 4–5.2)
RBC # BLD AUTO: 4.68 M/UL (ref 4–5.2)
SLIDE REVIEW: ABNORMAL
SODIUM SERPL-SCNC: 142 MMOL/L (ref 136–145)
VANCOMYCIN SERPL-MCNC: 16.2 UG/ML
WBC # BLD AUTO: 10.9 K/UL (ref 4–11)
WBC # BLD AUTO: 12.7 K/UL (ref 4–11)

## 2023-05-16 PROCEDURE — 36415 COLL VENOUS BLD VENIPUNCTURE: CPT

## 2023-05-16 PROCEDURE — 2580000003 HC RX 258: Performed by: PHYSICIAN ASSISTANT

## 2023-05-16 PROCEDURE — 1200000000 HC SEMI PRIVATE

## 2023-05-16 PROCEDURE — 87641 MR-STAPH DNA AMP PROBE: CPT

## 2023-05-16 PROCEDURE — 6370000000 HC RX 637 (ALT 250 FOR IP): Performed by: INTERNAL MEDICINE

## 2023-05-16 PROCEDURE — 80048 BASIC METABOLIC PNL TOTAL CA: CPT

## 2023-05-16 PROCEDURE — 80202 ASSAY OF VANCOMYCIN: CPT

## 2023-05-16 PROCEDURE — 99223 1ST HOSP IP/OBS HIGH 75: CPT | Performed by: INTERNAL MEDICINE

## 2023-05-16 PROCEDURE — 85025 COMPLETE CBC W/AUTO DIFF WBC: CPT

## 2023-05-16 PROCEDURE — 6360000002 HC RX W HCPCS: Performed by: INTERNAL MEDICINE

## 2023-05-16 PROCEDURE — 2580000003 HC RX 258: Performed by: INTERNAL MEDICINE

## 2023-05-16 RX ORDER — SODIUM CHLORIDE 9 MG/ML
INJECTION, SOLUTION INTRAVENOUS CONTINUOUS
Status: DISCONTINUED | OUTPATIENT
Start: 2023-05-16 | End: 2023-05-17

## 2023-05-16 RX ADMIN — CEFEPIME 2000 MG: 2 INJECTION, POWDER, FOR SOLUTION INTRAVENOUS at 18:27

## 2023-05-16 RX ADMIN — VALPROIC ACID 500 MG: 250 SOLUTION ORAL at 20:49

## 2023-05-16 RX ADMIN — ENOXAPARIN SODIUM 40 MG: 100 INJECTION SUBCUTANEOUS at 09:46

## 2023-05-16 RX ADMIN — CARBIDOPA AND LEVODOPA 1.5 TABLET: 25; 100 TABLET ORAL at 14:38

## 2023-05-16 RX ADMIN — MIDODRINE HYDROCHLORIDE 2.5 MG: 5 TABLET ORAL at 12:30

## 2023-05-16 RX ADMIN — VALPROIC ACID 250 MG: 250 SOLUTION ORAL at 14:38

## 2023-05-16 RX ADMIN — SODIUM CHLORIDE: 9 INJECTION, SOLUTION INTRAVENOUS at 14:05

## 2023-05-16 RX ADMIN — MIDODRINE HYDROCHLORIDE 2.5 MG: 5 TABLET ORAL at 09:36

## 2023-05-16 RX ADMIN — VANCOMYCIN HYDROCHLORIDE 1000 MG: 1 INJECTION, POWDER, LYOPHILIZED, FOR SOLUTION INTRAVENOUS at 22:56

## 2023-05-16 RX ADMIN — MIDODRINE HYDROCHLORIDE 2.5 MG: 5 TABLET ORAL at 18:27

## 2023-05-16 RX ADMIN — LEVOTHYROXINE SODIUM 150 MCG: 150 TABLET ORAL at 05:29

## 2023-05-16 RX ADMIN — CARBIDOPA AND LEVODOPA 1.5 TABLET: 25; 100 TABLET ORAL at 00:22

## 2023-05-16 RX ADMIN — CARBIDOPA AND LEVODOPA 1.5 TABLET: 25; 100 TABLET ORAL at 09:36

## 2023-05-16 RX ADMIN — VANCOMYCIN HYDROCHLORIDE 1000 MG: 1 INJECTION, POWDER, LYOPHILIZED, FOR SOLUTION INTRAVENOUS at 11:27

## 2023-05-16 RX ADMIN — SERTRALINE 100 MG: 50 TABLET, FILM COATED ORAL at 00:05

## 2023-05-16 RX ADMIN — Medication 10 ML: at 20:49

## 2023-05-16 RX ADMIN — VALPROIC ACID 250 MG: 250 SOLUTION ORAL at 09:36

## 2023-05-16 RX ADMIN — CEFEPIME 2000 MG: 2 INJECTION, POWDER, FOR SOLUTION INTRAVENOUS at 05:30

## 2023-05-16 RX ADMIN — PANTOPRAZOLE SODIUM 40 MG: 40 TABLET, DELAYED RELEASE ORAL at 05:29

## 2023-05-16 RX ADMIN — Medication 10 ML: at 00:05

## 2023-05-16 RX ADMIN — MULTIPLE VITAMINS W/ MINERALS TAB 1 TABLET: TAB at 09:36

## 2023-05-16 RX ADMIN — SERTRALINE 100 MG: 50 TABLET, FILM COATED ORAL at 20:49

## 2023-05-16 RX ADMIN — VANCOMYCIN HYDROCHLORIDE 1250 MG: 1.25 INJECTION, POWDER, LYOPHILIZED, FOR SOLUTION INTRAVENOUS at 00:03

## 2023-05-16 RX ADMIN — CARBIDOPA AND LEVODOPA 1.5 TABLET: 25; 100 TABLET ORAL at 20:49

## 2023-05-16 NOTE — CARE COORDINATION
Discharge Planning:     (CM) called and spoke with patient's Caregiver listed on chart, Pramod Hirsch ([phone]), who confirmed that patient is a group home resident at the following group home:    [address] [facility] [address] [facility] confirmed that patient will be returning to group home upon discharge. Suzan Santoro confirmed that CM Team can call her and that group home staff will pick patient up from hospital.    CM called patient's Legal Guardian [facility] ([phone]), and left a voicemail requesting a callback so above information can be confirmed. Silas Apley MSW, LISW-S, [facility]  [employer] -   [phone]    Electronically signed by Silas Apley, MSW on 5/16/2023 at 12:52 PM        Update at [phone]:  CM received a call from [facility] ([phone]), who confirmed that patient will return to [address] [place] [address] upon discharge. CM informed of Palliative Care consult for patient.       Silas Apley MSW, LISW-S, [facility]  [employer] -   [phone]    Electronically signed by Silas Apley, MSW on 5/16/2023 at 3:44 PM

## 2023-05-16 NOTE — CONSULTS
Infectious Diseases   Consult Note        Admission Date: 5/15/2023  Hospital Day: Hospital Day: 2   Attending: Murali Lancaster MD  Date of service: 5/16/23     Reason for admission: Bacterial pneumonia [J15.9]  Pneumonia of left lower lobe due to infectious organism [J18.9]  Diarrhea, unspecified type [R19.7]    Chief complaint/ Reason for consult: Systemic inflammatory response syndrome    Microbiology:        I have reviewed allavailable micro lab data and cultures    Blood culture (2/2) - collected on 5/15/2023: In process      Antibiotics and immunizations:       Current antibiotics: All antibiotics and their doses were reviewed by me    Recent Abx Admin                     vancomycin (VANCOCIN) 1,000 mg in sodium chloride 0.9 % 250 mL IVPB ([id number]) (mg) 1,000 mg New Bag 05/16/23 1127    cefepime (MAXIPIME) 2,000 mg in sodium chloride 0.9 % 50 mL IVPB (mini-bag) (mg) 2,000 mg New Bag 05/16/23 0530    vancomycin (VANCOCIN) 1,250 mg in sodium chloride 0.9 % 250 mL IVPB ([id number]) (mg) 1,250 mg New Bag 05/16/23 0003    piperacillin-tazobactam (ZOSYN) 3,375 mg in sodium chloride 0.9 % 50 mL IVPB (mini-bag) (mg) 3,375 mg New Bag 05/15/23 1818                      Immunization History: All immunization history was reviewed by me today. There is no immunization history on file for this patient. Known drug allergies: All allergies were reviewed and updated    Allergies   Allergen Reactions    Caffeine      Unsure of reaction    Iodine      Unsure of reaction       Social history:     Social History:  All social andepidemiologic history was reviewed and updated by me today as needed. Tobacco use:   reports that she has never smoked. She has never used smokeless tobacco.  Alcohol use:   reports no history of alcohol use. Currently lives in: [facility] [address]   reports no history of drug use.      COVID VACCINATION AND LAB RESULT RECORDS:     Internal Administration   First Dose      Second Dose

## 2023-05-17 ENCOUNTER — APPOINTMENT (OUTPATIENT)
Dept: CT IMAGING | Age: 58
End: 2023-05-17
Payer: MEDICARE

## 2023-05-17 LAB
ANION GAP SERPL CALCULATED.3IONS-SCNC: 7 MMOL/L (ref 3–16)
BASOPHILS # BLD: 0.1 K/UL (ref 0–0.2)
BASOPHILS NFR BLD: 1.3 %
BUN SERPL-MCNC: 31 MG/DL (ref 7–20)
CALCIUM SERPL-MCNC: 8.8 MG/DL (ref 8.3–10.6)
CHLORIDE SERPL-SCNC: 113 MMOL/L (ref 99–110)
CO2 SERPL-SCNC: 27 MMOL/L (ref 21–32)
CREAT SERPL-MCNC: <0.5 MG/DL (ref 0.6–1.1)
DEPRECATED RDW RBC AUTO: 12.6 % (ref 12.4–15.4)
EOSINOPHIL # BLD: 0.1 K/UL (ref 0–0.6)
EOSINOPHIL NFR BLD: 1.8 %
GFR SERPLBLD CREATININE-BSD FMLA CKD-EPI: >60 ML/MIN/{1.73_M2}
GLUCOSE BLD-MCNC: 77 MG/DL (ref 70–99)
GLUCOSE SERPL-MCNC: 80 MG/DL (ref 70–99)
HCT VFR BLD AUTO: 39.3 % (ref 36–48)
HGB BLD-MCNC: 13.2 G/DL (ref 12–16)
LYMPHOCYTES # BLD: 1.5 K/UL (ref 1–5.1)
LYMPHOCYTES NFR BLD: 32.4 %
MCH RBC QN AUTO: 34.9 PG (ref 26–34)
MCHC RBC AUTO-ENTMCNC: 33.6 G/DL (ref 31–36)
MCV RBC AUTO: 104.1 FL (ref 80–100)
MONOCYTES # BLD: 0.7 K/UL (ref 0–1.3)
MONOCYTES NFR BLD: 15.9 %
NEUTROPHILS # BLD: 2.2 K/UL (ref 1.7–7.7)
NEUTROPHILS NFR BLD: 48.6 %
PERFORMED ON: NORMAL
PLATELET # BLD AUTO: 177 K/UL (ref 135–450)
PMV BLD AUTO: 10.8 FL (ref 5–10.5)
POTASSIUM SERPL-SCNC: 4.6 MMOL/L (ref 3.5–5.1)
RBC # BLD AUTO: 3.78 M/UL (ref 4–5.2)
SODIUM SERPL-SCNC: 147 MMOL/L (ref 136–145)
VANCOMYCIN SERPL-MCNC: 17.3 UG/ML
WBC # BLD AUTO: 4.6 K/UL (ref 4–11)

## 2023-05-17 PROCEDURE — 80048 BASIC METABOLIC PNL TOTAL CA: CPT

## 2023-05-17 PROCEDURE — 6370000000 HC RX 637 (ALT 250 FOR IP): Performed by: INTERNAL MEDICINE

## 2023-05-17 PROCEDURE — 71250 CT THORAX DX C-: CPT

## 2023-05-17 PROCEDURE — 80202 ASSAY OF VANCOMYCIN: CPT

## 2023-05-17 PROCEDURE — 36415 COLL VENOUS BLD VENIPUNCTURE: CPT

## 2023-05-17 PROCEDURE — 99233 SBSQ HOSP IP/OBS HIGH 50: CPT | Performed by: INTERNAL MEDICINE

## 2023-05-17 PROCEDURE — 99222 1ST HOSP IP/OBS MODERATE 55: CPT | Performed by: INTERNAL MEDICINE

## 2023-05-17 PROCEDURE — 1200000000 HC SEMI PRIVATE

## 2023-05-17 PROCEDURE — 51798 US URINE CAPACITY MEASURE: CPT

## 2023-05-17 PROCEDURE — 2580000003 HC RX 258: Performed by: INTERNAL MEDICINE

## 2023-05-17 PROCEDURE — 6360000002 HC RX W HCPCS: Performed by: INTERNAL MEDICINE

## 2023-05-17 PROCEDURE — 2580000003 HC RX 258: Performed by: PHYSICIAN ASSISTANT

## 2023-05-17 PROCEDURE — 85025 COMPLETE CBC W/AUTO DIFF WBC: CPT

## 2023-05-17 RX ORDER — DEXTROSE MONOHYDRATE 50 MG/ML
INJECTION, SOLUTION INTRAVENOUS CONTINUOUS
Status: DISCONTINUED | OUTPATIENT
Start: 2023-05-17 | End: 2023-05-18

## 2023-05-17 RX ADMIN — Medication 10 ML: at 11:10

## 2023-05-17 RX ADMIN — VANCOMYCIN HYDROCHLORIDE 1000 MG: 1 INJECTION, POWDER, LYOPHILIZED, FOR SOLUTION INTRAVENOUS at 11:21

## 2023-05-17 RX ADMIN — CEFEPIME 2000 MG: 2 INJECTION, POWDER, FOR SOLUTION INTRAVENOUS at 17:27

## 2023-05-17 RX ADMIN — CARBIDOPA AND LEVODOPA 1.5 TABLET: 25; 100 TABLET ORAL at 15:54

## 2023-05-17 RX ADMIN — MIDODRINE HYDROCHLORIDE 2.5 MG: 5 TABLET ORAL at 11:08

## 2023-05-17 RX ADMIN — MIDODRINE HYDROCHLORIDE 2.5 MG: 5 TABLET ORAL at 15:53

## 2023-05-17 RX ADMIN — SERTRALINE 100 MG: 50 TABLET, FILM COATED ORAL at 20:20

## 2023-05-17 RX ADMIN — ENOXAPARIN SODIUM 40 MG: 100 INJECTION SUBCUTANEOUS at 11:08

## 2023-05-17 RX ADMIN — VALPROIC ACID 250 MG: 250 SOLUTION ORAL at 15:53

## 2023-05-17 RX ADMIN — VALPROIC ACID 500 MG: 250 SOLUTION ORAL at 20:19

## 2023-05-17 RX ADMIN — LEVOTHYROXINE SODIUM 150 MCG: 150 TABLET ORAL at 05:36

## 2023-05-17 RX ADMIN — VALPROIC ACID 250 MG: 250 SOLUTION ORAL at 11:08

## 2023-05-17 RX ADMIN — CEFEPIME 2000 MG: 2 INJECTION, POWDER, FOR SOLUTION INTRAVENOUS at 06:02

## 2023-05-17 RX ADMIN — MIDODRINE HYDROCHLORIDE 2.5 MG: 5 TABLET ORAL at 11:38

## 2023-05-17 RX ADMIN — CARBIDOPA AND LEVODOPA 1.5 TABLET: 25; 100 TABLET ORAL at 20:20

## 2023-05-17 RX ADMIN — CARBIDOPA AND LEVODOPA 1.5 TABLET: 25; 100 TABLET ORAL at 11:17

## 2023-05-17 RX ADMIN — MULTIPLE VITAMINS W/ MINERALS TAB 1 TABLET: TAB at 11:09

## 2023-05-17 RX ADMIN — PANTOPRAZOLE SODIUM 40 MG: 40 TABLET, DELAYED RELEASE ORAL at 05:36

## 2023-05-17 RX ADMIN — DEXTROSE MONOHYDRATE: 50 INJECTION, SOLUTION INTRAVENOUS at 11:07

## 2023-05-17 ASSESSMENT — PAIN DESCRIPTION - LOCATION: LOCATION: GENERALIZED

## 2023-05-17 NOTE — CONSULTS
[facility] Pulmonary and Critical Care   Consult Note      Reason for Consult: Pneumonia, respiratory failure  Requesting Physician: VICKY Leigh  Subjective:   CHIEF COMPLAINT / HPI:                The patient is a 59 y.o. female with significant past medical history of:      Diagnosis Date    Anemia     Anorexia     Autistic disorder     MRDD    Convulsions ([address]     Down syndrome     Dysphagia     G tube feedings ([address]     PEG tube    Hypothyroid     Malnutrition ([address]     Parkinson disease ([address]     UTI (urinary tract infection)     Weakness      Patient was brought to report to emergency room for diarrhea. She had prior diagnosis of pneumonia and had been started on doxycycline. Seems that the diarrhea began after she started on antibiotics. ED work-up also suggest worsening pneumonia-initially on room air on admission but I saw her today she is on 1 L nasal cannula oxygen supplement. Her saturations were in the low 90s. She did not demonstrate respiratory distress. She is nonverbal with MRDD. No one from group home is unable to provide history.   History obtained from review the medical record and discussion with bedside caregivers      Past Surgical History:        Procedure Laterality Date    GASTROSTOMY TUBE PLACEMENT      GASTROSTOMY TUBE PLACEMENT  2/18/14    GASTROSTOMY TUBE PLACEMENT N/A 5/20/2022    EGD PEG TUBE PLACEMENT performed by Ashly Chavez MD at [address] [place] [address]     Current Medications:    Current Facility-Administered Medications: dextrose 5 % solution, , IntraVENous, Continuous  vancomycin (VANCOCIN) 1,000 mg in sodium chloride 0.9 % 250 mL IVPB ([id number]), 1,000 mg, IntraVENous, Q12H  carbidopa-levodopa (SINEMET)  MG per tablet 1.5 tablet, 1.5 tablet, Oral, TID  acetaminophen (TYLENOL) tablet 650 mg, 650 mg, Oral, Q6H PRN  docusate sodium (COLACE) capsule 100 mg, 100 mg, Oral, Daily PRN  midodrine (PROAMATINE) tablet 2.5 mg, 2.5 mg, Oral, TID   therapeutic multivitamin-minerals 1

## 2023-05-17 NOTE — CONSULTS
PALLIATIVE MEDICINE CONSULTATION     Patient name:Miriam Phillip   ZEJ:[id number]    :1965  Room/Bed:N-5577/5577-01   LOS: 2 days         Date of consult:2023    Consult Information  Palliative Medicine Consult performed by: NICHOLE Vasques CNP, CNP    Inpatient consult to Palliative Care  Consult performed by: NICHOLE Vasques CNP  Consult ordered by: Pernell Stewart PA-C  Reason for consult: [address]             ASSESSMENT/RECOMMENDATIONS     59 y.o. female with pneumonia likely from aspiration       Symptom Management:  PNA- pt has PEG tube for nutrition and known history of aspiration CT chest with secretions in the lung. Goals of Care- left message for Gillesaggie Camarenaer legal guardian to update on pts continued aspiration PNA. Code status change was agreed to by legal guardian on prior admission pt should be [address]. Awaiting return call from guardian  ---Received a call back from legal guardian she is agreeable to palliative care to follow at [place], sent referral to [facility] living palliative care to add support at group home. Patient/Family Goals of Care :    left message for Glenny Camarenaer legal guardian to update on pts continued aspiration PNA. Code status change was agreed to by legal guardian on prior admission pt should be [address]. Awaiting return call from guardian   ---Received a call back from legal guardian she is agreeable to palliative care to follow at [place], sent referral to [facility] living palliative care to add support at group home. Disposition/Discharge Plan:   Pending     Advance Directives:       The patient has the following advanced directives on file:  [address] [facility] Will ACP-Advance Directive ACP-Power of     Not on File Filed on 22 Filed Not on File            The patient has appointed the following active healthcare agents:  Glenny Worthington legal guardian    The Patient has the following current code

## 2023-05-17 NOTE — PLAN OF CARE
Problem: Skin/Tissue Integrity  Goal: Absence of new skin breakdown  Description: 1. Monitor for areas of redness and/or skin breakdown  2. Assess vascular access sites hourly  3. Every 4-6 hours minimum:  Change oxygen saturation probe site  4. Every 4-6 hours:  If on nasal continuous positive airway pressure, respiratory therapy assess nares and determine need for appliance change or resting period.   Outcome: Progressing     Problem: Pain  Goal: Verbalizes/displays adequate comfort level or baseline comfort level  Outcome: Progressing     Problem: Safety - Adult  Goal: Free from fall injury  Outcome: Progressing     Problem: Nutrition Deficit:  Goal: Optimize nutritional status  Outcome: Progressing

## 2023-05-17 NOTE — PLAN OF CARE
Problem: Skin/Tissue Integrity  Goal: Absence of new skin breakdown  Description: 1. Monitor for areas of redness and/or skin breakdown  2. Assess vascular access sites hourly  3. Every 4-6 hours minimum:  Change oxygen saturation probe site  4. Every 4-6 hours:  If on nasal continuous positive airway pressure, respiratory therapy assess nares and determine need for appliance change or resting period.   Outcome: Progressing     Problem: Pain  Goal: Verbalizes/displays adequate comfort level or baseline comfort level  Outcome: Progressing  Flowsheets (Taken 5/17/2023 1045)  Verbalizes/displays adequate comfort level or baseline comfort level: Assess pain using appropriate pain scale     Problem: Safety - Adult  Goal: Free from fall injury  Outcome: Progressing     Problem: Nutrition Deficit:  Goal: Optimize nutritional status  Outcome: Progressing

## 2023-05-18 LAB
ANION GAP SERPL CALCULATED.3IONS-SCNC: 3 MMOL/L (ref 3–16)
BASOPHILS # BLD: 0 K/UL (ref 0–0.2)
BASOPHILS NFR BLD: 0.8 %
BUN SERPL-MCNC: 24 MG/DL (ref 7–20)
CALCIUM SERPL-MCNC: 8.6 MG/DL (ref 8.3–10.6)
CHLORIDE SERPL-SCNC: 106 MMOL/L (ref 99–110)
CO2 SERPL-SCNC: 26 MMOL/L (ref 21–32)
CREAT SERPL-MCNC: <0.5 MG/DL (ref 0.6–1.1)
DEPRECATED RDW RBC AUTO: 12.6 % (ref 12.4–15.4)
EOSINOPHIL # BLD: 0.1 K/UL (ref 0–0.6)
EOSINOPHIL NFR BLD: 3.1 %
GFR SERPLBLD CREATININE-BSD FMLA CKD-EPI: >60 ML/MIN/{1.73_M2}
GLUCOSE BLD-MCNC: 91 MG/DL (ref 70–99)
GLUCOSE SERPL-MCNC: 121 MG/DL (ref 70–99)
HCT VFR BLD AUTO: 39.1 % (ref 36–48)
HGB BLD-MCNC: 12.9 G/DL (ref 12–16)
LYMPHOCYTES # BLD: 1.4 K/UL (ref 1–5.1)
LYMPHOCYTES NFR BLD: 33.2 %
MCH RBC QN AUTO: 34.6 PG (ref 26–34)
MCHC RBC AUTO-ENTMCNC: 33.1 G/DL (ref 31–36)
MCV RBC AUTO: 104.7 FL (ref 80–100)
MONOCYTES # BLD: 0.6 K/UL (ref 0–1.3)
MONOCYTES NFR BLD: 13.7 %
NEUTROPHILS # BLD: 2.1 K/UL (ref 1.7–7.7)
NEUTROPHILS NFR BLD: 49.2 %
PERFORMED ON: NORMAL
PLATELET # BLD AUTO: 164 K/UL (ref 135–450)
PMV BLD AUTO: 10 FL (ref 5–10.5)
POTASSIUM SERPL-SCNC: 4.2 MMOL/L (ref 3.5–5.1)
RBC # BLD AUTO: 3.74 M/UL (ref 4–5.2)
SODIUM SERPL-SCNC: 135 MMOL/L (ref 136–145)
WBC # BLD AUTO: 4.2 K/UL (ref 4–11)

## 2023-05-18 PROCEDURE — 99232 SBSQ HOSP IP/OBS MODERATE 35: CPT | Performed by: INTERNAL MEDICINE

## 2023-05-18 PROCEDURE — 6360000002 HC RX W HCPCS: Performed by: INTERNAL MEDICINE

## 2023-05-18 PROCEDURE — 2580000003 HC RX 258: Performed by: INTERNAL MEDICINE

## 2023-05-18 PROCEDURE — 36415 COLL VENOUS BLD VENIPUNCTURE: CPT

## 2023-05-18 PROCEDURE — 80048 BASIC METABOLIC PNL TOTAL CA: CPT

## 2023-05-18 PROCEDURE — 94669 MECHANICAL CHEST WALL OSCILL: CPT

## 2023-05-18 PROCEDURE — 2580000003 HC RX 258: Performed by: PHYSICIAN ASSISTANT

## 2023-05-18 PROCEDURE — 6370000000 HC RX 637 (ALT 250 FOR IP): Performed by: INTERNAL MEDICINE

## 2023-05-18 PROCEDURE — 99233 SBSQ HOSP IP/OBS HIGH 50: CPT | Performed by: INTERNAL MEDICINE

## 2023-05-18 PROCEDURE — 1200000000 HC SEMI PRIVATE

## 2023-05-18 PROCEDURE — 85025 COMPLETE CBC W/AUTO DIFF WBC: CPT

## 2023-05-18 RX ADMIN — DEXTROSE MONOHYDRATE: 50 INJECTION, SOLUTION INTRAVENOUS at 04:02

## 2023-05-18 RX ADMIN — MIDODRINE HYDROCHLORIDE 2.5 MG: 5 TABLET ORAL at 13:13

## 2023-05-18 RX ADMIN — CEFEPIME 2000 MG: 2 INJECTION, POWDER, FOR SOLUTION INTRAVENOUS at 17:54

## 2023-05-18 RX ADMIN — CARBIDOPA AND LEVODOPA 1.5 TABLET: 25; 100 TABLET ORAL at 22:26

## 2023-05-18 RX ADMIN — VALPROIC ACID 250 MG: 250 SOLUTION ORAL at 15:05

## 2023-05-18 RX ADMIN — MIDODRINE HYDROCHLORIDE 2.5 MG: 5 TABLET ORAL at 17:49

## 2023-05-18 RX ADMIN — CEFEPIME 2000 MG: 2 INJECTION, POWDER, FOR SOLUTION INTRAVENOUS at 06:10

## 2023-05-18 RX ADMIN — CARBIDOPA AND LEVODOPA 1.5 TABLET: 25; 100 TABLET ORAL at 15:06

## 2023-05-18 RX ADMIN — VALPROIC ACID 500 MG: 250 SOLUTION ORAL at 22:26

## 2023-05-18 RX ADMIN — CARBIDOPA AND LEVODOPA 1.5 TABLET: 25; 100 TABLET ORAL at 09:44

## 2023-05-18 RX ADMIN — Medication 10 ML: at 09:46

## 2023-05-18 RX ADMIN — MULTIPLE VITAMINS W/ MINERALS TAB 1 TABLET: TAB at 09:45

## 2023-05-18 RX ADMIN — VALPROIC ACID 250 MG: 250 SOLUTION ORAL at 09:44

## 2023-05-18 RX ADMIN — LEVOTHYROXINE SODIUM 150 MCG: 150 TABLET ORAL at 06:10

## 2023-05-18 RX ADMIN — ENOXAPARIN SODIUM 40 MG: 100 INJECTION SUBCUTANEOUS at 09:45

## 2023-05-18 RX ADMIN — MIDODRINE HYDROCHLORIDE 2.5 MG: 5 TABLET ORAL at 09:45

## 2023-05-18 RX ADMIN — SERTRALINE 100 MG: 50 TABLET, FILM COATED ORAL at 22:26

## 2023-05-18 ASSESSMENT — PAIN SCALES - WONG BAKER
WONGBAKER_NUMERICALRESPONSE: 0

## 2023-05-18 NOTE — PLAN OF CARE
Problem: Skin/Tissue Integrity  Goal: Absence of new skin breakdown  Description: 1. Monitor for areas of redness and/or skin breakdown  2. Assess vascular access sites hourly  3. Every 4-6 hours minimum:  Change oxygen saturation probe site  4. Every 4-6 hours:  If on nasal continuous positive airway pressure, respiratory therapy assess nares and determine need for appliance change or resting period.   5/17/2023 2117 by Carmine Rodríguez RN  Outcome: Progressing  5/17/2023 1137 by Nancy Campuzano RN  Outcome: Progressing     Problem: Pain  Goal: Verbalizes/displays adequate comfort level or baseline comfort level  5/17/2023 2117 by Carmine Rodríguez RN  Outcome: Progressing  Flowsheets (Taken 5/17/2023 1951)  Verbalizes/displays adequate comfort level or baseline comfort level: Assess pain using appropriate pain scale  5/17/2023 1137 by Nancy Campuzano RN  Outcome: Progressing  Flowsheets (Taken 5/17/2023 1045)  Verbalizes/displays adequate comfort level or baseline comfort level: Assess pain using appropriate pain scale

## 2023-05-19 VITALS
WEIGHT: 97.22 LBS | HEART RATE: 62 BPM | OXYGEN SATURATION: 96 % | SYSTOLIC BLOOD PRESSURE: 96 MMHG | BODY MASS INDEX: 20.98 KG/M2 | RESPIRATION RATE: 16 BRPM | HEIGHT: 57 IN | DIASTOLIC BLOOD PRESSURE: 57 MMHG | TEMPERATURE: 97 F

## 2023-05-19 LAB
ANION GAP SERPL CALCULATED.3IONS-SCNC: 7 MMOL/L (ref 3–16)
BACTERIA BLD CULT ORG #2: NORMAL
BACTERIA BLD CULT: NORMAL
BASOPHILS # BLD: 0 K/UL (ref 0–0.2)
BASOPHILS NFR BLD: 0.5 %
BUN SERPL-MCNC: 14 MG/DL (ref 7–20)
CALCIUM SERPL-MCNC: 8.3 MG/DL (ref 8.3–10.6)
CHLORIDE SERPL-SCNC: 106 MMOL/L (ref 99–110)
CO2 SERPL-SCNC: 26 MMOL/L (ref 21–32)
CREAT SERPL-MCNC: <0.5 MG/DL (ref 0.6–1.1)
DEPRECATED RDW RBC AUTO: 12.1 % (ref 12.4–15.4)
EOSINOPHIL # BLD: 0.1 K/UL (ref 0–0.6)
EOSINOPHIL NFR BLD: 3.3 %
GFR SERPLBLD CREATININE-BSD FMLA CKD-EPI: >60 ML/MIN/{1.73_M2}
GLUCOSE SERPL-MCNC: 98 MG/DL (ref 70–99)
HCT VFR BLD AUTO: 38.5 % (ref 36–48)
HGB BLD-MCNC: 12.7 G/DL (ref 12–16)
LYMPHOCYTES # BLD: 1.3 K/UL (ref 1–5.1)
LYMPHOCYTES NFR BLD: 31.8 %
MCH RBC QN AUTO: 34 PG (ref 26–34)
MCHC RBC AUTO-ENTMCNC: 33 G/DL (ref 31–36)
MCV RBC AUTO: 103.2 FL (ref 80–100)
MONOCYTES # BLD: 0.6 K/UL (ref 0–1.3)
MONOCYTES NFR BLD: 15.7 %
NEUTROPHILS # BLD: 2 K/UL (ref 1.7–7.7)
NEUTROPHILS NFR BLD: 48.7 %
PLATELET # BLD AUTO: 177 K/UL (ref 135–450)
PMV BLD AUTO: 10.1 FL (ref 5–10.5)
POTASSIUM SERPL-SCNC: 4.7 MMOL/L (ref 3.5–5.1)
RBC # BLD AUTO: 3.73 M/UL (ref 4–5.2)
SODIUM SERPL-SCNC: 139 MMOL/L (ref 136–145)
WBC # BLD AUTO: 4.1 K/UL (ref 4–11)

## 2023-05-19 PROCEDURE — 80048 BASIC METABOLIC PNL TOTAL CA: CPT

## 2023-05-19 PROCEDURE — 6360000002 HC RX W HCPCS: Performed by: INTERNAL MEDICINE

## 2023-05-19 PROCEDURE — 6370000000 HC RX 637 (ALT 250 FOR IP): Performed by: INTERNAL MEDICINE

## 2023-05-19 PROCEDURE — 2580000003 HC RX 258: Performed by: INTERNAL MEDICINE

## 2023-05-19 PROCEDURE — 36415 COLL VENOUS BLD VENIPUNCTURE: CPT

## 2023-05-19 PROCEDURE — 94761 N-INVAS EAR/PLS OXIMETRY MLT: CPT

## 2023-05-19 PROCEDURE — 94669 MECHANICAL CHEST WALL OSCILL: CPT

## 2023-05-19 PROCEDURE — 99232 SBSQ HOSP IP/OBS MODERATE 35: CPT | Performed by: INTERNAL MEDICINE

## 2023-05-19 PROCEDURE — 85025 COMPLETE CBC W/AUTO DIFF WBC: CPT

## 2023-05-19 RX ORDER — AMOXICILLIN AND CLAVULANATE POTASSIUM 875; 125 MG/1; MG/1
1 TABLET, FILM COATED ORAL EVERY 12 HOURS SCHEDULED
Qty: 14 TABLET | Refills: 0
Start: 2023-05-19 | End: 2023-05-19 | Stop reason: SDUPTHER

## 2023-05-19 RX ORDER — AMOXICILLIN AND CLAVULANATE POTASSIUM 875; 125 MG/1; MG/1
1 TABLET, FILM COATED ORAL EVERY 12 HOURS SCHEDULED
Qty: 14 TABLET | Refills: 0 | Status: SHIPPED | OUTPATIENT
Start: 2023-05-19 | End: 2023-05-22 | Stop reason: SDUPTHER

## 2023-05-19 RX ORDER — FUROSEMIDE 20 MG/1
20 TABLET ORAL DAILY
Qty: 30 TABLET | Refills: 0 | Status: ON HOLD
Start: 2023-05-19

## 2023-05-19 RX ORDER — AMOXICILLIN AND CLAVULANATE POTASSIUM 875; 125 MG/1; MG/1
1 TABLET, FILM COATED ORAL EVERY 12 HOURS SCHEDULED
Qty: 14 TABLET | Refills: 0 | Status: SHIPPED | OUTPATIENT
Start: 2023-05-19 | End: 2023-05-19 | Stop reason: SDUPTHER

## 2023-05-19 RX ORDER — AMOXICILLIN AND CLAVULANATE POTASSIUM 875; 125 MG/1; MG/1
1 TABLET, FILM COATED ORAL EVERY 12 HOURS SCHEDULED
Status: DISCONTINUED | OUTPATIENT
Start: 2023-05-19 | End: 2023-05-20 | Stop reason: HOSPADM

## 2023-05-19 RX ADMIN — CARBIDOPA AND LEVODOPA 1.5 TABLET: 25; 100 TABLET ORAL at 09:14

## 2023-05-19 RX ADMIN — LEVOTHYROXINE SODIUM 150 MCG: 150 TABLET ORAL at 09:14

## 2023-05-19 RX ADMIN — MIDODRINE HYDROCHLORIDE 2.5 MG: 5 TABLET ORAL at 09:15

## 2023-05-19 RX ADMIN — CARBIDOPA AND LEVODOPA 1.5 TABLET: 25; 100 TABLET ORAL at 12:32

## 2023-05-19 RX ADMIN — MIDODRINE HYDROCHLORIDE 2.5 MG: 5 TABLET ORAL at 17:23

## 2023-05-19 RX ADMIN — ENOXAPARIN SODIUM 40 MG: 100 INJECTION SUBCUTANEOUS at 09:14

## 2023-05-19 RX ADMIN — AMOXICILLIN AND CLAVULANATE POTASSIUM 1 TABLET: 875; 125 TABLET ORAL at 12:32

## 2023-05-19 RX ADMIN — CEFEPIME 2000 MG: 2 INJECTION, POWDER, FOR SOLUTION INTRAVENOUS at 06:01

## 2023-05-19 RX ADMIN — Medication 10 ML: at 09:15

## 2023-05-19 RX ADMIN — MULTIPLE VITAMINS W/ MINERALS TAB 1 TABLET: TAB at 09:14

## 2023-05-19 RX ADMIN — VALPROIC ACID 250 MG: 250 SOLUTION ORAL at 09:14

## 2023-05-19 RX ADMIN — VALPROIC ACID 250 MG: 250 SOLUTION ORAL at 12:32

## 2023-05-19 RX ADMIN — MIDODRINE HYDROCHLORIDE 2.5 MG: 5 TABLET ORAL at 11:50

## 2023-05-19 NOTE — PROGRESS NOTES
4 Eyes Skin Assessment     NAME:  Berta Tellez  YOB: 1965  MEDICAL RECORD NUMBER:  [id number]    The patient is being assess for  Admission    I agree that 2 RN's have performed a thorough Head to Toe Skin Assessment on the patient. ALL assessment sites listed below have been assessed. Areas assessed by both nurses:    Head, Face, Ears, Shoulders, Back, Chest, Arms, Elbows, Hands, Sacrum. Buttock, Coccyx, Ischium, Legs. Feet and Heels, and Under Medical Devices         Does the Patient have a Wound?  No noted wound(s)       Abdirizak Prevention initiated:  Yes   Wound Care Orders initiated:  No    Pressure Injury (Stage 3,4, Unstageable, DTI, NWPT, and Complex wounds) if present place consult order under [de-identified] No    New and Established Ostomies if present place consult order under : No      Nurse 1 eSignature: Electronically signed by Hemanth Renner RN on 5/16/23 at 6:44 AM EDT    **SHARE this note so that the co-signing nurse is able to place an eSignature**    Nurse 2 eSignature: Electronically signed by Martin Bermudez RN on 5/16/23 at 6:46 AM EDT
Assessment complete. VSS. Patient resting in bed. Respirations even and easy. Call light in reach. Fall precautions in place.
Clinical Pharmacy Note: Pharmacy to Dose Vancomycin    Vancomycin Day: 1  Indication: HAP  Current Dose: 750 mg IV twice daily  Dosing Method: Bayesian Modeling    Random: 16.2    Recent Labs     05/15/23  1624 05/16/23  0517   BUN 35* 32*       Recent Labs     05/15/23  1624 05/16/23  0517   CREATININE 0.7 <0.5*       Recent Labs     05/15/23  1624 05/16/23  0517   WBC 12.7* 10.9         Intake/Output Summary (Last 24 hours) at 5/16/2023 0841  Last data filed at 5/15/2023 1856  Gross per 24 hour   Intake 50 ml   Output --   Net 50 ml         Ht Readings from Last 1 Encounters:   05/15/23 4' 9\" (1.448 m)        Wt Readings from Last 1 Encounters:   05/15/23 125 lb (56.7 kg)         Body mass index is 27.05 kg/m². Estimated Creatinine Clearance: 96 mL/min (based on SCr of 0.5 mg/dL). Assessment/Plan:  Vancomycin level is therapeutic. Increase vancomycin regimen to 1000 mg every 12 hours. Bayesian Modeling predicts an AUC of 514 mg/L*hr and trough of 14.4 mg/L. A vancomycin random level has been ordered on 5/17/23 at 0600 for follow-up. Changes in regimen will be determined based on culture results, renal function, and clinical response. Pharmacy will continue to monitor and adjust regimen as necessary.     Thank you for the consult,    Etheleen Alpers, PharmD  PGY-1 Pharmacy Resident  [id number]
Clinical Pharmacy Note: Pharmacy to Dose Vancomycin    Zo Husbands is a 59 y.o. female started on Vancomycin for pneumonia; consult received from Dr. Veronika Owens to manage therapy. Also receiving the following antibiotics: cfeepime. Goal AUC: 400-600 mg/L*hr  Goal Trough Level: 10-20 mcg/mL    Assessment/Plan:  A 1250 mg loading dose was given on 5/16/2023 at 0004  Initiate vancomycin 750 mg IV every 12 hours. Bayesian modeling predicts an AUC of 470 mg/L*hr and a trough of 14.6 mcg/mL at steady state concentration. A vancomycin random level has been ordered for 5/16/2023 at 0600  Changes in regimen will be determined based on culture results, renal function, and clinical response. Pharmacy will continue to monitor and adjust regimen as necessary. Allergies:  Caffeine and Iodine     Recent Labs     05/15/23  1624   CREATININE 0.7       Recent Labs     05/15/23  1624   WBC 12.7*       Ht Readings from Last 1 Encounters:   05/15/23 4' 9\" (1.448 m)        Wt Readings from Last 1 Encounters:   05/15/23 125 lb (56.7 kg)         Estimated Creatinine Clearance: 68 mL/min (based on SCr of 0.7 mg/dL).       Thank you for the consult,    Sophia Quevedo, PharmD, [address] [place] [address]
[facility] Pulmonary and Critical Care  Progress note      Reason for Consult: Pneumonia, respiratory failure  Requesting Physician: VICKY Schneider  Subjective:   CHIEF COMPLAINT / HPI:                The patient is a 59 y.o. female with significant past medical history of:      Diagnosis Date    Anemia     Anorexia     Autistic disorder     MRDD    Convulsions ([facility] [address]     Down syndrome     Dysphagia     G tube feedings (HCC)     PEG tube    Hypothyroid     Malnutrition ([facility] [address]     Parkinson disease ([facility] [address]     UTI (urinary tract infection)     Weakness      Interval history: Patient appears more alert this morning but remains noncommunicative.   Chest exam is less congested and she is in no respiratory distress and requiring no supplemental oxygen      Past Surgical History:        Procedure Laterality Date    GASTROSTOMY TUBE PLACEMENT      GASTROSTOMY TUBE PLACEMENT  2/18/14    GASTROSTOMY TUBE PLACEMENT N/A 5/20/2022    EGD PEG TUBE PLACEMENT performed by Colin Bunch MD at [address] [place] [address]     Current Medications:    Current Facility-Administered Medications: dextrose 5 % solution, , IntraVENous, Continuous  carbidopa-levodopa (SINEMET)  MG per tablet 1.5 tablet, 1.5 tablet, Oral, TID  acetaminophen (TYLENOL) tablet 650 mg, 650 mg, Oral, Q6H PRN  docusate sodium (COLACE) capsule 100 mg, 100 mg, Oral, Daily PRN  midodrine (PROAMATINE) tablet 2.5 mg, 2.5 mg, Oral, TID WC  therapeutic multivitamin-minerals 1 tablet, 1 tablet, Oral, Daily  pantoprazole (PROTONIX) tablet 40 mg, 40 mg, Oral, QAM AC  sertraline (ZOLOFT) tablet 100 mg, 100 mg, Oral, Nightly  cefepime (MAXIPIME) 2,000 mg in sodium chloride 0.9 % 50 mL IVPB (mini-bag), 2,000 mg, IntraVENous, Q12H  sodium chloride flush 0.9 % injection 5-40 mL, 5-40 mL, IntraVENous, 2 times per day  sodium chloride flush 0.9 % injection 5-40 mL, 5-40 mL, IntraVENous, PRN  0.9 % sodium chloride infusion, , IntraVENous, PRN  enoxaparin (LOVENOX) injection 40 mg, 40 mg,
Discharge instructions provided. IV discontinued. Picked up by director of [employer].
Infectious Diseases   Progress Note      Admission Date: 5/15/2023  Hospital Day: Hospital Day: 5   Attending: Jeyson Light MD  Date of service: 5/19/2023     Chief complaint/ Reason for consult:     Systemic inflammatory response syndrome with tachycardia and leukocytosis on admission  Diarrhea, stool C. difficile EIA negative  Concern for left lower lobe aspiration pneumonia on recent CT scan  History of parkinsonism  History of dysphagia    Microbiology:        I have reviewed allavailable micro lab data and cultures    Blood culture (2/2) - collected on 5/15/2023: Negative      Antibiotics and immunizations:       Current antibiotics: All antibiotics and their doses were reviewed by me    Recent Abx Admin                     amoxicillin-clavulanate (AUGMENTIN) 875-125 MG per tablet 1 tablet (tablet) 1 tablet Given 05/19/23 1232    cefepime (MAXIPIME) 2,000 mg in sodium chloride 0.9 % 50 mL IVPB (mini-bag) (mg) 2,000 mg New Bag 05/19/23 0601     2,000 mg New Bag 05/18/23 1754                      Immunization History: All immunization history was reviewed by me today. There is no immunization history on file for this patient. Known drug allergies: All allergies were reviewed and updated    Allergies   Allergen Reactions    Caffeine      Unsure of reaction    Iodine      Unsure of reaction       Social history:     Social History:  All social andepidemiologic history was reviewed and updated by me today as needed. Tobacco use:   reports that she has never smoked. She has never used smokeless tobacco.  Alcohol use:   reports no history of alcohol use. Currently lives in: [address] [place] [address]   reports no history of drug use.      COVID VACCINATION AND LAB RESULT RECORDS:     Internal Administration   First Dose      Second Dose           Last COVID Lab SARS-CoV-2 (no units)   Date Value   04/05/2022 Not Detected     SARS-CoV-2, PCR (no units)   Date Value   03/24/2022 Not Detected
Nutrition Note    RECOMMENDATIONS  PO Diet: NPO  Nutrition Support:   Continue Jevity 1.5 (standard with fiber formula) at goal rate of 40 mL/hr. Recommend 115 mL H20 q 4 hours. Monitor Na+. Ensure head of bed is 30 - 45 degrees during continuous feeding. Monitor for tolerance (bowel habits, N/V, cramping, abdominal exam findings: distended, firm, tense, guarded, discomfort). Irrigate tube with 30 mL water before, between and after each medication. If tube becomes clogged, first try flushing with water. If water does not unclog the tube, may use clog zapper at the direction of the physician/LIP. If tube remains clogged, contact Physician/LIP for additional orders. Closed System Guidelines:  Change tubing and feeding container every 24 hours. NUTRITION ASSESSMENT   Pt continues to tolerate Jevity 1.5 at 40 mL/hr (over 23 hr d/t 1 hour hold time w/ synthroid administration). Water flush 115 mL q4 hr. Na+ 5/18 147, 5/19 135. Will monitor sodium for change in water bolus.      Nutrition Related Findings: Na+ 135; LBM 5/18; non-verbal  Wounds: None  Nutrition Education:  Education not indicated   Nutrition Goals: Initiate nutrition support, Tolerate nutrition support at goal rate, by next RD assessment     MALNUTRITION ASSESSMENT   Acute Illness  Malnutrition Status: No malnutrition      NUTRITION DIAGNOSIS   No nutrition diagnosis at this time     CURRENT NUTRITION THERAPIES  Diet NPO  ADULT TUBE FEEDING; Gastrostomy; Standard with Fiber; Continuous; 40; No; 115; Q 4 hours     PO Intake: NPO   PO Supplement Intake:NPO    Diet NPO  ADULT TUBE FEEDING; Gastrostomy; Standard with Fiber; Continuous; 40; No; 115; Q 4 hours    ANTHROPOMETRICS  Current Height: 4' 9.01\" (144.8 cm)  Current Weight - Scale: 97 lb 3.6 oz (44.1 kg)  5/16/23  Ideal Body Weight (IBW): 85 lbs  (39 kg)        BMI: 21      COMPARATIVE STANDARDS  Energy (kcal):  1100- 1320     Protein (g):  53-88g       Fluid (mL/day):  1100 -
Patient arrived to unit in stable condition. Respirations easy and unlabored at room air. Nonverbal at baseline. VSS. Bed locked and in low position. Call light within reach.
Patient seen in ED, room 18. Admission completed with the following exceptions:  4 Eyes Assessment, Immunizations, Covid Vaccines, Rights and Responsibilities, Orientation to room, Plan of Care, Education/Learning Assessment and Education Plan, white board, height and weight, pain assessment and head to toe assessment. Patient is sleeping soundly no distress noted. Patient is nonverbal at baseline and takes nothing by mouth. Patient lives in an apartment with 2 other patients/residents and is being admitted for Bacterial pneumonia. Home Medications as well as Outside Sources has been verbally reviewed with patient and updated as appropriate and is now Completed per pharmacy technician Yamilex Ortiz. Plan of care updated if indicated. All questions answered. Patient Caregiver Jigna Power at bedside and assisted with admission questions. Caregiver states that the patient is under guardianship of [employer]ocacy [employer] Protective Services with a  of Jun Herrera. The caregiver reports that the patient also receives PEG tube feedings at 8 AM, 12 PM, 4PM, and 8PM in addition to 5x a day water flushing. Patient also is bed bound at baseline but does get in a wheelchair at times. Caregiver also reports that the patient was recently about a week ago started on doxycycline for pneumonia and then started having diarrhea.
Physician Progress Note      PATIENT:               Jeo Dawkins  CSN #:                  [id number]  :                       1965  ADMIT DATE:       5/15/2023 3:52 PM  DISCH DATE:  RESPONDING  PROVIDER #:        Bridgett Lee PA-C          QUERY TEXT:    Patient admitted with Bacterial pneumonia. Documentation reflects  Noted   documentation of sepsis in H&P. If possible, please document in the progress   notes and discharge summary if Sepsis was: The medical record reflects the following:  Risk Factors: Pneumonia of left lower lobe due to infectious organism,   Diarrhea, failed OP  doxy  Clinical Indicators: WBC 12.7->10.9->4.6->4.2, BANDSPCT 2, ,  Glucose   100->121,\"Sepsis secondary to pna failed doxy\" in the H&P , \"Systemic   inflammatory response syndrome with tachycardia and leukocytosis on   [id number]\" in the PN  Treatment: Zosyn and fluids , vanc and cefepime, Labs, ID consult, Pulmonary   consult    Thank you , Seth Bone RN, CDS ([phone])  Options provided:  -- Sepsis confirmed after study  -- Sepsis treated and resolved  -- Sepsis ruled out after study  -- Other - I will add my own diagnosis  -- Disagree - Not applicable / Not valid  -- Disagree - Clinically unable to determine / Unknown  -- Refer to Clinical Documentation Reviewer    PROVIDER RESPONSE TEXT:    Sepsis treated and resolved. Query created by: Rosemary Bartholomew on 2023 9:56 AM      QUERY TEXT:    Pt admitted with Pneumonia. Pt noted to have ID and palliative care   consulted. If possible, please document in the progress notes and discharge   summary if you are evaluating and/or treating any of the following:    Note: CAP and HCAP indicate where the pneumonia was acquired, not a specific   type. The medical record reflects the following:  Risk Factors: Pneumonia of left lower lobe due to infectious organism,   Diarrhea, failed OP doxy, History of dysphagia, Downs Syndrome.   Clinical
[facility] Pulmonary and Critical Care  Progress note      Reason for Consult: Pneumonia, respiratory failure  Requesting Physician: VICKY Calderón  Subjective:   CHIEF COMPLAINT / HPI:                The patient is a 59 y.o. female with significant past medical history of:      Diagnosis Date    Anemia     Anorexia     Autistic disorder     MRDD    Convulsions ([facility] [address]     Down syndrome     Dysphagia     G tube feedings (HCC)     PEG tube    Hypothyroid     Malnutrition ([facility] [address]     Parkinson disease ([facility] [address]     UTI (urinary tract infection)     Weakness      Interval history: She remains about the same. Arousable but noncommunicative. No obvious respiratory distress. Tolerating room air.       Past Surgical History:        Procedure Laterality Date    GASTROSTOMY TUBE PLACEMENT      GASTROSTOMY TUBE PLACEMENT  2/18/14    GASTROSTOMY TUBE PLACEMENT N/A 5/20/2022    EGD PEG TUBE PLACEMENT performed by Cierra Logan MD at [address] [place] [address]     Current Medications:    Current Facility-Administered Medications: carbidopa-levodopa (SINEMET)  MG per tablet 1.5 tablet, 1.5 tablet, Oral, TID  acetaminophen (TYLENOL) tablet 650 mg, 650 mg, Oral, Q6H PRN  docusate sodium (COLACE) capsule 100 mg, 100 mg, Oral, Daily PRN  midodrine (PROAMATINE) tablet 2.5 mg, 2.5 mg, Oral, TID WC  therapeutic multivitamin-minerals 1 tablet, 1 tablet, Oral, Daily  pantoprazole (PROTONIX) tablet 40 mg, 40 mg, Oral, QAM AC  sertraline (ZOLOFT) tablet 100 mg, 100 mg, Oral, Nightly  cefepime (MAXIPIME) 2,000 mg in sodium chloride 0.9 % 50 mL IVPB (mini-bag), 2,000 mg, IntraVENous, Q12H  sodium chloride flush 0.9 % injection 5-40 mL, 5-40 mL, IntraVENous, 2 times per day  sodium chloride flush 0.9 % injection 5-40 mL, 5-40 mL, IntraVENous, PRN  0.9 % sodium chloride infusion, , IntraVENous, PRN  enoxaparin (LOVENOX) injection 40 mg, 40 mg, SubCUTAneous, Daily  ondansetron (ZOFRAN-ODT) disintegrating tablet 4 mg, 4 mg, Oral, Q8H PRN **OR** ondansetron
Pt assessment done. VS stable. TF infusing given at ordered rate. Pt resting comfortably in bed.
Pt evening shift assessment complete. VSS and medication given as ordered. TF infusing at ordered rate. Pt assessed for comfort needs. None noted at this time, resting comfortably in bed.
Pulsox probe switched from right index finger to right middle finger. Patient currently at 94% on 1 L.
Shift assessment completed. Routine vitals stable. Scheduled medications given. Patient is awake, alert and nonverbal. Respirations are labored at times due to congestion in throat and lungs. Patient does not appear to be in distress. Call light within reach. TF running at 40ml/hr, patient had coughed up yellowish brown sputum large amount x 1. CT of chest ordered and pulmonary saw patient and will review CT before making any changes.
[facility]    5/17/2023 10:44 AM        Name: Jessica Chisholm . Admitted: 5/15/2023  Primary Care Provider: Nolberto Bradshaw MD (Tel: [phone])    Brief Course: Patient admitted from a group home with pneumonia. Failed outpatient doxy. Currently on cefepime and vanco. TF resumed yesterday      Subjective:    No issues per nursing staff. Starting her tube feeds Tuesday. Patient non verbal. Saturating 86% when I took her off O2.     Reviewed interval ancillary notes    Current Medications  dextrose 5 % solution, Continuous  vancomycin (VANCOCIN) 1,000 mg in sodium chloride 0.9 % 250 mL IVPB ([id number]), Q12H  carbidopa-levodopa (SINEMET)  MG per tablet 1.5 tablet, TID  acetaminophen (TYLENOL) tablet 650 mg, Q6H PRN  docusate sodium (COLACE) capsule 100 mg, Daily PRN  midodrine (PROAMATINE) tablet 2.5 mg, TID WC  therapeutic multivitamin-minerals 1 tablet, Daily  pantoprazole (PROTONIX) tablet 40 mg, QAM AC  sertraline (ZOLOFT) tablet 100 mg, Nightly  cefepime (MAXIPIME) 2,000 mg in sodium chloride 0.9 % 50 mL IVPB (mini-bag), Q12H  sodium chloride flush 0.9 % injection 5-40 mL, 2 times per day  sodium chloride flush 0.9 % injection 5-40 mL, PRN  0.9 % sodium chloride infusion, PRN  enoxaparin (LOVENOX) injection 40 mg, Daily  ondansetron (ZOFRAN-ODT) disintegrating tablet 4 mg, Q8H PRN   Or  ondansetron (ZOFRAN) injection 4 mg, Q6H PRN  polyethylene glycol (GLYCOLAX) packet 17 g, Daily PRN  acetaminophen (TYLENOL) tablet 650 mg, Q6H PRN   Or  acetaminophen (TYLENOL) suppository 650 mg, Q6H PRN  levothyroxine (SYNTHROID) tablet 150 mcg, Daily  valproic acid (DEPAKENE) 250 MG/5ML oral solution 250 mg, BID  valproic acid (DEPAKENE) 250 MG/5ML oral solution 500 mg, Nightly        Objective:  /67   Pulse 57   Temp 98.5 °F (36.9 °C) (Axillary)   Resp 18   Ht 4' 9.01\" (1.448 m)   Wt 97 lb 3.6 oz
[facility]    5/16/2023 2:59 PM        Name: Trang Casas . Admitted: 5/15/2023  Primary Care Provider: Rosalino Pearce MD (Tel: [phone])    Brief Course: Patient admitted from a group home with pneumonia. Failed outpatient doxy. Subjective:    No issues per nursing staff. Starting her tube feeds today.  Patient non verbal.    Reviewed interval ancillary notes    Current Medications  vancomycin (VANCOCIN) 1,000 mg in sodium chloride 0.9 % 250 mL IVPB ([id number]), Q12H  0.9 % sodium chloride infusion, Continuous  carbidopa-levodopa (SINEMET)  MG per tablet 1.5 tablet, TID  acetaminophen (TYLENOL) tablet 650 mg, Q6H PRN  docusate sodium (COLACE) capsule 100 mg, Daily PRN  midodrine (PROAMATINE) tablet 2.5 mg, TID WC  therapeutic multivitamin-minerals 1 tablet, Daily  pantoprazole (PROTONIX) tablet 40 mg, QAM AC  sertraline (ZOLOFT) tablet 100 mg, Nightly  cefepime (MAXIPIME) 2,000 mg in sodium chloride 0.9 % 50 mL IVPB (mini-bag), Q12H  sodium chloride flush 0.9 % injection 5-40 mL, 2 times per day  sodium chloride flush 0.9 % injection 5-40 mL, PRN  0.9 % sodium chloride infusion, PRN  enoxaparin (LOVENOX) injection 40 mg, Daily  ondansetron (ZOFRAN-ODT) disintegrating tablet 4 mg, Q8H PRN   Or  ondansetron (ZOFRAN) injection 4 mg, Q6H PRN  polyethylene glycol (GLYCOLAX) packet 17 g, Daily PRN  acetaminophen (TYLENOL) tablet 650 mg, Q6H PRN   Or  acetaminophen (TYLENOL) suppository 650 mg, Q6H PRN  levothyroxine (SYNTHROID) tablet 150 mcg, Daily  valproic acid (DEPAKENE) 250 MG/5ML oral solution 250 mg, BID  valproic acid (DEPAKENE) 250 MG/5ML oral solution 500 mg, Nightly        Objective:  /76   Pulse 69   Temp 98.4 °F (36.9 °C) (Axillary)   Resp 16   Ht 4' 9.01\" (1.448 m)   Wt 97 lb 3.6 oz (44.1 kg)   SpO2 92%   BMI 21.03 kg/m²     Intake/Output Summary (Last 24 hours) at
FEEDING; Gastrostomy; Standard with Fiber; Continuous; 20; Yes; 20; Q 4 hours; 40; 115; Q 4 hours     PO Intake: NPO   PO Supplement Intake:NPO    Current Tube Feeding (TF) Orders:  Feeding Route: Gastrostomy  Formula: Standard with Fiber  Schedule:    Feeding Regimen:    Additives/Modulars:    Water Flushes: 115 ml q 4 hr  Current TF & Flush Orders Provides:    Goal TF & Flush Orders Provides: Jevity 1.5 @ goal rate of 40 ml/hr (x 23 hrs as TF held 30 min before & after administering synthroid) provides 920 ml; 1380 kcal, 59g pro & 700 ml free water. ANTHROPOMETRICS  Current Height: 4' 9.01\" (144.8 cm)  Current Weight - Scale: 97 lb 3.6 oz (44.1 kg)    Ideal Body Weight (IBW): 85 lbs  (39 kg)        BMI: 21      COMPARATIVE STANDARDS  Energy (kcal):  1100- 1320     Protein (g):  53-88g       Fluid (mL/day):   1 ml/kcal    The patient will be monitored per nutrition standards of care. Consult dietitian if additional nutrition interventions are needed prior to RD reassessment.      Guille Jackson RD, LD    Contact: [phone]
mouth daily         Patient is no longer taking Synthroid 100 MCG Tablet    Timing of last doses updated.     Thank you,  Timothy Nielsen CPhT
plugging. On cefepime. Vanco discontinued yesterday. Appreciate ID and pulmonary input. Sounds improved today. Metanebs started. Hypernatremia-resolved after changing fluids yesterday  Dehydration-improved. Will dc fluids. Seizure disorder-continue home meds. Hypothyroid-continue synthroid  Severe MRDD-non verbal, has peg. Disposition-hopefully home in next 24-48. Resides in a group home.       Diet: Diet NPO  ADULT TUBE FEEDING; Gastrostomy; Standard with Fiber; Continuous; 20; Yes; 20; Q 4 hours; 40; 115; Q 4 hours  Code:[id number]-CCA  DVT PPX: lovenox  Disposition group home      Vamshi Barajas PA-C   5/18/2023 12:11 PM
cervical lymphadenopathy    Lines: All vascular access sites are healthy with no local erythema, discharge or tenderness       Intake and output:    I/O last 3 completed shifts: In: 217 [NG/GT:664]  Out: 400 [Urine:400]    Lab Data:   All available labs and old records have been reviewed by me. CBC:  Recent Labs     05/15/23  1624 05/16/23  0517 05/17/23  0938   WBC 12.7* 10.9 4.6   RBC 4.68 4.06 3.78*   HGB 16.0 13.7 13.2   HCT 48.5* 41.8 39.3    184 177   .7* 103.0* 104.1*   MCH 34.1* 33.7 34.9*   MCHC 32.9 32.7 33.6   RDW 12.8 12.5 12.6   BANDSPCT  --  2  --         BMP:  Recent Labs     05/15/23  1624 05/16/23  0517 05/17/23  0938    142 147*   K 4.0 4.3 4.6    109 113*   CO2 24 28 27   BUN 35* 32* 31*   CREATININE 0.7 <0.5* <0.5*   CALCIUM 9.2 9.1 8.8   GLUCOSE 100* 87 80        Hepatic Function Panel:   Lab Results   Component Value Date/Time    ALKPHOS 71 05/15/2023 04:24 PM    ALT 15 05/15/2023 04:24 PM    AST 27 05/15/2023 04:24 PM    PROT 8.2 05/15/2023 04:24 PM    PROT 6.9 02/06/2013 01:26 PM    BILITOT 0.3 05/15/2023 04:24 PM    BILIDIR <0.2 05/15/2023 04:24 PM    IBILI see below 05/15/2023 04:24 PM    LABALBU 3.6 05/15/2023 04:24 PM       CPK:   Lab Results   Component Value Date    CKTOTAL 78 08/22/2012     ESR: No results found for: SEDRATE  CRP: No results found for: CRP        Imaging: All pertinent images and reports for the current visit were reviewed by me during this visit. I reviewed the chest x-ray/CT scan/MRI images and independently interpreted the findings and results today. XR CHEST PORTABLE   Final Result   Worsening left infrahilar opacity         CT CHEST WO CONTRAST    (Results Pending)       Medications: All current and past medications were reviewed.      vancomycin  1,000 mg IntraVENous Q12H    carbidopa-levodopa  1.5 tablet Oral TID    midodrine  2.5 mg Oral TID WC    therapeutic multivitamin-minerals  1 tablet Oral Daily    pantoprazole  40 mg
x-ray/CT scan/MRI images and independently interpreted the findings and results today. CT CHEST WO CONTRAST   Final Result   Increased filling defects-mucous plugging in the lower lobe airways, left   greater than right. Increased ground-glass opacity is seen in the right   middle lobe, likely postinflammatory-infectious. Ground-glass opacity on the left appears similar. A small right-sided   pleural effusion also appears unchanged      Esophagus is patulous. Fluid is seen in the esophagus, to nearly the level   of thoracic inlet         XR CHEST PORTABLE   Final Result   Worsening left infrahilar opacity             Medications: All current and past medications were reviewed.      carbidopa-levodopa  1.5 tablet Oral TID    midodrine  2.5 mg Oral TID WC    therapeutic multivitamin-minerals  1 tablet Oral Daily    pantoprazole  40 mg Oral QAM AC    sertraline  100 mg Oral Nightly    cefepime  2,000 mg IntraVENous Q12H    sodium chloride flush  5-40 mL IntraVENous 2 times per day    enoxaparin  40 mg SubCUTAneous Daily    levothyroxine  150 mcg Oral Daily    valproic acid  250 mg Oral BID    valproic acid  500 mg Oral Nightly        sodium chloride         acetaminophen, docusate sodium, sodium chloride flush, sodium chloride, ondansetron **OR** ondansetron, polyethylene glycol, acetaminophen **OR** acetaminophen      Problem list:       Patient Active Problem List   Diagnosis Code    Down syndrome Q90.9    Down syndrome Q90.9    Down syndrome Q90.9    Dysphagia R13.10    Autism F84.0    PEG adjustment, replacement, or removal Z43.1    Hypotension I95.9    Sepsis ([address] A41.9    Acute hypoxemic respiratory failure (HCC) J96.01    AVELINO (acute kidney injury) ([address] N17.9    Aspiration pneumonia ([address] J69.0    Septic shock ([address] A41.9, R65.21    Moderate malnutrition ([address] E44.0    Streptococcal bacteremia R78.81, B95.5    Pneumonia of left lower lobe due to infectious organism J18.9    Parkinsonism ([address] Andria Lieberman

## 2023-05-19 NOTE — CARE COORDINATION
SW spoke with rep from [employer] who asked if they could be given 24 hour notice next time before pt's discharge so they can adequately have staff ready at the home. SW informed that I would notate this in patient's chart but could not guarantee this for future discharges. Stated their agency will work on finding someone to come pick pt up around 6pm tonight. They will call SW back and provide a more accurate time when they have one.     Electronically signed by MACK Callaway, JORDON on 5/19/2023 at 3:09 PM

## 2023-05-19 NOTE — PLAN OF CARE
Problem: Pain  Goal: Verbalizes/displays adequate comfort level or baseline comfort level  Outcome: Progressing   No signs of pain. Problem: Safety - Adult  Goal: Free from fall injury  Outcome: Progressing   Patient has remained free of falls. 2/4 bed rails up, bed locked and in lowest position, call light within reach. Bed alarm on. Non-skid footwear and fall band on.

## 2023-05-19 NOTE — DISCHARGE SUMMARY
Hospital Medicine Discharge Summary    Patient: Kaveh Newton     Gender: female  : 1965   Age: 59 y.o. MRN: [id number]    Admitting Physician: Shahida Samaniego MD  Discharge Physician: Elizabeth Hernández PA-C     Code Status: DNR-CCA     Admit Date: 5/15/2023   Discharge Date:   23    Disposition:  Home  Time spent arranging discharge: 35 minutes    Discharge Diagnoses: Active Hospital Problems    Diagnosis Date Noted    Diarrhea [R19.7] 2023    SIRS (systemic inflammatory response syndrome) (HCC) [R65.10] 2023    Hypothyroidism [E03.9] 2023    Gastrostomy tube in place [facility]) [Z93.1] 2023    Bacterial pneumonia [J15.9] 05/15/2023    Autism [F84.0]        Recommendations: Palliative Care at Dc when consideration for hospice    Condition at Discharge:  [facility] Hospital Course:   Patient is a 61-year-old female with profound MRDD, nonverbal, PEG tube fed, who been previously diagnosed with pneumonia and treated on doxycycline. In the emergency room chest x-ray revealed sats 92% on room air, white count was 12.7 and heart rate was 102. Patient was admitted and started on broad-spectrum IV antibiotics. She was seen by infectious disease as well as pulmonary. She was started on nebulizers including MetaNebs. She did have a CT scan which revealed mucous plugging. She was weaned off of oxygen. At time of DC congestion is substantially improved. She was transitioned to antibiotics via PEG tube and was discharged back to the group home in stable condition. She was seen by palliative care. Patient is already DNR CCA. Consider palliative care in the group home versus hospice. Discharge Exam:    BP (!) 95/55   Pulse 99   Temp 97 °F (36.1 °C) (Axillary)   Resp 17   Ht 4' 9.01\" (1.448 m)   Wt 97 lb 3.6 oz (44.1 kg)   SpO2 94%   BMI 21.03 kg/m²   General appearance:  Appears comfortable.  AAOx3  HEENT: atraumatic, Pupils equal, muscous membranes moist, no masses

## 2023-05-19 NOTE — DISCHARGE INSTR - COC
Certification: I certify the above information and transfer of Rosa Adame  is necessary for the continuing treatment of the diagnosis listed and that she requires Intermediate/Mental Retardation/Developmental Disabilities Care for greater 30 days.      Update Admission H&P: No change in H&P    PHYSICIAN SIGNATURE:  Electronically signed by Vinny Hudson PA-C on 5/19/23 at 2:00 PM EDT

## 2023-05-19 NOTE — CARE COORDINATION
Received a call back from [facility] coordinator stating that they have someone coming to  the patient at [address] [place] [address] to go back to their group home. All discharge needs met at this time.     Electronically signed by MACK Melendez, JORDON on 5/19/2023 at 6:48 PM

## 2023-05-19 NOTE — CARE COORDINATION
05/19/23 1507   IMM Letter   IMM Letter given to Patient/Family/Significant other/Guardian/POA/by: DAMIAN provided IMM verbally and faxed copy to  [employer] fax #[phone] [facility].    IMM Letter date given: 05/19/23   IMM Letter time given: 5988

## 2023-05-19 NOTE — PLAN OF CARE
Problem: Pain  Goal: Verbalizes/displays adequate comfort level or baseline comfort level  5/19/2023 0938 by Nas Madden RN  Outcome: Progressing  No signs of pain. Problem: Safety - Adult  Goal: Free from fall injury  5/19/2023 0938 by Nas Madden RN  Outcome: Progressing  No falls. Bed rails x2 up, bed locked, alarm, and in lowest position. Non skid socks and fall band on. Call light within reach.

## 2023-05-22 RX ORDER — AMOXICILLIN AND CLAVULANATE POTASSIUM 875; 125 MG/1; MG/1
1 TABLET, FILM COATED ORAL EVERY 12 HOURS SCHEDULED
Qty: 14 TABLET | Refills: 0 | Status: ON HOLD | OUTPATIENT
Start: 2023-05-22 | End: 2023-05-29

## 2023-05-26 ENCOUNTER — HOSPITAL ENCOUNTER (INPATIENT)
Age: 58
LOS: 4 days | Discharge: HOME | DRG: 871 | End: 2023-05-30
Attending: EMERGENCY MEDICINE | Admitting: INTERNAL MEDICINE
Payer: MEDICARE

## 2023-05-26 ENCOUNTER — APPOINTMENT (OUTPATIENT)
Dept: CT IMAGING | Age: 58
DRG: 871 | End: 2023-05-26
Payer: MEDICARE

## 2023-05-26 ENCOUNTER — APPOINTMENT (OUTPATIENT)
Dept: GENERAL RADIOLOGY | Age: 58
DRG: 871 | End: 2023-05-26
Payer: MEDICARE

## 2023-05-26 DIAGNOSIS — E87.0 HYPERNATREMIA: ICD-10-CM

## 2023-05-26 DIAGNOSIS — J18.9 PNEUMONIA OF BOTH LOWER LOBES DUE TO INFECTIOUS ORGANISM: Primary | ICD-10-CM

## 2023-05-26 LAB
ALBUMIN SERPL-MCNC: 3.6 G/DL (ref 3.4–5)
ALBUMIN/GLOB SERPL: 0.8 {RATIO} (ref 1.1–2.2)
ALP SERPL-CCNC: 75 U/L (ref 40–129)
ALT SERPL-CCNC: <5 U/L (ref 10–40)
ANION GAP SERPL CALCULATED.3IONS-SCNC: 11 MMOL/L (ref 3–16)
APTT BLD: 29 SEC (ref 22.7–35.9)
AST SERPL-CCNC: 25 U/L (ref 15–37)
BASOPHILS # BLD: 0 K/UL (ref 0–0.2)
BASOPHILS NFR BLD: 0 %
BILIRUB SERPL-MCNC: 0.4 MG/DL (ref 0–1)
BILIRUB UR QL STRIP.AUTO: NEGATIVE
BUN SERPL-MCNC: 36 MG/DL (ref 7–20)
CALCIUM SERPL-MCNC: 9.6 MG/DL (ref 8.3–10.6)
CHARACTER UR: ABNORMAL
CHLORIDE SERPL-SCNC: 109 MMOL/L (ref 99–110)
CLARITY UR: CLEAR
CO2 SERPL-SCNC: 32 MMOL/L (ref 21–32)
COLOR UR: YELLOW
CREAT SERPL-MCNC: 0.8 MG/DL (ref 0.6–1.1)
DEPRECATED RDW RBC AUTO: 13.2 % (ref 12.4–15.4)
EKG ATRIAL RATE: 105 BPM
EKG DIAGNOSIS: NORMAL
EKG P AXIS: 24 DEGREES
EKG P-R INTERVAL: 120 MS
EKG Q-T INTERVAL: 342 MS
EKG QRS DURATION: 66 MS
EKG QTC CALCULATION (BAZETT): 452 MS
EKG R AXIS: -16 DEGREES
EKG T AXIS: 12 DEGREES
EKG VENTRICULAR RATE: 105 BPM
EOSINOPHIL # BLD: 0 K/UL (ref 0–0.6)
EOSINOPHIL NFR BLD: 0 %
FLUAV RNA UPPER RESP QL NAA+PROBE: NEGATIVE
FLUBV AG NPH QL: NEGATIVE
GFR SERPLBLD CREATININE-BSD FMLA CKD-EPI: >60 ML/MIN/{1.73_M2}
GLUCOSE SERPL-MCNC: 92 MG/DL (ref 70–99)
GLUCOSE UR STRIP.AUTO-MCNC: NEGATIVE MG/DL
HCT VFR BLD AUTO: 48.4 % (ref 36–48)
HGB BLD-MCNC: 15.9 G/DL (ref 12–16)
HGB UR QL STRIP.AUTO: ABNORMAL
INR PPP: 1.18 (ref 0.84–1.16)
KETONES UR STRIP.AUTO-MCNC: NEGATIVE MG/DL
LACTATE BLDV-SCNC: 1.2 MMOL/L (ref 0.4–1.9)
LEUKOCYTE ESTERASE UR QL STRIP.AUTO: ABNORMAL
LIPASE SERPL-CCNC: 18 U/L (ref 13–60)
LYMPHOCYTES # BLD: 2.4 K/UL (ref 1–5.1)
LYMPHOCYTES NFR BLD: 11 %
MCH RBC QN AUTO: 34.2 PG (ref 26–34)
MCHC RBC AUTO-ENTMCNC: 32.8 G/DL (ref 31–36)
MCV RBC AUTO: 104.2 FL (ref 80–100)
MONOCYTES # BLD: 2.2 K/UL (ref 0–1.3)
MONOCYTES NFR BLD: 10 %
NEUTROPHILS # BLD: 17.1 K/UL (ref 1.7–7.7)
NEUTROPHILS NFR BLD: 78 %
NEUTS BAND NFR BLD MANUAL: 1 % (ref 0–7)
NITRITE UR QL STRIP.AUTO: NEGATIVE
NT-PROBNP SERPL-MCNC: 1098 PG/ML (ref 0–124)
PH UR STRIP.AUTO: 6.5 [PH] (ref 5–8)
PLATELET # BLD AUTO: 148 K/UL (ref 135–450)
PLATELET BLD QL SMEAR: ADEQUATE
PMV BLD AUTO: 11 FL (ref 5–10.5)
POTASSIUM SERPL-SCNC: 4.1 MMOL/L (ref 3.5–5.1)
PROT SERPL-MCNC: 7.9 G/DL (ref 6.4–8.2)
PROT UR STRIP.AUTO-MCNC: ABNORMAL MG/DL
PROTHROMBIN TIME: 15 SEC (ref 11.5–14.8)
RBC # BLD AUTO: 4.64 M/UL (ref 4–5.2)
RBC #/AREA URNS HPF: ABNORMAL /HPF (ref 0–4)
RBC MORPH BLD: NORMAL
SARS-COV-2 RDRP RESP QL NAA+PROBE: NOT DETECTED
SLIDE REVIEW: ABNORMAL
SODIUM SERPL-SCNC: 152 MMOL/L (ref 136–145)
SP GR UR STRIP.AUTO: 1.01 (ref 1–1.03)
TROPONIN, HIGH SENSITIVITY: 47 NG/L (ref 0–14)
TROPONIN, HIGH SENSITIVITY: 66 NG/L (ref 0–14)
UA COMPLETE W REFLEX CULTURE PNL UR: YES
UA DIPSTICK W REFLEX MICRO PNL UR: YES
URN SPEC COLLECT METH UR: ABNORMAL
UROBILINOGEN UR STRIP-ACNC: 0.2 E.U./DL
WBC # BLD AUTO: 21.6 K/UL (ref 4–11)
WBC #/AREA URNS HPF: ABNORMAL /HPF (ref 0–5)

## 2023-05-26 PROCEDURE — P9612 CATHETERIZE FOR URINE SPEC: HCPCS

## 2023-05-26 PROCEDURE — 96365 THER/PROPH/DIAG IV INF INIT: CPT

## 2023-05-26 PROCEDURE — 71045 X-RAY EXAM CHEST 1 VIEW: CPT

## 2023-05-26 PROCEDURE — 1200000000 HC SEMI PRIVATE

## 2023-05-26 PROCEDURE — 80053 COMPREHEN METABOLIC PANEL: CPT

## 2023-05-26 PROCEDURE — 6370000000 HC RX 637 (ALT 250 FOR IP): Performed by: FAMILY MEDICINE

## 2023-05-26 PROCEDURE — 96361 HYDRATE IV INFUSION ADD-ON: CPT

## 2023-05-26 PROCEDURE — 85025 COMPLETE CBC W/AUTO DIFF WBC: CPT

## 2023-05-26 PROCEDURE — 6360000002 HC RX W HCPCS: Performed by: PHYSICIAN ASSISTANT

## 2023-05-26 PROCEDURE — 83605 ASSAY OF LACTIC ACID: CPT

## 2023-05-26 PROCEDURE — 6360000002 HC RX W HCPCS: Performed by: INTERNAL MEDICINE

## 2023-05-26 PROCEDURE — 2580000003 HC RX 258: Performed by: PHYSICIAN ASSISTANT

## 2023-05-26 PROCEDURE — 84484 ASSAY OF TROPONIN QUANT: CPT

## 2023-05-26 PROCEDURE — 85610 PROTHROMBIN TIME: CPT

## 2023-05-26 PROCEDURE — 87804 INFLUENZA ASSAY W/OPTIC: CPT

## 2023-05-26 PROCEDURE — 2580000003 HC RX 258: Performed by: INTERNAL MEDICINE

## 2023-05-26 PROCEDURE — 96368 THER/DIAG CONCURRENT INF: CPT

## 2023-05-26 PROCEDURE — 87040 BLOOD CULTURE FOR BACTERIA: CPT

## 2023-05-26 PROCEDURE — 93005 ELECTROCARDIOGRAM TRACING: CPT | Performed by: PHYSICIAN ASSISTANT

## 2023-05-26 PROCEDURE — 83880 ASSAY OF NATRIURETIC PEPTIDE: CPT

## 2023-05-26 PROCEDURE — 93010 ELECTROCARDIOGRAM REPORT: CPT | Performed by: INTERNAL MEDICINE

## 2023-05-26 PROCEDURE — 87635 SARS-COV-2 COVID-19 AMP PRB: CPT

## 2023-05-26 PROCEDURE — 83690 ASSAY OF LIPASE: CPT

## 2023-05-26 PROCEDURE — 99285 EMERGENCY DEPT VISIT HI MDM: CPT

## 2023-05-26 PROCEDURE — 6370000000 HC RX 637 (ALT 250 FOR IP): Performed by: PHYSICIAN ASSISTANT

## 2023-05-26 PROCEDURE — 85730 THROMBOPLASTIN TIME PARTIAL: CPT

## 2023-05-26 PROCEDURE — 81001 URINALYSIS AUTO W/SCOPE: CPT

## 2023-05-26 PROCEDURE — 36415 COLL VENOUS BLD VENIPUNCTURE: CPT

## 2023-05-26 PROCEDURE — 2700000000 HC OXYGEN THERAPY PER DAY

## 2023-05-26 PROCEDURE — 74176 CT ABD & PELVIS W/O CONTRAST: CPT

## 2023-05-26 PROCEDURE — 87086 URINE CULTURE/COLONY COUNT: CPT

## 2023-05-26 RX ORDER — ONDANSETRON 4 MG/1
4 TABLET, ORALLY DISINTEGRATING ORAL EVERY 8 HOURS PRN
Status: DISCONTINUED | OUTPATIENT
Start: 2023-05-26 | End: 2023-05-30 | Stop reason: HOSPADM

## 2023-05-26 RX ORDER — SODIUM CHLORIDE 0.9 % (FLUSH) 0.9 %
5-40 SYRINGE (ML) INJECTION EVERY 12 HOURS SCHEDULED
Status: DISCONTINUED | OUTPATIENT
Start: 2023-05-26 | End: 2023-05-30 | Stop reason: HOSPADM

## 2023-05-26 RX ORDER — ACETAMINOPHEN 650 MG/1
650 SUPPOSITORY RECTAL EVERY 6 HOURS PRN
Status: DISCONTINUED | OUTPATIENT
Start: 2023-05-26 | End: 2023-05-26

## 2023-05-26 RX ORDER — VALPROIC ACID 250 MG/5ML
500 SOLUTION ORAL NIGHTLY
Status: DISCONTINUED | OUTPATIENT
Start: 2023-05-26 | End: 2023-05-26

## 2023-05-26 RX ORDER — LACTOBACILLUS RHAMNOSUS GG 10B CELL
1 CAPSULE ORAL 2 TIMES DAILY WITH MEALS
Status: DISCONTINUED | OUTPATIENT
Start: 2023-05-27 | End: 2023-05-26

## 2023-05-26 RX ORDER — ENOXAPARIN SODIUM 100 MG/ML
30 INJECTION SUBCUTANEOUS DAILY
Status: DISCONTINUED | OUTPATIENT
Start: 2023-05-26 | End: 2023-05-30 | Stop reason: HOSPADM

## 2023-05-26 RX ORDER — LEVOTHYROXINE SODIUM 0.15 MG/1
150 TABLET ORAL DAILY
Status: DISCONTINUED | OUTPATIENT
Start: 2023-05-27 | End: 2023-05-30 | Stop reason: HOSPADM

## 2023-05-26 RX ORDER — ACETAMINOPHEN 650 MG/1
650 SUPPOSITORY RECTAL EVERY 6 HOURS PRN
Status: DISCONTINUED | OUTPATIENT
Start: 2023-05-26 | End: 2023-05-30 | Stop reason: HOSPADM

## 2023-05-26 RX ORDER — VALPROIC ACID 250 MG/5ML
250 SOLUTION ORAL 2 TIMES DAILY
Status: DISCONTINUED | OUTPATIENT
Start: 2023-05-27 | End: 2023-05-26

## 2023-05-26 RX ORDER — ONDANSETRON 2 MG/ML
4 INJECTION INTRAMUSCULAR; INTRAVENOUS EVERY 6 HOURS PRN
Status: DISCONTINUED | OUTPATIENT
Start: 2023-05-26 | End: 2023-05-30 | Stop reason: HOSPADM

## 2023-05-26 RX ORDER — PANTOPRAZOLE SODIUM 40 MG/1
40 TABLET, DELAYED RELEASE ORAL
Status: DISCONTINUED | OUTPATIENT
Start: 2023-05-27 | End: 2023-05-26

## 2023-05-26 RX ORDER — SODIUM CHLORIDE 9 MG/ML
INJECTION, SOLUTION INTRAVENOUS PRN
Status: DISCONTINUED | OUTPATIENT
Start: 2023-05-26 | End: 2023-05-30 | Stop reason: HOSPADM

## 2023-05-26 RX ORDER — 0.9 % SODIUM CHLORIDE 0.9 %
1000 INTRAVENOUS SOLUTION INTRAVENOUS ONCE
Status: COMPLETED | OUTPATIENT
Start: 2023-05-26 | End: 2023-05-26

## 2023-05-26 RX ORDER — FUROSEMIDE 20 MG/1
20 TABLET ORAL DAILY
Status: DISCONTINUED | OUTPATIENT
Start: 2023-05-26 | End: 2023-05-26

## 2023-05-26 RX ORDER — ACETAMINOPHEN 325 MG/1
650 TABLET ORAL EVERY 6 HOURS PRN
Status: DISCONTINUED | OUTPATIENT
Start: 2023-05-26 | End: 2023-05-26

## 2023-05-26 RX ORDER — POTASSIUM CHLORIDE 7.45 MG/ML
10 INJECTION INTRAVENOUS PRN
Status: DISCONTINUED | OUTPATIENT
Start: 2023-05-26 | End: 2023-05-30 | Stop reason: HOSPADM

## 2023-05-26 RX ORDER — SODIUM CHLORIDE 9 MG/ML
INJECTION, SOLUTION INTRAVENOUS CONTINUOUS
Status: DISCONTINUED | OUTPATIENT
Start: 2023-05-26 | End: 2023-05-27

## 2023-05-26 RX ORDER — SERTRALINE HYDROCHLORIDE 20 MG/ML
100 SOLUTION ORAL NIGHTLY
Status: DISCONTINUED | OUTPATIENT
Start: 2023-05-26 | End: 2023-05-26

## 2023-05-26 RX ORDER — MIDODRINE HYDROCHLORIDE 5 MG/1
2.5 TABLET ORAL
Status: DISCONTINUED | OUTPATIENT
Start: 2023-05-27 | End: 2023-05-26

## 2023-05-26 RX ORDER — ACETAMINOPHEN 650 MG/1
650 SUPPOSITORY RECTAL ONCE
Status: COMPLETED | OUTPATIENT
Start: 2023-05-26 | End: 2023-05-26

## 2023-05-26 RX ORDER — ONDANSETRON 4 MG/1
4 TABLET, FILM COATED ORAL EVERY 8 HOURS PRN
Status: DISCONTINUED | OUTPATIENT
Start: 2023-05-26 | End: 2023-05-26 | Stop reason: SDUPTHER

## 2023-05-26 RX ORDER — LEVOTHYROXINE SODIUM 0.15 MG/1
150 TABLET ORAL DAILY
Status: DISCONTINUED | OUTPATIENT
Start: 2023-05-26 | End: 2023-05-26

## 2023-05-26 RX ORDER — DOCUSATE SODIUM 100 MG/1
100 CAPSULE, LIQUID FILLED ORAL DAILY
Status: DISCONTINUED | OUTPATIENT
Start: 2023-05-27 | End: 2023-05-26

## 2023-05-26 RX ORDER — PANTOPRAZOLE SODIUM 40 MG/10ML
40 INJECTION, POWDER, LYOPHILIZED, FOR SOLUTION INTRAVENOUS DAILY
Status: DISCONTINUED | OUTPATIENT
Start: 2023-05-27 | End: 2023-05-30 | Stop reason: HOSPADM

## 2023-05-26 RX ORDER — ACETAMINOPHEN 325 MG/1
650 TABLET ORAL EVERY 6 HOURS PRN
Status: DISCONTINUED | OUTPATIENT
Start: 2023-05-26 | End: 2023-05-30 | Stop reason: HOSPADM

## 2023-05-26 RX ORDER — LACTOBACILLUS RHAMNOSUS GG 10B CELL
1 CAPSULE ORAL 2 TIMES DAILY WITH MEALS
Status: DISCONTINUED | OUTPATIENT
Start: 2023-05-27 | End: 2023-05-30 | Stop reason: HOSPADM

## 2023-05-26 RX ORDER — ONDANSETRON 4 MG/1
4 TABLET, ORALLY DISINTEGRATING ORAL EVERY 8 HOURS PRN
Status: DISCONTINUED | OUTPATIENT
Start: 2023-05-26 | End: 2023-05-26

## 2023-05-26 RX ORDER — MIDODRINE HYDROCHLORIDE 5 MG/1
2.5 TABLET ORAL
Status: DISCONTINUED | OUTPATIENT
Start: 2023-05-27 | End: 2023-05-30 | Stop reason: HOSPADM

## 2023-05-26 RX ORDER — FUROSEMIDE 20 MG/1
20 TABLET ORAL DAILY
Status: DISCONTINUED | OUTPATIENT
Start: 2023-05-27 | End: 2023-05-27

## 2023-05-26 RX ORDER — POTASSIUM CHLORIDE 20 MEQ/1
40 TABLET, EXTENDED RELEASE ORAL PRN
Status: DISCONTINUED | OUTPATIENT
Start: 2023-05-26 | End: 2023-05-30 | Stop reason: HOSPADM

## 2023-05-26 RX ORDER — ONDANSETRON 2 MG/ML
4 INJECTION INTRAMUSCULAR; INTRAVENOUS EVERY 6 HOURS PRN
Status: DISCONTINUED | OUTPATIENT
Start: 2023-05-26 | End: 2023-05-26

## 2023-05-26 RX ORDER — 0.9 % SODIUM CHLORIDE 0.9 %
500 INTRAVENOUS SOLUTION INTRAVENOUS PRN
Status: DISCONTINUED | OUTPATIENT
Start: 2023-05-26 | End: 2023-05-30 | Stop reason: HOSPADM

## 2023-05-26 RX ORDER — VALPROIC ACID 250 MG/5ML
500 SOLUTION ORAL NIGHTLY
Status: DISCONTINUED | OUTPATIENT
Start: 2023-05-26 | End: 2023-05-30 | Stop reason: HOSPADM

## 2023-05-26 RX ORDER — SODIUM CHLORIDE 0.9 % (FLUSH) 0.9 %
10 SYRINGE (ML) INJECTION PRN
Status: DISCONTINUED | OUTPATIENT
Start: 2023-05-26 | End: 2023-05-30 | Stop reason: HOSPADM

## 2023-05-26 RX ORDER — DOCUSATE SODIUM 100 MG/1
100 CAPSULE, LIQUID FILLED ORAL DAILY
Status: DISCONTINUED | OUTPATIENT
Start: 2023-05-27 | End: 2023-05-30 | Stop reason: HOSPADM

## 2023-05-26 RX ORDER — IPRATROPIUM BROMIDE AND ALBUTEROL SULFATE 2.5; .5 MG/3ML; MG/3ML
1 SOLUTION RESPIRATORY (INHALATION) 2 TIMES DAILY
Status: DISCONTINUED | OUTPATIENT
Start: 2023-05-27 | End: 2023-05-30 | Stop reason: HOSPADM

## 2023-05-26 RX ORDER — IPRATROPIUM BROMIDE AND ALBUTEROL SULFATE 2.5; .5 MG/3ML; MG/3ML
1 SOLUTION RESPIRATORY (INHALATION)
Status: DISCONTINUED | OUTPATIENT
Start: 2023-05-26 | End: 2023-05-26

## 2023-05-26 RX ORDER — HYDRALAZINE HYDROCHLORIDE 20 MG/ML
10 INJECTION INTRAMUSCULAR; INTRAVENOUS EVERY 6 HOURS PRN
Status: DISCONTINUED | OUTPATIENT
Start: 2023-05-26 | End: 2023-05-30 | Stop reason: HOSPADM

## 2023-05-26 RX ORDER — VALPROIC ACID 250 MG/5ML
250 SOLUTION ORAL 2 TIMES DAILY
Status: DISCONTINUED | OUTPATIENT
Start: 2023-05-27 | End: 2023-05-30 | Stop reason: HOSPADM

## 2023-05-26 RX ORDER — 0.9 % SODIUM CHLORIDE 0.9 %
350 INTRAVENOUS SOLUTION INTRAVENOUS ONCE
Status: COMPLETED | OUTPATIENT
Start: 2023-05-26 | End: 2023-05-26

## 2023-05-26 RX ORDER — M-VIT,TX,IRON,MINS/CALC/FOLIC 27MG-0.4MG
1 TABLET ORAL DAILY
Status: DISCONTINUED | OUTPATIENT
Start: 2023-05-27 | End: 2023-05-30 | Stop reason: HOSPADM

## 2023-05-26 RX ADMIN — VALPROIC ACID 500 MG: 250 SOLUTION ORAL at 23:44

## 2023-05-26 RX ADMIN — CARBIDOPA AND LEVODOPA 1.5 TABLET: 25; 100 TABLET ORAL at 22:14

## 2023-05-26 RX ADMIN — CEFEPIME 1000 MG: 1 INJECTION, POWDER, FOR SOLUTION INTRAMUSCULAR; INTRAVENOUS at 14:09

## 2023-05-26 RX ADMIN — SODIUM CHLORIDE 1000 ML: 9 INJECTION, SOLUTION INTRAVENOUS at 10:45

## 2023-05-26 RX ADMIN — CEFTRIAXONE SODIUM 1000 MG: 1 INJECTION, POWDER, FOR SOLUTION INTRAMUSCULAR; INTRAVENOUS at 11:57

## 2023-05-26 RX ADMIN — VANCOMYCIN HYDROCHLORIDE 1000 MG: 1 INJECTION, POWDER, LYOPHILIZED, FOR SOLUTION INTRAVENOUS at 14:48

## 2023-05-26 RX ADMIN — SODIUM CHLORIDE 350 ML: 9 INJECTION, SOLUTION INTRAVENOUS at 12:00

## 2023-05-26 RX ADMIN — CEFEPIME 2000 MG: 2 INJECTION, POWDER, FOR SOLUTION INTRAVENOUS at 20:55

## 2023-05-26 RX ADMIN — ACETAMINOPHEN 650 MG: 650 SUPPOSITORY RECTAL at 10:26

## 2023-05-26 RX ADMIN — SODIUM CHLORIDE: 9 INJECTION, SOLUTION INTRAVENOUS at 20:42

## 2023-05-26 RX ADMIN — SERTRALINE 100 MG: 50 TABLET, FILM COATED ORAL at 22:13

## 2023-05-26 RX ADMIN — ENOXAPARIN SODIUM 30 MG: 100 INJECTION SUBCUTANEOUS at 22:14

## 2023-05-26 ASSESSMENT — PAIN SCALES - WONG BAKER
WONGBAKER_NUMERICALRESPONSE: 0
WONGBAKER_NUMERICALRESPONSE: 0

## 2023-05-26 ASSESSMENT — PAIN SCALES - GENERAL
PAINLEVEL_OUTOF10: 0

## 2023-05-26 ASSESSMENT — PAIN - FUNCTIONAL ASSESSMENT: PAIN_FUNCTIONAL_ASSESSMENT: 0-10

## 2023-05-27 ENCOUNTER — APPOINTMENT (OUTPATIENT)
Dept: GENERAL RADIOLOGY | Age: 58
DRG: 871 | End: 2023-05-27
Payer: MEDICARE

## 2023-05-27 LAB
ANION GAP SERPL CALCULATED.3IONS-SCNC: 9 MMOL/L (ref 3–16)
BACTERIA UR CULT: NORMAL
BASOPHILS # BLD: 0.1 K/UL (ref 0–0.2)
BASOPHILS NFR BLD: 0.5 %
BUN SERPL-MCNC: 33 MG/DL (ref 7–20)
C DIFF TOX A+B STL QL IA: NORMAL
CALCIUM SERPL-MCNC: 8.7 MG/DL (ref 8.3–10.6)
CHLORIDE SERPL-SCNC: 116 MMOL/L (ref 99–110)
CO2 SERPL-SCNC: 25 MMOL/L (ref 21–32)
CREAT SERPL-MCNC: <0.5 MG/DL (ref 0.6–1.1)
CRP SERPL-MCNC: 54.4 MG/L (ref 0–5.1)
DEPRECATED RDW RBC AUTO: 13.1 % (ref 12.4–15.4)
EOSINOPHIL # BLD: 0 K/UL (ref 0–0.6)
EOSINOPHIL NFR BLD: 0.3 %
FOLATE SERPL-MCNC: >20 NG/ML (ref 4.78–24.2)
GFR SERPLBLD CREATININE-BSD FMLA CKD-EPI: >60 ML/MIN/{1.73_M2}
GLUCOSE SERPL-MCNC: 109 MG/DL (ref 70–99)
HCT VFR BLD AUTO: 39.4 % (ref 36–48)
HGB BLD-MCNC: 12.5 G/DL (ref 12–16)
LACTATE BLDV-SCNC: 0.7 MMOL/L (ref 0.4–2)
LYMPHOCYTES # BLD: 1.9 K/UL (ref 1–5.1)
LYMPHOCYTES NFR BLD: 16.1 %
MCH RBC QN AUTO: 33.8 PG (ref 26–34)
MCHC RBC AUTO-ENTMCNC: 31.8 G/DL (ref 31–36)
MCV RBC AUTO: 106.3 FL (ref 80–100)
MONOCYTES # BLD: 0.8 K/UL (ref 0–1.3)
MONOCYTES NFR BLD: 6.6 %
NEUTROPHILS # BLD: 9 K/UL (ref 1.7–7.7)
NEUTROPHILS NFR BLD: 76.5 %
PLATELET # BLD AUTO: 111 K/UL (ref 135–450)
PMV BLD AUTO: 10.9 FL (ref 5–10.5)
POTASSIUM SERPL-SCNC: 3.9 MMOL/L (ref 3.5–5.1)
PROCALCITONIN SERPL IA-MCNC: 0.07 NG/ML (ref 0–0.15)
RBC # BLD AUTO: 3.71 M/UL (ref 4–5.2)
SODIUM SERPL-SCNC: 147 MMOL/L (ref 136–145)
SODIUM SERPL-SCNC: 150 MMOL/L (ref 136–145)
URATE SERPL-MCNC: 2.7 MG/DL (ref 2.6–6)
VANCOMYCIN SERPL-MCNC: 20 UG/ML
VANCOMYCIN SERPL-MCNC: 45.2 UG/ML
VIT B12 SERPL-MCNC: 883 PG/ML (ref 211–911)
WBC # BLD AUTO: 11.8 K/UL (ref 4–11)

## 2023-05-27 PROCEDURE — 83930 ASSAY OF BLOOD OSMOLALITY: CPT

## 2023-05-27 PROCEDURE — 51798 US URINE CAPACITY MEASURE: CPT

## 2023-05-27 PROCEDURE — 86140 C-REACTIVE PROTEIN: CPT

## 2023-05-27 PROCEDURE — 83935 ASSAY OF URINE OSMOLALITY: CPT

## 2023-05-27 PROCEDURE — C9113 INJ PANTOPRAZOLE SODIUM, VIA: HCPCS | Performed by: FAMILY MEDICINE

## 2023-05-27 PROCEDURE — 83605 ASSAY OF LACTIC ACID: CPT

## 2023-05-27 PROCEDURE — 84295 ASSAY OF SERUM SODIUM: CPT

## 2023-05-27 PROCEDURE — 2700000000 HC OXYGEN THERAPY PER DAY

## 2023-05-27 PROCEDURE — 84300 ASSAY OF URINE SODIUM: CPT

## 2023-05-27 PROCEDURE — 87324 CLOSTRIDIUM AG IA: CPT

## 2023-05-27 PROCEDURE — 6370000000 HC RX 637 (ALT 250 FOR IP): Performed by: FAMILY MEDICINE

## 2023-05-27 PROCEDURE — 71045 X-RAY EXAM CHEST 1 VIEW: CPT

## 2023-05-27 PROCEDURE — 82607 VITAMIN B-12: CPT

## 2023-05-27 PROCEDURE — 6370000000 HC RX 637 (ALT 250 FOR IP): Performed by: INTERNAL MEDICINE

## 2023-05-27 PROCEDURE — 82570 ASSAY OF URINE CREATININE: CPT

## 2023-05-27 PROCEDURE — 92610 EVALUATE SWALLOWING FUNCTION: CPT

## 2023-05-27 PROCEDURE — 94640 AIRWAY INHALATION TREATMENT: CPT

## 2023-05-27 PROCEDURE — 87449 NOS EACH ORGANISM AG IA: CPT

## 2023-05-27 PROCEDURE — 1200000000 HC SEMI PRIVATE

## 2023-05-27 PROCEDURE — 94760 N-INVAS EAR/PLS OXIMETRY 1: CPT

## 2023-05-27 PROCEDURE — 82436 ASSAY OF URINE CHLORIDE: CPT

## 2023-05-27 PROCEDURE — 84550 ASSAY OF BLOOD/URIC ACID: CPT

## 2023-05-27 PROCEDURE — 84145 PROCALCITONIN (PCT): CPT

## 2023-05-27 PROCEDURE — 85025 COMPLETE CBC W/AUTO DIFF WBC: CPT

## 2023-05-27 PROCEDURE — 84133 ASSAY OF URINE POTASSIUM: CPT

## 2023-05-27 PROCEDURE — 92526 ORAL FUNCTION THERAPY: CPT

## 2023-05-27 PROCEDURE — 6360000002 HC RX W HCPCS: Performed by: INTERNAL MEDICINE

## 2023-05-27 PROCEDURE — 82746 ASSAY OF FOLIC ACID SERUM: CPT

## 2023-05-27 PROCEDURE — 80202 ASSAY OF VANCOMYCIN: CPT

## 2023-05-27 PROCEDURE — 80048 BASIC METABOLIC PNL TOTAL CA: CPT

## 2023-05-27 PROCEDURE — 2580000003 HC RX 258: Performed by: INTERNAL MEDICINE

## 2023-05-27 PROCEDURE — 84540 ASSAY OF URINE/UREA-N: CPT

## 2023-05-27 PROCEDURE — 36415 COLL VENOUS BLD VENIPUNCTURE: CPT

## 2023-05-27 PROCEDURE — 6360000002 HC RX W HCPCS: Performed by: FAMILY MEDICINE

## 2023-05-27 RX ORDER — DEXTROSE MONOHYDRATE 50 MG/ML
INJECTION, SOLUTION INTRAVENOUS CONTINUOUS
Status: DISCONTINUED | OUTPATIENT
Start: 2023-05-27 | End: 2023-05-28

## 2023-05-27 RX ADMIN — PANTOPRAZOLE SODIUM 40 MG: 40 INJECTION, POWDER, FOR SOLUTION INTRAVENOUS at 10:11

## 2023-05-27 RX ADMIN — ENOXAPARIN SODIUM 30 MG: 100 INJECTION SUBCUTANEOUS at 10:10

## 2023-05-27 RX ADMIN — MULTIPLE VITAMINS W/ MINERALS TAB 1 TABLET: TAB at 10:11

## 2023-05-27 RX ADMIN — MIDODRINE HYDROCHLORIDE 2.5 MG: 5 TABLET ORAL at 08:00

## 2023-05-27 RX ADMIN — CARBIDOPA AND LEVODOPA 1.5 TABLET: 25; 100 TABLET ORAL at 22:05

## 2023-05-27 RX ADMIN — IPRATROPIUM BROMIDE AND ALBUTEROL SULFATE 1 DOSE: .5; 3 SOLUTION RESPIRATORY (INHALATION) at 20:28

## 2023-05-27 RX ADMIN — Medication 1 CAPSULE: at 17:47

## 2023-05-27 RX ADMIN — VANCOMYCIN HYDROCHLORIDE 750 MG: 750 INJECTION, POWDER, LYOPHILIZED, FOR SOLUTION INTRAVENOUS at 14:26

## 2023-05-27 RX ADMIN — VALPROIC ACID 250 MG: 250 SOLUTION ORAL at 08:00

## 2023-05-27 RX ADMIN — LEVOTHYROXINE SODIUM 150 MCG: 150 TABLET ORAL at 05:47

## 2023-05-27 RX ADMIN — VALPROIC ACID 500 MG: 250 SOLUTION ORAL at 22:07

## 2023-05-27 RX ADMIN — VANCOMYCIN HYDROCHLORIDE 1250 MG: 1.25 INJECTION, POWDER, LYOPHILIZED, FOR SOLUTION INTRAVENOUS at 03:35

## 2023-05-27 RX ADMIN — VALPROIC ACID 250 MG: 250 SOLUTION ORAL at 16:00

## 2023-05-27 RX ADMIN — SERTRALINE 100 MG: 50 TABLET, FILM COATED ORAL at 22:05

## 2023-05-27 RX ADMIN — FUROSEMIDE 20 MG: 20 TABLET ORAL at 10:11

## 2023-05-27 RX ADMIN — MIDODRINE HYDROCHLORIDE 2.5 MG: 5 TABLET ORAL at 17:47

## 2023-05-27 RX ADMIN — CARBIDOPA AND LEVODOPA 1.5 TABLET: 25; 100 TABLET ORAL at 14:28

## 2023-05-27 RX ADMIN — CEFEPIME 2000 MG: 2 INJECTION, POWDER, FOR SOLUTION INTRAVENOUS at 05:46

## 2023-05-27 RX ADMIN — CARBIDOPA AND LEVODOPA 1.5 TABLET: 25; 100 TABLET ORAL at 09:00

## 2023-05-27 RX ADMIN — DEXTROSE MONOHYDRATE: 50 INJECTION, SOLUTION INTRAVENOUS at 14:45

## 2023-05-27 RX ADMIN — SODIUM CHLORIDE, PRESERVATIVE FREE 10 ML: 5 INJECTION INTRAVENOUS at 10:11

## 2023-05-27 RX ADMIN — Medication 1 CAPSULE: at 10:11

## 2023-05-27 RX ADMIN — MIDODRINE HYDROCHLORIDE 2.5 MG: 5 TABLET ORAL at 12:00

## 2023-05-27 RX ADMIN — CEFEPIME 2000 MG: 2 INJECTION, POWDER, FOR SOLUTION INTRAVENOUS at 18:41

## 2023-05-27 ASSESSMENT — PAIN SCALES - WONG BAKER
WONGBAKER_NUMERICALRESPONSE: 0

## 2023-05-27 ASSESSMENT — PAIN SCALES - GENERAL
PAINLEVEL_OUTOF10: 0
PAINLEVEL_OUTOF10: 0

## 2023-05-28 LAB
ALBUMIN SERPL-MCNC: 2.5 G/DL (ref 3.4–5)
ANION GAP SERPL CALCULATED.3IONS-SCNC: 7 MMOL/L (ref 3–16)
BASOPHILS # BLD: 0 K/UL (ref 0–0.2)
BASOPHILS NFR BLD: 0.6 %
BUN SERPL-MCNC: 25 MG/DL (ref 7–20)
CALCIUM SERPL-MCNC: 8.3 MG/DL (ref 8.3–10.6)
CHLORIDE SERPL-SCNC: 107 MMOL/L (ref 99–110)
CHLORIDE UR-SCNC: 225 MMOL/L
CO2 SERPL-SCNC: 28 MMOL/L (ref 21–32)
CREAT SERPL-MCNC: <0.5 MG/DL (ref 0.6–1.1)
CREAT UR-MCNC: 32.7 MG/DL (ref 28–259)
DEPRECATED RDW RBC AUTO: 12.4 % (ref 12.4–15.4)
EOSINOPHIL # BLD: 0.1 K/UL (ref 0–0.6)
EOSINOPHIL NFR BLD: 1.6 %
GFR SERPLBLD CREATININE-BSD FMLA CKD-EPI: >60 ML/MIN/{1.73_M2}
GLUCOSE BLD-MCNC: 94 MG/DL (ref 70–99)
GLUCOSE SERPL-MCNC: 109 MG/DL (ref 70–99)
HCT VFR BLD AUTO: 36.6 % (ref 36–48)
HGB BLD-MCNC: 12 G/DL (ref 12–16)
LYMPHOCYTES # BLD: 1.6 K/UL (ref 1–5.1)
LYMPHOCYTES NFR BLD: 24.5 %
MCH RBC QN AUTO: 34.4 PG (ref 26–34)
MCHC RBC AUTO-ENTMCNC: 32.8 G/DL (ref 31–36)
MCV RBC AUTO: 104.7 FL (ref 80–100)
MONOCYTES # BLD: 0.6 K/UL (ref 0–1.3)
MONOCYTES NFR BLD: 9.3 %
NEUTROPHILS # BLD: 4.2 K/UL (ref 1.7–7.7)
NEUTROPHILS NFR BLD: 64 %
OSMOLALITY SERPL: 319 MOSM/KG (ref 275–295)
OSMOLALITY UR: 653 MOSM/KG (ref 390–1070)
PERFORMED ON: NORMAL
PHOSPHATE SERPL-MCNC: 2.9 MG/DL (ref 2.5–4.9)
PLATELET # BLD AUTO: 110 K/UL (ref 135–450)
PMV BLD AUTO: 11.3 FL (ref 5–10.5)
POTASSIUM SERPL-SCNC: 3.6 MMOL/L (ref 3.5–5.1)
POTASSIUM UR-SCNC: 32.2 MMOL/L
RBC # BLD AUTO: 3.5 M/UL (ref 4–5.2)
SODIUM SERPL-SCNC: 142 MMOL/L (ref 136–145)
SODIUM UR-SCNC: 184 MMOL/L
UUN UR-MCNC: 542.9 MG/DL (ref 800–1666)
VANCOMYCIN SERPL-MCNC: 24.4 UG/ML
WBC # BLD AUTO: 6.5 K/UL (ref 4–11)

## 2023-05-28 PROCEDURE — 1200000000 HC SEMI PRIVATE

## 2023-05-28 PROCEDURE — 6360000002 HC RX W HCPCS: Performed by: INTERNAL MEDICINE

## 2023-05-28 PROCEDURE — 85025 COMPLETE CBC W/AUTO DIFF WBC: CPT

## 2023-05-28 PROCEDURE — 94640 AIRWAY INHALATION TREATMENT: CPT

## 2023-05-28 PROCEDURE — 2580000003 HC RX 258: Performed by: INTERNAL MEDICINE

## 2023-05-28 PROCEDURE — 97530 THERAPEUTIC ACTIVITIES: CPT

## 2023-05-28 PROCEDURE — 94761 N-INVAS EAR/PLS OXIMETRY MLT: CPT

## 2023-05-28 PROCEDURE — 87449 NOS EACH ORGANISM AG IA: CPT

## 2023-05-28 PROCEDURE — 6370000000 HC RX 637 (ALT 250 FOR IP): Performed by: INTERNAL MEDICINE

## 2023-05-28 PROCEDURE — C9113 INJ PANTOPRAZOLE SODIUM, VIA: HCPCS | Performed by: FAMILY MEDICINE

## 2023-05-28 PROCEDURE — 6370000000 HC RX 637 (ALT 250 FOR IP): Performed by: FAMILY MEDICINE

## 2023-05-28 PROCEDURE — 97166 OT EVAL MOD COMPLEX 45 MIN: CPT

## 2023-05-28 PROCEDURE — 2700000000 HC OXYGEN THERAPY PER DAY

## 2023-05-28 PROCEDURE — 6360000002 HC RX W HCPCS: Performed by: FAMILY MEDICINE

## 2023-05-28 PROCEDURE — 80202 ASSAY OF VANCOMYCIN: CPT

## 2023-05-28 PROCEDURE — 97162 PT EVAL MOD COMPLEX 30 MIN: CPT

## 2023-05-28 PROCEDURE — 51798 US URINE CAPACITY MEASURE: CPT

## 2023-05-28 PROCEDURE — 80069 RENAL FUNCTION PANEL: CPT

## 2023-05-28 RX ADMIN — IPRATROPIUM BROMIDE AND ALBUTEROL SULFATE 1 DOSE: .5; 3 SOLUTION RESPIRATORY (INHALATION) at 11:14

## 2023-05-28 RX ADMIN — VANCOMYCIN HYDROCHLORIDE 750 MG: 750 INJECTION, POWDER, LYOPHILIZED, FOR SOLUTION INTRAVENOUS at 13:19

## 2023-05-28 RX ADMIN — VANCOMYCIN HYDROCHLORIDE 750 MG: 750 INJECTION, POWDER, LYOPHILIZED, FOR SOLUTION INTRAVENOUS at 01:13

## 2023-05-28 RX ADMIN — Medication 1 CAPSULE: at 17:17

## 2023-05-28 RX ADMIN — MIDODRINE HYDROCHLORIDE 2.5 MG: 5 TABLET ORAL at 17:17

## 2023-05-28 RX ADMIN — CARBIDOPA AND LEVODOPA 1.5 TABLET: 25; 100 TABLET ORAL at 13:20

## 2023-05-28 RX ADMIN — CEFEPIME 2000 MG: 2 INJECTION, POWDER, FOR SOLUTION INTRAVENOUS at 05:46

## 2023-05-28 RX ADMIN — SODIUM CHLORIDE: 9 INJECTION, SOLUTION INTRAVENOUS at 05:44

## 2023-05-28 RX ADMIN — ENOXAPARIN SODIUM 30 MG: 100 INJECTION SUBCUTANEOUS at 10:29

## 2023-05-28 RX ADMIN — VALPROIC ACID 500 MG: 250 SOLUTION ORAL at 20:35

## 2023-05-28 RX ADMIN — VALPROIC ACID 250 MG: 250 SOLUTION ORAL at 17:17

## 2023-05-28 RX ADMIN — MIDODRINE HYDROCHLORIDE 2.5 MG: 5 TABLET ORAL at 08:00

## 2023-05-28 RX ADMIN — LEVOTHYROXINE SODIUM 150 MCG: 150 TABLET ORAL at 05:01

## 2023-05-28 RX ADMIN — Medication 1 CAPSULE: at 09:00

## 2023-05-28 RX ADMIN — MIDODRINE HYDROCHLORIDE 2.5 MG: 5 TABLET ORAL at 12:00

## 2023-05-28 RX ADMIN — PANTOPRAZOLE SODIUM 40 MG: 40 INJECTION, POWDER, FOR SOLUTION INTRAVENOUS at 10:30

## 2023-05-28 RX ADMIN — CEFEPIME 2000 MG: 2 INJECTION, POWDER, FOR SOLUTION INTRAVENOUS at 17:16

## 2023-05-28 RX ADMIN — IPRATROPIUM BROMIDE AND ALBUTEROL SULFATE 1 DOSE: .5; 3 SOLUTION RESPIRATORY (INHALATION) at 20:39

## 2023-05-28 RX ADMIN — MULTIPLE VITAMINS W/ MINERALS TAB 1 TABLET: TAB at 09:00

## 2023-05-28 RX ADMIN — SODIUM CHLORIDE, PRESERVATIVE FREE 10 ML: 5 INJECTION INTRAVENOUS at 10:29

## 2023-05-28 RX ADMIN — CARBIDOPA AND LEVODOPA 1.5 TABLET: 25; 100 TABLET ORAL at 20:34

## 2023-05-28 RX ADMIN — SERTRALINE 100 MG: 50 TABLET, FILM COATED ORAL at 20:35

## 2023-05-28 RX ADMIN — VALPROIC ACID 250 MG: 250 SOLUTION ORAL at 08:00

## 2023-05-28 RX ADMIN — CARBIDOPA AND LEVODOPA 1.5 TABLET: 25; 100 TABLET ORAL at 09:00

## 2023-05-28 RX ADMIN — DEXTROSE MONOHYDRATE: 50 INJECTION, SOLUTION INTRAVENOUS at 01:09

## 2023-05-28 ASSESSMENT — PAIN SCALES - WONG BAKER
WONGBAKER_NUMERICALRESPONSE: 0

## 2023-05-29 LAB
ALBUMIN SERPL-MCNC: 2.6 G/DL (ref 3.4–5)
ANION GAP SERPL CALCULATED.3IONS-SCNC: 7 MMOL/L (ref 3–16)
BASOPHILS # BLD: 0 K/UL (ref 0–0.2)
BASOPHILS NFR BLD: 0.5 %
BUN SERPL-MCNC: 15 MG/DL (ref 7–20)
CALCIUM SERPL-MCNC: 8.9 MG/DL (ref 8.3–10.6)
CHLORIDE SERPL-SCNC: 107 MMOL/L (ref 99–110)
CO2 SERPL-SCNC: 27 MMOL/L (ref 21–32)
CREAT SERPL-MCNC: <0.5 MG/DL (ref 0.6–1.1)
CRP SERPL-MCNC: 10.5 MG/L (ref 0–5.1)
DEPRECATED RDW RBC AUTO: 12 % (ref 12.4–15.4)
EOSINOPHIL # BLD: 0.2 K/UL (ref 0–0.6)
EOSINOPHIL NFR BLD: 3 %
GFR SERPLBLD CREATININE-BSD FMLA CKD-EPI: >60 ML/MIN/{1.73_M2}
GLUCOSE SERPL-MCNC: 97 MG/DL (ref 70–99)
HCT VFR BLD AUTO: 35.8 % (ref 36–48)
HGB BLD-MCNC: 12 G/DL (ref 12–16)
LEGIONELLA AG UR QL: NORMAL
LYMPHOCYTES # BLD: 1.6 K/UL (ref 1–5.1)
LYMPHOCYTES NFR BLD: 30.7 %
MCH RBC QN AUTO: 34.5 PG (ref 26–34)
MCHC RBC AUTO-ENTMCNC: 33.6 G/DL (ref 31–36)
MCV RBC AUTO: 102.8 FL (ref 80–100)
MONOCYTES # BLD: 0.5 K/UL (ref 0–1.3)
MONOCYTES NFR BLD: 10.6 %
NEUTROPHILS # BLD: 2.8 K/UL (ref 1.7–7.7)
NEUTROPHILS NFR BLD: 55.2 %
PHOSPHATE SERPL-MCNC: 3.2 MG/DL (ref 2.5–4.9)
PLATELET # BLD AUTO: 109 K/UL (ref 135–450)
PMV BLD AUTO: 10.7 FL (ref 5–10.5)
POTASSIUM SERPL-SCNC: 4.2 MMOL/L (ref 3.5–5.1)
PROCALCITONIN SERPL IA-MCNC: 0.07 NG/ML (ref 0–0.15)
RBC # BLD AUTO: 3.48 M/UL (ref 4–5.2)
SODIUM SERPL-SCNC: 141 MMOL/L (ref 136–145)
WBC # BLD AUTO: 5.1 K/UL (ref 4–11)

## 2023-05-29 PROCEDURE — 6360000002 HC RX W HCPCS: Performed by: FAMILY MEDICINE

## 2023-05-29 PROCEDURE — 94761 N-INVAS EAR/PLS OXIMETRY MLT: CPT

## 2023-05-29 PROCEDURE — 36415 COLL VENOUS BLD VENIPUNCTURE: CPT

## 2023-05-29 PROCEDURE — C9113 INJ PANTOPRAZOLE SODIUM, VIA: HCPCS | Performed by: FAMILY MEDICINE

## 2023-05-29 PROCEDURE — 6370000000 HC RX 637 (ALT 250 FOR IP): Performed by: INTERNAL MEDICINE

## 2023-05-29 PROCEDURE — 84145 PROCALCITONIN (PCT): CPT

## 2023-05-29 PROCEDURE — 2700000000 HC OXYGEN THERAPY PER DAY

## 2023-05-29 PROCEDURE — 80069 RENAL FUNCTION PANEL: CPT

## 2023-05-29 PROCEDURE — 94640 AIRWAY INHALATION TREATMENT: CPT

## 2023-05-29 PROCEDURE — 6370000000 HC RX 637 (ALT 250 FOR IP): Performed by: FAMILY MEDICINE

## 2023-05-29 PROCEDURE — 6360000002 HC RX W HCPCS: Performed by: INTERNAL MEDICINE

## 2023-05-29 PROCEDURE — 2580000003 HC RX 258: Performed by: INTERNAL MEDICINE

## 2023-05-29 PROCEDURE — 85025 COMPLETE CBC W/AUTO DIFF WBC: CPT

## 2023-05-29 PROCEDURE — 1200000000 HC SEMI PRIVATE

## 2023-05-29 PROCEDURE — 86140 C-REACTIVE PROTEIN: CPT

## 2023-05-29 RX ADMIN — CARBIDOPA AND LEVODOPA 1.5 TABLET: 25; 100 TABLET ORAL at 08:16

## 2023-05-29 RX ADMIN — IPRATROPIUM BROMIDE AND ALBUTEROL SULFATE 1 DOSE: .5; 3 SOLUTION RESPIRATORY (INHALATION) at 21:40

## 2023-05-29 RX ADMIN — Medication 1 CAPSULE: at 17:54

## 2023-05-29 RX ADMIN — VANCOMYCIN HYDROCHLORIDE 750 MG: 750 INJECTION, POWDER, LYOPHILIZED, FOR SOLUTION INTRAVENOUS at 14:11

## 2023-05-29 RX ADMIN — Medication 1 CAPSULE: at 08:16

## 2023-05-29 RX ADMIN — CARBIDOPA AND LEVODOPA 1.5 TABLET: 25; 100 TABLET ORAL at 14:03

## 2023-05-29 RX ADMIN — PANTOPRAZOLE SODIUM 40 MG: 40 INJECTION, POWDER, FOR SOLUTION INTRAVENOUS at 08:15

## 2023-05-29 RX ADMIN — VALPROIC ACID 250 MG: 250 SOLUTION ORAL at 17:54

## 2023-05-29 RX ADMIN — VANCOMYCIN HYDROCHLORIDE 750 MG: 750 INJECTION, POWDER, LYOPHILIZED, FOR SOLUTION INTRAVENOUS at 00:01

## 2023-05-29 RX ADMIN — VANCOMYCIN HYDROCHLORIDE 750 MG: 750 INJECTION, POWDER, LYOPHILIZED, FOR SOLUTION INTRAVENOUS at 23:36

## 2023-05-29 RX ADMIN — CEFEPIME 2000 MG: 2 INJECTION, POWDER, FOR SOLUTION INTRAVENOUS at 06:05

## 2023-05-29 RX ADMIN — ENOXAPARIN SODIUM 30 MG: 100 INJECTION SUBCUTANEOUS at 08:18

## 2023-05-29 RX ADMIN — IPRATROPIUM BROMIDE AND ALBUTEROL SULFATE 1 DOSE: .5; 3 SOLUTION RESPIRATORY (INHALATION) at 09:03

## 2023-05-29 RX ADMIN — VALPROIC ACID 250 MG: 250 SOLUTION ORAL at 08:15

## 2023-05-29 RX ADMIN — MULTIPLE VITAMINS W/ MINERALS TAB 1 TABLET: TAB at 08:16

## 2023-05-29 RX ADMIN — MIDODRINE HYDROCHLORIDE 2.5 MG: 5 TABLET ORAL at 17:54

## 2023-05-29 RX ADMIN — MIDODRINE HYDROCHLORIDE 2.5 MG: 5 TABLET ORAL at 08:16

## 2023-05-29 RX ADMIN — CEFEPIME 2000 MG: 2 INJECTION, POWDER, FOR SOLUTION INTRAVENOUS at 17:58

## 2023-05-29 RX ADMIN — CARBIDOPA AND LEVODOPA 1.5 TABLET: 25; 100 TABLET ORAL at 21:02

## 2023-05-29 RX ADMIN — MIDODRINE HYDROCHLORIDE 2.5 MG: 5 TABLET ORAL at 14:03

## 2023-05-29 RX ADMIN — SERTRALINE 100 MG: 50 TABLET, FILM COATED ORAL at 21:02

## 2023-05-29 RX ADMIN — LEVOTHYROXINE SODIUM 150 MCG: 150 TABLET ORAL at 08:00

## 2023-05-29 RX ADMIN — VALPROIC ACID 500 MG: 250 SOLUTION ORAL at 21:02

## 2023-05-29 ASSESSMENT — PAIN SCALES - WONG BAKER
WONGBAKER_NUMERICALRESPONSE: 0

## 2023-05-29 ASSESSMENT — PAIN SCALES - GENERAL
PAINLEVEL_OUTOF10: 0

## 2023-05-29 NOTE — PROGRESS NOTES
Progress Note - Dr. Porter De Jesus - Internal Medicine  PCP: Monika Sanders MD [address] [facility]  / Mallory Ocampo [address] [facility] Day: 3  Code Status: Full Code  Current Diet: Diet NPO Exceptions are: Other (Specify); Specify Other Exceptions: TUBE FEED  ADULT TUBE FEEDING; PEG; Standard with Fiber; Continuous; 30; Yes; 10; Q 4 hours; 40; 250; Q 4 hours        CC: follow up on medical issues    Subjective:   Rian Whelan is a 59 y.o. female. Pt seen and examined  Chart reviewed since last visit, labs and imaging below      About the same  Remains on empiric iv abx  SLP eval rec NPO  Wbc improving  Na normalized    Review of Systems: (1 system for EPF, 2-9 for detailed, 10+ for comprehensive)  Cannot obtain from pt    I have reviewed the patient's medical and social history in detail and updated the computerized patient record. To recap: She  has a past medical history of Anemia, Anorexia, Autistic disorder, Convulsions ([address] Down syndrome, Dysphagia, G tube feedings ([address] Hypothyroid, Malnutrition ([address] Parkinson disease ([address] UTI (urinary tract infection), and Weakness. . She  has a past surgical history that includes Gastrostomy tube placement; Gastrostomy tube placement (2/18/14); and Gastrostomy tube placement (N/A, 5/20/2022). . She  reports that she has never smoked. She has never used smokeless tobacco. She reports that she does not drink alcohol and does not use drugs. .        Active Hospital Problems    Diagnosis Date Noted    Parkinsonism ([address] [G20]      Priority: Medium    Pneumonia of left lower lobe due to infectious organism [J18.9]      Priority: Medium    Moderate malnutrition ([address] [E44.0] 05/11/2022     Priority: Medium    HCAP (healthcare-associated pneumonia) [J18.9] 05/26/2023    Down syndrome [Q90.9]        Current Facility-Administered Medications: vancomycin (VANCOCIN) 750 mg in sodium chloride 0.9 % 250 mL IVPB ([id number]), 750 mg, IntraVENous, Q12H  therapeutic

## 2023-05-29 NOTE — PROGRESS NOTES
signs and recent I/Os reviewed by me. Wt Readings from Last 3 Encounters:   05/26/23 97 lb (44 kg)   05/16/23 97 lb 3.6 oz (44.1 kg)   03/25/23 90 lb (40.8 kg)     BP Readings from Last 3 Encounters:   05/29/23 (!) 98/58   05/19/23 (!) 96/57   05/09/23 101/65     Patient Vitals for the past 24 hrs:   BP Temp Temp src Pulse Resp SpO2   05/29/23 0903 -- -- -- 60 18 96 %   05/29/23 0800 (!) 98/58 97.9 °F (36.6 °C) Axillary 58 18 96 %   05/29/23 0349 (!) 92/56 97.9 °F (36.6 °C) Axillary 59 18 95 %   05/28/23 2357 (!) 97/45 -- Oral 67 18 96 %   05/28/23 2039 -- -- -- 64 18 93 %   05/28/23 2013 (!) 91/55 98.1 °F (36.7 °C) Oral 57 18 97 %         Intake/Output Summary (Last 24 hours) at 5/29/2023 1335  Last data filed at 5/29/2023 0550  Gross per 24 hour   Intake 1043 ml   Output 400 ml   Net 643 ml         Physical exam:  due to underlying history of Down syndrome, patient unable to fully participate  General:  Chronically ill-appearing, no acute distress   Neck: Supple, JVD not visible. CVS:  Heart sounds are normal. No loud murmur. RS: Normal respiratory effort, Breat sound: diminished at bases. Abd: Soft , bowel sounds are normal, no distension and no tenderness . Skin: No rash , some bruises,   Extremities/MSK:   Edema, no cyanosis.         =======================================================================================     DATA:  Diagnostic tests reviewed by me for today's visit:   (AS NEEDED FOR MY EVALUATION AND MANAGEMENT).        Recent Labs     05/27/23  0536 05/28/23 0524 05/29/23 0527   WBC 11.8* 6.5 5.1   HCT 39.4 36.6 35.8*   * 110* 109*       Iron Saturation:  No components found for: PERCENTFE  FERRITIN:  No results found for: FERRITIN  IRON:  No results found for: IRON  TIBC:  No results found for: TIBC    Recent Labs     05/27/23  0536 05/27/23  1809 05/28/23 0524 05/29/23 0527   * 147* 142 141   K 3.9  --  3.6 4.2   *  --  107 107   CO2 25  --  28 27   BUN 33*

## 2023-05-29 NOTE — PLAN OF CARE
Problem: Pain  Goal: Verbalizes/displays adequate comfort level or baseline comfort level  5/28/2023 2227 by Crista Munoz RN  Outcome: Progressing  5/28/2023 1523 by Crista Munoz RN  Outcome: Progressing     Problem: Safety - Adult  Goal: Free from fall injury  5/28/2023 2227 by Crista Munoz RN  Outcome: Progressing  5/28/2023 1523 by Crista Munoz RN  Outcome: Progressing     Problem: Skin/Tissue Integrity  Goal: Absence of new skin breakdown  Description: 1. Monitor for areas of redness and/or skin breakdown  2. Assess vascular access sites hourly  3. Every 4-6 hours minimum:  Change oxygen saturation probe site  4. Every 4-6 hours:  If on nasal continuous positive airway pressure, respiratory therapy assess nares and determine need for appliance change or resting period.   5/28/2023 2227 by Crista Munoz RN  Outcome: Progressing  5/28/2023 1523 by Crista Munoz RN  Outcome: Progressing

## 2023-05-30 VITALS
RESPIRATION RATE: 16 BRPM | DIASTOLIC BLOOD PRESSURE: 55 MMHG | OXYGEN SATURATION: 95 % | WEIGHT: 97 LBS | BODY MASS INDEX: 20.93 KG/M2 | HEART RATE: 58 BPM | HEIGHT: 57 IN | TEMPERATURE: 98.4 F | SYSTOLIC BLOOD PRESSURE: 104 MMHG

## 2023-05-30 LAB
ALBUMIN SERPL-MCNC: 2.2 G/DL (ref 3.4–5)
ANION GAP SERPL CALCULATED.3IONS-SCNC: 5 MMOL/L (ref 3–16)
BACTERIA BLD CULT ORG #2: NORMAL
BACTERIA BLD CULT: NORMAL
BASOPHILS # BLD: 0 K/UL (ref 0–0.2)
BASOPHILS NFR BLD: 0.7 %
BUN SERPL-MCNC: 15 MG/DL (ref 7–20)
CALCIUM SERPL-MCNC: 8.4 MG/DL (ref 8.3–10.6)
CHLORIDE SERPL-SCNC: 102 MMOL/L (ref 99–110)
CO2 SERPL-SCNC: 27 MMOL/L (ref 21–32)
CREAT SERPL-MCNC: <0.5 MG/DL (ref 0.6–1.1)
DEPRECATED RDW RBC AUTO: 12.3 % (ref 12.4–15.4)
EOSINOPHIL # BLD: 0.1 K/UL (ref 0–0.6)
EOSINOPHIL NFR BLD: 3.2 %
GFR SERPLBLD CREATININE-BSD FMLA CKD-EPI: >60 ML/MIN/{1.73_M2}
GLUCOSE SERPL-MCNC: 87 MG/DL (ref 70–99)
HCT VFR BLD AUTO: 35.2 % (ref 36–48)
HGB BLD-MCNC: 11.9 G/DL (ref 12–16)
LYMPHOCYTES # BLD: 1.7 K/UL (ref 1–5.1)
LYMPHOCYTES NFR BLD: 39.2 %
MCH RBC QN AUTO: 34.8 PG (ref 26–34)
MCHC RBC AUTO-ENTMCNC: 33.8 G/DL (ref 31–36)
MCV RBC AUTO: 103.1 FL (ref 80–100)
MONOCYTES # BLD: 0.5 K/UL (ref 0–1.3)
MONOCYTES NFR BLD: 11.7 %
NEUTROPHILS # BLD: 2 K/UL (ref 1.7–7.7)
NEUTROPHILS NFR BLD: 45.2 %
PHOSPHATE SERPL-MCNC: 3.2 MG/DL (ref 2.5–4.9)
PLATELET # BLD AUTO: 107 K/UL (ref 135–450)
PMV BLD AUTO: 10.7 FL (ref 5–10.5)
POTASSIUM SERPL-SCNC: 4.2 MMOL/L (ref 3.5–5.1)
RBC # BLD AUTO: 3.42 M/UL (ref 4–5.2)
SODIUM SERPL-SCNC: 134 MMOL/L (ref 136–145)
WBC # BLD AUTO: 4.5 K/UL (ref 4–11)

## 2023-05-30 PROCEDURE — 85025 COMPLETE CBC W/AUTO DIFF WBC: CPT

## 2023-05-30 PROCEDURE — C9113 INJ PANTOPRAZOLE SODIUM, VIA: HCPCS | Performed by: FAMILY MEDICINE

## 2023-05-30 PROCEDURE — 6370000000 HC RX 637 (ALT 250 FOR IP): Performed by: FAMILY MEDICINE

## 2023-05-30 PROCEDURE — 80069 RENAL FUNCTION PANEL: CPT

## 2023-05-30 PROCEDURE — 6370000000 HC RX 637 (ALT 250 FOR IP): Performed by: INTERNAL MEDICINE

## 2023-05-30 PROCEDURE — 2700000000 HC OXYGEN THERAPY PER DAY

## 2023-05-30 PROCEDURE — 6360000002 HC RX W HCPCS: Performed by: INTERNAL MEDICINE

## 2023-05-30 PROCEDURE — 2580000003 HC RX 258: Performed by: INTERNAL MEDICINE

## 2023-05-30 PROCEDURE — 94640 AIRWAY INHALATION TREATMENT: CPT

## 2023-05-30 PROCEDURE — 6360000002 HC RX W HCPCS: Performed by: FAMILY MEDICINE

## 2023-05-30 PROCEDURE — 36415 COLL VENOUS BLD VENIPUNCTURE: CPT

## 2023-05-30 RX ORDER — AMOXICILLIN AND CLAVULANATE POTASSIUM 875; 125 MG/1; MG/1
1 TABLET, FILM COATED ORAL EVERY 12 HOURS SCHEDULED
Qty: 14 TABLET | Refills: 0 | Status: SHIPPED | OUTPATIENT
Start: 2023-05-30 | End: 2023-06-06

## 2023-05-30 RX ADMIN — MIDODRINE HYDROCHLORIDE 2.5 MG: 5 TABLET ORAL at 07:58

## 2023-05-30 RX ADMIN — Medication 1 CAPSULE: at 08:00

## 2023-05-30 RX ADMIN — CEFEPIME 2000 MG: 2 INJECTION, POWDER, FOR SOLUTION INTRAVENOUS at 06:29

## 2023-05-30 RX ADMIN — VALPROIC ACID 250 MG: 250 SOLUTION ORAL at 08:00

## 2023-05-30 RX ADMIN — CARBIDOPA AND LEVODOPA 1.5 TABLET: 25; 100 TABLET ORAL at 07:59

## 2023-05-30 RX ADMIN — ENOXAPARIN SODIUM 30 MG: 100 INJECTION SUBCUTANEOUS at 08:00

## 2023-05-30 RX ADMIN — PANTOPRAZOLE SODIUM 40 MG: 40 INJECTION, POWDER, FOR SOLUTION INTRAVENOUS at 08:00

## 2023-05-30 RX ADMIN — LEVOTHYROXINE SODIUM 150 MCG: 150 TABLET ORAL at 06:05

## 2023-05-30 RX ADMIN — MULTIPLE VITAMINS W/ MINERALS TAB 1 TABLET: TAB at 08:00

## 2023-05-30 RX ADMIN — IPRATROPIUM BROMIDE AND ALBUTEROL SULFATE 1 DOSE: .5; 3 SOLUTION RESPIRATORY (INHALATION) at 07:53

## 2023-05-30 ASSESSMENT — PAIN SCALES - WONG BAKER
WONGBAKER_NUMERICALRESPONSE: 0

## 2023-05-30 ASSESSMENT — PAIN SCALES - GENERAL: PAINLEVEL_OUTOF10: 0

## 2023-05-30 NOTE — CARE COORDINATION
05/30/23 0946   Readmission Assessment   Number of Days since last admission? 8-30 days   Previous Disposition Other (comment)  (group home)   Who is being Nick Campuzano  (caregiver Galilea Muñoz)   What was the patient's/caregiver's perception as to why they think they needed to return back to the hospital? Other (Comment)  (not feeling well)   Did you visit your Primary Care Physician after you left the hospital, before you returned this time? Yes   Did you see a specialist, such as Cardiac, Pulmonary, Orthopedic Physician, etc. after you left the hospital? No   Who advised the patient to return to the hospital? Other (Comment)  (group home)   Does the patient report anything that got in the way of taking their medications? No   In our efforts to provide the best possible care to you and others like you, can you think of anything that we could have done to help you after you left the hospital the first time, so that you might not have needed to return so soon?  Other (Comment)  (caregiver states nothing)     AMNA Drew RN    [employer]  Phone: [phone]

## 2023-05-30 NOTE — CARE COORDINATION
Discharge Planning Note:    - Patient is from:    [address], [facility]. [address] [facility], [facility]: Caregiver: [phone]    - Note: group home may be able to transport home upon discharge. Will continue to follow.     CHIKI PaulaN RN    [facility]  Phone: [phone]

## 2023-05-30 NOTE — DISCHARGE SUMMARY
[facility] -- Physician Discharge Summary     Sanjeev Box  1965  MRN: [id number]    Admit Date: 5/26/2023  Discharge Date: No discharge date for patient encounter. Attending MD: Gina Guerrero MD  Discharging MD: Gina Guerrero MD  PCP: [place] [id number] [facility] / [place] [id number] [phone]    Admission Diagnosis: Hypernatremia [E87.0]  HCAP (healthcare-associated pneumonia) [J18.9]  Pneumonia of both lower lobes due to infectious organism [J18.9]  DISCHARGE DIAGNOSIS: same    Full Hospital Problem List:  Active Hospital Problems    Diagnosis Date Noted    Parkinsonism ([facility] [G20]      Priority: Medium    Pneumonia of left lower lobe due to infectious organism [J18.9]      Priority: Medium    Moderate malnutrition ([facility] [E44.0] 05/11/2022     Priority: Medium    HCAP (healthcare-associated pneumonia) [J18.9] 05/26/2023    Down syndrome [Q90.9]            Hospital Course:  59 y.o. female who is nonverbal with a history of autism, Down syndrome, Parkinson's, malnutrition with G-tube dependence who presents to the emergency department today via ambulance from [facility] with report of fatigue and not acting like herself. Patient has been seen several times recently. Patient was seen at this facility 2 weeks ago and diagnosed with left lower lobe pneumonia and started on doxycycline. She came back to the emergency department on 5/15 with report of diarrhea and was found to have worsening pneumonia and admitted to the hospital until last Friday. She was discharged on Augmentin. Patient is here with her caregiver today who states she is still having diarrhea but feels it is improving. She has not appeared short of breath. Patient is tachycardic on arrival around 110. She is hypoxic with oxygen saturation as low as 88% on room air.      CT chest  in ER is reviewed by self, bilateral infiltrates noted  Wbc noted to be 21k  To be admitted for HCAP, failure to progress

## 2023-05-30 NOTE — RT PROTOCOL NOTE
RT Nebulizer Bronchodilator Protocol Note    There is a bronchodilator order in the chart from a provider indicating to follow the RT Bronchodilator Protocol and there is an Initiate RT Bronchodilator Protocol order as well (see protocol at bottom of note). CXR Findings:  No results found. The findings from the last RT Protocol Assessment were as follows:  Smoking: None or smoker <15 pack years  Respiratory Pattern: Regular pattern and RR 12-20 bpm  Breath Sounds: Slightly diminished and/or crackles  Cough: Unable to generate effective cough  Indication for Bronchodilator Therapy: Decreased or absent breath sounds  Bronchodilator Assessment Score: 6    Aerosolized bronchodilator medication orders have been revised according to the RT Nebulizer Bronchodilator Protocol below. Respiratory Therapist to perform RT Therapy Protocol Assessment initially then follow the protocol. Repeat RT Therapy Protocol Assessment PRN for score 0-3 or on second treatment, BID, and PRN for scores above 3. No Indications - adjust the frequency to every 6 hours PRN wheezing or bronchospasm, if no treatments needed after 48 hours then discontinue using Per Protocol order mode. If indication present, adjust the RT bronchodilator orders based on the Bronchodilator Assessment Score as indicated below. If a patient is on this medication at home then do not decrease Frequency below that used at home. 0-3 - enter or revise RT bronchodilator order(s) to equivalent RT Bronchodilator order with Frequency of every 4 hours PRN for wheezing or increased work of breathing using Per Protocol order mode. 4-6 - enter or revise RT Bronchodilator order(s) to two equivalent RT bronchodilator orders with one order with BID Frequency and one order with Frequency of every 4 hours PRN wheezing or increased work of breathing using Per Protocol order mode.          7-10 - enter or revise RT Bronchodilator order(s) to two equivalent RT

## 2023-05-30 NOTE — DISCHARGE INSTR - COC
Continuity of Care Form    Patient Name: Zainab Ramirez   :  1965  MRN:  [id number]    Admit date:  2023  Discharge date:  ***    Code Status Order: Full Code   Advance Directives:     Admitting Physician:  Obdulia Camejo MD  PCP: Hesham Quiroz MD    Discharging Nurse: [facility] Unit/Room#: [id number]-[id number]/8561-60  Discharging Unit Phone Number: ***    Emergency Contact:   Extended Emergency Contact Information  Primary Emergency Contact: [address] [place] [address] Phone: [phone]  Mobile Phone: [phone]  Relation: Legal Guardian  Secondary Emergency Contact: Marisa Gilliam  Mobile Phone: [phone]  Relation: Lay Caregiver    Past Surgical History:  Past Surgical History:   Procedure Laterality Date    GASTROSTOMY TUBE PLACEMENT      GASTROSTOMY TUBE PLACEMENT  12    GASTROSTOMY TUBE PLACEMENT N/A 2022    EGD PEG [facility] [address] [facility]       Immunization History: There is no immunization history on file for this patient.     Active Problems:  Patient Active Problem List   Diagnosis Code    Down syndrome Q90.9    Down syndrome Q90.9    Down syndrome Q90.9    Dysphagia R13.10    Autism F84.0    PEG adjustment, replacement, or removal Z43.1    Hypotension I95.9    Sepsis ([address] A41.9    Acute hypoxemic respiratory failure (HCC) J96.01    AVELINO (acute kidney injury) ([address] N17.9    Aspiration pneumonia (HCC) J69.0    Septic shock (HCC) A41.9, R65.21    Moderate malnutrition ([address] E44.0    Streptococcal bacteremia R78.81, B95.5    Pneumonia of left lower lobe due to infectious organism J18.9    Parkinsonism ([address] G20    Anorexia R63.0    Pneumonia of both lower lobes due to methicillin susceptible Staphylococcus aureus (MSSA) ([address] J15.211    Non-healing open wound of right groin S31.103A    Bacterial pneumonia J15.9    Diarrhea R19.7    SIRS (systemic inflammatory response syndrome) ([facility]) R65.10    Hypothyroidism E03.9

## 2023-05-30 NOTE — PLAN OF CARE
Problem: Discharge Planning  Goal: Discharge to home or other facility with appropriate resources  Outcome: Progressing  Flowsheets (Taken 5/29/2023 2022)  Discharge to home or other facility with appropriate resources: Identify barriers to discharge with patient and caregiver     Problem: Pain  Goal: Verbalizes/displays adequate comfort level or baseline comfort level  Outcome: Progressing  Flowsheets (Taken 5/29/2023 2015)  Verbalizes/displays adequate comfort level or baseline comfort level: Assess pain using appropriate pain scale     Problem: Safety - Adult  Goal: Free from fall injury  Outcome: Progressing  Flowsheets (Taken 5/29/2023 2301)  Free From Fall Injury: Instruct family/caregiver on patient safety     Problem: Skin/Tissue Integrity  Goal: Absence of new skin breakdown  Description: 1. Monitor for areas of redness and/or skin breakdown  2. Assess vascular access sites hourly  3. Every 4-6 hours minimum:  Change oxygen saturation probe site  4. Every 4-6 hours:  If on nasal continuous positive airway pressure, respiratory therapy assess nares and determine need for appliance change or resting period.   Outcome: Progressing     Problem: ABCDS Injury Assessment  Goal: Absence of physical injury  Outcome: Progressing  Flowsheets (Taken 5/29/2023 2301)  Absence of Physical Injury: Implement safety measures based on patient assessment     Problem: Nutrition Deficit:  Goal: Optimize nutritional status  Outcome: Progressing     Problem: Neurosensory - Adult  Goal: Achieves stable or improved neurological status  Outcome: Progressing  Flowsheets (Taken 5/29/2023 2301)  Achieves stable or improved neurological status: Assess for and report changes in neurological status     Problem: Respiratory - Adult  Goal: Achieves optimal ventilation and oxygenation  Outcome: Progressing  Flowsheets (Taken 5/29/2023 2301)  Achieves optimal ventilation and oxygenation: Assess for changes in respiratory status

## 2023-06-23 ENCOUNTER — OFFICE VISIT (OUTPATIENT)
Dept: INTERNAL MEDICINE CLINIC | Age: 58
End: 2023-06-23
Payer: MEDICARE

## 2023-06-23 VITALS
BODY MASS INDEX: 26.97 KG/M2 | HEART RATE: 94 BPM | WEIGHT: 125 LBS | HEIGHT: 57 IN | OXYGEN SATURATION: 85 % | DIASTOLIC BLOOD PRESSURE: 75 MMHG | SYSTOLIC BLOOD PRESSURE: 105 MMHG

## 2023-06-23 DIAGNOSIS — F98.1 PSYCHOGENIC FECAL INCONTINENCE: ICD-10-CM

## 2023-06-23 DIAGNOSIS — G20 PARKINSONISM, UNSPECIFIED PARKINSONISM TYPE (HCC): Primary | ICD-10-CM

## 2023-06-23 DIAGNOSIS — I95.9 HYPOTENSION, UNSPECIFIED HYPOTENSION TYPE: ICD-10-CM

## 2023-06-23 DIAGNOSIS — E03.9 HYPOTHYROIDISM, UNSPECIFIED TYPE: ICD-10-CM

## 2023-06-23 DIAGNOSIS — F31.9 BIPOLAR AFFECTIVE DISORDER, REMISSION STATUS UNSPECIFIED (HCC): ICD-10-CM

## 2023-06-23 DIAGNOSIS — Z93.1 PEG (PERCUTANEOUS ENDOSCOPIC GASTROSTOMY) STATUS (HCC): ICD-10-CM

## 2023-06-23 DIAGNOSIS — R56.9 SEIZURE (HCC): ICD-10-CM

## 2023-06-23 PROCEDURE — G8427 DOCREV CUR MEDS BY ELIG CLIN: HCPCS | Performed by: STUDENT IN AN ORGANIZED HEALTH CARE EDUCATION/TRAINING PROGRAM

## 2023-06-23 PROCEDURE — 3017F COLORECTAL CA SCREEN DOC REV: CPT | Performed by: STUDENT IN AN ORGANIZED HEALTH CARE EDUCATION/TRAINING PROGRAM

## 2023-06-23 PROCEDURE — 1111F DSCHRG MED/CURRENT MED MERGE: CPT | Performed by: STUDENT IN AN ORGANIZED HEALTH CARE EDUCATION/TRAINING PROGRAM

## 2023-06-23 PROCEDURE — 1036F TOBACCO NON-USER: CPT | Performed by: STUDENT IN AN ORGANIZED HEALTH CARE EDUCATION/TRAINING PROGRAM

## 2023-06-23 PROCEDURE — G8419 CALC BMI OUT NRM PARAM NOF/U: HCPCS | Performed by: STUDENT IN AN ORGANIZED HEALTH CARE EDUCATION/TRAINING PROGRAM

## 2023-06-23 PROCEDURE — 99205 OFFICE O/P NEW HI 60 MIN: CPT | Performed by: STUDENT IN AN ORGANIZED HEALTH CARE EDUCATION/TRAINING PROGRAM

## 2023-06-23 RX ORDER — ACETAMINOPHEN 325 MG/1
650 TABLET ORAL EVERY 6 HOURS PRN
Qty: 120 TABLET | Refills: 3 | Status: SHIPPED | OUTPATIENT
Start: 2023-06-23 | End: 2023-06-23

## 2023-06-23 RX ORDER — MIDODRINE HYDROCHLORIDE 2.5 MG/1
2.5 TABLET ORAL 2 TIMES DAILY
Qty: 90 TABLET | Refills: 1 | Status: SHIPPED | OUTPATIENT
Start: 2023-06-23

## 2023-06-23 RX ORDER — VALPROIC ACID 250 MG/5ML
250 SOLUTION ORAL EVERY 8 HOURS SCHEDULED
Qty: 600 ML | Refills: 5 | Status: SHIPPED | OUTPATIENT
Start: 2023-06-23

## 2023-06-23 RX ORDER — LEVOTHYROXINE SODIUM 0.15 MG/1
150 TABLET ORAL DAILY
Qty: 90 TABLET | Refills: 2 | Status: SHIPPED | OUTPATIENT
Start: 2023-06-23

## 2023-06-23 RX ORDER — M-VIT,TX,IRON,MINS/CALC/FOLIC 27MG-0.4MG
1 TABLET ORAL DAILY
Qty: 30 TABLET | Refills: 11 | Status: SHIPPED | OUTPATIENT
Start: 2023-06-23 | End: 2024-06-22

## 2023-06-23 RX ORDER — ACETAMINOPHEN 325 MG/1
650 TABLET ORAL EVERY 6 HOURS PRN
Qty: 120 TABLET | Refills: 3 | Status: SHIPPED | OUTPATIENT
Start: 2023-06-23

## 2023-06-23 RX ORDER — SERTRALINE HYDROCHLORIDE 20 MG/ML
100 SOLUTION ORAL NIGHTLY
Qty: 400 ML | Refills: 5 | Status: SHIPPED | OUTPATIENT
Start: 2023-06-23

## 2023-06-23 RX ORDER — LACTOBACILLUS ACIDOPHILUS
1 POWDER (GRAM) MISCELLANEOUS DAILY
Qty: 500 G | Refills: 1 | Status: SHIPPED | OUTPATIENT
Start: 2023-06-23

## 2023-06-23 SDOH — ECONOMIC STABILITY: INCOME INSECURITY: HOW HARD IS IT FOR YOU TO PAY FOR THE VERY BASICS LIKE FOOD, HOUSING, MEDICAL CARE, AND HEATING?: NOT HARD AT ALL

## 2023-06-23 SDOH — ECONOMIC STABILITY: FOOD INSECURITY: WITHIN THE PAST 12 MONTHS, YOU WORRIED THAT YOUR FOOD WOULD RUN OUT BEFORE YOU GOT MONEY TO BUY MORE.: NEVER TRUE

## 2023-06-23 SDOH — ECONOMIC STABILITY: HOUSING INSECURITY
IN THE LAST 12 MONTHS, WAS THERE A TIME WHEN YOU DID NOT HAVE A STEADY PLACE TO SLEEP OR SLEPT IN A SHELTER (INCLUDING NOW)?: NO

## 2023-06-23 SDOH — ECONOMIC STABILITY: FOOD INSECURITY: WITHIN THE PAST 12 MONTHS, THE FOOD YOU BOUGHT JUST DIDN'T LAST AND YOU DIDN'T HAVE MONEY TO GET MORE.: NEVER TRUE

## 2023-06-23 ASSESSMENT — ENCOUNTER SYMPTOMS
ALLERGIC/IMMUNOLOGIC NEGATIVE: 1
EYES NEGATIVE: 1
SHORTNESS OF BREATH: 0
ABDOMINAL PAIN: 0
GASTROINTESTINAL NEGATIVE: 1
RESPIRATORY NEGATIVE: 1
EYE DISCHARGE: 0

## 2023-06-23 ASSESSMENT — PATIENT HEALTH QUESTIONNAIRE - PHQ9: DEPRESSION UNABLE TO ASSESS: PT REFUSES

## 2023-06-23 NOTE — PROGRESS NOTES
Rupinder Ray (:  1965) is a 59 y.o. female,New patient, here for evaluation of the following chief complaint(s):  New Patient    Patient is nonverbal, has multiple neurological condition including autism, Down's, MRDD, parkinsonism, seizure disorder recurrent pneumonia, severe malnutrition status post PEG placement who is here for establishment of care. She is accompanied by her caregiver and the nurse and are here mainly for discussion regarding her medication. Requesting the medication to be switched from PEG to oral form. Patient was recently seen in the hospital for diarrhea and followed with GI. She is using stool softener only as needed. She follows GI, he had her PEG placement in 2022 with Dr Rozina Flowers. She recently moved to a different facility, she started to live at [facility] since 2024. She requires help with ADLS and IADLs. She is Wheelchair dependant. :  [facility]. Director- Jourdan Garcia, [phone]. Advocacy and protective services: [phone]         ASSESSMENT/PLAN:  1. Parkinsonism, unspecified Parkinsonism type ([facility]- Progressively worsening continue with sinemet.  -     DME Order for (Specify) as OP  2. Hypothyroidism, unspecified type- tolerating synthroid, refills provided. -     levothyroxine (SYNTHROID) 150 MCG tablet; 1 tablet by PEG Tube route Daily, Disp-90 tablet, R-2Normal  3. Bipolar affective disorder, remission status unspecified ([facility]- mood at baseline, continue with Zoloft. -     sertraline (ZOLOFT) 20 MG/ML concentrated solution; Take 5 mLs by mouth nightly, Disp-400 mL, R-5Normal  4. Seizure ([facility]- controlled, no seizure episodes, refills on valproate sent. -     valproic acid (DEPAKENE) 250 MG/5ML SOLN oral solution; 5 mLs by Per G Tube route every 8 hours 250 mg in the 8 AM, 250 mg in the afternoon 2 Pm, 500 mg nightly 8 PM., Disp-600 mL, R-5Normal  5.  PEG (percutaneous endoscopic gastrostomy)

## 2023-06-26 ENCOUNTER — TELEPHONE (OUTPATIENT)
Dept: INTERNAL MEDICINE CLINIC | Age: 58
End: 2023-06-26

## 2023-06-26 DIAGNOSIS — Z93.1 PEG (PERCUTANEOUS ENDOSCOPIC GASTROSTOMY) STATUS (HCC): ICD-10-CM

## 2023-06-26 RX ORDER — LACTOBACILLUS ACIDOPHILUS
1 POWDER (GRAM) MISCELLANEOUS DAILY
Qty: 500 G | Refills: 1 | Status: SHIPPED | OUTPATIENT
Start: 2023-06-26 | End: 2023-06-27 | Stop reason: SDUPTHER

## 2023-06-26 NOTE — TELEPHONE ENCOUNTER
[facility] pharmacy called and you sent over a Rx for  Lactobacillus acidophilus and they can't get this. . he suggested maybe breaking open a over the counter medication , cutrell and trying this ?     Please call [facility] back at [phone]

## 2023-06-26 NOTE — TELEPHONE ENCOUNTER
[facility] received two scripts Friday. Acidophilus--needs to clarify directions; Omeprazole 2 mg suspension, the amount  was only for 10 days. PLease clarify if this is correct.     Phone [phone]  Fax [phone]

## 2023-06-27 DIAGNOSIS — Z93.1 PEG (PERCUTANEOUS ENDOSCOPIC GASTROSTOMY) STATUS (HCC): ICD-10-CM

## 2023-06-27 RX ORDER — LACTOBACILLUS ACIDOPHILUS
1 POWDER (GRAM) MISCELLANEOUS DAILY
Qty: 500 G | Refills: 1 | Status: SHIPPED | OUTPATIENT
Start: 2023-06-27

## 2023-06-28 ENCOUNTER — TELEPHONE (OUTPATIENT)
Dept: INTERNAL MEDICINE CLINIC | Age: 58
End: 2023-06-28

## 2023-06-28 NOTE — TELEPHONE ENCOUNTER
Please call the pharmacy about the RX for Lactobacillus.     Has a lot of question about the directions    [phone] [facility] pharmacy

## 2023-07-06 ENCOUNTER — HOSPITAL ENCOUNTER (EMERGENCY)
Age: 58
Discharge: HOME | End: 2023-07-06
Payer: MEDICARE

## 2023-07-06 VITALS
HEART RATE: 62 BPM | RESPIRATION RATE: 18 BRPM | OXYGEN SATURATION: 97 % | TEMPERATURE: 98.2 F | DIASTOLIC BLOOD PRESSURE: 81 MMHG | SYSTOLIC BLOOD PRESSURE: 107 MMHG

## 2023-07-06 DIAGNOSIS — Z43.1 ATTENTION TO G-TUBE (HCC): Primary | ICD-10-CM

## 2023-07-06 PROCEDURE — 99283 EMERGENCY DEPT VISIT LOW MDM: CPT

## 2023-07-06 ASSESSMENT — ENCOUNTER SYMPTOMS
VOMITING: 0
COUGH: 0
COLOR CHANGE: 0
SHORTNESS OF BREATH: 0
ABDOMINAL DISTENTION: 0

## 2023-07-06 NOTE — ED PROVIDER NOTES
[facility]        Pt Name: Melvin Ruiz  MRN: [id number]  [address] [facility] [place] 1965  Date of evaluation: 7/6/2023  Provider: Kate Ko PA-C  PCP: Freddy Cabezas MD  Note Started: 5:35 PM EDT 7/6/23      SILVERIO. I have evaluated this patient. CHIEF COMPLAINT       Chief Complaint   Patient presents with    G Tube Complications     [facility] Per EMS, PT PULLING OUT G TUBE TODAY. Non verbal       HISTORY OF PRESENT ILLNESS: 1 or more Elements     History from : EMS    Limitations to history : nonverbal     Melvin Ruiz is a 59 y.o. female who presents to the emergency department via EMS reports that she pulled on her G-tube by accident. Patient is nonverbal at baseline with history of autism and Down syndrome. She was brought to the emergency department for G-tube assessment. She is lying comfortably in bed and does not appear to be writhing in pain. She recently started residing at [facility] for [facility] per PCP note on 6/23/23. Nursing Notes were all reviewed and agreed with or any disagreements were addressed in the HPI. REVIEW OF SYSTEMS :      Review of Systems   Constitutional:  Negative for chills and fever. Respiratory:  Negative for cough and shortness of breath. Gastrointestinal:  Negative for abdominal distention and vomiting. Skin:  Negative for color change, pallor, rash and wound. Neurological:  Negative for seizures. Unable to assess other ROS due to her nonverbal status  Positives and Pertinent negatives as per HPI.      SURGICAL HISTORY     Past Surgical History:   Procedure Laterality Date    GASTROSTOMY TUBE PLACEMENT      GASTROSTOMY TUBE PLACEMENT  2/18/14    GASTROSTOMY TUBE PLACEMENT N/A 5/20/2022    EGD PEG TUBE PLACEMENT performed by Bereket Avalos MD at [address]       Discharge Medication List as of 7/6/2023  6:38 PM        CONTINUE

## 2023-07-06 NOTE — ED NOTES
G-tube able to flush without resistance. GI contents were noted. Celeste Lyon, [facility].       Kay Hay RN  07/06/23 [id number]

## 2023-08-20 ENCOUNTER — HOSPITAL ENCOUNTER (EMERGENCY)
Age: 58
Discharge: NURSING, REHAB OR LONG-TERM CARE | End: 2023-08-20
Attending: EMERGENCY MEDICINE
Payer: MEDICARE

## 2023-08-20 VITALS
DIASTOLIC BLOOD PRESSURE: 61 MMHG | RESPIRATION RATE: 17 BRPM | OXYGEN SATURATION: 100 % | HEART RATE: 62 BPM | SYSTOLIC BLOOD PRESSURE: 124 MMHG | TEMPERATURE: 98.3 F

## 2023-08-20 DIAGNOSIS — R19.7 DIARRHEA, UNSPECIFIED TYPE: Primary | ICD-10-CM

## 2023-08-20 LAB
ALBUMIN SERPL-MCNC: 3.3 G/DL (ref 3.4–5)
ALBUMIN/GLOB SERPL: 0.9 {RATIO} (ref 1.1–2.2)
ALP SERPL-CCNC: 78 U/L (ref 40–129)
ALT SERPL-CCNC: <5 U/L (ref 10–40)
ANION GAP SERPL CALCULATED.3IONS-SCNC: 10 MMOL/L (ref 3–16)
AST SERPL-CCNC: 29 U/L (ref 15–37)
BASOPHILS # BLD: 0 K/UL (ref 0–0.2)
BASOPHILS NFR BLD: 0.4 %
BILIRUB SERPL-MCNC: 0.3 MG/DL (ref 0–1)
BUN SERPL-MCNC: 28 MG/DL (ref 7–20)
CALCIUM SERPL-MCNC: 9.2 MG/DL (ref 8.3–10.6)
CHLORIDE SERPL-SCNC: 108 MMOL/L (ref 99–110)
CO2 SERPL-SCNC: 26 MMOL/L (ref 21–32)
CREAT SERPL-MCNC: 0.5 MG/DL (ref 0.6–1.2)
DEPRECATED RDW RBC AUTO: 12.9 % (ref 12.4–15.4)
EOSINOPHIL # BLD: 0.1 K/UL (ref 0–0.6)
EOSINOPHIL NFR BLD: 0.8 %
GFR SERPLBLD CREATININE-BSD FMLA CKD-EPI: >60 ML/MIN/{1.73_M2}
GLUCOSE SERPL-MCNC: 88 MG/DL (ref 70–99)
HCT VFR BLD AUTO: 42.4 % (ref 36–48)
HGB BLD-MCNC: 13.9 G/DL (ref 12–16)
LYMPHOCYTES # BLD: 1.1 K/UL (ref 1–5.1)
LYMPHOCYTES NFR BLD: 15.5 %
MCH RBC QN AUTO: 33.8 PG (ref 26–34)
MCHC RBC AUTO-ENTMCNC: 32.8 G/DL (ref 31–36)
MCV RBC AUTO: 103 FL (ref 80–100)
MONOCYTES # BLD: 0.9 K/UL (ref 0–1.3)
MONOCYTES NFR BLD: 12.9 %
NEUTROPHILS # BLD: 5 K/UL (ref 1.7–7.7)
NEUTROPHILS NFR BLD: 70.4 %
PLATELET # BLD AUTO: 157 K/UL (ref 135–450)
PMV BLD AUTO: 10.7 FL (ref 5–10.5)
POTASSIUM SERPL-SCNC: 3.8 MMOL/L (ref 3.5–5.1)
PROT SERPL-MCNC: 6.9 G/DL (ref 6.4–8.2)
RBC # BLD AUTO: 4.12 M/UL (ref 4–5.2)
SODIUM SERPL-SCNC: 144 MMOL/L (ref 136–145)
WBC # BLD AUTO: 7.1 K/UL (ref 4–11)

## 2023-08-20 PROCEDURE — 80053 COMPREHEN METABOLIC PANEL: CPT

## 2023-08-20 PROCEDURE — 99284 EMERGENCY DEPT VISIT MOD MDM: CPT

## 2023-08-20 PROCEDURE — 2580000003 HC RX 258: Performed by: EMERGENCY MEDICINE

## 2023-08-20 PROCEDURE — 85025 COMPLETE CBC W/AUTO DIFF WBC: CPT

## 2023-08-20 RX ORDER — 0.9 % SODIUM CHLORIDE 0.9 %
1000 INTRAVENOUS SOLUTION INTRAVENOUS ONCE
Status: COMPLETED | OUTPATIENT
Start: 2023-08-20 | End: 2023-08-20

## 2023-08-20 RX ADMIN — SODIUM CHLORIDE 1000 ML: 9 INJECTION, SOLUTION INTRAVENOUS at 12:49

## 2023-08-20 ASSESSMENT — PAIN - FUNCTIONAL ASSESSMENT: PAIN_FUNCTIONAL_ASSESSMENT: FACE, LEGS, ACTIVITY, CRY, AND CONSOLABILITY (FLACC)

## 2023-08-20 NOTE — DISCHARGE INSTRUCTIONS
Give plenty of fluids. The patient should see her doctor in 2 days or not better and return if worse.

## 2023-08-20 NOTE — ED TRIAGE NOTES
Pt is nonverbal. Lives in group home. Pt unable to answer safety/security questions. Pt from group home. Per squad, diarrhea for 12 hours. No caregiver at pts bedside. Patient alert. Skin appropriate for ethnicity, dry and intact. No signs of acute distress noted at this time. Regular respiratory pattern, normal respiratory depth, unlabored respirations. Pt placed on a continuous pulse oximetry and telemetry monitoring. Bedside Monitor on with Alarms audible and alarms set. Pt on cycling blood pressure. Fall risk precautions in place, call light in reach, bed side table within reach, bed alarm on, will continue to monitor.

## 2023-08-20 NOTE — ED NOTES
Report given to [facility]. Patient alert. Patient transferred to [facility] via ambulance [facility]. Skin appropriate for ethnicity, dry and intact. No signs of acute distress noted at this time. Regular respiratory pattern, normal respiratory depth, unlabored respirations.          Nona Gupta RN  08/20/23 [id number]

## 2023-08-20 NOTE — ED PROVIDER NOTES
[id number]   EMERGENCY DEPARTMENTENCOUNTER      Pt Name: Julius Potter  MRN: [id number]  [address] [facility] 1965  Date ofevaluation: 8/20/2023  Provider: Forrest Briones MD    CHIEF COMPLAINT       Chief Complaint   Patient presents with    Diarrhea     Pt arrived via squad from a group home. Diarrhea for 12 hours. HPI    HISTORY OF PRESENT ILLNESS   (Location/Symptom, Timing/Onset,Context/Setting, Quality, Duration, Modifying Factors, Severity)  Note limiting factors. Julius Potter is a 59 y.o. female who presents to the emergency department with diarrhea. This is a nonverbal 68-year-old female with a history of cerebral palsy and history of diarrhea who presents with diarrhea she is after the last 12 hours. There is no history of any fevers. The patient is nonverbal.  The patient has a history of diarrhea in the past as well. NursingNotes were reviewed. Review of Systems    REVIEW OF SYSTEMS    (2-9 systems for level 4, 10 or more for level 5)     Review of Systems   Constitutional: Negative for fever. HENT: Negative for rhinorrhea and sore throat. Eyes: Negative for redness. Respiratory: Negative for shortness of breath. Cardiovascular: Negative for chest pain. Gastrointestinal: Negative for abdominal pain. Positive for diarrhea. Genitourinary: Negative for flank pain. Neurological: Negative for headaches. Hematological: Negative for adenopathy. Psychiatric/Behavioral: Negative for confusion. Except as noted above the remainder of the review of systems was reviewed and negative.        PAST MEDICAL HISTORY     Past Medical History:   Diagnosis Date    Anemia     Anorexia     Autistic disorder     MRDD    Convulsions ([address])     Down syndrome     Dysphagia     G tube feedings (HCC)     PEG tube    Hypothyroid     Malnutrition (HCC)     Parkinson disease ([address])     UTI (urinary tract infection)     Weakness          SURGICALHISTORY

## 2023-09-11 ENCOUNTER — COMMUNITY OUTREACH (OUTPATIENT)
Dept: INTERNAL MEDICINE CLINIC | Age: 58
End: 2023-09-11

## 2023-10-06 ASSESSMENT — ENCOUNTER SYMPTOMS
ALLERGIC/IMMUNOLOGIC NEGATIVE: 1
ABDOMINAL PAIN: 0
EYES NEGATIVE: 1
SHORTNESS OF BREATH: 0
RESPIRATORY NEGATIVE: 1
EYE DISCHARGE: 0
GASTROINTESTINAL NEGATIVE: 1

## 2023-10-06 NOTE — PROGRESS NOTES
Gertrude Saez (:  1965) is a 59 y.o. female,New patient, here for evaluation of the following chief complaint(s):  Follow-up and Parkinsonism (3 month)    Patient is nonverbal, has multiple neurological condition including autism, Down's, MRDD, parkinsonism, seizure disorder recurrent pneumonia, severe malnutrition status post PEG placement who is here for establishment of care. She is accompanied by her caregiver and the nurse and are here mainly for discussion regarding her medication. Requesting the medication to be switched from PEG to oral form. Patient was recently seen in the hospital for diarrhea and followed with GI. She is using stool softener only as needed. She follows GI, he had her PEG placement in 2022 with Dr Howard Spivey. She recently moved to a different facility, she started to live at [facility] since 2024. She requires help with ADLS and IADLs. She is Wheelchair dependant. Wears diapers. :  [facility]. Director- Kamilah Weems, [phone]. Advocacy and protective services: [phone]         ASSESSMENT/PLAN:    1. Bipolar affective disorder, remission status unspecified (HCC)-stable at her baseline, continue the Zoloft for now. Handicap placard ordered for the patient.  -     Handicap Placard MISC; Starting Tue 10/10/2023, Disp-1 each, R-0, PrintDuration- 5 years  2. Parkinsonism, unspecified Parkinsonism type-at baseline, handicap placard provided. -     Handicap Placard MISC; Starting Tue 10/10/2023, Disp-1 each, R-0, PrintDuration- 5 years  3. Colon cancer screening-orders placed. -     Fecal DNA Colorectal cancer screening (Cologuard)  4. Major depressive disorder, recurrent, mild-continue with the Zoloft. 5. Recurrent major depressive disorder, in remission (HCC)-stable, continue with Zoloft. 6. Encounter for screening mammogram for malignant neoplasm of breast orders placed.   -     PIETER DIGITAL SCREEN W OR WO

## 2023-10-10 ENCOUNTER — OFFICE VISIT (OUTPATIENT)
Dept: INTERNAL MEDICINE CLINIC | Age: 58
End: 2023-10-10
Payer: MEDICARE

## 2023-10-10 DIAGNOSIS — F31.9 BIPOLAR AFFECTIVE DISORDER, REMISSION STATUS UNSPECIFIED (HCC): Primary | ICD-10-CM

## 2023-10-10 DIAGNOSIS — R05.1 ACUTE COUGH: ICD-10-CM

## 2023-10-10 DIAGNOSIS — Z12.11 COLON CANCER SCREENING: ICD-10-CM

## 2023-10-10 DIAGNOSIS — F33.0 MAJOR DEPRESSIVE DISORDER, RECURRENT, MILD (HCC): ICD-10-CM

## 2023-10-10 DIAGNOSIS — G20 PARKINSONISM, UNSPECIFIED PARKINSONISM TYPE: ICD-10-CM

## 2023-10-10 DIAGNOSIS — Z12.31 ENCOUNTER FOR SCREENING MAMMOGRAM FOR MALIGNANT NEOPLASM OF BREAST: ICD-10-CM

## 2023-10-10 DIAGNOSIS — F33.40 RECURRENT MAJOR DEPRESSIVE DISORDER, IN REMISSION (HCC): ICD-10-CM

## 2023-10-10 PROBLEM — F33.9 MAJOR DEPRESSIVE DISORDER, RECURRENT, UNSPECIFIED (HCC): Status: ACTIVE | Noted: 2023-10-10

## 2023-10-10 PROBLEM — F33.1 MAJOR DEPRESSIVE DISORDER, RECURRENT, MODERATE (HCC): Status: ACTIVE | Noted: 2023-10-10

## 2023-10-10 PROCEDURE — G8427 DOCREV CUR MEDS BY ELIG CLIN: HCPCS | Performed by: STUDENT IN AN ORGANIZED HEALTH CARE EDUCATION/TRAINING PROGRAM

## 2023-10-10 PROCEDURE — G8484 FLU IMMUNIZE NO ADMIN: HCPCS | Performed by: STUDENT IN AN ORGANIZED HEALTH CARE EDUCATION/TRAINING PROGRAM

## 2023-10-10 PROCEDURE — 1036F TOBACCO NON-USER: CPT | Performed by: STUDENT IN AN ORGANIZED HEALTH CARE EDUCATION/TRAINING PROGRAM

## 2023-10-10 PROCEDURE — 3017F COLORECTAL CA SCREEN DOC REV: CPT | Performed by: STUDENT IN AN ORGANIZED HEALTH CARE EDUCATION/TRAINING PROGRAM

## 2023-10-10 PROCEDURE — 99214 OFFICE O/P EST MOD 30 MIN: CPT | Performed by: STUDENT IN AN ORGANIZED HEALTH CARE EDUCATION/TRAINING PROGRAM

## 2023-10-10 PROCEDURE — G8419 CALC BMI OUT NRM PARAM NOF/U: HCPCS | Performed by: STUDENT IN AN ORGANIZED HEALTH CARE EDUCATION/TRAINING PROGRAM

## 2023-10-10 RX ORDER — ACETAMINOPHEN, DEXTROMETHORPHAN HYDROBROMIDE, GUAIFENESIN, AND PHENYLEPHRINE HYDROCHLORIDE 325; 10; 200; 5 MG/10ML; MG/10ML; MG/10ML; MG/10ML
10 SOLUTION ORAL PRN
Qty: 1120 ML | Refills: 0 | Status: SHIPPED | OUTPATIENT
Start: 2023-10-10

## 2023-10-10 RX ORDER — CALCIUM CARBONATE 1250 MG/5ML
2000 SUSPENSION ORAL
COMMUNITY
Start: 2023-01-18

## 2023-10-22 LAB — NONINV COLON CA DNA+OCC BLD SCRN STL QL: NEGATIVE

## 2023-10-30 ENCOUNTER — TELEPHONE (OUTPATIENT)
Dept: INTERNAL MEDICINE CLINIC | Age: 58
End: 2023-10-30

## 2023-10-31 NOTE — TELEPHONE ENCOUNTER
Attempted to call Thurannette De Santiago at the phone number provided to ask her more questions. Got a weird dial tone at the end of call and unable to leave VM. Will attempt to call back later.

## 2023-11-20 DIAGNOSIS — I95.9 HYPOTENSION, UNSPECIFIED HYPOTENSION TYPE: ICD-10-CM

## 2023-11-20 DIAGNOSIS — Z93.1 PEG (PERCUTANEOUS ENDOSCOPIC GASTROSTOMY) STATUS (HCC): ICD-10-CM

## 2023-11-20 RX ORDER — MIDODRINE HYDROCHLORIDE 2.5 MG/1
2.5 TABLET ORAL
Qty: 90 TABLET | Refills: 1 | Status: SHIPPED | OUTPATIENT
Start: 2023-11-20 | End: 2023-11-21 | Stop reason: SDUPTHER

## 2023-11-20 NOTE — TELEPHONE ENCOUNTER
Future Appointments   Date Time Provider [address]   4/10/2024 11:00 AM Mila Goodson MD [facility]       Last appointment 10/10/23

## 2023-11-21 ENCOUNTER — TELEPHONE (OUTPATIENT)
Dept: INTERNAL MEDICINE CLINIC | Age: 58
End: 2023-11-21

## 2023-11-21 DIAGNOSIS — Z93.1 PEG (PERCUTANEOUS ENDOSCOPIC GASTROSTOMY) STATUS (HCC): ICD-10-CM

## 2023-11-21 DIAGNOSIS — I95.9 HYPOTENSION, UNSPECIFIED HYPOTENSION TYPE: ICD-10-CM

## 2023-11-21 RX ORDER — MIDODRINE HYDROCHLORIDE 2.5 MG/1
2.5 TABLET ORAL
Qty: 90 TABLET | Refills: 0 | Status: SHIPPED | OUTPATIENT
Start: 2023-11-21 | End: 2023-11-21 | Stop reason: SDUPTHER

## 2023-11-21 NOTE — TELEPHONE ENCOUNTER
Future Appointments   Date Time Provider [address]   4/10/2024 11:00 AM Hamlet Goodson MD [place]         Last appt 10/10/23    Was sent to wrong pharmacy.

## 2023-11-21 NOTE — TELEPHONE ENCOUNTER
Future Appointments   Date Time Provider [address]  [facility]   4/10/2024 11:00 AM Serjio Goodson MD [facility]         Last appointment 10/10/23

## 2023-11-22 RX ORDER — MIDODRINE HYDROCHLORIDE 2.5 MG/1
2.5 TABLET ORAL
Qty: 90 TABLET | Refills: 0 | Status: SHIPPED | OUTPATIENT
Start: 2023-11-22

## 2023-11-27 NOTE — TELEPHONE ENCOUNTER
Future Appointments   Date Time Provider [address]   4/10/2024 11:00 AM Gregory Goodson MD [facility]         Last appt 10/10/23 supervision

## 2024-01-05 ENCOUNTER — TELEPHONE (OUTPATIENT)
Dept: INTERNAL MEDICINE CLINIC | Age: 59
End: 2024-01-05

## 2024-01-05 NOTE — TELEPHONE ENCOUNTER
She is no longer using a pump to feed and is being changed to syringe feeding. [employer] sent over the order, I already put it in your box before she called, and needs it to be signed and faxed back please.

## 2024-01-09 ENCOUNTER — HOSPITAL ENCOUNTER (EMERGENCY)
Age: 59
Discharge: HOME | End: 2024-01-10
Attending: EMERGENCY MEDICINE
Payer: MEDICARE

## 2024-01-09 ENCOUNTER — APPOINTMENT (OUTPATIENT)
Dept: GENERAL RADIOLOGY | Age: 59
End: 2024-01-09
Payer: MEDICARE

## 2024-01-09 VITALS
DIASTOLIC BLOOD PRESSURE: 71 MMHG | RESPIRATION RATE: 18 BRPM | TEMPERATURE: 99.7 F | HEART RATE: 70 BPM | OXYGEN SATURATION: 93 % | SYSTOLIC BLOOD PRESSURE: 109 MMHG

## 2024-01-09 DIAGNOSIS — T85.528A DISLODGED GASTROSTOMY TUBE: Primary | ICD-10-CM

## 2024-01-09 PROCEDURE — 6360000004 HC RX CONTRAST MEDICATION

## 2024-01-09 PROCEDURE — 49465 FLUORO EXAM OF G/COLON TUBE: CPT

## 2024-01-09 PROCEDURE — 43762 RPLC GTUBE NO REVJ TRC: CPT

## 2024-01-09 PROCEDURE — 99283 EMERGENCY DEPT VISIT LOW MDM: CPT

## 2024-01-09 RX ADMIN — IOHEXOL 30 ML: 350 INJECTION, SOLUTION INTRAVENOUS at 19:27

## 2024-01-09 ASSESSMENT — PAIN SCALES - WONG BAKER: WONGBAKER_NUMERICALRESPONSE: 0

## 2024-01-09 ASSESSMENT — PAIN - FUNCTIONAL ASSESSMENT: PAIN_FUNCTIONAL_ASSESSMENT: WONG-BAKER FACES

## 2024-01-10 NOTE — ED PROVIDER NOTES
[facility] EMERGENCY DEPARTMENT PROVIDER NOTE    Patient Identification  Pt Name: Miriam Iyer  MRN: [id number]  Birthdate 1965  Date of evaluation: 1/9/2024  Provider: Erasto Santos MD  PCP: Shannon Goodson MD    HPI  Miriam Iyer is a 58 y.o. female who presents to the ED for dislodged G-tube.  Patient had a longstanding gastrostomy tube.  After returning from adult  today her caregiver noted the G-tube was dislodged.  She has had a small amount of blood from the site.  Otherwise, patient has been acting normally and does not appear to be in pain or distress per the caregiver.  Patient has severe MRDD and is unable to provide history.  She is nonverbal.    No other complaints, modifying factors or associated symptoms.     Nursing notes reviewed.   Allergies: Caffeine and Iodine  Past medical history:   Past Medical History:   Diagnosis Date    Anemia     Anorexia     Autistic disorder     MRDD    Convulsions (HCC)     Down syndrome     Dysphagia     G tube feedings (HCC)     PEG tube    Hypothyroid     Malnutrition (HCC)     Parkinson disease     UTI (urinary tract infection)     Weakness      Past surgical history:   Past Surgical History:   Procedure Laterality Date    GASTROSTOMY TUBE PLACEMENT      GASTROSTOMY TUBE PLACEMENT  2/18/14    GASTROSTOMY TUBE PLACEMENT N/A 5/20/2022    EGD PEG TUBE PLACEMENT performed by Jian Fofana MD at [facility]     Home medications:   Previous Medications    ACETAMINOPHEN (TYLENOL) 325 MG TABLET    2 tablets by PEG Tube route every 6 hours as needed for Fever or Pain (For temperature > 100 F and pain scale >5)    CALCIUM CARBONATE ANTACID (CALCIUM ELEMENTAL 500 MG/5ML, 1250 MG/5ML AS CARBONATE,) 1250 MG/5ML SUSP SUSPENSION    Take 20 mLs by mouth    CARBIDOPA-LEVODOPA (SINEMET)  MG PER TABLET    1.5 tablet 3 TIMES DAILY (route: oral)    CARBIDOPA-LEVODOPA (SINEMET)  MG PER TABLET    1.5 tablets by PEG Tube route 3 times daily

## 2024-01-18 DIAGNOSIS — Z93.1 PEG (PERCUTANEOUS ENDOSCOPIC GASTROSTOMY) STATUS (HCC): ICD-10-CM

## 2024-01-18 DIAGNOSIS — I95.9 HYPOTENSION, UNSPECIFIED HYPOTENSION TYPE: ICD-10-CM

## 2024-01-19 RX ORDER — MIDODRINE HYDROCHLORIDE 2.5 MG/1
2.5 TABLET ORAL
Qty: 90 TABLET | Refills: 2 | Status: SHIPPED | OUTPATIENT
Start: 2024-01-19

## 2024-01-19 NOTE — TELEPHONE ENCOUNTER
Future Appointments   Date Time Provider Department Center   4/10/2024 11:00 AM Shannon Goodson MD [facility] [place] [facility]         Last appt 10/10/23

## 2024-01-23 DIAGNOSIS — I95.9 HYPOTENSION, UNSPECIFIED HYPOTENSION TYPE: ICD-10-CM

## 2024-01-23 DIAGNOSIS — Z93.1 PEG (PERCUTANEOUS ENDOSCOPIC GASTROSTOMY) STATUS (HCC): ICD-10-CM

## 2024-01-23 RX ORDER — MIDODRINE HYDROCHLORIDE 2.5 MG/1
2.5 TABLET ORAL
Qty: 90 TABLET | Refills: 2 | Status: SHIPPED | OUTPATIENT
Start: 2024-01-23

## 2024-01-23 NOTE — TELEPHONE ENCOUNTER
Pharmacy is requesting a refill for midodrine (PROAMATINE) 2.5 MG tablet     Please send to [facility]    URGENT    Thank you

## 2024-01-23 NOTE — TELEPHONE ENCOUNTER
Future Appointments   Date Time Provider Department Center   4/10/2024 11:00 AM Shannon Goodson MD [facility] [place] [facility]         Last appt 10/10/23

## 2024-02-08 ENCOUNTER — TELEPHONE (OUTPATIENT)
Dept: INTERNAL MEDICINE CLINIC | Age: 59
End: 2024-02-08

## 2024-02-08 NOTE — TELEPHONE ENCOUNTER
Future Appointments   Date Time Provider Department Center   3/8/2024  1:30 PM Keerthi Busby DO [facility] [place] [facility]   4/10/2024 11:00 AM Shannon Goodson MD [facility] [place] [facility]       Last appointment 10/10/23

## 2024-02-19 DIAGNOSIS — R56.9 SEIZURE (HCC): ICD-10-CM

## 2024-02-19 DIAGNOSIS — Z93.1 PEG (PERCUTANEOUS ENDOSCOPIC GASTROSTOMY) STATUS (HCC): ICD-10-CM

## 2024-02-19 RX ORDER — VALPROIC ACID 250 MG/5ML
250 SOLUTION ORAL EVERY 8 HOURS SCHEDULED
Qty: 600 ML | Refills: 2 | Status: SHIPPED | OUTPATIENT
Start: 2024-02-19 | End: 2024-04-05 | Stop reason: SDUPTHER

## 2024-02-19 NOTE — TELEPHONE ENCOUNTER
Future Appointments   Date Time Provider Department Center   3/8/2024  1:30 PM Keerthi Busby DO [facility] [place] [facility]   4/10/2024 11:00 AM Shannon Goodson MD [facility] [place] [facility]     Last appointment

## 2024-04-02 RX ORDER — LACTOBACILLUS RHAMNOSUS GG 10B CELL
1 CAPSULE ORAL 2 TIMES DAILY WITH MEALS
Qty: 14 CAPSULE | Refills: 1 | Status: SHIPPED | OUTPATIENT
Start: 2024-04-02 | End: 2024-04-05 | Stop reason: SDUPTHER

## 2024-04-02 NOTE — TELEPHONE ENCOUNTER
Future Appointments   Date Time Provider Department Center   4/5/2024  1:00 PM Keerthi Busby DO [facility] [place] [facility]   4/10/2024 11:00 AM Shannon Goodson MD [facility] [place] [facility]     Last appt on 10.10.2023

## 2024-04-02 NOTE — TELEPHONE ENCOUNTER
Patients caregiver called asking for a refill of lactobacillus (CULTURELLE) capsule 1 capsule and omeprazole (PRILOSEC) 2 MG/ML SUSP 2 mg/mL oral suspension     Please send to   [id number] [place] [id number] - P [phone] - F [phone]

## 2024-04-03 ENCOUNTER — TELEPHONE (OUTPATIENT)
Dept: INTERNAL MEDICINE CLINIC | Age: 59
End: 2024-04-03

## 2024-04-03 DIAGNOSIS — Z93.1 PEG (PERCUTANEOUS ENDOSCOPIC GASTROSTOMY) STATUS (HCC): ICD-10-CM

## 2024-04-03 NOTE — TELEPHONE ENCOUNTER
Called pt to schedule a medication refill appt but n/a. Vm left for pt to call office to schedule

## 2024-04-03 NOTE — TELEPHONE ENCOUNTER
Marisa called wanting to know why the omeprazole (PRILOSEC) 2 MG/ML SUSP 2 mg/mL oral suspension  was not sent to the pharmacy

## 2024-04-05 ENCOUNTER — OFFICE VISIT (OUTPATIENT)
Dept: INTERNAL MEDICINE CLINIC | Age: 59
End: 2024-04-05
Payer: MEDICAID

## 2024-04-05 VITALS — SYSTOLIC BLOOD PRESSURE: 100 MMHG | WEIGHT: 145 LBS | DIASTOLIC BLOOD PRESSURE: 76 MMHG | BODY MASS INDEX: 31.38 KG/M2

## 2024-04-05 DIAGNOSIS — R05.1 ACUTE COUGH: ICD-10-CM

## 2024-04-05 DIAGNOSIS — E03.9 HYPOTHYROIDISM, UNSPECIFIED TYPE: ICD-10-CM

## 2024-04-05 DIAGNOSIS — R56.9 SEIZURE (HCC): ICD-10-CM

## 2024-04-05 DIAGNOSIS — Z13.220 LIPID SCREENING: ICD-10-CM

## 2024-04-05 DIAGNOSIS — G20.A1 PARKINSON'S DISEASE, UNSPECIFIED WHETHER DYSKINESIA PRESENT, UNSPECIFIED WHETHER MANIFESTATIONS FLUCTUATE (HCC): ICD-10-CM

## 2024-04-05 DIAGNOSIS — D75.89 MACROCYTOSIS: ICD-10-CM

## 2024-04-05 DIAGNOSIS — E44.0 MODERATE MALNUTRITION (HCC): ICD-10-CM

## 2024-04-05 DIAGNOSIS — R13.10 DYSPHAGIA, UNSPECIFIED TYPE: ICD-10-CM

## 2024-04-05 DIAGNOSIS — Z23 NEED FOR PNEUMOCOCCAL VACCINATION: ICD-10-CM

## 2024-04-05 DIAGNOSIS — Z93.1 PEG (PERCUTANEOUS ENDOSCOPIC GASTROSTOMY) STATUS (HCC): ICD-10-CM

## 2024-04-05 DIAGNOSIS — F31.9 BIPOLAR AFFECTIVE DISORDER, REMISSION STATUS UNSPECIFIED (HCC): Primary | ICD-10-CM

## 2024-04-05 DIAGNOSIS — I95.9 HYPOTENSION, UNSPECIFIED HYPOTENSION TYPE: ICD-10-CM

## 2024-04-05 PROBLEM — J18.9 HCAP (HEALTHCARE-ASSOCIATED PNEUMONIA): Status: RESOLVED | Noted: 2023-05-26 | Resolved: 2024-04-05

## 2024-04-05 PROBLEM — J15.9 BACTERIAL PNEUMONIA: Status: RESOLVED | Noted: 2023-05-15 | Resolved: 2024-04-05

## 2024-04-05 PROBLEM — G40.89 OTHER SEIZURES (HCC): Status: ACTIVE | Noted: 2024-04-05

## 2024-04-05 LAB
BASOPHILS # BLD: 0.1 K/UL (ref 0–0.2)
BASOPHILS NFR BLD: 0.7 %
DEPRECATED RDW RBC AUTO: 12.4 % (ref 12.4–15.4)
EOSINOPHIL # BLD: 0.1 K/UL (ref 0–0.6)
EOSINOPHIL NFR BLD: 1.1 %
HCT VFR BLD AUTO: 44.9 % (ref 36–48)
HGB BLD-MCNC: 15.1 G/DL (ref 12–16)
LYMPHOCYTES # BLD: 1.6 K/UL (ref 1–5.1)
LYMPHOCYTES NFR BLD: 23.4 %
MCH RBC QN AUTO: 34.4 PG (ref 26–34)
MCHC RBC AUTO-ENTMCNC: 33.6 G/DL (ref 31–36)
MCV RBC AUTO: 102.4 FL (ref 80–100)
MONOCYTES # BLD: 0.8 K/UL (ref 0–1.3)
MONOCYTES NFR BLD: 12 %
NEUTROPHILS # BLD: 4.2 K/UL (ref 1.7–7.7)
NEUTROPHILS NFR BLD: 62.8 %
PLATELET # BLD AUTO: 167 K/UL (ref 135–450)
PMV BLD AUTO: 11.2 FL (ref 5–10.5)
RBC # BLD AUTO: 4.38 M/UL (ref 4–5.2)
WBC # BLD AUTO: 6.7 K/UL (ref 4–11)

## 2024-04-05 PROCEDURE — 99204 OFFICE O/P NEW MOD 45 MIN: CPT | Performed by: STUDENT IN AN ORGANIZED HEALTH CARE EDUCATION/TRAINING PROGRAM

## 2024-04-05 RX ORDER — SERTRALINE HYDROCHLORIDE 20 MG/ML
100 SOLUTION ORAL NIGHTLY
Qty: 400 ML | Refills: 5 | Status: SHIPPED | OUTPATIENT
Start: 2024-04-05

## 2024-04-05 RX ORDER — LACTOBACILLUS ACIDOPHILUS
1 POWDER (GRAM) MISCELLANEOUS DAILY
Qty: 500 G | Refills: 1 | Status: SHIPPED | OUTPATIENT
Start: 2024-04-05 | End: 2024-04-09 | Stop reason: SDUPTHER

## 2024-04-05 RX ORDER — VALPROIC ACID 250 MG/5ML
250 SOLUTION ORAL 3 TIMES DAILY PRN
Qty: 600 ML | Refills: 5 | Status: SHIPPED | OUTPATIENT
Start: 2024-04-05

## 2024-04-05 RX ORDER — ACETAMINOPHEN, DEXTROMETHORPHAN HYDROBROMIDE, GUAIFENESIN, AND PHENYLEPHRINE HYDROCHLORIDE 325; 10; 200; 5 MG/10ML; MG/10ML; MG/10ML; MG/10ML
10 SOLUTION ORAL PRN
Qty: 1120 ML | Refills: 0 | Status: SHIPPED | OUTPATIENT
Start: 2024-04-05

## 2024-04-05 RX ORDER — LACTOBACILLUS RHAMNOSUS GG 10B CELL
1 CAPSULE ORAL 2 TIMES DAILY WITH MEALS
Qty: 14 CAPSULE | Refills: 1 | Status: SHIPPED | OUTPATIENT
Start: 2024-04-05

## 2024-04-05 RX ORDER — MIDODRINE HYDROCHLORIDE 2.5 MG/1
2.5 TABLET ORAL
Qty: 90 TABLET | Refills: 2 | Status: SHIPPED | OUTPATIENT
Start: 2024-04-05

## 2024-04-05 RX ORDER — CALCIUM CARBONATE 1250 MG/5ML
1250 SUSPENSION ORAL 2 TIMES DAILY
Qty: 450 ML | Refills: 5 | Status: SHIPPED | OUTPATIENT
Start: 2024-04-05

## 2024-04-05 RX ORDER — ACETAMINOPHEN 325 MG/1
650 TABLET ORAL EVERY 6 HOURS PRN
Qty: 120 TABLET | Refills: 0 | Status: SHIPPED | OUTPATIENT
Start: 2024-04-05

## 2024-04-05 RX ORDER — LEVOTHYROXINE SODIUM 0.15 MG/1
150 TABLET ORAL DAILY
Qty: 90 TABLET | Refills: 2 | Status: SHIPPED | OUTPATIENT
Start: 2024-04-05

## 2024-04-05 RX ORDER — SERTRALINE HYDROCHLORIDE 20 MG/ML
100 SOLUTION ORAL NIGHTLY
Qty: 400 ML | Refills: 5 | Status: CANCELLED | OUTPATIENT
Start: 2024-04-05

## 2024-04-05 RX ORDER — M-VIT,TX,IRON,MINS/CALC/FOLIC 27MG-0.4MG
1 TABLET ORAL DAILY
Qty: 30 TABLET | Refills: 11 | Status: SHIPPED | OUTPATIENT
Start: 2024-04-05 | End: 2025-04-05

## 2024-04-05 NOTE — PROGRESS NOTES
Unspecified convulsions     Past Medical History:   Diagnosis Date    Anemia     Anorexia     Autistic disorder     MRDD    Convulsions (HCC)     Down syndrome     Dysphagia     G tube feedings (HCC)     PEG tube    Hypothyroid     Malnutrition (HCC)     Parkinson disease (HCC)     Pneumonia of left lower lobe due to infectious organism     UTI (urinary tract infection)     Weakness      Current Outpatient Medications   Medication Sig Dispense Refill    acetaminophen (TYLENOL) 325 MG tablet 2 tablets by PEG Tube route every 6 hours as needed for Fever or Pain (For temperature > 100 F and pain scale >5) 120 tablet 0    Nutritional Supplements (JEVITY 1.5 DORINDA/FIBER) LIQD 237 mLs by Jejunal Tube route 6 times daily 7am, 10am, 1pm, 4pm, 8pm, and 12am 237 mL 5    Calcium Carbonate Antacid (CALCIUM ELEMENTAL 500 MG/5ML, 1250 MG/5ML AS CARBONATE,) 1250 MG/5ML SUSP suspension 12.5 mLs by Per J Tube route 2 times daily 8am and 8pm 450 mL 5    valproic acid (DEPAKENE) 250 MG/5ML SOLN oral solution 5 mLs by Per G Tube route 3 times daily as needed (3 times daily prn) 250 mg in the 8 AM, 250 mg in the afternoon 2 Pm, 500 mg nightly 8 PM. 600 mL 5    omeprazole (PRILOSEC) 2 MG/ML SUSP 2 mg/mL oral suspension 10 mLs by Per G Tube route Daily Take one capsule by peg tube everyday at 8am for acid reflux. 600 mL 5    sertraline (ZOLOFT) 20 MG/ML concentrated solution Take 5 mLs by mouth nightly 400 mL 5    Phenylephrine-DM-GG-APAP (MUCINEX CHILD COLD/SORE THROAT) 5--325 MG/10ML LIQD 10 mLs by PEG Tube route as needed (cough) 1120 mL 0    Multiple Vitamins-Minerals (THERAPEUTIC MULTIVITAMIN-MINERALS) tablet 1 tablet by PEG Tube route daily 30 tablet 11    midodrine (PROAMATINE) 2.5 MG tablet 1 tablet by PEG Tube route in the morning and 1 tablet in the evening. Can hold if her blood pressure persistently  above 120 mm Hg.. 90 tablet 2    levothyroxine (SYNTHROID) 150 MCG tablet 1 tablet by PEG Tube route Daily 90 tablet 2

## 2024-04-05 NOTE — PATIENT INSTRUCTIONS
Thank you for allowing me to care for you!    Patient instructions:  Fasting labs  Tdap vaccine, Shingles, RSV vaccine   Establish with dentist

## 2024-04-06 LAB
CHOLEST SERPL-MCNC: 135 MG/DL (ref 0–199)
HDLC SERPL-MCNC: 50 MG/DL (ref 40–60)
LDL CHOLESTEROL CALCULATED: 75 MG/DL
TRIGL SERPL-MCNC: 51 MG/DL (ref 0–150)
VLDLC SERPL CALC-MCNC: 10 MG/DL

## 2024-04-09 DIAGNOSIS — Z93.1 PEG (PERCUTANEOUS ENDOSCOPIC GASTROSTOMY) STATUS (HCC): ICD-10-CM

## 2024-04-09 PROBLEM — G40.89 OTHER SEIZURES (HCC): Status: RESOLVED | Noted: 2024-04-05 | Resolved: 2024-04-09

## 2024-04-09 PROBLEM — F33.0 MAJOR DEPRESSIVE DISORDER, RECURRENT, MILD (HCC): Status: RESOLVED | Noted: 2023-10-10 | Resolved: 2024-04-09

## 2024-04-09 RX ORDER — LACTOBACILLUS ACIDOPHILUS
1 POWDER (GRAM) MISCELLANEOUS DAILY
Qty: 500 G | Refills: 1 | Status: SHIPPED | OUTPATIENT
Start: 2024-04-09

## 2024-04-09 NOTE — TELEPHONE ENCOUNTER
omeprazole (PRILOSEC) 2 MG/ML SUSP 2 mg/mL oral suspension  Date: 4/5/2024 Department: [facility] Ordering/Authorizing: Keerthi Busby,      Outpatient Medication Detail     Disp Refills Start End    omeprazole (PRILOSEC) 2 MG/ML SUSP 2 mg/mL oral suspension 600 mL 5 4/5/2024 --    Sig - Route: 10 mLs by Per G Tube route Daily Take one capsule by peg tube everyday at 8am for acid reflux. - Per G Tube    Sent to pharmacy as: omeprazole 2 MG/ML PO SUSP 2 mg/mL oral suspension    E-Prescribing Status: Receipt confirmed by pharmacy (4/5/2024  2:54 PM E    lactobacillus (CULTURELLE) capsule  Date: 4/5/2024 Department: [facility] Ordering/Authorizing: Keerthi Busby,      Outpatient Medication Detail     Disp Refills Start End    lactobacillus (CULTURELLE) capsule 14 capsule 1 4/5/2024 --    Sig - Route: Take 1 capsule by mouth 2 times daily (with meals) - Oral    Sent to pharmacy as: Culturelle Oral Capsule    E-Prescribing Status: Receipt confirmed by pharmacy (4/5/2024  3:01 PM EDT)        [facility]    Pt is out of these meds

## 2024-04-09 NOTE — TELEPHONE ENCOUNTER
Last appointment: 4/5/2024  Next appointment: 7/5/2024  Last refill: 4/5/24  Called and verified with [facility] that they have not received refill request yet.

## 2024-04-10 NOTE — TELEPHONE ENCOUNTER
The omeprazole needs to be in pill form, is too expensive in liquid form.      Please send to [facility]    The pharmacy is saying they did not receive the refill requests.    Thank you

## 2024-04-15 ENCOUNTER — TELEPHONE (OUTPATIENT)
Dept: INTERNAL MEDICINE CLINIC | Age: 59
End: 2024-04-15

## 2024-04-15 DIAGNOSIS — Z93.1 PEG (PERCUTANEOUS ENDOSCOPIC GASTROSTOMY) STATUS (HCC): ICD-10-CM

## 2024-04-15 RX ORDER — OMEPRAZOLE 20 MG/1
20 CAPSULE, DELAYED RELEASE ORAL DAILY
Qty: 90 CAPSULE | Refills: 3 | Status: CANCELLED | OUTPATIENT
Start: 2024-04-15

## 2024-04-15 NOTE — TELEPHONE ENCOUNTER
omeprazole (PRILOSEC) 2 MG/ML SUSP 2 mg/mL oral suspension     Rx is not covered.     Pharmacy needs new script for 20mg capsules    Please send to [facility]    Thank you

## 2024-04-15 NOTE — TELEPHONE ENCOUNTER
Patients caregiver called to check on status of previous message regarding changing the Prilosec to capsules due to cost.

## 2024-04-16 ENCOUNTER — TELEPHONE (OUTPATIENT)
Dept: INTERNAL MEDICINE CLINIC | Age: 59
End: 2024-04-16

## 2024-04-16 RX ORDER — OMEPRAZOLE 20 MG/1
20 CAPSULE, DELAYED RELEASE ORAL
Qty: 90 CAPSULE | Refills: 1 | Status: SHIPPED | OUTPATIENT
Start: 2024-04-16 | End: 2024-10-13

## 2024-05-08 ENCOUNTER — TELEPHONE (OUTPATIENT)
Dept: INTERNAL MEDICINE CLINIC | Age: 59
End: 2024-05-08

## 2024-05-08 NOTE — TELEPHONE ENCOUNTER
Called patient and left a message for her to call back to see if she wanted to be scheduled for a mammogram here on one of the van dates we are providing.

## 2024-05-30 ENCOUNTER — TELEPHONE (OUTPATIENT)
Dept: INTERNAL MEDICINE CLINIC | Age: 59
End: 2024-05-30

## 2024-05-30 DIAGNOSIS — Z93.1 PEG (PERCUTANEOUS ENDOSCOPIC GASTROSTOMY) STATUS (HCC): ICD-10-CM

## 2024-05-30 RX ORDER — LACTOBACILLUS ACIDOPHILUS
1 POWDER (GRAM) MISCELLANEOUS DAILY
Qty: 500 G | Refills: 5 | Status: CANCELLED | OUTPATIENT
Start: 2024-05-30

## 2024-05-30 RX ORDER — LACTOBACILLUS RHAMNOSUS GG 10B CELL
1 CAPSULE ORAL 2 TIMES DAILY
Qty: 180 CAPSULE | Refills: 1 | COMMUNITY
Start: 2024-05-30 | End: 2024-11-26

## 2024-05-30 NOTE — TELEPHONE ENCOUNTER
Michelle called from the [facility] requesting a refill for the patient's lactobacillus (CULTURELLE) capsule medication.     The last prescription was given for 14 days even though it has been a long term medicine in the past.

## 2024-06-27 DIAGNOSIS — Z93.1 PEG (PERCUTANEOUS ENDOSCOPIC GASTROSTOMY) STATUS (HCC): ICD-10-CM

## 2024-06-27 NOTE — TELEPHONE ENCOUNTER
Recent Visits  Date Type Provider Dept   04/05/24 Office Visit Keerthi Busby DO [facility]   10/10/23 Office Visit Shannon Goodson MD [facility]   06/23/23 Office Visit Shannon Goodson MD [facility]   Showing recent visits within past 540 days with a meds authorizing provider and meeting all other requirements  Future Appointments  Date Type Provider Dept   07/05/24 Appointment Keerthi Busby DO [facility]   Showing future appointments within next 150 days with a meds authorizing provider and meeting all other requirements     4/5/2024

## 2024-06-28 RX ORDER — LACTOBACILLUS ACIDOPHILUS
1 POWDER (GRAM) MISCELLANEOUS DAILY
Qty: 500 G | Refills: 1 | Status: SHIPPED | OUTPATIENT
Start: 2024-06-28

## 2024-07-05 ENCOUNTER — OFFICE VISIT (OUTPATIENT)
Dept: INTERNAL MEDICINE CLINIC | Age: 59
End: 2024-07-05
Payer: MEDICAID

## 2024-07-05 VITALS — DIASTOLIC BLOOD PRESSURE: 74 MMHG | HEART RATE: 84 BPM | SYSTOLIC BLOOD PRESSURE: 102 MMHG

## 2024-07-05 DIAGNOSIS — Z00.00 MEDICARE ANNUAL WELLNESS VISIT, SUBSEQUENT: Primary | ICD-10-CM

## 2024-07-05 PROBLEM — J96.01 ACUTE HYPOXEMIC RESPIRATORY FAILURE (HCC): Status: RESOLVED | Noted: 2022-04-05 | Resolved: 2024-07-05

## 2024-07-05 PROCEDURE — G0438 PPPS, INITIAL VISIT: HCPCS | Performed by: STUDENT IN AN ORGANIZED HEALTH CARE EDUCATION/TRAINING PROGRAM

## 2024-07-05 SDOH — ECONOMIC STABILITY: FOOD INSECURITY: WITHIN THE PAST 12 MONTHS, THE FOOD YOU BOUGHT JUST DIDN'T LAST AND YOU DIDN'T HAVE MONEY TO GET MORE.: NEVER TRUE

## 2024-07-05 SDOH — ECONOMIC STABILITY: FOOD INSECURITY: WITHIN THE PAST 12 MONTHS, YOU WORRIED THAT YOUR FOOD WOULD RUN OUT BEFORE YOU GOT MONEY TO BUY MORE.: NEVER TRUE

## 2024-07-05 SDOH — ECONOMIC STABILITY: INCOME INSECURITY: HOW HARD IS IT FOR YOU TO PAY FOR THE VERY BASICS LIKE FOOD, HOUSING, MEDICAL CARE, AND HEATING?: NOT HARD AT ALL

## 2024-07-05 ASSESSMENT — PATIENT HEALTH QUESTIONNAIRE - PHQ9
3. TROUBLE FALLING OR STAYING ASLEEP: NOT AT ALL
8. MOVING OR SPEAKING SO SLOWLY THAT OTHER PEOPLE COULD HAVE NOTICED. OR THE OPPOSITE, BEING SO FIGETY OR RESTLESS THAT YOU HAVE BEEN MOVING AROUND A LOT MORE THAN USUAL: NOT AT ALL
SUM OF ALL RESPONSES TO PHQ QUESTIONS 1-9: 0
7. TROUBLE CONCENTRATING ON THINGS, SUCH AS READING THE NEWSPAPER OR WATCHING TELEVISION: NOT AT ALL
9. THOUGHTS THAT YOU WOULD BE BETTER OFF DEAD, OR OF HURTING YOURSELF: NOT AT ALL
5. POOR APPETITE OR OVEREATING: NOT AT ALL
SUM OF ALL RESPONSES TO PHQ9 QUESTIONS 1 & 2: 0
SUM OF ALL RESPONSES TO PHQ QUESTIONS 1-9: 0
SUM OF ALL RESPONSES TO PHQ QUESTIONS 1-9: 0
1. LITTLE INTEREST OR PLEASURE IN DOING THINGS: NOT AT ALL
SUM OF ALL RESPONSES TO PHQ QUESTIONS 1-9: 0
2. FEELING DOWN, DEPRESSED OR HOPELESS: NOT AT ALL
10. IF YOU CHECKED OFF ANY PROBLEMS, HOW DIFFICULT HAVE THESE PROBLEMS MADE IT FOR YOU TO DO YOUR WORK, TAKE CARE OF THINGS AT HOME, OR GET ALONG WITH OTHER PEOPLE: NOT DIFFICULT AT ALL
4. FEELING TIRED OR HAVING LITTLE ENERGY: NOT AT ALL
6. FEELING BAD ABOUT YOURSELF - OR THAT YOU ARE A FAILURE OR HAVE LET YOURSELF OR YOUR FAMILY DOWN: NOT AT ALL

## 2024-07-05 NOTE — PROGRESS NOTES
DO   levothyroxine (SYNTHROID) 150 MCG tablet 1 tablet by PEG Tube route Daily Yes Keerthi Busby DO   carbidopa-levodopa (SINEMET)  MG per tablet 1.5 tablets by PEG Tube route 3 times daily Indications: Take one and a half tablets by mouth three times per day Yes Keerthi Busby DO   Nutritional Supplements (JEVITY 1.5 DORINDA/FIBER PO) Take by mouth Yes Augustine Rahman MD   carbidopa-levodopa (SINEMET)  MG per tablet 1.5 tablet 3 TIMES DAILY (route: oral) Yes Provider, Historical, MD   Handicap Placard MISC by Does not apply route Duration- 5 years Yes Shannon Goodson MD   docusate sodium (COLACE) 100 MG capsule Take 1 capsule by mouth daily Yes Provider, Historical, MD       CareTeam (Including outside providers/suppliers regularly involved in providing care):   Patient Care Team:  Keerthi Busby DO as PCP - General (Family Medicine)  Keerthi Busby DO as PCP - Empaneled Provider  Dev Austin DPM as Consulting Physician (Podiatry)  Jim Salcedo MD (Gastroenterology)     Reviewed and updated this visit:  Tobacco  Allergies  Meds  Problems  Med Hx  Surg Hx  Soc Hx  Fam Hx

## 2024-07-15 ENCOUNTER — APPOINTMENT (OUTPATIENT)
Dept: GENERAL RADIOLOGY | Age: 59
End: 2024-07-15
Payer: MEDICARE

## 2024-07-15 ENCOUNTER — HOSPITAL ENCOUNTER (EMERGENCY)
Age: 59
Discharge: HOME | End: 2024-07-15
Attending: EMERGENCY MEDICINE
Payer: MEDICARE

## 2024-07-15 ENCOUNTER — APPOINTMENT (OUTPATIENT)
Dept: CT IMAGING | Age: 59
End: 2024-07-15
Payer: MEDICARE

## 2024-07-15 VITALS
WEIGHT: 145 LBS | TEMPERATURE: 97 F | RESPIRATION RATE: 18 BRPM | DIASTOLIC BLOOD PRESSURE: 84 MMHG | HEART RATE: 70 BPM | BODY MASS INDEX: 31.38 KG/M2 | SYSTOLIC BLOOD PRESSURE: 109 MMHG | OXYGEN SATURATION: 94 %

## 2024-07-15 DIAGNOSIS — N30.00 ACUTE CYSTITIS WITHOUT HEMATURIA: ICD-10-CM

## 2024-07-15 DIAGNOSIS — R25.1 TREMULOUSNESS: ICD-10-CM

## 2024-07-15 DIAGNOSIS — K94.23 MALFUNCTION OF GASTROSTOMY TUBE (HCC): Primary | ICD-10-CM

## 2024-07-15 LAB
ALBUMIN SERPL-MCNC: 3 G/DL (ref 3.4–5)
ALBUMIN/GLOB SERPL: 0.9 {RATIO} (ref 1.1–2.2)
ALP SERPL-CCNC: 138 U/L (ref 40–129)
ALT SERPL-CCNC: 36 U/L (ref 10–40)
ANION GAP SERPL CALCULATED.3IONS-SCNC: 7 MMOL/L (ref 3–16)
AST SERPL-CCNC: 66 U/L (ref 15–37)
BACTERIA URNS QL MICRO: ABNORMAL /HPF
BASOPHILS # BLD: 0 K/UL (ref 0–0.2)
BASOPHILS NFR BLD: 0.4 %
BILIRUB SERPL-MCNC: 0.3 MG/DL (ref 0–1)
BILIRUB UR QL STRIP.AUTO: NEGATIVE
BUN SERPL-MCNC: 28 MG/DL (ref 7–20)
CALCIUM SERPL-MCNC: 8.7 MG/DL (ref 8.3–10.6)
CHLORIDE SERPL-SCNC: 101 MMOL/L (ref 99–110)
CLARITY UR: ABNORMAL
CO2 SERPL-SCNC: 28 MMOL/L (ref 21–32)
COLOR UR: ABNORMAL
CREAT SERPL-MCNC: <0.5 MG/DL (ref 0.6–1.2)
DEPRECATED RDW RBC AUTO: 13.6 % (ref 12.4–15.4)
EOSINOPHIL # BLD: 0.1 K/UL (ref 0–0.6)
EOSINOPHIL NFR BLD: 0.9 %
EPI CELLS #/AREA URNS AUTO: 0 /HPF (ref 0–5)
GFR SERPLBLD CREATININE-BSD FMLA CKD-EPI: >90 ML/MIN/{1.73_M2}
GLUCOSE SERPL-MCNC: 85 MG/DL (ref 70–99)
GLUCOSE UR STRIP.AUTO-MCNC: NEGATIVE MG/DL
HCT VFR BLD AUTO: 42.1 % (ref 36–48)
HGB BLD-MCNC: 14.3 G/DL (ref 12–16)
HGB UR QL STRIP.AUTO: ABNORMAL
HYALINE CASTS #/AREA URNS AUTO: 0 /LPF (ref 0–8)
KETONES UR STRIP.AUTO-MCNC: NEGATIVE MG/DL
LEUKOCYTE ESTERASE UR QL STRIP.AUTO: ABNORMAL
LYMPHOCYTES # BLD: 1.6 K/UL (ref 1–5.1)
LYMPHOCYTES NFR BLD: 25.5 %
MCH RBC QN AUTO: 35.1 PG (ref 26–34)
MCHC RBC AUTO-ENTMCNC: 34 G/DL (ref 31–36)
MCV RBC AUTO: 103.4 FL (ref 80–100)
MONOCYTES # BLD: 0.8 K/UL (ref 0–1.3)
MONOCYTES NFR BLD: 12.2 %
NEUTROPHILS # BLD: 3.9 K/UL (ref 1.7–7.7)
NEUTROPHILS NFR BLD: 61 %
NITRITE UR QL STRIP.AUTO: NEGATIVE
PH UR STRIP.AUTO: 8 [PH] (ref 5–8)
PLATELET # BLD AUTO: 183 K/UL (ref 135–450)
PMV BLD AUTO: 9.4 FL (ref 5–10.5)
POTASSIUM SERPL-SCNC: 4.7 MMOL/L (ref 3.5–5.1)
PROT SERPL-MCNC: 6.5 G/DL (ref 6.4–8.2)
PROT UR STRIP.AUTO-MCNC: ABNORMAL MG/DL
RBC # BLD AUTO: 4.07 M/UL (ref 4–5.2)
RBC CLUMPS #/AREA URNS AUTO: 45 /HPF (ref 0–4)
SODIUM SERPL-SCNC: 136 MMOL/L (ref 136–145)
SP GR UR STRIP.AUTO: 1.02 (ref 1–1.03)
UA COMPLETE W REFLEX CULTURE PNL UR: YES
UA DIPSTICK W REFLEX MICRO PNL UR: YES
URN SPEC COLLECT METH UR: ABNORMAL
UROBILINOGEN UR STRIP-ACNC: 1 E.U./DL
WBC # BLD AUTO: 6.3 K/UL (ref 4–11)
WBC #/AREA URNS AUTO: 13 /HPF (ref 0–5)

## 2024-07-15 PROCEDURE — 99285 EMERGENCY DEPT VISIT HI MDM: CPT

## 2024-07-15 PROCEDURE — 87086 URINE CULTURE/COLONY COUNT: CPT

## 2024-07-15 PROCEDURE — 49465 FLUORO EXAM OF G/COLON TUBE: CPT

## 2024-07-15 PROCEDURE — 80053 COMPREHEN METABOLIC PANEL: CPT

## 2024-07-15 PROCEDURE — 85025 COMPLETE CBC W/AUTO DIFF WBC: CPT

## 2024-07-15 PROCEDURE — 6360000004 HC RX CONTRAST MEDICATION: Performed by: EMERGENCY MEDICINE

## 2024-07-15 PROCEDURE — 71045 X-RAY EXAM CHEST 1 VIEW: CPT

## 2024-07-15 PROCEDURE — 81001 URINALYSIS AUTO W/SCOPE: CPT

## 2024-07-15 PROCEDURE — 70450 CT HEAD/BRAIN W/O DYE: CPT

## 2024-07-15 RX ORDER — CEPHALEXIN 500 MG/1
500 CAPSULE ORAL 4 TIMES DAILY
Qty: 40 CAPSULE | Refills: 0 | Status: SHIPPED | OUTPATIENT
Start: 2024-07-15 | End: 2024-07-25

## 2024-07-15 RX ADMIN — IOHEXOL 25 ML: 350 INJECTION, SOLUTION INTRAVENOUS at 17:04

## 2024-07-15 NOTE — ED PROVIDER NOTES
[facility] Emergency Department [facility]    I, Deonte David MD, am the primary clinician of record.    CHIEF COMPLAINT  Chief Complaint   Patient presents with    G Tube Complications     Kimberly ems from , resides at home. Sent pt over for \"g-tube\" clogging, pt also sent for \"shaking\"         HISTORY OF PRESENT ILLNESS  Miriam Iyer is a 59 y.o. female  who presents to the ED complaining of clogged G-tube.    She has a history of MRDD, autism/Down syndrome and parkinsonism in her previous records.  She is nonverbal and so history is unobtainable from the patient herself.  Although she was sent here for \"shaking\" it is unclear if this was her baseline or not as no seizure-like activity was described according to EMS or in report.  Patient last saw her PCP on July 5 and was in her usual state of health at that time.  The main reason she is in here today is actually for a clogged G-tube that was not able to be trouble shot at her home.   Unclear if she has had any other symptoms recently, but we have no report of any recent falls or head trauma, we have no reports of any fevers vomiting or trouble with bowel movements or any other recent illnesses.    No other complaints, modifying factors or associated symptoms.     I have reviewed the following from the nursing documentation.    Past Medical History:   Diagnosis Date    Anemia     Anorexia     Autistic disorder     MRDD    Convulsions (HCC)     Down syndrome     Dysphagia     G tube feedings (HCC)     PEG tube    Hypothyroid     Malnutrition (HCC)     Parkinson disease (HCC)     Pneumonia of left lower lobe due to infectious organism     UTI (urinary tract infection)     Weakness      Past Surgical History:   Procedure Laterality Date    GASTROSTOMY TUBE PLACEMENT      GASTROSTOMY TUBE PLACEMENT  2/18/14    GASTROSTOMY TUBE PLACEMENT N/A 5/20/2022    EGD PEG TUBE PLACEMENT performed by Jian Fofana MD at [facility] ENDOSCOPY     Family History

## 2024-07-16 LAB — BACTERIA UR CULT: NORMAL

## 2024-07-19 DIAGNOSIS — Z93.1 PEG (PERCUTANEOUS ENDOSCOPIC GASTROSTOMY) STATUS (HCC): ICD-10-CM

## 2024-07-22 RX ORDER — LACTOBACILLUS RHAMNOSUS GG 10B CELL
1 CAPSULE ORAL 2 TIMES DAILY
Qty: 180 CAPSULE | Refills: 1 | Status: SHIPPED | OUTPATIENT
Start: 2024-07-22 | End: 2025-01-18

## 2024-08-16 ENCOUNTER — APPOINTMENT (OUTPATIENT)
Dept: GENERAL RADIOLOGY | Age: 59
End: 2024-08-16
Payer: MEDICARE

## 2024-08-16 ENCOUNTER — HOSPITAL ENCOUNTER (EMERGENCY)
Age: 59
Discharge: HOME | End: 2024-08-16
Attending: EMERGENCY MEDICINE
Payer: MEDICARE

## 2024-08-16 VITALS
HEART RATE: 67 BPM | OXYGEN SATURATION: 95 % | TEMPERATURE: 97.3 F | RESPIRATION RATE: 18 BRPM | DIASTOLIC BLOOD PRESSURE: 73 MMHG | SYSTOLIC BLOOD PRESSURE: 105 MMHG

## 2024-08-16 DIAGNOSIS — T85.528A DISLODGED GASTROSTOMY TUBE: Primary | ICD-10-CM

## 2024-08-16 PROCEDURE — 49465 FLUORO EXAM OF G/COLON TUBE: CPT

## 2024-08-16 PROCEDURE — 99283 EMERGENCY DEPT VISIT LOW MDM: CPT

## 2024-08-16 PROCEDURE — 6360000004 HC RX CONTRAST MEDICATION

## 2024-08-16 PROCEDURE — 43762 RPLC GTUBE NO REVJ TRC: CPT

## 2024-08-16 RX ADMIN — IOHEXOL 25 ML: 350 INJECTION, SOLUTION INTRAVENOUS at 18:00

## 2024-08-16 NOTE — ED PROVIDER NOTES
I personally saw the patient and made/approved the management plan and take responsibility for the patient management.    For further details of Miriam Iyer's emergency department encounter, please see the advanced practice provider's documentation.    I, Deonte David MD, am the primary physician provider of record.    CHIEF COMPLAINT  Chief Complaint   Patient presents with    G Tube Complications     Pt from home caregivers moved her to bed and g-tube was caught and ripped out. Pt nonverbal at baseline.        Briefly, Miriam Iyer is a 59 y.o. female  who presents to the ED complaining of accidental removal of G-tube.  This was noticed as she was being moved by caregivers.  Patient has MRDD, Down syndrome and autism.  Not able provide any meaningful history herself.  No other apparent complaints.    FOCUSED PHYSICAL EXAMINATION  /73   Pulse 67   Temp 97.3 °F (36.3 °C) (Temporal)   Resp 18   SpO2 95%    Focused physical examination notable for no acute distress, well-appearing, well-nourished, baseline mentation without obvious facial droop, no obvious rash.  No obvious cranial nerve deficits on my initial exam.  Abdomen is soft nontender nondistended with G-tube not currently in place.      EKG performed in ED:  None      RADIOLOGY  Any applicable radiology studies including x-ray, CT, MRI, and/or ultrasound, were reviewed independently by me in addition to the radiologist.  I reviewed all radiology images and reports as well from this evaluation.    XR INJ CONTRAST GASTRIC TUBE PERC    Result Date: 8/16/2024  1. Normally located percutaneous gastrostomy tube with the balloon at the junction of the body and antrum of the stomach. 2. No evidence of bowel obstruction.      ED COURSE/MDM  Patient seen and evaluated. Old records reviewed. Labs and imaging reviewed.      The patient's ED workup was notable for dislodgment of gastrotomy tube.  This was replaced by SILVERIO under my direct continuous

## 2024-08-27 ENCOUNTER — APPOINTMENT (OUTPATIENT)
Dept: GENERAL RADIOLOGY | Age: 59
DRG: 177 | End: 2024-08-27
Payer: MEDICARE

## 2024-08-27 ENCOUNTER — HOSPITAL ENCOUNTER (INPATIENT)
Age: 59
LOS: 2 days | Discharge: HOME | DRG: 177 | End: 2024-08-29
Attending: INTERNAL MEDICINE | Admitting: INTERNAL MEDICINE
Payer: MEDICARE

## 2024-08-27 DIAGNOSIS — R31.9 URINARY TRACT INFECTION WITH HEMATURIA, SITE UNSPECIFIED: ICD-10-CM

## 2024-08-27 DIAGNOSIS — T17.908A ASPIRATION INTO AIRWAY, INITIAL ENCOUNTER: ICD-10-CM

## 2024-08-27 DIAGNOSIS — N39.0 URINARY TRACT INFECTION WITH HEMATURIA, SITE UNSPECIFIED: ICD-10-CM

## 2024-08-27 DIAGNOSIS — A41.9 SEPTICEMIA (HCC): ICD-10-CM

## 2024-08-27 DIAGNOSIS — J96.01 ACUTE RESPIRATORY FAILURE WITH HYPOXIA (HCC): Primary | ICD-10-CM

## 2024-08-27 PROBLEM — J96.20 ACUTE AND CHRONIC RESPIRATORY FAILURE (ACUTE-ON-CHRONIC) (HCC): Status: ACTIVE | Noted: 2024-08-27

## 2024-08-27 PROBLEM — J18.9 PNEUMONIA: Status: ACTIVE | Noted: 2024-08-27

## 2024-08-27 LAB
ALBUMIN SERPL-MCNC: 2.8 G/DL (ref 3.4–5)
ALBUMIN/GLOB SERPL: 0.6 {RATIO} (ref 1.1–2.2)
ALP SERPL-CCNC: 129 U/L (ref 40–129)
ALT SERPL-CCNC: <5 U/L (ref 10–40)
ANION GAP SERPL CALCULATED.3IONS-SCNC: 11 MMOL/L (ref 3–16)
AST SERPL-CCNC: 55 U/L (ref 15–37)
BACTERIA URNS QL MICRO: ABNORMAL /HPF
BASOPHILS # BLD: 0 K/UL (ref 0–0.2)
BASOPHILS NFR BLD: 0.5 %
BILIRUB SERPL-MCNC: 0.6 MG/DL (ref 0–1)
BILIRUB UR QL STRIP.AUTO: NEGATIVE
BUN SERPL-MCNC: 37 MG/DL (ref 7–20)
CALCIUM SERPL-MCNC: 8.7 MG/DL (ref 8.3–10.6)
CHARACTER UR: ABNORMAL
CHLORIDE SERPL-SCNC: 107 MMOL/L (ref 99–110)
CLARITY UR: ABNORMAL
CO2 SERPL-SCNC: 25 MMOL/L (ref 21–32)
COLOR UR: ABNORMAL
CREAT SERPL-MCNC: 0.6 MG/DL (ref 0.6–1.2)
DEPRECATED RDW RBC AUTO: 12.8 % (ref 12.4–15.4)
EOSINOPHIL # BLD: 0 K/UL (ref 0–0.6)
EOSINOPHIL NFR BLD: 0.2 %
EPI CELLS #/AREA URNS AUTO: 0 /HPF (ref 0–5)
FLUAV RNA RESP QL NAA+PROBE: NOT DETECTED
FLUBV RNA RESP QL NAA+PROBE: NOT DETECTED
GFR SERPLBLD CREATININE-BSD FMLA CKD-EPI: >90 ML/MIN/{1.73_M2}
GLUCOSE BLD-MCNC: 74 MG/DL (ref 70–99)
GLUCOSE BLD-MCNC: 79 MG/DL (ref 70–99)
GLUCOSE BLD-MCNC: 97 MG/DL (ref 70–99)
GLUCOSE SERPL-MCNC: 74 MG/DL (ref 70–99)
GLUCOSE UR STRIP.AUTO-MCNC: NEGATIVE MG/DL
HCT VFR BLD AUTO: 45.6 % (ref 36–48)
HGB BLD-MCNC: 15.6 G/DL (ref 12–16)
HGB UR QL STRIP.AUTO: NEGATIVE
HYALINE CASTS #/AREA URNS AUTO: 2 /LPF (ref 0–8)
KETONES UR STRIP.AUTO-MCNC: NEGATIVE MG/DL
LACTATE BLDV-SCNC: 1.1 MMOL/L (ref 0.4–1.9)
LACTATE BLDV-SCNC: 1.2 MMOL/L (ref 0.4–1.9)
LEUKOCYTE ESTERASE UR QL STRIP.AUTO: ABNORMAL
LYMPHOCYTES # BLD: 2.1 K/UL (ref 1–5.1)
LYMPHOCYTES NFR BLD: 24.6 %
MCH RBC QN AUTO: 35.6 PG (ref 26–34)
MCHC RBC AUTO-ENTMCNC: 34.3 G/DL (ref 31–36)
MCV RBC AUTO: 104 FL (ref 80–100)
MONOCYTES # BLD: 1.1 K/UL (ref 0–1.3)
MONOCYTES NFR BLD: 12.2 %
NEUTROPHILS # BLD: 5.5 K/UL (ref 1.7–7.7)
NEUTROPHILS NFR BLD: 62.5 %
NITRITE UR QL STRIP.AUTO: NEGATIVE
NT-PROBNP SERPL-MCNC: 747 PG/ML (ref 0–124)
PERFORMED ON: NORMAL
PH UR STRIP.AUTO: 7 [PH] (ref 5–8)
PLATELET # BLD AUTO: 148 K/UL (ref 135–450)
PMV BLD AUTO: 10.8 FL (ref 5–10.5)
POTASSIUM SERPL-SCNC: 4.5 MMOL/L (ref 3.5–5.1)
PROCALCITONIN SERPL IA-MCNC: 0.14 NG/ML (ref 0–0.15)
PROT SERPL-MCNC: 7.2 G/DL (ref 6.4–8.2)
PROT UR STRIP.AUTO-MCNC: ABNORMAL MG/DL
RBC # BLD AUTO: 4.38 M/UL (ref 4–5.2)
RBC CLUMPS #/AREA URNS AUTO: 6 /HPF (ref 0–4)
REASON FOR REJECTION: NORMAL
REJECTED TEST: NORMAL
SARS-COV-2 RNA RESP QL NAA+PROBE: NOT DETECTED
SODIUM SERPL-SCNC: 143 MMOL/L (ref 136–145)
SP GR UR STRIP.AUTO: 1.02 (ref 1–1.03)
TROPONIN, HIGH SENSITIVITY: 49 NG/L (ref 0–14)
TROPONIN, HIGH SENSITIVITY: 49 NG/L (ref 0–14)
UA COMPLETE W REFLEX CULTURE PNL UR: YES
UA DIPSTICK W REFLEX MICRO PNL UR: YES
URN SPEC COLLECT METH UR: ABNORMAL
UROBILINOGEN UR STRIP-ACNC: 1 E.U./DL
WBC # BLD AUTO: 8.7 K/UL (ref 4–11)
WBC #/AREA URNS AUTO: 577 /HPF (ref 0–5)

## 2024-08-27 PROCEDURE — 87205 SMEAR GRAM STAIN: CPT

## 2024-08-27 PROCEDURE — 71045 X-RAY EXAM CHEST 1 VIEW: CPT

## 2024-08-27 PROCEDURE — 87636 SARSCOV2 & INF A&B AMP PRB: CPT

## 2024-08-27 PROCEDURE — 87081 CULTURE SCREEN ONLY: CPT

## 2024-08-27 PROCEDURE — 81001 URINALYSIS AUTO W/SCOPE: CPT

## 2024-08-27 PROCEDURE — 85025 COMPLETE CBC W/AUTO DIFF WBC: CPT

## 2024-08-27 PROCEDURE — 87186 SC STD MICRODIL/AGAR DIL: CPT

## 2024-08-27 PROCEDURE — 2700000000 HC OXYGEN THERAPY PER DAY

## 2024-08-27 PROCEDURE — 36415 COLL VENOUS BLD VENIPUNCTURE: CPT

## 2024-08-27 PROCEDURE — 96361 HYDRATE IV INFUSION ADD-ON: CPT

## 2024-08-27 PROCEDURE — 83880 ASSAY OF NATRIURETIC PEPTIDE: CPT

## 2024-08-27 PROCEDURE — 2060000000 HC ICU INTERMEDIATE R&B

## 2024-08-27 PROCEDURE — 49465 FLUORO EXAM OF G/COLON TUBE: CPT

## 2024-08-27 PROCEDURE — 87077 CULTURE AEROBIC IDENTIFY: CPT

## 2024-08-27 PROCEDURE — 80053 COMPREHEN METABOLIC PANEL: CPT

## 2024-08-27 PROCEDURE — 2580000003 HC RX 258: Performed by: INTERNAL MEDICINE

## 2024-08-27 PROCEDURE — 84145 PROCALCITONIN (PCT): CPT

## 2024-08-27 PROCEDURE — 87449 NOS EACH ORGANISM AG IA: CPT

## 2024-08-27 PROCEDURE — 87040 BLOOD CULTURE FOR BACTERIA: CPT

## 2024-08-27 PROCEDURE — 6360000002 HC RX W HCPCS: Performed by: PHYSICIAN ASSISTANT

## 2024-08-27 PROCEDURE — 99285 EMERGENCY DEPT VISIT HI MDM: CPT

## 2024-08-27 PROCEDURE — 84484 ASSAY OF TROPONIN QUANT: CPT

## 2024-08-27 PROCEDURE — 6360000002 HC RX W HCPCS: Performed by: INTERNAL MEDICINE

## 2024-08-27 PROCEDURE — 87086 URINE CULTURE/COLONY COUNT: CPT

## 2024-08-27 PROCEDURE — 94761 N-INVAS EAR/PLS OXIMETRY MLT: CPT

## 2024-08-27 PROCEDURE — 2580000003 HC RX 258: Performed by: PHYSICIAN ASSISTANT

## 2024-08-27 PROCEDURE — 96365 THER/PROPH/DIAG IV INF INIT: CPT

## 2024-08-27 PROCEDURE — 83605 ASSAY OF LACTIC ACID: CPT

## 2024-08-27 PROCEDURE — 96375 TX/PRO/DX INJ NEW DRUG ADDON: CPT

## 2024-08-27 PROCEDURE — 87070 CULTURE OTHR SPECIMN AEROBIC: CPT

## 2024-08-27 PROCEDURE — 6360000004 HC RX CONTRAST MEDICATION

## 2024-08-27 PROCEDURE — 6370000000 HC RX 637 (ALT 250 FOR IP): Performed by: INTERNAL MEDICINE

## 2024-08-27 PROCEDURE — 6370000000 HC RX 637 (ALT 250 FOR IP): Performed by: PHYSICIAN ASSISTANT

## 2024-08-27 PROCEDURE — 93005 ELECTROCARDIOGRAM TRACING: CPT | Performed by: PHYSICIAN ASSISTANT

## 2024-08-27 PROCEDURE — 94640 AIRWAY INHALATION TREATMENT: CPT

## 2024-08-27 RX ORDER — 0.9 % SODIUM CHLORIDE 0.9 %
500 INTRAVENOUS SOLUTION INTRAVENOUS PRN
Status: DISCONTINUED | OUTPATIENT
Start: 2024-08-27 | End: 2024-08-29 | Stop reason: HOSPADM

## 2024-08-27 RX ORDER — POTASSIUM CHLORIDE 7.45 MG/ML
10 INJECTION INTRAVENOUS PRN
Status: DISCONTINUED | OUTPATIENT
Start: 2024-08-27 | End: 2024-08-29 | Stop reason: HOSPADM

## 2024-08-27 RX ORDER — POTASSIUM CHLORIDE 1500 MG/1
40 TABLET, EXTENDED RELEASE ORAL PRN
Status: DISCONTINUED | OUTPATIENT
Start: 2024-08-27 | End: 2024-08-29 | Stop reason: HOSPADM

## 2024-08-27 RX ORDER — M-VIT,TX,IRON,MINS/CALC/FOLIC 27MG-0.4MG
1 TABLET ORAL DAILY
Status: DISCONTINUED | OUTPATIENT
Start: 2024-08-28 | End: 2024-08-29 | Stop reason: HOSPADM

## 2024-08-27 RX ORDER — LACTOBACILLUS RHAMNOSUS GG 10B CELL
1 CAPSULE ORAL 2 TIMES DAILY
Status: DISCONTINUED | OUTPATIENT
Start: 2024-08-27 | End: 2024-08-27

## 2024-08-27 RX ORDER — ACETAMINOPHEN 650 MG/1
650 SUPPOSITORY RECTAL EVERY 6 HOURS PRN
Status: DISCONTINUED | OUTPATIENT
Start: 2024-08-27 | End: 2024-08-29 | Stop reason: HOSPADM

## 2024-08-27 RX ORDER — SODIUM CHLORIDE 0.9 % (FLUSH) 0.9 %
5-40 SYRINGE (ML) INJECTION EVERY 12 HOURS SCHEDULED
Status: DISCONTINUED | OUTPATIENT
Start: 2024-08-27 | End: 2024-08-29 | Stop reason: HOSPADM

## 2024-08-27 RX ORDER — VALPROIC ACID 250 MG/5ML
250 SOLUTION ORAL 3 TIMES DAILY PRN
Status: DISCONTINUED | OUTPATIENT
Start: 2024-08-27 | End: 2024-08-27 | Stop reason: SDUPTHER

## 2024-08-27 RX ORDER — PANTOPRAZOLE SODIUM 40 MG/1
40 TABLET, DELAYED RELEASE ORAL
Status: DISCONTINUED | OUTPATIENT
Start: 2024-08-28 | End: 2024-08-27 | Stop reason: SDUPTHER

## 2024-08-27 RX ORDER — SODIUM CHLORIDE 9 MG/ML
INJECTION, SOLUTION INTRAVENOUS CONTINUOUS
Status: DISCONTINUED | OUTPATIENT
Start: 2024-08-27 | End: 2024-08-29 | Stop reason: HOSPADM

## 2024-08-27 RX ORDER — 0.9 % SODIUM CHLORIDE 0.9 %
30 INTRAVENOUS SOLUTION INTRAVENOUS ONCE
Status: COMPLETED | OUTPATIENT
Start: 2024-08-27 | End: 2024-08-27

## 2024-08-27 RX ORDER — VALPROIC ACID 250 MG/5ML
250 SOLUTION ORAL 2 TIMES DAILY
Status: DISCONTINUED | OUTPATIENT
Start: 2024-08-27 | End: 2024-08-29 | Stop reason: HOSPADM

## 2024-08-27 RX ORDER — CARBIDOPA AND LEVODOPA 25; 100 MG/1; MG/1
1.5 TABLET ORAL 3 TIMES DAILY
Status: DISCONTINUED | OUTPATIENT
Start: 2024-08-27 | End: 2024-08-29 | Stop reason: HOSPADM

## 2024-08-27 RX ORDER — ONDANSETRON 4 MG/1
4 TABLET, ORALLY DISINTEGRATING ORAL EVERY 8 HOURS PRN
Status: DISCONTINUED | OUTPATIENT
Start: 2024-08-27 | End: 2024-08-29 | Stop reason: HOSPADM

## 2024-08-27 RX ORDER — M-VIT,TX,IRON,MINS/CALC/FOLIC 27MG-0.4MG
1 TABLET ORAL DAILY
COMMUNITY

## 2024-08-27 RX ORDER — ACETAMINOPHEN 325 MG/1
650 TABLET ORAL EVERY 6 HOURS PRN
Status: DISCONTINUED | OUTPATIENT
Start: 2024-08-27 | End: 2024-08-27 | Stop reason: SDUPTHER

## 2024-08-27 RX ORDER — MIDODRINE HYDROCHLORIDE 5 MG/1
2.5 TABLET ORAL
Status: DISCONTINUED | OUTPATIENT
Start: 2024-08-27 | End: 2024-08-29 | Stop reason: HOSPADM

## 2024-08-27 RX ORDER — VIT B COMPLEX/ZINC/MANGANESE 3.6-.75/15
5 LIQUID (ML) ORAL DAILY
COMMUNITY
Start: 2024-08-19

## 2024-08-27 RX ORDER — CARBIDOPA AND LEVODOPA 25; 100 MG/1; MG/1
1.5 TABLET ORAL 3 TIMES DAILY
Status: DISCONTINUED | OUTPATIENT
Start: 2024-08-27 | End: 2024-08-27 | Stop reason: SDUPTHER

## 2024-08-27 RX ORDER — CALCIUM CARBONATE 1250 MG/5ML
500 SUSPENSION ORAL 2 TIMES DAILY
Status: DISCONTINUED | OUTPATIENT
Start: 2024-08-27 | End: 2024-08-29 | Stop reason: HOSPADM

## 2024-08-27 RX ORDER — ACETAMINOPHEN 650 MG/1
650 SUPPOSITORY RECTAL ONCE
Status: COMPLETED | OUTPATIENT
Start: 2024-08-27 | End: 2024-08-27

## 2024-08-27 RX ORDER — SODIUM CHLORIDE 0.9 % (FLUSH) 0.9 %
10 SYRINGE (ML) INJECTION PRN
Status: DISCONTINUED | OUTPATIENT
Start: 2024-08-27 | End: 2024-08-29 | Stop reason: HOSPADM

## 2024-08-27 RX ORDER — SODIUM CHLORIDE 9 MG/ML
INJECTION, SOLUTION INTRAVENOUS PRN
Status: DISCONTINUED | OUTPATIENT
Start: 2024-08-27 | End: 2024-08-29 | Stop reason: HOSPADM

## 2024-08-27 RX ORDER — ACETAMINOPHEN 325 MG/1
650 TABLET ORAL EVERY 6 HOURS PRN
Status: DISCONTINUED | OUTPATIENT
Start: 2024-08-27 | End: 2024-08-29 | Stop reason: HOSPADM

## 2024-08-27 RX ORDER — SODIUM CHLORIDE 9 MG/ML
INJECTION, SOLUTION INTRAVENOUS CONTINUOUS
Status: DISCONTINUED | OUTPATIENT
Start: 2024-08-27 | End: 2024-08-27 | Stop reason: SDUPTHER

## 2024-08-27 RX ORDER — LEVOTHYROXINE SODIUM 150 UG/1
150 TABLET ORAL DAILY
Status: DISCONTINUED | OUTPATIENT
Start: 2024-08-28 | End: 2024-08-29 | Stop reason: HOSPADM

## 2024-08-27 RX ORDER — ENOXAPARIN SODIUM 100 MG/ML
40 INJECTION SUBCUTANEOUS DAILY
Status: DISCONTINUED | OUTPATIENT
Start: 2024-08-27 | End: 2024-08-29 | Stop reason: HOSPADM

## 2024-08-27 RX ORDER — VALPROIC ACID 250 MG/5ML
500 SOLUTION ORAL NIGHTLY
Status: DISCONTINUED | OUTPATIENT
Start: 2024-08-27 | End: 2024-08-29 | Stop reason: HOSPADM

## 2024-08-27 RX ORDER — DOCUSATE SODIUM 100 MG/1
100 CAPSULE, LIQUID FILLED ORAL DAILY
Status: DISCONTINUED | OUTPATIENT
Start: 2024-08-28 | End: 2024-08-29 | Stop reason: HOSPADM

## 2024-08-27 RX ORDER — HYDRALAZINE HYDROCHLORIDE 20 MG/ML
10 INJECTION INTRAMUSCULAR; INTRAVENOUS EVERY 6 HOURS PRN
Status: DISCONTINUED | OUTPATIENT
Start: 2024-08-27 | End: 2024-08-29 | Stop reason: HOSPADM

## 2024-08-27 RX ORDER — IPRATROPIUM BROMIDE AND ALBUTEROL SULFATE 2.5; .5 MG/3ML; MG/3ML
1 SOLUTION RESPIRATORY (INHALATION)
Status: DISCONTINUED | OUTPATIENT
Start: 2024-08-27 | End: 2024-08-29 | Stop reason: HOSPADM

## 2024-08-27 RX ORDER — ONDANSETRON 2 MG/ML
4 INJECTION INTRAMUSCULAR; INTRAVENOUS EVERY 6 HOURS PRN
Status: DISCONTINUED | OUTPATIENT
Start: 2024-08-27 | End: 2024-08-29 | Stop reason: HOSPADM

## 2024-08-27 RX ORDER — L. ACIDOPHILUS/L.BULGARICUS 100MM CELL
1 GRANULES IN PACKET (EA) ORAL DAILY
Status: DISCONTINUED | OUTPATIENT
Start: 2024-08-28 | End: 2024-08-29 | Stop reason: HOSPADM

## 2024-08-27 RX ADMIN — ENOXAPARIN SODIUM 40 MG: 100 INJECTION SUBCUTANEOUS at 19:02

## 2024-08-27 RX ADMIN — IOHEXOL 50 ML: 350 INJECTION, SOLUTION INTRAVENOUS at 10:20

## 2024-08-27 RX ADMIN — ACETAMINOPHEN 650 MG: 650 SUPPOSITORY RECTAL at 08:57

## 2024-08-27 RX ADMIN — SODIUM CHLORIDE 1365 ML: 9 INJECTION, SOLUTION INTRAVENOUS at 09:49

## 2024-08-27 RX ADMIN — IPRATROPIUM BROMIDE AND ALBUTEROL SULFATE 1 DOSE: .5; 3 SOLUTION RESPIRATORY (INHALATION) at 19:50

## 2024-08-27 RX ADMIN — IPRATROPIUM BROMIDE AND ALBUTEROL SULFATE 1 DOSE: .5; 3 SOLUTION RESPIRATORY (INHALATION) at 16:27

## 2024-08-27 RX ADMIN — SODIUM CHLORIDE: 9 INJECTION, SOLUTION INTRAVENOUS at 15:48

## 2024-08-27 RX ADMIN — PIPERACILLIN AND TAZOBACTAM 4500 MG: 4; .5 INJECTION, POWDER, FOR SOLUTION INTRAVENOUS at 09:51

## 2024-08-27 RX ADMIN — SODIUM CHLORIDE, PRESERVATIVE FREE 10 ML: 5 INJECTION INTRAVENOUS at 21:30

## 2024-08-27 RX ADMIN — CEFEPIME 2000 MG: 2 INJECTION, POWDER, FOR SOLUTION INTRAVENOUS at 16:17

## 2024-08-27 RX ADMIN — SODIUM CHLORIDE 1250 MG: 9 INJECTION, SOLUTION INTRAVENOUS at 23:11

## 2024-08-27 RX ADMIN — SODIUM CHLORIDE 1250 MG: 9 INJECTION, SOLUTION INTRAVENOUS at 10:25

## 2024-08-27 RX ADMIN — VALPROIC ACID 500 MG: 250 SOLUTION ORAL at 21:30

## 2024-08-27 RX ADMIN — CEFEPIME 2000 MG: 2 INJECTION, POWDER, FOR SOLUTION INTRAVENOUS at 19:49

## 2024-08-27 ASSESSMENT — PAIN SCALES - WONG BAKER
WONGBAKER_NUMERICALRESPONSE: NO HURT
WONGBAKER_NUMERICALRESPONSE: NO HURT

## 2024-08-27 ASSESSMENT — PAIN - FUNCTIONAL ASSESSMENT: PAIN_FUNCTIONAL_ASSESSMENT: WONG-BAKER FACES

## 2024-08-27 ASSESSMENT — LIFESTYLE VARIABLES: HOW OFTEN DO YOU HAVE A DRINK CONTAINING ALCOHOL: NEVER

## 2024-08-27 ASSESSMENT — ENCOUNTER SYMPTOMS: CHOKING: 1

## 2024-08-27 NOTE — CARE COORDINATION
[facility] Nurse  Consult Note       NAME:  Miriam Iyer  MEDICAL RECORD NUMBER:  [id number]  AGE: 59 y.o.   GENDER: female  : 1965  TODAY'S DATE:  2024    Subjective   Reason for WOCN Evaluation and Assessment: peg tube site excoriation      Miriam Iyer is a 59 y.o. female referred by:   [x] Physician  [x] Nursing  [] Other:     Wound Identification:  Wound Type:  moisture associated skin damage  Contributing Factors:  non verbal, develop mentally delayed    Wound History: present on admission  Current Wound Care Treatment:  open to air    Patient Goal of Care:  [x] Wound Healing  [] Odor Control  [] Palliative Care  [] Pain Control   [] Other:         PAST MEDICAL HISTORY        Diagnosis Date    Anemia     Anorexia     Autistic disorder     MRDD    Convulsions (HCC)     Down syndrome     Dysphagia     G tube feedings (HCC)     PEG tube    Hypothyroid     Malnutrition (HCC)     Parkinson disease (HCC)     Pneumonia of left lower lobe due to infectious organism     UTI (urinary tract infection)     Weakness        PAST SURGICAL HISTORY    Past Surgical History:   Procedure Laterality Date    GASTROSTOMY TUBE PLACEMENT      GASTROSTOMY TUBE PLACEMENT  14    GASTROSTOMY TUBE PLACEMENT N/A 2022    EGD PEG TUBE PLACEMENT performed by Jian Fofana MD at [facility] ENDOSCOPY       FAMILY HISTORY    Family History   Family history unknown: Yes       SOCIAL HISTORY    Social History     Tobacco Use    Smoking status: Never    Smokeless tobacco: Never   Vaping Use    Vaping status: Never Used   Substance Use Topics    Alcohol use: No    Drug use: No       ALLERGIES    Allergies   Allergen Reactions    Caffeine      Unsure of reaction  Other reaction(s): Unknown    Iodine      Unsure of reaction  Other reaction(s): Unknown       MEDICATIONS    No current facility-administered medications on file prior to encounter.     Current Outpatient Medications on File Prior to Encounter

## 2024-08-27 NOTE — PLAN OF CARE
Problem: Discharge Planning  Goal: Discharge to home or other facility with appropriate resources  Outcome: Progressing   Lives in private residence with 24hr care staff. Disposition based of course of stay

## 2024-08-27 NOTE — ED TRIAGE NOTES
Patient oriented to room and ED throughput process.  Safety measures with ED bed locked in lowest position and call light in reach.  Patient educated on all orders, including any medications.  Patient educated on chief complaint/symptoms. Patient encouraged to ask questions regarding care, medications or treatment plan.  Pt is nonverbal

## 2024-08-27 NOTE — ED PROVIDER NOTES
[facility] EMERGENCY DEPARTMENT  EMERGENCY DEPARTMENT ENCOUNTER        Pt Name: Miriam Iyer  MRN: [id number]  Birthdate 1965  Date of evaluation: 8/27/2024  Provider: Love Bryson PA-C  PCP: Keerthi Busby DO  Note Started: 8:55 AM EDT 8/27/24      SILVERIO. I have evaluated this patient.        CHIEF COMPLAINT       Chief Complaint   Patient presents with    G Tube Complications     Arrived via [facility] d/t gtube complications of leaking; per home health care acting like she was choking; VSS; per report pt acting normal       HISTORY OF PRESENT ILLNESS: 1 or more Elements     History From: ems, caregiver   Limitations to history : nonverbal    Miriam Iyer is a 59 y.o. female who presents for evaluation of concern for G-tube complication.  Caregiver initially was concerned as there was feedings leaking out of from G-tube site, recommended stated that patient acted like she was choking.  EMS reports vitals were within normal limits and caregivers report that she is otherwise acting appropriate at baseline.  No additional information is able to be obtained at this time.    Nursing Notes were all reviewed and agreed with or any disagreements were addressed in the HPI.    REVIEW OF SYSTEMS :      Review of Systems   Unable to perform ROS: Patient nonverbal   Respiratory:  Positive for choking.        Positives and Pertinent negatives as per HPI.     SURGICAL HISTORY     Past Surgical History:   Procedure Laterality Date    GASTROSTOMY TUBE PLACEMENT      GASTROSTOMY TUBE PLACEMENT  2/18/14    GASTROSTOMY TUBE PLACEMENT N/A 5/20/2022    EGD PEG TUBE PLACEMENT performed by Jian Fofana MD at [facility] ENDOSCOPY       CURRENTMEDICATIONS       Previous Medications    ACETAMINOPHEN (TYLENOL) 325 MG TABLET    2 tablets by PEG Tube route every 6 hours as needed for Fever or Pain (For temperature > 100 F and pain scale >5)    CALCIUM CARBONATE ANTACID (CALCIUM ELEMENTAL 500 MG/5ML, 1250 MG/5ML AS  Chief Complaint  Follow-up (Left foot)      History of Present Illness:  Kathrine Fritz is a pleasant 50 y.o. female here for follow-up regarding her left foot. As a review, approximately 3 months ago she was diagnosed with a stress fracture of the second metatarsal of her left foot following a marathon. Her recovery has been routine. At her last visit she was asked to progress out of her boot. Today patient states that she is doing well with some occasional discomfort. Continues to improve day by day. Medical History:  Patient's medications, allergies, past medical, surgical, social and family histories were reviewed and updated as appropriate. ROS: Review of systems reviewed from Patient History Form completed today and available in the patient's chart under the Media tab. Pertinent items are noted in HPI  Review of systems reviewed from Patient History Form completed today and available in the patient's chart under the Media tab. Vital Signs:  Temp 97.5 °F (36.4 °C)   Ht 5' 4\" (1.626 m)   Wt 165 lb (74.8 kg)   BMI 28.32 kg/m²     Left foot examination:    Inspection: There is normal alignment. No swelling or ecchymosis is present. Gait: Patient is able to weight-bear without pain. Range of motion: Patient can perform a toe rise without pain. There is full range of motion of the ankle, midfoot, hindfoot and forefoot. Stability: No instability is present. Strength: Normal strength is present. Palpation: There is no focal tenderness. Neurovascular: Skin is warm and well-perfused. Sensation normal.    Right foot comparison exam:    Inspection: There is normal alignment. No swelling or ecchymosis is present. Gait: Patient is able to weight-bear without pain. Range of motion: Patient can perform a toe rise without pain. There is full range of motion of the ankle, midfoot, hindfoot and forefoot. Stability: No instability is present.       Strength: Normal strength is present. Palpation: There is no focal tenderness. Neurovascular: Skin is warm and well-perfused. Sensation normal.        Radiology:       Pertinent imaging was interpreted and reviewed with the patient today, images only - no report available. Left foot x-ray:    AP, Oblique, Lateral views were obtained  and reviewed of the left foot. Impression: Healing nondisplaced second metatarsal fracture           Assessment :    50 y.o. female with healing nondisplaced fracture of the base of the second metatarsal of the left foot    Impression:  Encounter Diagnoses   Name Primary? Left foot pain Yes    Stress fracture of metatarsal bone of left foot with routine healing, subsequent encounter        Office Procedures:  Orders Placed This Encounter   Procedures    Carbon Shoe Insert $45     Patient was supplied carbon shoe insert. This retail item was supplied to provide functional support and assist in protecting the affected area. Verbal and written instructions for the use of and application of this item were provided. The patient was educated and fit by a healthcare professional with expert knowledge and specialization in brace application. They were instructed to contact the office immediately should the equipment result in increased pain, decreased sensation, increased swelling or worsening of the condition. XR FOOT LEFT (MIN 3 VIEWS)     Standing Status:   Future     Number of Occurrences:   1     Standing Expiration Date:   7/19/2023     Order Specific Question:   Reason for exam:     Answer:   pain         Plan: Pertinent imaging was reviewed. The etiology, natural history, and treatment options for the disorder were discussed. The roles of activity medication, antiinflammatories, injections, bracing, physical therapy, and surgical interventions were all described to the patient and questions were answered. Kayli Ross is doing well today.   Again her x-rays show ample evidence of healing. I would like her to progress her activity as tolerated. He was also given home therapy exercises to work on her foot range of motion . follow-up if no improvement or worsening symptoms. Shani Manuel is in agreement with this plan. All questions were answered to patient's satisfaction and was encouraged to call with any further questions. Total time spent for evaluation, education, and development of treatment plan: 35 minutes    Karolynn Libman, [address] [facility]  7/20/2022    This dictation was performed with a verbal recognition program [facility]) and it was checked for errors. It is possible that there are still dictated errors within this office note. If so, please bring any areas to my attention for an addendum. All efforts were made to ensure that this office note is accurate.

## 2024-08-27 NOTE — PROGRESS NOTES
Patient seen in ED, room 21.  Admission completed with the following exceptions:  4 Eyes Assessment, Immunizations, Vaccines, Rights and Responsibilities, Orientation to room, Plan of Care, Education/Learning Assessment and Education Plan, white board, height and weight, pain assessment and head to toe assessment.  Patient is alert.  She is non-verbal and did not make any attempt to make eye contact or acknowledge this RN.  Patient lives in a group home where she has a roommate and is being admitted for Acute and chronic respiratory failure.  Home Medications as well as Outside Sources have been verbally reviewed with patient and updated if appropriate.  Medication reconciliation is now Completed.  All questions answered.    All questions answered by the caregivers in the room.  Nurse from facility was provided with lab results and admitting diagnosis.    Jevity 1.5 paul 237 ml at midnight, 0700, 1000, 1300,1600, 2100.     Water flush 30 ml before and after each medication and Jevity bolus (6 times a day)    Fall risk bracelet applied to left wrist.    Caregivers X 2 at bedside.

## 2024-08-27 NOTE — PROGRESS NOTES
Pt assessed for admission. Gtube dressing saturated and noted drainage from stoma of gtube. Gastric contents in tube as well.

## 2024-08-27 NOTE — PROGRESS NOTES
Pt admitted for uti, poss aspiratoin    Full h+p to follow    Active Hospital Problems    Diagnosis Date Noted    Acute respiratory failure with hypoxia (HCC) [J96.01] 08/27/2024    Pneumonia [J18.9] 08/27/2024    UTI (urinary tract infection) [N39.0] 08/27/2024    Hypothyroidism [E03.9] 05/16/2023    Gastrostomy tube in place (HCC) [Z93.1] 05/16/2023    Down syndrome [Q90.9]        Please use Perfect[employer]erve to contact me with any questions during the day.   The hospitalist service will provide cross-coverage for this patient from 7pm to 7am.    During those hours, contact the on-call hospitalist MD/SILVERIO for questions.

## 2024-08-27 NOTE — PROGRESS NOTES
BRIEF NUTRITION NOTE    Consult received for tube feed ordering and management. This RD requested pt's actual body weight as  lbs is estimated. No weight has been charted in Epic at this time. Further, RN reports pt has had episodes of emesis today, G-tube dressing is saturated, and gastric contents are leaking from stoma. Text sent to Dr. Al to clarify if tube feeds should start given above; no response at this time.     Do not recommend to start tube feeds at this time. RD will evaluate tomorrow and provide appropriate recommendations once actual body weight has been obtained.     Electronically signed by Aby Méndez MS, RD, LD on 8/27/2024 at 3:05 PM

## 2024-08-27 NOTE — ED NOTES
How does patient ambulate?   []Low Fall Risk (ambulates by themselves without support)  []Stand by assist   []Contact Guard   []Front wheel walker  []Wheelchair   []Steady  [x]Bed bound  []History of Lower Extremity Amputation  []Unknown, did not assess in the emergency department   How does patient take pills?  []Whole with Water  []Crushed in applesauce  []Crushed in pudding  []Other-gtube  []Unknown no oral medications were given in the ED  Is patient alert?   [x]Alert to self. nonverbal  []Drowsy but responds to voice  []Doesn't respond to voice but responds to painful stimuli  []Unresponsive  Is patient oriented?   [x]To person  []To place  []To time  []To situation  []Confused  []Agitated  []Follows commands  If patient is disoriented or from a Skill Nursing Facility has family been notified of admission?   []Yes   []No  Group home  Patient belongings?   []Cell phone  []Wallet   []Dentures  []Clothing  Any specific patient or family belongings/needs/dynamics?   Caregivers at bedside  Miscellaneous comments/pending orders?  -     If there are any additional questions please reach out to the Emergency Department.

## 2024-08-27 NOTE — PROGRESS NOTES
Clinical Pharmacy Note: Pharmacy to Dose Vancomycin    Miriam Iyer is a 59 y.o. female started on Vancomycin for PNA; consult received from Dr. Al to manage therapy. Also receiving the following antibiotics: Cefepime.    Goal AUC: 400-600 mg/L*hr  Goal Trough Level: 15-20 mcg/mL    Assessment/Plan:  A 1250 mg loading dose was given on 8/27 at 1025  Initiate vancomycin 1250 mg IV every 12 hours. Bayesian modeling predicts an AUC of 545 mg/L*hr and a trough of 15 mcg/mL at steady state concentration.  A vancomycin random level has been ordered for 8/28 at 0600  Changes in regimen will be determined based on culture results, renal function, and clinical response.  Pharmacy will continue to monitor and adjust regimen as necessary.    Allergies:  Caffeine and Iodine     Recent Labs     08/27/24  0924   CREATININE 0.6       Recent Labs     08/27/24  1023   WBC 8.7       Ht Readings from Last 1 Encounters:   08/27/24 1.524 m (5')        Wt Readings from Last 1 Encounters:   08/27/24 65.8 kg (145 lb)         Estimated Creatinine Clearance: 83 mL/min (based on SCr of 0.6 mg/dL).      Thank you for the consult,    Annemarie Garcia [facility]  [phone]

## 2024-08-27 NOTE — H&P
History and Physical  Dr. Al  8/27/2024    PCP: Keerthi Busby DO    Cc:   Chief Complaint   Patient presents with    G Tube Complications     Arrived via [facility] d/t gtube complications of leaking; per home health care acting like she was choking; VSS; per report pt acting normal       HPI:  Miriam Iyer is a 59 y.o. female who has a past medical history of Anemia, Anorexia, Autistic disorder, Convulsions (HCC), Down syndrome, Dysphagia, G tube feedings (HCC), Hypothyroid, Malnutrition (HCC), Parkinson disease (HCC), Pneumonia of left lower lobe due to infectious organism, UTI (urinary tract infection), and Weakness.     Patient presents with Acute and chronic respiratory failure (acute-on-chronic) ([facility]).  HPI  (1-3 for expanded problem focused, ?4 for detailed/comprehensive)      59 y.o. female who presents for evaluation of concern for G-tube complication.  Caregiver initially was concerned as there was feedings leaking out of from G-tube site, recommended stated that patient acted like she was choking.  EMS reports vitals were within normal limits and caregivers report that she is otherwise acting appropriate at baseline.  No further hx nor ROS obtainable from pt      CBC CMP are relatively unremarkable.  No leukocytosis.  Troponin 49, delta 49.  Lactic acid 1.1.  Blood cultures are pending.  COVID and flu swabs are negative.  Urinalysis positive for large leukocytes with 4+ bacteria and 577 white blood cells.  Treat empirically with iv abx due to concern for UTI as well as aspiration.  Initial portable chest x-ray is negative.    Problem list of hospitalization thus far:  Active Hospital Problems    Diagnosis     Acute respiratory failure with hypoxia ([facility]) [J96.01]     Pneumonia [J18.9]     UTI (urinary tract infection) [N39.0]     Hypothyroidism [E03.9]     Gastrostomy tube in place ([facility]) [Z93.1]     Down syndrome [Q90.9]          Review of Systems: (1 system for EPF, 2-9 for detailed, 10+ for  proximal small bowel.  There is a nonobstructive bowel gas pattern.  Degenerative changes involve the lumbar spine.     1. Intraluminal placement of percutaneous gastric tube.     XR CHEST PORTABLE    Result Date: 8/27/2024  EXAMINATION: ONE XRAY VIEW OF THE CHEST 8/27/2024 8:58 am COMPARISON: 07/15/2024 HISTORY: ORDERING SYSTEM PROVIDED HISTORY: SOB TECHNOLOGIST PROVIDED HISTORY: Reason for exam:->SOB Reason for Exam: Shortness of breath FINDINGS: There is left basilar scarring.  Cardiomegaly is stable.  There is no pneumothorax or pleural effusion.     1.  No acute abnormality.     XR INJ CONTRAST GASTRIC TUBE PERC    Result Date: 8/16/2024  EXAMINATION: ONE SUPINE XRAY VIEW(S) OF THE ABDOMEN 8/16/2024 4:59 pm COMPARISON: 07/15/2024. HISTORY: ORDERING SYSTEM PROVIDED HISTORY: g tube replaced TECHNOLOGIST PROVIDED HISTORY: Reason for exam:->g tube replaced Reason for Exam: g tube replaced FINDINGS: A gastric tube is normally located with the balloon at the junction of the antrum and body of the stomach.  There is a small amount of contrast in the stomach and proximal duodenum.  No extravasation of contrast is identified. There is no dilated bowel.  There is normal amount of stool in the colon.     1. Normally located percutaneous gastrostomy tube with the balloon at the junction of the body and antrum of the stomach. 2. No evidence of bowel obstruction.         EKG:     Lab Results   Component Value Date/Time    GLUCOSE 74 08/27/2024 09:24 AM     Lab Results   Component Value Date/Time    POCGLU 94 05/28/2023 09:15 PM     /88   Pulse 80   Temp 98.6 °F (37 °C) (Oral)   Resp 16   Ht 1.524 m (5')   Wt 65.8 kg (145 lb)   SpO2 96%   BMI 28.32 kg/m²     MEDICAL DECISION MAKING:    Active Problems:    Down syndrome -Established problem. Stable.    Plan: Continue present orders/plan.     Hypothyroidism  Plan: stay on replacement    Gastrostomy tube in place (HCC)  Plan: dietary consulted for feeds.     Acute

## 2024-08-28 ENCOUNTER — APPOINTMENT (OUTPATIENT)
Dept: GENERAL RADIOLOGY | Age: 59
DRG: 177 | End: 2024-08-28
Payer: MEDICARE

## 2024-08-28 LAB
ANION GAP SERPL CALCULATED.3IONS-SCNC: 7 MMOL/L (ref 3–16)
BASOPHILS # BLD: 0 K/UL (ref 0–0.2)
BASOPHILS NFR BLD: 0.6 %
BUN SERPL-MCNC: 31 MG/DL (ref 7–20)
C DIFF TOX A+B STL QL IA: NORMAL
CALCIUM SERPL-MCNC: 8.6 MG/DL (ref 8.3–10.6)
CHLORIDE SERPL-SCNC: 114 MMOL/L (ref 99–110)
CO2 SERPL-SCNC: 24 MMOL/L (ref 21–32)
CREAT SERPL-MCNC: 0.5 MG/DL (ref 0.6–1.2)
DEPRECATED RDW RBC AUTO: 12.2 % (ref 12.4–15.4)
EKG ATRIAL RATE: 106 BPM
EKG DIAGNOSIS: NORMAL
EKG P AXIS: 20 DEGREES
EKG P-R INTERVAL: 118 MS
EKG Q-T INTERVAL: 338 MS
EKG QRS DURATION: 66 MS
EKG QTC CALCULATION (BAZETT): 448 MS
EKG R AXIS: -6 DEGREES
EKG T AXIS: 20 DEGREES
EKG VENTRICULAR RATE: 106 BPM
EOSINOPHIL # BLD: 0.1 K/UL (ref 0–0.6)
EOSINOPHIL NFR BLD: 1.3 %
GFR SERPLBLD CREATININE-BSD FMLA CKD-EPI: >90 ML/MIN/{1.73_M2}
GLUCOSE BLD-MCNC: 63 MG/DL (ref 70–99)
GLUCOSE BLD-MCNC: 71 MG/DL (ref 70–99)
GLUCOSE BLD-MCNC: 72 MG/DL (ref 70–99)
GLUCOSE BLD-MCNC: 78 MG/DL (ref 70–99)
GLUCOSE BLD-MCNC: 83 MG/DL (ref 70–99)
GLUCOSE SERPL-MCNC: 83 MG/DL (ref 70–99)
HCT VFR BLD AUTO: 46.6 % (ref 36–48)
HGB BLD-MCNC: 15.4 G/DL (ref 12–16)
LACTATE BLDV-SCNC: 0.9 MMOL/L (ref 0.4–2)
LEGIONELLA AG UR QL: NORMAL
LYMPHOCYTES # BLD: 1.9 K/UL (ref 1–5.1)
LYMPHOCYTES NFR BLD: 34.9 %
MCH RBC QN AUTO: 35 PG (ref 26–34)
MCHC RBC AUTO-ENTMCNC: 33.1 G/DL (ref 31–36)
MCV RBC AUTO: 105.9 FL (ref 80–100)
MONOCYTES # BLD: 0.6 K/UL (ref 0–1.3)
MONOCYTES NFR BLD: 10.2 %
NEUTROPHILS # BLD: 2.9 K/UL (ref 1.7–7.7)
NEUTROPHILS NFR BLD: 53 %
PERFORMED ON: ABNORMAL
PERFORMED ON: NORMAL
PLATELET # BLD AUTO: 115 K/UL (ref 135–450)
PMV BLD AUTO: 10.8 FL (ref 5–10.5)
POTASSIUM SERPL-SCNC: 4.3 MMOL/L (ref 3.5–5.1)
PROCALCITONIN SERPL IA-MCNC: 0.13 NG/ML (ref 0–0.15)
RBC # BLD AUTO: 4.4 M/UL (ref 4–5.2)
SODIUM SERPL-SCNC: 145 MMOL/L (ref 136–145)
VANCOMYCIN SERPL-MCNC: 26.6 UG/ML
WBC # BLD AUTO: 5.4 K/UL (ref 4–11)

## 2024-08-28 PROCEDURE — 87324 CLOSTRIDIUM AG IA: CPT

## 2024-08-28 PROCEDURE — 36415 COLL VENOUS BLD VENIPUNCTURE: CPT

## 2024-08-28 PROCEDURE — 97530 THERAPEUTIC ACTIVITIES: CPT

## 2024-08-28 PROCEDURE — 87493 C DIFF AMPLIFIED PROBE: CPT

## 2024-08-28 PROCEDURE — 71045 X-RAY EXAM CHEST 1 VIEW: CPT

## 2024-08-28 PROCEDURE — 6360000004 HC RX CONTRAST MEDICATION

## 2024-08-28 PROCEDURE — 94640 AIRWAY INHALATION TREATMENT: CPT

## 2024-08-28 PROCEDURE — 6370000000 HC RX 637 (ALT 250 FOR IP): Performed by: INTERNAL MEDICINE

## 2024-08-28 PROCEDURE — 80048 BASIC METABOLIC PNL TOTAL CA: CPT

## 2024-08-28 PROCEDURE — 83605 ASSAY OF LACTIC ACID: CPT

## 2024-08-28 PROCEDURE — 93010 ELECTROCARDIOGRAM REPORT: CPT | Performed by: INTERNAL MEDICINE

## 2024-08-28 PROCEDURE — 2580000003 HC RX 258: Performed by: INTERNAL MEDICINE

## 2024-08-28 PROCEDURE — 85025 COMPLETE CBC W/AUTO DIFF WBC: CPT

## 2024-08-28 PROCEDURE — 84145 PROCALCITONIN (PCT): CPT

## 2024-08-28 PROCEDURE — 2700000000 HC OXYGEN THERAPY PER DAY

## 2024-08-28 PROCEDURE — 80202 ASSAY OF VANCOMYCIN: CPT

## 2024-08-28 PROCEDURE — 97165 OT EVAL LOW COMPLEX 30 MIN: CPT

## 2024-08-28 PROCEDURE — 94761 N-INVAS EAR/PLS OXIMETRY MLT: CPT

## 2024-08-28 PROCEDURE — 49465 FLUORO EXAM OF G/COLON TUBE: CPT

## 2024-08-28 PROCEDURE — 97163 PT EVAL HIGH COMPLEX 45 MIN: CPT

## 2024-08-28 PROCEDURE — 87449 NOS EACH ORGANISM AG IA: CPT

## 2024-08-28 PROCEDURE — 6360000002 HC RX W HCPCS: Performed by: INTERNAL MEDICINE

## 2024-08-28 PROCEDURE — 2060000000 HC ICU INTERMEDIATE R&B

## 2024-08-28 RX ADMIN — SODIUM CHLORIDE, PRESERVATIVE FREE 10 ML: 5 INJECTION INTRAVENOUS at 22:09

## 2024-08-28 RX ADMIN — CEFEPIME 2000 MG: 2 INJECTION, POWDER, FOR SOLUTION INTRAVENOUS at 13:06

## 2024-08-28 RX ADMIN — CARBIDOPA AND LEVODOPA 1.5 TABLET: 25; 100 TABLET ORAL at 22:09

## 2024-08-28 RX ADMIN — PANTOPRAZOLE SODIUM 40 MG: 40 INJECTION, POWDER, FOR SOLUTION INTRAVENOUS at 12:02

## 2024-08-28 RX ADMIN — VALPROIC ACID 250 MG: 250 SOLUTION ORAL at 12:16

## 2024-08-28 RX ADMIN — IPRATROPIUM BROMIDE AND ALBUTEROL SULFATE 1 DOSE: .5; 3 SOLUTION RESPIRATORY (INHALATION) at 16:02

## 2024-08-28 RX ADMIN — IOHEXOL 50 ML: 350 INJECTION, SOLUTION INTRAVENOUS at 14:15

## 2024-08-28 RX ADMIN — LACTOBACILLUS ACIDOPHILUS / LACTOBACILLUS BULGARICUS 1 PACKET: 100 MILLION CFU STRENGTH GRANULES at 12:05

## 2024-08-28 RX ADMIN — CARBIDOPA AND LEVODOPA 1.5 TABLET: 25; 100 TABLET ORAL at 15:51

## 2024-08-28 RX ADMIN — CEFEPIME 2000 MG: 2 INJECTION, POWDER, FOR SOLUTION INTRAVENOUS at 19:33

## 2024-08-28 RX ADMIN — VALPROIC ACID 500 MG: 250 SOLUTION ORAL at 22:08

## 2024-08-28 RX ADMIN — CARBIDOPA AND LEVODOPA 1.5 TABLET: 25; 100 TABLET ORAL at 11:58

## 2024-08-28 RX ADMIN — LEVOTHYROXINE SODIUM 150 MCG: 0.15 TABLET ORAL at 11:58

## 2024-08-28 RX ADMIN — IPRATROPIUM BROMIDE AND ALBUTEROL SULFATE 1 DOSE: .5; 3 SOLUTION RESPIRATORY (INHALATION) at 12:19

## 2024-08-28 RX ADMIN — IPRATROPIUM BROMIDE AND ALBUTEROL SULFATE 1 DOSE: .5; 3 SOLUTION RESPIRATORY (INHALATION) at 07:23

## 2024-08-28 RX ADMIN — ENOXAPARIN SODIUM 40 MG: 100 INJECTION SUBCUTANEOUS at 11:59

## 2024-08-28 RX ADMIN — Medication 1 TABLET: at 11:59

## 2024-08-28 RX ADMIN — SERTRALINE 100 MG: 50 TABLET, FILM COATED ORAL at 22:09

## 2024-08-28 RX ADMIN — CALCIUM CARBONATE 500 MG: 1250 SUSPENSION ORAL at 22:08

## 2024-08-28 RX ADMIN — CALCIUM CARBONATE 500 MG: 1250 SUSPENSION ORAL at 12:05

## 2024-08-28 RX ADMIN — VALPROIC ACID 250 MG: 250 SOLUTION ORAL at 15:51

## 2024-08-28 RX ADMIN — VANCOMYCIN HYDROCHLORIDE 1000 MG: 1 INJECTION, POWDER, LYOPHILIZED, FOR SOLUTION INTRAVENOUS at 13:18

## 2024-08-28 RX ADMIN — IPRATROPIUM BROMIDE AND ALBUTEROL SULFATE 1 DOSE: .5; 3 SOLUTION RESPIRATORY (INHALATION) at 21:10

## 2024-08-28 RX ADMIN — SODIUM CHLORIDE, PRESERVATIVE FREE 10 ML: 5 INJECTION INTRAVENOUS at 12:12

## 2024-08-28 RX ADMIN — CEFEPIME 2000 MG: 2 INJECTION, POWDER, FOR SOLUTION INTRAVENOUS at 03:40

## 2024-08-28 NOTE — PROGRESS NOTES
Clinical Pharmacy Note: Pharmacy to Dose Vancomycin    Vancomycin Day: 2  Indication: PNA  Current Dose: 1250 q12  Dosing Method: Bayesian Modeling    Random: 26.6    Recent Labs     08/27/24  0924 08/28/24  0442   BUN 37* 31*       Recent Labs     08/27/24  0924 08/28/24  0442   CREATININE 0.6 0.5*       Recent Labs     08/27/24  1023 08/28/24  0442   WBC 8.7 5.4       No intake or output data in the 24 hours ending 08/28/24 0733      Ht Readings from Last 1 Encounters:   08/27/24 1.524 m (5')        Wt Readings from Last 1 Encounters:   08/27/24 58.4 kg (128 lb 11.2 oz)         Body mass index is 25.13 kg/m².    Estimated Creatinine Clearance: 95 mL/min (A) (based on SCr of 0.5 mg/dL (L)).      Assessment/Plan:  Vancomycin level is supratherapeutic.  Decrease vancomycin regimen to 1000 every 12 hours.   Bayesian Modeling predicts an AUC of 556 mg/L*hr and trough of 14.5 mg/L.  A vancomycin random level has been ordered on 8/29 at 0600 for follow-up.  Changes in regimen will be determined based on culture results, renal function, and clinical response.  Pharmacy will continue to monitor and adjust regimen as necessary.    Thank you for the consult,    Hina Lopez, PharmD  PGY-1 Pharmacy Resident

## 2024-08-28 NOTE — PROGRESS NOTES
[facility] - Inpatient Rehabilitation Department   Phone: [phone]    Occupational Therapy    [x] Initial Evaluation            [] Daily Treatment Note         [x] Discharge Summary      Patient: Miriam Iyer   : 1965   MRN: [id number]   Date of Service:  2024    Admitting Diagnosis:  Acute and chronic respiratory failure (acute-on-chronic) (HCC)  Current Admission Summary: 59 y.o. female who presents for evaluation of concern for G-tube complication.   Past Medical History:  has a past medical history of Anemia, Anorexia, Autistic disorder, Convulsions ([facility]), Down syndrome, Dysphagia, G tube feedings (HCC), Hypothyroid, Malnutrition (HCC), Parkinson disease ([facility]), Pneumonia of left lower lobe due to infectious organism, UTI (urinary tract infection), and Weakness.  Past Surgical History:  has a past surgical history that includes Gastrostomy tube placement; Gastrostomy tube placement (14); and Gastrostomy tube placement (N/A, 2022).    Discharge Recommendations: Miriam Iyer scored a  on the -[facility] ADL Inpatient form.  At this time, no further OT is recommended upon discharge due to patient requiring dependent assistance for all self care tasks at baseline.  Recommend patient returns to prior setting without additional skilled OT services.    DME Required For Discharge: No DME required    Precautions/Restrictions: high fall risk  Positional Restrictions:no positional restrictions    Pre-Admission Information   Lives With:  group home and caregiver                      Patient nonverbal and unable to give PLOF this session.  No PLOF from previous admissions.     Examination   Vision:   Unable to formally test secondary to cog  Hearing:   Unable to formally test secondary to cog  Observation:   General Observation:  BLE's resting in hip ER, knee flexion and PF, BUE's resting in elbow and wrist flexion, able to complete PROM but returns to flex position  Sensation:

## 2024-08-28 NOTE — CARE COORDINATION
Case Management Assessment  Initial Evaluation    Date/Time of Evaluation: 8/28/2024 3:38 PM  Assessment Completed by: MACK Garland    If patient is discharged prior to next notation, then this note serves as note for discharge by case management.    Patient Name: Miriam Iyer                   YOB: 1965  Diagnosis: Acute and chronic respiratory failure (acute-on-chronic) (HCC) [J96.20]  Septicemia (HCC) [A41.9]  Acute respiratory failure with hypoxia (HCC) [J96.01]  Aspiration into airway, initial encounter [T17.908A]  Urinary tract infection with hematuria, site unspecified [N39.0, R31.9]                   Date / Time: 8/27/2024  8:37 AM    Patient Admission Status: Inpatient   Readmission Risk (Low < 19, Mod (19-27), High > 27): Readmission Risk Score: 16.6    Current PCP: Keerthi Busby, DO  PCP verified by CM? Yes    Chart Reviewed: Yes      History Provided by: Other (see comment) (Legal Guardian- Marcy BUSTILLO)  Patient Orientation: Unable to Assess (Pt is non-verbal, Down syndrome)    Patient Cognition: Severely Impaired    Hospitalization in the last 30 days (Readmission):  No    If yes, Readmission Assessment in CM Navigator will be completed.    Advance Directives:      Code Status: Full Code   Patient's Primary Decision Maker is: Named in Scanned ACP Document      Discharge Planning:    Patient lives with: Other (Comment) (Group Home) Type of Home: Group Home  Primary Care Giver: Other (Comment) (Private Caregivers)  Patient Support Systems include: Other (Comment) (LG)   Current Financial resources: Medicare  Current community resources: None  Current services prior to admission: Home Care            Current DME:              Type of Home Care services:  None    ADLS  Prior functional level: Assistance with the following:, Mobility, Shopping, Housework, Cooking, Feeding, Toileting, Dressing, Bathing  Current functional level: Assistance with the following:, Bathing,

## 2024-08-28 NOTE — CONSULTS
Gastroenterology Consult Note        Patient: Miriam Iyer  : 1965  Acct#:      Date:  2024      1. Acute respiratory failure with hypoxia (HCC)    2. Aspiration into airway, initial encounter    3. Urinary tract infection with hematuria, site unspecified    4. Septicemia (HCC)        Subjective:       History of Present Illness  Patient is a 59 y.o.  female admitted with Acute and chronic respiratory failure (acute-on-chronic) ([facility]) [J96.20]  Septicemia (HCC) [A41.9]  Acute respiratory failure with hypoxia ([facility]) [J96.01]  Aspiration into airway, initial encounter [T17.918A]  Urinary tract infection with hematuria, site unspecified [N39.0, R31.9] who is seen in consult for PEG leaking.   H/o developmental delay. Has PEG in place for tube feeds due to poor PO intake and oropharyngeal dysphagia. She was brought to the ED by caregiver for leaking from G tube site.  Per report, she has also had intermittent vomiting prior to admission.  Patient nonverbal.  RN reports green/yellow leakage from around G-tube.  No vomiting here.  Had 2 bowel movements today.      Past Medical History:   Diagnosis Date    Anemia     Anorexia     Autistic disorder     MRDD    Convulsions (HCC)     Down syndrome     Dysphagia     G tube feedings (HCC)     PEG tube    Hypothyroid     Malnutrition (HCC)     Parkinson disease (HCC)     Pneumonia of left lower lobe due to infectious organism     UTI (urinary tract infection)     Weakness       Past Surgical History:   Procedure Laterality Date    GASTROSTOMY TUBE PLACEMENT      GASTROSTOMY TUBE PLACEMENT  14    GASTROSTOMY TUBE PLACEMENT N/A 2022    EGD PEG TUBE PLACEMENT performed by Jian Fofana MD at [facility] ENDOSCOPY      Past Endoscopic History  EGD with PEG placement 2022 with Dr Fofana  INDICATIONS: Poor p.o. intake/malnutrition and aspiration pneumonia.   Impression:   - Successful placement of 20 Indonesian PEG tube  - Otherwise normal EGD    bilaterally, Normal Effort  Heart: regular rate and rhythm, normal S1 and S2, no murmurs or rubs  Abdomen: soft, non-tender. Bowel sounds normal. No masses,  no organomegaly.  PEG in epigastrium.  It is at 10 cm at the level of skin.  18 mL fluid was aspirated from balloon and to place able to be pulled to 4 cm at level of skin and then reinflated.  External bumper cinched down to 3 cm at level of skin.  Tape placed across tube to prevent bumper from migrating distally again. Surrounding excoriation of skin  Extremities: atraumatic, no cyanosis or edema  Skin: warm and dry, no jaundice  Neuro: Gnonverbal  Musculoskeletal: 5/5  strength BUE      Data Review:    Recent Labs     08/27/24  1023 08/28/24  0442   WBC 8.7 5.4   HGB 15.6 15.4   HCT 45.6 46.6   .0* 105.9*    115*     Recent Labs     08/27/24  0924 08/28/24  0442    145   K 4.5 4.3    114*   CO2 25 24   BUN 37* 31*   CREATININE 0.6 0.5*     Recent Labs     08/27/24  0924   AST 55*   ALT <5*   BILITOT 0.6   ALKPHOS 129     No results for input(s): \"LIPASE\", \"AMYLASE\" in the last 72 hours.  No results for input(s): \"PROTIME\", \"INR\" in the last 72 hours.  Invalid input(s): \"PTT\"  No results for input(s): \"OCCULTBLD\" in the last 72 hours.    Imaging Studies:                            Xray IR contrast via PEG 8/27/24  FINDINGS:  Single image of the abdomen was obtained after injection of contrast through  the percutaneous gastric tube.  Contrast opacifies the stomach and proximal  small bowel.  There is a nonobstructive bowel gas pattern.  Degenerative  changes involve the lumbar spine.     IMPRESSION:  1. Intraluminal placement of percutaneous gastric tube.    Assessment/Plan:     PEG malfunction - PEG was at 10 cm at the level of skin.  18 mL fluid was aspirated from balloon and tube able to be pulled to 4 cm at level of skin and then reinflated.  External bumper cinched down to 3 cm at level of skin.  Tape placed across tube to

## 2024-08-28 NOTE — PROGRESS NOTES
[facility] - Inpatient Rehabilitation Department   Phone: [phone]    Physical Therapy    [x] Initial Evaluation            [] Daily Treatment Note         [x] Discharge Summary      Patient: Miriam Iyer   : 1965   MRN: [id number]   Date of Service:  2024  Admitting Diagnosis: Acute and chronic respiratory failure (acute-on-chronic) (HCC)    Current Admission Summary:   CHIEF COMPLAINT             Chief Complaint   Patient presents with    G Tube Complications       Arrived via [facility] d/t gtube complications of leaking; per home health care acting like she was choking; VSS; per report pt acting normal         HISTORY OF PRESENT ILLNESS: 1 or more Elements      History From: ems, caregiver   Limitations to history : nonverbal     Miriam Iyer is a 59 y.o. female who presents for evaluation of concern for G-tube complication.  Caregiver initially was concerned as there was feedings leaking out of from G-tube site, recommended stated that patient acted like she was choking.  EMS reports vitals were within normal limits and caregivers report that she is otherwise acting appropriate at baseline.  No additional information is able to be obtained at this time.    Past Medical History:  has a past medical history of Anemia, Anorexia, Autistic disorder, Convulsions ([facility]), Down syndrome, Dysphagia, G tube feedings ([facility]), Hypothyroid, Malnutrition ([facility]), Parkinson disease ([facility]), Pneumonia of left lower lobe due to infectious organism, UTI (urinary tract infection), and Weakness.  Past Surgical History:  has a past surgical history that includes Gastrostomy tube placement; Gastrostomy tube placement (14); and Gastrostomy tube placement (N/A, 2022).    Discharge Recommendations: Miriam Iyer scored a  on the AM-PAC short mobility form.  At this time, no further PT is recommended upon discharge due to patient requiring dependent assistance for all mobility tasks prior to

## 2024-08-28 NOTE — PROGRESS NOTES
Nutrition Note    RECOMMENDATIONS  PO Diet: NPO  Nutrition Support:   When GI agrees to restart TF, initiate Jevity 1.5 (standard with fiber formula) at 20 mL/hr and as tolerated, increase by 10 mL/hr q 4 hours until goal of 40 mL/hr.  Recommend 30 mL H20 q 4 hours with IV fluid and 120 ml Q 4 hours once IV fluid is discontinued.       ASSESSMENT   Consult order received for Tube feeding management. Pt is disoriented and non-verbal, tube feeding is a sole source of nutrition. Pt presented with G-tube complications of leaking, and choking episodes per home health care. Home tube feeding was bolus feeding x 6 times per day with Jevity 1.5 to provide 2133kcal and 92g protein. CBW is 127lb, 145lb on 7/15, however, not comparable d/t it is an estimated wt, pt was 125lb on 6/23/23 per wt record in the EMR.  Pt is at risk for overfeeding with home TF regimen per CBW. Noted tube feeding was not recommended to start yesterday. Upon visiting, nurse reported no emesis today, plan for GI consult. RD will provide tube feeding recommendations in the chart and will start Tube feeding per GI recs. Will cont to follow up.       Malnutrition Status: At risk for malnutrition (Comment)  Acute Illness  Findings of the 6 clinical characteristics of malnutrition:  Energy Intake:  Mild decrease in energy intake (Comment)  Weight Loss:  Unable to assess     Body Fat Loss:  No significant body fat loss     Muscle Mass Loss:  No significant muscle mass loss    Fluid Accumulation:  No significant fluid accumulation     Strength:  Not Performed      NUTRITION DIAGNOSIS   Inadequate protein-energy intake related to inadequate enteral nutrition infusion (G tube leaking) as evidenced by nutrition support - enteral nutrition, NPO or clear liquid status due to medical condition    Goals: Initiate nutrition support, Tolerate nutrition support at goal rate     NUTRITION RELATED FINDINGS  Objective: No edema noted. LBM 8/28. POCG 78. Na+ 145, K+

## 2024-08-28 NOTE — PROGRESS NOTES
Progress Note - Dr. Al - Internal Medicine  PCP: [place] [address] / [facility] [address] [phone]    Hospital Day: 1  Code Status: Full Code  Current Diet: Diet NPO        CC: follow up on medical issues    Subjective:   Miriam Iyer is a 59 y.o. female.    Pt seen and examined  Chart reviewed since last visit, labs and imaging below      No change  Pt not able to give hx/ros  G tube with some drainage  Swab sent no growth  GI asked to see      Review of Systems: (1 system for EPF, 2-9 for detailed, 10+ for comprehensive)  Cannot obtain from pt    I have reviewed the patient's medical and social history in detail and updated the computerized patient record.  To recap: She  has a past medical history of Anemia, Anorexia, Autistic disorder, Convulsions ([facility]), Down syndrome, Dysphagia, G tube feedings ([facility]), Hypothyroid, Malnutrition ([facility]), Parkinson disease ([facility]), Pneumonia of left lower lobe due to infectious organism, UTI (urinary tract infection), and Weakness.. She  has a past surgical history that includes Gastrostomy tube placement; Gastrostomy tube placement (2/18/14); and Gastrostomy tube placement (N/A, 5/20/2022).. She  reports that she has never smoked. She has never used smokeless tobacco. She reports that she does not drink alcohol and does not use drugs..        Active Hospital Problems    Diagnosis Date Noted    Acute respiratory failure with hypoxia ([facility]) [J96.01] 08/27/2024    Pneumonia [J18.9] 08/27/2024    UTI (urinary tract infection) [N39.0] 08/27/2024    Acute and chronic respiratory failure (acute-on-chronic) ([facility]) [J96.20] 08/27/2024    Hypothyroidism [E03.9] 05/16/2023    Gastrostomy tube in place ([facility]) [Z93.1] 05/16/2023    Down syndrome [Q90.9]        Current Facility-Administered Medications: vancomycin (VANCOCIN) 1,000 mg in sodium chloride 0.9 % 250 mL IVPB ([id number]), 1,000 mg, IntraVENous, Q12H  docusate sodium (COLACE) capsule 100 mg, 100 mg, Oral,

## 2024-08-28 NOTE — PLAN OF CARE
Problem: Skin/Tissue Integrity  Goal: Absence of new skin breakdown  Description: 1.  Monitor for areas of redness and/or skin breakdown  2.  Assess vascular access sites hourly  3.  Every 4-6 hours minimum:  Change oxygen saturation probe site  4.  Every 4-6 hours:  If on nasal continuous positive airway pressure, respiratory therapy assess nares and determine need for appliance change or resting period.  Outcome: Progressing     Problem: Discharge Planning  Goal: Discharge to home or other facility with appropriate resources  8/28/2024 0346 by Ian Collado, RN  Outcome: Progressing  Flowsheets (Taken 8/27/2024 1937)  Discharge to home or other facility with appropriate resources: Identify discharge learning needs (meds, wound care, etc)  8/27/2024 1809 by Santa Sullivan, RN  Outcome: Progressing     Problem: Pain  Goal: Verbalizes/displays adequate comfort level or baseline comfort level  Outcome: Progressing  Flowsheets (Taken 8/27/2024 1937)  Verbalizes/displays adequate comfort level or baseline comfort level: Assess pain using appropriate pain scale     Problem: Safety - Adult  Goal: Free from fall injury  Outcome: Progressing

## 2024-08-28 NOTE — PROGRESS NOTES
Her guardian with [employer] was given an update via telephone. Her name is Marcy Sexton. May reach her the main number listed in chart during business hours.

## 2024-08-29 VITALS
DIASTOLIC BLOOD PRESSURE: 55 MMHG | TEMPERATURE: 98.1 F | WEIGHT: 126.5 LBS | OXYGEN SATURATION: 93 % | HEIGHT: 60 IN | BODY MASS INDEX: 24.84 KG/M2 | HEART RATE: 86 BPM | RESPIRATION RATE: 16 BRPM | SYSTOLIC BLOOD PRESSURE: 114 MMHG

## 2024-08-29 LAB
ANION GAP SERPL CALCULATED.3IONS-SCNC: 11 MMOL/L (ref 3–16)
BACTERIA UR CULT: ABNORMAL
BASOPHILS # BLD: 0 K/UL (ref 0–0.2)
BASOPHILS NFR BLD: 0.7 %
BUN SERPL-MCNC: 23 MG/DL (ref 7–20)
C DIFF TOX GENS STL QL NAA+PROBE: NORMAL
CALCIUM SERPL-MCNC: 8.4 MG/DL (ref 8.3–10.6)
CHLORIDE SERPL-SCNC: 107 MMOL/L (ref 99–110)
CO2 SERPL-SCNC: 22 MMOL/L (ref 21–32)
CREAT SERPL-MCNC: <0.5 MG/DL (ref 0.6–1.2)
DEPRECATED RDW RBC AUTO: 12.4 % (ref 12.4–15.4)
EOSINOPHIL # BLD: 0.2 K/UL (ref 0–0.6)
EOSINOPHIL NFR BLD: 3.4 %
GFR SERPLBLD CREATININE-BSD FMLA CKD-EPI: >90 ML/MIN/{1.73_M2}
GLUCOSE BLD-MCNC: 105 MG/DL (ref 70–99)
GLUCOSE BLD-MCNC: 77 MG/DL (ref 70–99)
GLUCOSE BLD-MCNC: 89 MG/DL (ref 70–99)
GLUCOSE BLD-MCNC: 99 MG/DL (ref 70–99)
GLUCOSE SERPL-MCNC: 100 MG/DL (ref 70–99)
HCT VFR BLD AUTO: 46 % (ref 36–48)
HGB BLD-MCNC: 15.2 G/DL (ref 12–16)
LYMPHOCYTES # BLD: 1.6 K/UL (ref 1–5.1)
LYMPHOCYTES NFR BLD: 29.2 %
MCH RBC QN AUTO: 34.9 PG (ref 26–34)
MCHC RBC AUTO-ENTMCNC: 33.2 G/DL (ref 31–36)
MCV RBC AUTO: 105.2 FL (ref 80–100)
MONOCYTES # BLD: 0.7 K/UL (ref 0–1.3)
MONOCYTES NFR BLD: 12.8 %
MRSA SPEC QL CULT: NORMAL
NEUTROPHILS # BLD: 2.9 K/UL (ref 1.7–7.7)
NEUTROPHILS NFR BLD: 53.9 %
ORGANISM: ABNORMAL
PERFORMED ON: ABNORMAL
PERFORMED ON: NORMAL
PLATELET # BLD AUTO: 120 K/UL (ref 135–450)
PMV BLD AUTO: 11 FL (ref 5–10.5)
POTASSIUM SERPL-SCNC: 4.3 MMOL/L (ref 3.5–5.1)
RBC # BLD AUTO: 4.37 M/UL (ref 4–5.2)
SODIUM SERPL-SCNC: 140 MMOL/L (ref 136–145)
VANCOMYCIN SERPL-MCNC: 25.7 UG/ML
WBC # BLD AUTO: 5.4 K/UL (ref 4–11)

## 2024-08-29 PROCEDURE — 94640 AIRWAY INHALATION TREATMENT: CPT

## 2024-08-29 PROCEDURE — 94761 N-INVAS EAR/PLS OXIMETRY MLT: CPT

## 2024-08-29 PROCEDURE — 80202 ASSAY OF VANCOMYCIN: CPT

## 2024-08-29 PROCEDURE — 85025 COMPLETE CBC W/AUTO DIFF WBC: CPT

## 2024-08-29 PROCEDURE — 36415 COLL VENOUS BLD VENIPUNCTURE: CPT

## 2024-08-29 PROCEDURE — 6370000000 HC RX 637 (ALT 250 FOR IP): Performed by: INTERNAL MEDICINE

## 2024-08-29 PROCEDURE — 2580000003 HC RX 258: Performed by: INTERNAL MEDICINE

## 2024-08-29 PROCEDURE — 80048 BASIC METABOLIC PNL TOTAL CA: CPT

## 2024-08-29 PROCEDURE — 6360000002 HC RX W HCPCS: Performed by: INTERNAL MEDICINE

## 2024-08-29 RX ORDER — LEVOFLOXACIN 500 MG/1
500 TABLET, FILM COATED ORAL DAILY
Qty: 7 TABLET | Refills: 0 | Status: SHIPPED | OUTPATIENT
Start: 2024-08-29 | End: 2024-09-05

## 2024-08-29 RX ADMIN — MIDODRINE HYDROCHLORIDE 2.5 MG: 5 TABLET ORAL at 09:18

## 2024-08-29 RX ADMIN — VALPROIC ACID 250 MG: 250 SOLUTION ORAL at 15:23

## 2024-08-29 RX ADMIN — IPRATROPIUM BROMIDE AND ALBUTEROL SULFATE 1 DOSE: .5; 3 SOLUTION RESPIRATORY (INHALATION) at 12:27

## 2024-08-29 RX ADMIN — VALPROIC ACID 250 MG: 250 SOLUTION ORAL at 09:18

## 2024-08-29 RX ADMIN — PANTOPRAZOLE SODIUM 40 MG: 40 INJECTION, POWDER, FOR SOLUTION INTRAVENOUS at 09:19

## 2024-08-29 RX ADMIN — CARBIDOPA AND LEVODOPA 1.5 TABLET: 25; 100 TABLET ORAL at 15:24

## 2024-08-29 RX ADMIN — IPRATROPIUM BROMIDE AND ALBUTEROL SULFATE 1 DOSE: .5; 3 SOLUTION RESPIRATORY (INHALATION) at 15:34

## 2024-08-29 RX ADMIN — CARBIDOPA AND LEVODOPA 1.5 TABLET: 25; 100 TABLET ORAL at 09:18

## 2024-08-29 RX ADMIN — VANCOMYCIN HYDROCHLORIDE 1000 MG: 1 INJECTION, POWDER, LYOPHILIZED, FOR SOLUTION INTRAVENOUS at 09:40

## 2024-08-29 RX ADMIN — CALCIUM CARBONATE 500 MG: 1250 SUSPENSION ORAL at 09:17

## 2024-08-29 RX ADMIN — Medication 1 TABLET: at 09:18

## 2024-08-29 RX ADMIN — LEVOTHYROXINE SODIUM 150 MCG: 0.15 TABLET ORAL at 06:07

## 2024-08-29 RX ADMIN — LACTOBACILLUS ACIDOPHILUS / LACTOBACILLUS BULGARICUS 1 PACKET: 100 MILLION CFU STRENGTH GRANULES at 09:17

## 2024-08-29 RX ADMIN — IPRATROPIUM BROMIDE AND ALBUTEROL SULFATE 1 DOSE: .5; 3 SOLUTION RESPIRATORY (INHALATION) at 08:55

## 2024-08-29 RX ADMIN — ENOXAPARIN SODIUM 40 MG: 100 INJECTION SUBCUTANEOUS at 09:17

## 2024-08-29 RX ADMIN — VANCOMYCIN HYDROCHLORIDE 1000 MG: 1 INJECTION, POWDER, LYOPHILIZED, FOR SOLUTION INTRAVENOUS at 00:13

## 2024-08-29 RX ADMIN — CEFEPIME 2000 MG: 2 INJECTION, POWDER, FOR SOLUTION INTRAVENOUS at 11:16

## 2024-08-29 RX ADMIN — CEFEPIME 2000 MG: 2 INJECTION, POWDER, FOR SOLUTION INTRAVENOUS at 04:20

## 2024-08-29 ASSESSMENT — PAIN SCALES - WONG BAKER: WONGBAKER_NUMERICALRESPONSE: NO HURT

## 2024-08-29 NOTE — PROGRESS NOTES
CLINICAL PHARMACY NOTE: MEDS TO BEDS    Total # of Prescriptions Filled: 1   The following medications were delivered to the patient:  LEVOFLOXACIN 500MG TABS    Additional Documentation: Patient RN picked up=signed  Alva Becerra Pharmacy Tech

## 2024-08-29 NOTE — PROGRESS NOTES
Clinical Pharmacy Note: Pharmacy to Dose Vancomycin    Vancomycin Day: 3  Indication: PNA  Current Dose: 1000 Q12  Dosing Method: Bayesian Modeling    Random: 25.7    Recent Labs     08/28/24  0442 08/29/24  0438   BUN 31* 23*       Recent Labs     08/28/24  0442 08/29/24  0438   CREATININE 0.5* <0.5*       Recent Labs     08/28/24  0442 08/29/24  0438   WBC 5.4 5.4         Intake/Output Summary (Last 24 hours) at 8/29/2024 0737  Last data filed at 8/29/2024 0619  Gross per 24 hour   Intake 454 ml   Output --   Net 454 ml         Ht Readings from Last 1 Encounters:   08/28/24 1.524 m (5')        Wt Readings from Last 1 Encounters:   08/28/24 57.8 kg (127 lb 6.4 oz)         Body mass index is 24.88 kg/m².    Estimated Creatinine Clearance: 94 mL/min (based on SCr of 0.5 mg/dL).      Assessment/Plan:  Vancomycin level is therapeutic.  Continue current vancomycin regimen of 1000 every 12 hours.   Bayesian Modeling predicts an AUC of 556 mg/L*hr and trough of 14.2 mg/L.  A vancomycin random level has been ordered on 9/2 at 0600 for follow-up.  Changes in regimen will be determined based on culture results, renal function, and clinical response.  Pharmacy will continue to monitor and adjust regimen as necessary.    Thank you for the consult,    iHna Lopez, PharmD  PGY-1 Pharmacy Resident

## 2024-08-29 NOTE — PROGRESS NOTES
Private Nurse, Monet, notified of discharge and picked up prescriptions and belongings. IV and telemetry removed, handoff given to EMS, discharged to home in stable condition with all belongings via EMS.

## 2024-08-29 NOTE — PLAN OF CARE
Problem: Skin/Tissue Integrity  Goal: Absence of new skin breakdown  Description: 1.  Monitor for areas of redness and/or skin breakdown  2.  Assess vascular access sites hourly  3.  Every 4-6 hours minimum:  Change oxygen saturation probe site  4.  Every 4-6 hours:  If on nasal continuous positive airway pressure, respiratory therapy assess nares and determine need for appliance change or resting period.  Outcome: Progressing     Problem: Discharge Planning  Goal: Discharge to home or other facility with appropriate resources  Outcome: Progressing  Flowsheets (Taken 8/28/2024 1940)  Discharge to home or other facility with appropriate resources: Identify discharge learning needs (meds, wound care, etc)     Problem: Pain  Goal: Verbalizes/displays adequate comfort level or baseline comfort level  Outcome: Progressing     Problem: Safety - Adult  Goal: Free from fall injury  Outcome: Progressing     Problem: Nutrition Deficit:  Goal: Optimize nutritional status  Outcome: Progressing

## 2024-08-29 NOTE — CARE COORDINATION
Case Management -  Discharge Note      Patient Name: Miriam Iyer                   YOB: 1965            Readmission Risk (Low < 19, Mod (19-27), High > 27): Readmission Risk Score: 15.9    Current PCP: Keerthi Busby DO      (IMM) Important Message from Medicare:    Has pt received appropriate IMM before discharge if required: N/A patient has been here for 2 days and got an IMM on admission.     PT AM-PAC: 6 /24  OT AM-PAC: 6 /24      Pt resides at:    [facility]  [address], [address]  [place] [address]    CM spoke with staff, Luke Shelton [phone] who asked for CM to schedule transport for 4PM.     [facility]  ([phone]) is transporting patient back to [facility] Home @ 4PM today.    CM called [employer] and spoke with guardian, Marcy Sexton and notified her of D/C and transport. She is in agreement.     Financial    Payor: MEDICARE / Plan: MEDICARE PART A AND B / Product Type: *No Product type* /     Pharmacy:  Potential assistance Purchasing Medications: No  Meds-to-Beds request: Yes      [facility] - [place], [place] - [address] - P [phone] - F [phone]  [address] [address]  Phone: [phone] Fax: [phone]    [facility] - [place], [place] - [address] - P [phone] - F [phone]  [address] [address]  Phone: [phone] Fax: [phone]    [facility] - [place], [place] - [address] - P [phone] - F [phone]  [address] [address]  Phone: [phone] Fax: [phone]      Notes:    Additional Case Management Notes: All CM needs met.         Electronically signed by MACK Garland on 8/29/2024 at 2:11 PM

## 2024-08-29 NOTE — DISCHARGE SUMMARY
opacifies bowel loops.  There is likely some retained contrast in the bowel from prior study.     XR CHEST 1 VIEW    Result Date: 8/28/2024  EXAMINATION: ONE XRAY VIEW OF THE CHEST 8/28/2024 10:34 am COMPARISON: 08/27/2024 radiograph HISTORY: ORDERING SYSTEM PROVIDED HISTORY: pna TECHNOLOGIST PROVIDED HISTORY: Reason for exam:->pna FINDINGS: The heart is enlarged.  Mediastinum and pulmonary vascular markings are normal.  The lungs are clear.  There are no significant skeletal findings.     Cardiomegaly with no acute finding.     XR INJ CONTRAST GASTRIC TUBE PERC    Result Date: 8/27/2024  EXAMINATION: ONE SUPINE XRAY VIEW(S) OF THE ABDOMEN 8/27/2024 8:58 am COMPARISON: 08/16/2024 HISTORY: ORDERING SYSTEM PROVIDED HISTORY: confirm placement TECHNOLOGIST PROVIDED HISTORY: Reason for exam:->confirm placement Reason for Exam: confirm placement of G tube FINDINGS: Single image of the abdomen was obtained after injection of contrast through the percutaneous gastric tube.  Contrast opacifies the stomach and proximal small bowel.  There is a nonobstructive bowel gas pattern.  Degenerative changes involve the lumbar spine.     1. Intraluminal placement of percutaneous gastric tube.     XR CHEST PORTABLE    Result Date: 8/27/2024  EXAMINATION: ONE XRAY VIEW OF THE CHEST 8/27/2024 8:58 am COMPARISON: 07/15/2024 HISTORY: ORDERING SYSTEM PROVIDED HISTORY: SOB TECHNOLOGIST PROVIDED HISTORY: Reason for exam:->SOB Reason for Exam: Shortness of breath FINDINGS: There is left basilar scarring.  Cardiomegaly is stable.  There is no pneumothorax or pleural effusion.     1.  No acute abnormality.     XR INJ CONTRAST GASTRIC TUBE PERC    Result Date: 8/16/2024  EXAMINATION: ONE SUPINE XRAY VIEW(S) OF THE ABDOMEN 8/16/2024 4:59 pm COMPARISON: 07/15/2024. HISTORY: ORDERING SYSTEM PROVIDED HISTORY: g tube replaced TECHNOLOGIST PROVIDED HISTORY: Reason for exam:->g tube replaced Reason for Exam: g tube replaced FINDINGS: A gastric tube is  Pm, 500 mg nightly 8 PM.           * This list has 4 medication(s) that are the same as other medications prescribed for you. Read the directions carefully, and ask your doctor or other care provider to review them with you.                STOP taking these medications      lactobacillus capsule     Mucinex Child Cold/Sore Throat 5--325 MG/10ML Liqd  Generic drug: Phenylephrine-DM-GG-APAP               Where to Get Your Medications        These medications were sent to [facility] [address] - P [phone] - F [phone]  [address] [facility] [address]      Phone: [phone]   levoFLOXacin 500 MG tablet           Allergies:  Allergies   Allergen Reactions    Caffeine      Unsure of reaction  Other reaction(s): Unknown    Iodine      Unsure of reaction  Other reaction(s): Unknown       Follow up Instructions:  Follow-up with PCP: Keerthi Busby DO in 2 wk .      Total time spent on day of discharge including face-to-face visit, examination, documentation, counseling, preparation of discharge plans and followup, and discharge medicine reconciliation and presciptions is 33 minutes      ---       Subjective from day of d/c:   Miriam Iyer is a 59 y.o. female.    Pt seen and examined  Chart reviewed since last visit, labs and imaging below      About the same  No issues    Review of Systems: (1 system for EPF, 2-9 for detailed, 10+ for comprehensive)  Cannot obtain from pt    I have reviewed the patient's medical and social history in detail and updated the computerized patient record.  To recap: She  has a past medical history of Anemia, Anorexia, Autistic disorder, Convulsions (HCC), Down syndrome, Dysphagia, G tube feedings (HCC), Hypothyroid, Malnutrition (HCC), Parkinson disease (HCC), Pneumonia of left lower lobe due to infectious organism, UTI (urinary tract infection), and Weakness.. She  has a past surgical history that includes Gastrostomy tube placement;

## 2024-08-29 NOTE — DISCHARGE INSTR - COC
Continuity of Care Form    Patient Name: Miriam Iyer   :  1965  MRN:  [id number]    Admit date:  2024  Discharge date:  2024    Code Status Order: Full Code   Advance Directives:   Advance Care Flowsheet Documentation             Admitting Physician:  Sj Al MD  PCP: Keerthi Busby DO    Discharging Nurse: Janusz Blackwell RN  Discharging Hospital Unit/Room#: 3TN-3360/3360-01  Discharging Unit Phone Number: [phone]    Emergency Contact:   Extended Emergency Contact Information  Primary Emergency Contact: [employer]  Home Phone: [phone]  Mobile Phone: [phone]  Relation: Legal Guardian  Secondary Emergency Contact: [employer]  Home Phone: [phone]  Mobile Phone: [phone]  Relation: Legal Guardian  Preferred language: English   needed? No    Past Surgical History:  Past Surgical History:   Procedure Laterality Date    GASTROSTOMY TUBE PLACEMENT      GASTROSTOMY TUBE PLACEMENT  12    GASTROSTOMY TUBE PLACEMENT N/A 2022    EGD PEG TUBE PLACEMENT performed by Jian Fofana MD at [facility] ENDOSCOPY       Immunization History:   Immunization History   Administered Date(s) Administered    COVID-19, MODERNA BLUE border, Primary or Immunocompromised, (age 12y+), IM, 100 mcg/0.5mL 2021, 2021    INFLUENZA, INTRADERMAL, QUADRIVALENT, PRESERVATIVE FREE 10/20/2016    Influenza Virus Vaccine 2010, 10/20/2016, 10/02/2018, 10/13/2021    Influenza, AFLURIA (age 3 y+), FLUZONE, (age 6 mo+), Quadv MDV, 0.5mL 2019, 2020    Influenza, FLUARIX, FLULAVAL, FLUZONE (age 6 mo+) and AFLURIA, (age 3 y+), Quadv PF, 0.5mL 10/02/2018    Influenza, FLUMIST, (age 2-49 y), Quadv Live, INTRANASAL, 0.2m 2020       Active Problems:  Patient Active Problem List   Diagnosis Code    Down syndrome Q90.9    Dysphagia R13.10    Autism F84.0    PEG adjustment, replacement, or removal Z43.1    Hypotension

## 2024-08-30 ENCOUNTER — TELEPHONE (OUTPATIENT)
Dept: INTERNAL MEDICINE CLINIC | Age: 59
End: 2024-08-30

## 2024-08-30 LAB
BACTERIA SPEC AEROBE CULT: ABNORMAL
BACTERIA SPEC AEROBE CULT: ABNORMAL
GRAM STN SPEC: ABNORMAL
ORGANISM: ABNORMAL
ORGANISM: ABNORMAL

## 2024-08-30 NOTE — PROGRESS NOTES
Physician Progress Note      PATIENT:               JACLYN KATHLEEN  [facility] #:                  [id number]  :                       1965  ADMIT DATE:       2024 8:37 AM  DISCH DATE:        2024 4:00 PM  RESPONDING  PROVIDER #:        Sj Al MD          QUERY TEXT:    Pt admitted with acute respiratory failure.  Pt noted to have pneumonia. If   possible, please document in the progress notes and discharge summary if you   are evaluating and/or treating any of the following:    Note: CAP and HCAP indicate where the pneumonia was acquired, not a specific   type.    The medical record reflects the following:  Risk Factors: 59 y.o. female who has a past medical history of Anemia,   Anorexia, Autistic disorder, Convulsions, Down syndrome, Dysphagia, G tube   feedings.  Clinical Indicators:  - IM H+P - 59 y.o. female who presents for   evaluation of concern for G-tube complication.  Caregiver initially was   concerned as there was feedings leaking out of from G-tube site, recommended   stated that patient acted like she was choking.  EMS reports vitals were   within normal limits and caregivers report that she is otherwise acting   appropriate at baseline.  No further hx nor ROS obtainable from pt. CBC CMP   are relatively unremarkable.  No leukocytosis.  Troponin 49, delta 49.  Lactic   acid 1.1.  Blood cultures are pending.  COVID and flu swabs are  Treatment: IV antibiotics - Cefepime/Zosyn/Vanc, serial BMPs, BC/UA, IVF.  Options provided:  -- This patient has Aspiration pneumonia.  -- Other - I will add my own diagnosis  -- Disagree - Not applicable / Not valid  -- Disagree - Clinically unable to determine / Unknown  -- Refer to Clinical Documentation Reviewer    PROVIDER RESPONSE TEXT:    This patient has Aspiration pneumonia.    Query created by: Lamonte Broderick on 2024 8:07 AM      Electronically signed by:  Sj Al MD 2024 2:04 PM

## 2024-08-30 NOTE — TELEPHONE ENCOUNTER
Care Transitions Initial Follow Up Call    Outreach made within 2 business days of discharge: Yes    Patient: Miriam Iyer Patient : 1965   MRN: [id number]  Reason for Admission: ARF  Discharge Date: 24       Spoke with: ANN MARIE asking for contact to reach out    Discharge department/facility: F    Follow Up  Future Appointments   Date Time Provider Department Center   2025 11:00 AM Keerthi Busby DO [facility] DEP   2025 10:30 AM Keerthi Busby DO [facility] DEP       Vicky Willis RN

## 2024-08-31 LAB
BACTERIA BLD CULT ORG #2: NORMAL
BACTERIA BLD CULT: NORMAL

## 2024-09-11 NOTE — PROGRESS NOTES
Physician Progress Note      PATIENT:               JACLYN KATHLEEN  [facility] #:                  [id number]  :                       1965  ADMIT DATE:       2024 8:37 AM  DISCH DATE:        2024 4:00 PM  RESPONDING  PROVIDER #:        Sj Al MD          QUERY TEXT:    Patient admitted with G-Tube complication. Noted documentation of acute   respiratory failure in IM H+P on . In order to support the diagnosis of   acute respiratory failure, please include additional clinical indicators in   your documentation.  Or please document if the diagnosis of acute respiratory   failure has been ruled out after further study.    The medical record reflects the following:  Risk Factors: 59 y.o. female who has a past medical history of Anemia,   Anorexia, Autistic disorder, Convulsions, Down syndrome, Dysphagia, G tube   feedings, Hypothyroid, Malnutrition, Parkinson disease, Pneumonia of left   lower lobe due to infectious organism.  Clinical Indicators:  - IM H+P - Arrived via [facility] d/t gtube   complications of leaking; per home health care acting like she was choking;   VSS; per report pt acting normal. Patient presents with Acute and chronic   respiratory failure (acute-on-chronic). Acute respiratory failure with hypoxia   -Established problem. Stable.  Still, some concern for aspiration. Plan: slp   eval. Feeds thru g tube. Empiric abx to cover poss aspiration Respiratory:   rales bilaterally with normal respiratory effort.  VS: Spo2 - 88% placed   on 3L NC.  - IM - Respiratory: clear to auscult  Treatment: imaging, supplemental O2.  - IM - DC on RA.    Acute Respiratory Failure Clinical Indicators per  MS-DRG Training Guide and   Quick Reference Guide:  pO2 < 60 mmHg or SpO2 (pulse oximetry) < 91% breathing room air  pCO2 > 50 and pH < 7.35  P/F ratio (pO2 / FIO2) < 300  pO2 decrease or pCO2 increase by 10 mmHg from baseline (if known)  Supplemental oxygen of 40% or

## 2024-09-17 ENCOUNTER — OFFICE VISIT (OUTPATIENT)
Dept: INTERNAL MEDICINE CLINIC | Age: 59
End: 2024-09-17
Payer: MEDICARE

## 2024-09-17 VITALS — DIASTOLIC BLOOD PRESSURE: 70 MMHG | SYSTOLIC BLOOD PRESSURE: 106 MMHG

## 2024-09-17 DIAGNOSIS — F79 INTELLECTUAL DISABILITY: ICD-10-CM

## 2024-09-17 DIAGNOSIS — E03.9 HYPOTHYROIDISM, UNSPECIFIED TYPE: Primary | ICD-10-CM

## 2024-09-17 DIAGNOSIS — N30.90 CYSTITIS: ICD-10-CM

## 2024-09-17 DIAGNOSIS — G80.9 CEREBRAL PALSY, UNSPECIFIED TYPE (HCC): ICD-10-CM

## 2024-09-17 DIAGNOSIS — D69.6 THROMBOCYTOPENIA, UNSPECIFIED (HCC): ICD-10-CM

## 2024-09-17 PROCEDURE — G8420 CALC BMI NORM PARAMETERS: HCPCS | Performed by: STUDENT IN AN ORGANIZED HEALTH CARE EDUCATION/TRAINING PROGRAM

## 2024-09-17 PROCEDURE — G8427 DOCREV CUR MEDS BY ELIG CLIN: HCPCS | Performed by: STUDENT IN AN ORGANIZED HEALTH CARE EDUCATION/TRAINING PROGRAM

## 2024-09-17 PROCEDURE — 3017F COLORECTAL CA SCREEN DOC REV: CPT | Performed by: STUDENT IN AN ORGANIZED HEALTH CARE EDUCATION/TRAINING PROGRAM

## 2024-09-17 PROCEDURE — 1111F DSCHRG MED/CURRENT MED MERGE: CPT | Performed by: STUDENT IN AN ORGANIZED HEALTH CARE EDUCATION/TRAINING PROGRAM

## 2024-09-17 PROCEDURE — 1036F TOBACCO NON-USER: CPT | Performed by: STUDENT IN AN ORGANIZED HEALTH CARE EDUCATION/TRAINING PROGRAM

## 2024-09-17 PROCEDURE — 99214 OFFICE O/P EST MOD 30 MIN: CPT | Performed by: STUDENT IN AN ORGANIZED HEALTH CARE EDUCATION/TRAINING PROGRAM

## 2024-09-17 NOTE — PROGRESS NOTES
(HCC)    AVELINO (acute kidney injury) (HCC)    Aspiration pneumonia (HCC)    Septic shock (HCC)    Moderate malnutrition (HCC)    Streptococcal bacteremia    Parkinsonism (HCC)    Anorexia    Non-healing open wound of right groin    Diarrhea    SIRS (systemic inflammatory response syndrome) (HCC)    Hypothyroidism    Gastrostomy tube in place (HCC)    Major depressive disorder, recurrent, moderate    Major depressive disorder, recurrent, unspecified    Bipolar affective disorder, remission status unspecified (HCC)    Seizure (HCC)    Parkinson's disease    Unspecified convulsions    Acute respiratory failure with hypoxia    Pneumonia    Acute and chronic respiratory failure (acute-on-chronic)    Thrombocytopenia, unspecified (HCC)     Past Medical History:   Diagnosis Date    Anemia     Anorexia     Autistic disorder     MRDD    Convulsions (HCC)     Down syndrome     Dysphagia     G tube feedings (HCC)     PEG tube    Hypothyroid     Malnutrition (HCC)     Parkinson disease (HCC)     Pneumonia of left lower lobe due to infectious organism     UTI (urinary tract infection)     Weakness      Prior to Admission medications    Medication Sig Start Date End Date Taking? Authorizing Provider   Multiple Vitamins-Minerals (THERAPEUTIC MULTIVITAMIN-MINERALS) tablet Take 1 tablet by mouth daily   Yes Provider, MD Augustine   B Complex-Minerals (ELDERTONIC) LIQD liquid Take 5 mLs by mouth daily 8/19/24  Yes ProviderAugustine MD   Lactobacillus Acidophilus (ACIDOPHILUS LACTOBACILLUS) 10 VIONNE UNT/GM POWD 1 g by PEG Tube route daily Add probiotic acidophilus supplement to  milk, juice or water and to be given via PEG tube.  Patient taking differently: 1 g by PEG Tube route 2 times daily Add probiotic acidophilus supplement to  milk, juice or water and to be given via PEG tube. 6/28/24  Yes Keerthi Busby,    omeprazole (PRILOSEC) 20 MG delayed release capsule Take 1 capsule by mouth every morning (before breakfast) Take one

## 2024-09-20 DIAGNOSIS — Z93.1 PEG (PERCUTANEOUS ENDOSCOPIC GASTROSTOMY) STATUS (HCC): ICD-10-CM

## 2024-09-20 DIAGNOSIS — G20.A1 PARKINSON'S DISEASE, UNSPECIFIED WHETHER DYSKINESIA PRESENT, UNSPECIFIED WHETHER MANIFESTATIONS FLUCTUATE (HCC): ICD-10-CM

## 2024-09-20 DIAGNOSIS — I95.9 HYPOTENSION, UNSPECIFIED HYPOTENSION TYPE: ICD-10-CM

## 2024-09-20 RX ORDER — MIDODRINE HYDROCHLORIDE 2.5 MG/1
2.5 TABLET ORAL
Qty: 180 TABLET | Refills: 1 | Status: SHIPPED | OUTPATIENT
Start: 2024-09-20 | End: 2025-03-19

## 2024-09-20 RX ORDER — CARBIDOPA AND LEVODOPA 25; 100 MG/1; MG/1
1.5 TABLET ORAL 3 TIMES DAILY
Qty: 90 TABLET | Refills: 5 | Status: SHIPPED | OUTPATIENT
Start: 2024-09-20

## 2024-09-26 PROBLEM — N39.0 UTI (URINARY TRACT INFECTION): Status: RESOLVED | Noted: 2024-08-27 | Resolved: 2024-09-26

## 2024-10-29 ENCOUNTER — HOSPITAL ENCOUNTER (EMERGENCY)
Age: 59
Discharge: HOME | End: 2024-10-29
Payer: MEDICARE

## 2024-10-29 ENCOUNTER — APPOINTMENT (OUTPATIENT)
Dept: GENERAL RADIOLOGY | Age: 59
End: 2024-10-29
Payer: MEDICARE

## 2024-10-29 VITALS
TEMPERATURE: 98.4 F | DIASTOLIC BLOOD PRESSURE: 73 MMHG | SYSTOLIC BLOOD PRESSURE: 98 MMHG | OXYGEN SATURATION: 96 % | RESPIRATION RATE: 16 BRPM | HEART RATE: 60 BPM

## 2024-10-29 DIAGNOSIS — J18.9 ATYPICAL PNEUMONIA: Primary | ICD-10-CM

## 2024-10-29 LAB
ANION GAP SERPL CALCULATED.3IONS-SCNC: 7 MMOL/L (ref 3–16)
BACTERIA URNS QL MICRO: ABNORMAL /HPF
BASOPHILS # BLD: 0 K/UL (ref 0–0.2)
BASOPHILS NFR BLD: 0.4 %
BILIRUB UR QL STRIP.AUTO: NEGATIVE
BUN SERPL-MCNC: 21 MG/DL (ref 7–20)
CALCIUM SERPL-MCNC: 8.7 MG/DL (ref 8.3–10.6)
CHLORIDE SERPL-SCNC: 106 MMOL/L (ref 99–110)
CLARITY UR: ABNORMAL
CO2 SERPL-SCNC: 27 MMOL/L (ref 21–32)
COLOR UR: ABNORMAL
CREAT SERPL-MCNC: 0.5 MG/DL (ref 0.6–1.2)
DEPRECATED RDW RBC AUTO: 11.9 % (ref 12.4–15.4)
EKG ATRIAL RATE: 71 BPM
EKG DIAGNOSIS: NORMAL
EKG P AXIS: 22 DEGREES
EKG P-R INTERVAL: 144 MS
EKG Q-T INTERVAL: 438 MS
EKG QRS DURATION: 70 MS
EKG QTC CALCULATION (BAZETT): 475 MS
EKG R AXIS: -4 DEGREES
EKG T AXIS: 22 DEGREES
EKG VENTRICULAR RATE: 71 BPM
EOSINOPHIL # BLD: 0.1 K/UL (ref 0–0.6)
EOSINOPHIL NFR BLD: 2.6 %
EPI CELLS #/AREA URNS HPF: ABNORMAL /HPF (ref 0–5)
FLUAV RNA RESP QL NAA+PROBE: NOT DETECTED
FLUBV RNA RESP QL NAA+PROBE: NOT DETECTED
GFR SERPLBLD CREATININE-BSD FMLA CKD-EPI: >90 ML/MIN/{1.73_M2}
GLUCOSE SERPL-MCNC: 84 MG/DL (ref 70–99)
GLUCOSE UR STRIP.AUTO-MCNC: NEGATIVE MG/DL
HCT VFR BLD AUTO: 41.8 % (ref 36–48)
HGB BLD-MCNC: 14.5 G/DL (ref 12–16)
HGB UR QL STRIP.AUTO: NEGATIVE
KETONES UR STRIP.AUTO-MCNC: NEGATIVE MG/DL
LACTATE BLDV-SCNC: 1.7 MMOL/L (ref 0.4–1.9)
LEUKOCYTE ESTERASE UR QL STRIP.AUTO: ABNORMAL
LYMPHOCYTES # BLD: 1.4 K/UL (ref 1–5.1)
LYMPHOCYTES NFR BLD: 28.7 %
MCH RBC QN AUTO: 35.6 PG (ref 26–34)
MCHC RBC AUTO-ENTMCNC: 34.6 G/DL (ref 31–36)
MCV RBC AUTO: 102.7 FL (ref 80–100)
MONOCYTES # BLD: 0.7 K/UL (ref 0–1.3)
MONOCYTES NFR BLD: 13.8 %
NEUTROPHILS # BLD: 2.7 K/UL (ref 1.7–7.7)
NEUTROPHILS NFR BLD: 54.5 %
NITRITE UR QL STRIP.AUTO: NEGATIVE
NT-PROBNP SERPL-MCNC: 167 PG/ML (ref 0–124)
PH UR STRIP.AUTO: 7.5 [PH] (ref 5–8)
PLATELET # BLD AUTO: 145 K/UL (ref 135–450)
PMV BLD AUTO: 10.3 FL (ref 5–10.5)
POTASSIUM SERPL-SCNC: 4.2 MMOL/L (ref 3.5–5.1)
PROT UR STRIP.AUTO-MCNC: NEGATIVE MG/DL
RBC # BLD AUTO: 4.07 M/UL (ref 4–5.2)
RBC #/AREA URNS HPF: ABNORMAL /HPF (ref 0–4)
REASON FOR REJECTION: NORMAL
REJECTED TEST: NORMAL
SARS-COV-2 RNA RESP QL NAA+PROBE: NOT DETECTED
SODIUM SERPL-SCNC: 140 MMOL/L (ref 136–145)
SP GR UR STRIP.AUTO: 1.01 (ref 1–1.03)
TROPONIN, HIGH SENSITIVITY: 54 NG/L (ref 0–14)
UA COMPLETE W REFLEX CULTURE PNL UR: ABNORMAL
UA DIPSTICK W REFLEX MICRO PNL UR: YES
URN SPEC COLLECT METH UR: ABNORMAL
UROBILINOGEN UR STRIP-ACNC: 1 E.U./DL
WBC # BLD AUTO: 4.9 K/UL (ref 4–11)
WBC #/AREA URNS HPF: ABNORMAL /HPF (ref 0–5)

## 2024-10-29 PROCEDURE — 93010 ELECTROCARDIOGRAM REPORT: CPT | Performed by: INTERNAL MEDICINE

## 2024-10-29 PROCEDURE — 83605 ASSAY OF LACTIC ACID: CPT

## 2024-10-29 PROCEDURE — 85025 COMPLETE CBC W/AUTO DIFF WBC: CPT

## 2024-10-29 PROCEDURE — 99285 EMERGENCY DEPT VISIT HI MDM: CPT

## 2024-10-29 PROCEDURE — 83880 ASSAY OF NATRIURETIC PEPTIDE: CPT

## 2024-10-29 PROCEDURE — 80048 BASIC METABOLIC PNL TOTAL CA: CPT

## 2024-10-29 PROCEDURE — 71045 X-RAY EXAM CHEST 1 VIEW: CPT

## 2024-10-29 PROCEDURE — 81001 URINALYSIS AUTO W/SCOPE: CPT

## 2024-10-29 PROCEDURE — 84484 ASSAY OF TROPONIN QUANT: CPT

## 2024-10-29 PROCEDURE — 93005 ELECTROCARDIOGRAM TRACING: CPT | Performed by: PHYSICIAN ASSISTANT

## 2024-10-29 PROCEDURE — 87636 SARSCOV2 & INF A&B AMP PRB: CPT

## 2024-10-29 ASSESSMENT — ENCOUNTER SYMPTOMS
SHORTNESS OF BREATH: 1
COUGH: 1

## 2024-10-29 NOTE — PROGRESS NOTES
Discharge prescription Augmentin suspension picked up from outpatient pharmacy and delivered to DESIREE Gonzales. Augmentin will need to be refrigerated when patient returns home.    Martha Miles, PharmD, BCCCP

## 2024-10-29 NOTE — ED PROVIDER NOTES
68   Resp:  16   Temp: 98.4 °F (36.9 °C)    TempSrc: Rectal    SpO2: 99%        Patient was given the following medications:  Medications - No data to display          Is this patient to be included in the SEP-1 Core Measure due to severe sepsis or septic shock?   No   Exclusion criteria - the patient is NOT to be included for SEP-1 Core Measure due to:  2+ SIRS criteria are not met    Chronic Conditions affecting care:    has a past medical history of Anemia, Anorexia, Autistic disorder, Convulsions (HCC), Down syndrome, Dysphagia, G tube feedings (HCC), Hypothyroid, Malnutrition (HCC), Parkinson disease (HCC), Pneumonia of left lower lobe due to infectious organism, UTI (urinary tract infection), and Weakness.    CONSULTS: (Who and What was discussed)  None      Social Determinants Significantly Affecting Health : None    Records Reviewed (External and Source) n/a    CC/HPI Summary, DDx, ED Course, and Reassessment: Patient presents for evaluation of cough.  On exam, she is resting comfortably in bed in no acute distress and nontoxic.  No significant diaphoresis but nursing staff states that she was upon arrival.  Vitals are all within normal limits and she is afebrile.  Audible coarse breath sounds bilaterally and with auscultation.  Chest is nontender and abdomen is benign.  Please see attending note for EKG interpretation.    Disposition Considerations (tests considered but not done, Admit vs D/C, Shared Decision Making, Pt Expectation of Test or Tx.): CBC CMP are unremarkable.  No leukocytosis.  Urinalysis negative.  COVID and flu negative.  Troponin negative, lactate 1.7.  X-ray is unremarkable.  Due to the fact that she has a cough and is a high risk for aspiration with coarse breath sounds will be covered empirically for suspected aspiration but I believe she can be discharged home for outpatient management.    Patient's oxygen saturations are good.  I do not believe the patient's symptoms today are due to

## 2024-10-29 NOTE — ED NOTES
Patient discharged  to home in stable condition via [facility].  Discharge instructions and prescriptions sent with patient. All belongings in tow including discharge paperwork and antibiotic from pharmacy.

## 2024-12-05 ENCOUNTER — OFFICE VISIT (OUTPATIENT)
Dept: INTERNAL MEDICINE CLINIC | Age: 59
End: 2024-12-05

## 2024-12-05 VITALS — HEART RATE: 66 BPM | DIASTOLIC BLOOD PRESSURE: 78 MMHG | SYSTOLIC BLOOD PRESSURE: 116 MMHG | OXYGEN SATURATION: 96 %

## 2024-12-05 DIAGNOSIS — S71.119S: Primary | ICD-10-CM

## 2024-12-05 RX ORDER — MUPIROCIN 20 MG/G
OINTMENT TOPICAL
Qty: 15 G | Refills: 1 | Status: SHIPPED | OUTPATIENT
Start: 2024-12-05 | End: 2024-12-12

## 2024-12-05 NOTE — PROGRESS NOTES
Miriam Iyer (:  1965) is a 59 y.o. female,Established patient, here for evaluation of the following chief complaint(s):  Vaginal Bleeding (Twice possible yesterday but nothing this morning some discharge from overnight)         Assessment & Plan  Laceration of skin of thigh, sequela   Acute condition, new, Supportive care with appropriate antipyretics and fluids.  Apply antibiotic ointment as directed    Orders:    mupirocin (BACTROBAN) 2 % ointment; Apply topically 3 times daily.      No follow-ups on file.       Subjective   HPIPresents today with care provider stating noticed blood upon cleaning yesterday and today with small amount of provide all clots.    Review of Systems   Genitourinary:         Labial laceration          Objective   Physical Exam  Constitutional:       Appearance: She is obese.   Cardiovascular:      Rate and Rhythm: Normal rate and regular rhythm.   Pulmonary:      Comments: Moist respirations without dyspnea  Genitourinary:     Exam position: Knee-chest position.      Labia:         Right: Lesion present.           Comments: Small less than 1 cm laceration noted on right labia area small amount of drainage no erythremia noted  Neurological:      Mental Status: She is alert.      Comments: Nonverbal, followed with her eyes.                  An electronic signature was used to authenticate this note.    --Judith Dominguez, NICHOLE - CNP

## 2024-12-19 DIAGNOSIS — Z93.1 PEG (PERCUTANEOUS ENDOSCOPIC GASTROSTOMY) STATUS (HCC): ICD-10-CM

## 2024-12-19 DIAGNOSIS — G20.A1 PARKINSON'S DISEASE, UNSPECIFIED WHETHER DYSKINESIA PRESENT, UNSPECIFIED WHETHER MANIFESTATIONS FLUCTUATE (HCC): ICD-10-CM

## 2024-12-22 RX ORDER — CARBIDOPA AND LEVODOPA 25; 100 MG/1; MG/1
1.5 TABLET ORAL 3 TIMES DAILY
Qty: 90 TABLET | Refills: 5 | Status: SHIPPED | OUTPATIENT
Start: 2024-12-22

## 2024-12-22 RX ORDER — LACTOBACILLUS ACIDOPHILUS
1 POWDER (GRAM) MISCELLANEOUS 2 TIMES DAILY
Qty: 1000 G | Refills: 1 | Status: SHIPPED | OUTPATIENT
Start: 2024-12-22

## 2024-12-23 DIAGNOSIS — Z93.1 PEG (PERCUTANEOUS ENDOSCOPIC GASTROSTOMY) STATUS (HCC): ICD-10-CM

## 2024-12-23 DIAGNOSIS — R56.9 SEIZURE (HCC): ICD-10-CM

## 2024-12-23 RX ORDER — VALPROIC ACID 250 MG/5ML
250 SOLUTION ORAL 3 TIMES DAILY PRN
Qty: 600 ML | Refills: 5 | Status: SHIPPED | OUTPATIENT
Start: 2024-12-23

## 2024-12-26 RX ORDER — M-VIT,TX,IRON,MINS/CALC/FOLIC 27MG-0.4MG
1 TABLET ORAL DAILY
Qty: 90 TABLET | Refills: 1 | Status: SHIPPED | OUTPATIENT
Start: 2024-12-26

## 2024-12-26 NOTE — TELEPHONE ENCOUNTER
Manuela RN for [facility] called for a refill of Multiple Vitamins-Minerals (THERAPEUTIC MULTIVITAMIN-MINERALS) tablet.     Last Fill  n/a  Last OV  12/05/2024  Next OV  01/07/2025

## 2025-01-17 ENCOUNTER — APPOINTMENT (OUTPATIENT)
Dept: GENERAL RADIOLOGY | Age: 60
End: 2025-01-17
Payer: MEDICARE

## 2025-01-17 ENCOUNTER — HOSPITAL ENCOUNTER (EMERGENCY)
Age: 60
Discharge: HOME | End: 2025-01-17
Payer: MEDICARE

## 2025-01-17 VITALS
OXYGEN SATURATION: 95 % | DIASTOLIC BLOOD PRESSURE: 63 MMHG | HEART RATE: 64 BPM | RESPIRATION RATE: 16 BRPM | TEMPERATURE: 96.8 F | SYSTOLIC BLOOD PRESSURE: 100 MMHG

## 2025-01-17 DIAGNOSIS — K94.23 GASTROSTOMY TUBE DYSFUNCTION (HCC): Primary | ICD-10-CM

## 2025-01-17 PROCEDURE — 49465 FLUORO EXAM OF G/COLON TUBE: CPT

## 2025-01-17 PROCEDURE — 99283 EMERGENCY DEPT VISIT LOW MDM: CPT

## 2025-01-17 PROCEDURE — 43762 RPLC GTUBE NO REVJ TRC: CPT

## 2025-01-17 PROCEDURE — 6360000004 HC RX CONTRAST MEDICATION

## 2025-01-17 RX ADMIN — IOHEXOL 40 ML: 350 INJECTION, SOLUTION INTRAVENOUS at 15:03

## 2025-01-17 NOTE — ED PROVIDER NOTES
MG/5ML, 1250 MG/5ML AS CARBONATE,) 1250 MG/5ML SUSP SUSPENSION    12.5 mLs by Per J Tube route 2 times daily 8am and 8pm    CARBIDOPA-LEVODOPA (SINEMET)  MG PER TABLET    1.5 tablets by PEG Tube route 3 times daily Indications: Take one and a half tablets by mouth three times per day    DOCUSATE (COLACE) 50 MG/5ML LIQUID    Take 10 mLs by mouth daily    HANDICAP PLACARD MISC    by Does not apply route Duration- 5 years    LACTOBACILLUS ACIDOPHILUS (ACIDOPHILUS LACTOBACILLUS) 10 IVONNE UNT/GM POWD    1 g by PEG Tube route 2 times daily Add probiotic acidophilus supplement to  milk, juice or water and to be given via PEG tube.    LEVOTHYROXINE (SYNTHROID) 150 MCG TABLET    1 tablet by PEG Tube route Daily    MIDODRINE (PROAMATINE) 2.5 MG TABLET    1 tablet by PEG Tube route in the morning and 1 tablet in the evening. 2nd dose at 4 PM.  Can hold if her blood pressure persistently  above 120 mm Hg..    MULTIPLE VITAMINS-MINERALS (THERAPEUTIC MULTIVITAMIN-MINERALS) TABLET    Take 1 tablet by mouth daily    NUTRITIONAL SUPPLEMENTS (JEVITY 1.5 DORINDA/FIBER PO)    Take by mouth    NUTRITIONAL SUPPLEMENTS (JEVITY 1.5 DORINDA/FIBER) LIQD    237 mLs by Jejunal Tube route 6 times daily 7am, 10am, 1pm, 4pm, 8pm, and 12am    OMEPRAZOLE (PRILOSEC) 20 MG DELAYED RELEASE CAPSULE    Take 1 capsule by mouth every morning (before breakfast) Take one capsule by peg tube everyday at 8am for acid reflux.    SERTRALINE (ZOLOFT) 20 MG/ML CONCENTRATED SOLUTION    Take 5 mLs by mouth nightly    VALPROIC ACID (DEPAKENE) 250 MG/5ML SOLN ORAL SOLUTION    5 mLs by Per G Tube route 3 times daily as needed (3 times daily prn) 250 mg in the 8 AM, 250 mg in the afternoon 2 Pm, 500 mg nightly 8 PM.       ALLERGIES     Caffeine and Iodine    FAMILYHISTORY       Family History   Family history unknown: Yes        SOCIAL HISTORY       Social History     Tobacco Use    Smoking status: Never    Smokeless tobacco: Never   Vaping Use    Vaping status: Never Used

## 2025-01-22 DIAGNOSIS — Z93.1 PEG (PERCUTANEOUS ENDOSCOPIC GASTROSTOMY) STATUS (HCC): ICD-10-CM

## 2025-01-22 RX ORDER — MUPIROCIN 20 MG/G
OINTMENT TOPICAL
COMMUNITY
Start: 2024-12-18 | End: 2025-01-22 | Stop reason: SDUPTHER

## 2025-01-26 RX ORDER — MUPIROCIN 20 MG/G
OINTMENT TOPICAL DAILY
Qty: 1 G | Refills: 1 | Status: SHIPPED | OUTPATIENT
Start: 2025-01-26

## 2025-01-26 RX ORDER — LACTOBACILLUS RHAMNOSUS GG 10B CELL
1 CAPSULE ORAL 2 TIMES DAILY
Qty: 180 CAPSULE | Refills: 1 | Status: SHIPPED | OUTPATIENT
Start: 2025-01-26 | End: 2025-07-25

## 2025-03-10 DIAGNOSIS — R13.10 DYSPHAGIA, UNSPECIFIED TYPE: ICD-10-CM

## 2025-03-10 DIAGNOSIS — I95.9 HYPOTENSION, UNSPECIFIED HYPOTENSION TYPE: ICD-10-CM

## 2025-03-10 DIAGNOSIS — Z93.1 PEG (PERCUTANEOUS ENDOSCOPIC GASTROSTOMY) STATUS (HCC): ICD-10-CM

## 2025-03-10 NOTE — TELEPHONE ENCOUNTER
Recent Visits  Date Type Provider Dept   12/05/24 Office Visit Judith Dominguez APRN - CNP [facility]   09/17/24 Office Visit Keerthi Busby DO [facility]   07/05/24 Office Visit Keerthi Busby DO [facility]   04/05/24 Office Visit Keerthi Busby DO [facility]   10/10/23 Office Visit Shannon Goodson MD [facility]   Showing recent visits within past 540 days with a meds authorizing provider and meeting all other requirements  Future Appointments  Date Type Provider Dept   03/18/25 Appointment Keerthi Busby DO [facility]   Showing future appointments within next 150 days with a meds authorizing provider and meeting all other requirements     12/5/2024

## 2025-03-11 RX ORDER — MIDODRINE HYDROCHLORIDE 2.5 MG/1
2.5 TABLET ORAL
Qty: 180 TABLET | Refills: 1 | Status: SHIPPED | OUTPATIENT
Start: 2025-03-11 | End: 2025-09-07

## 2025-03-11 RX ORDER — CALCIUM CARBONATE 1250 MG/5ML
1250 SUSPENSION ORAL 2 TIMES DAILY
Qty: 450 ML | Refills: 5 | Status: SHIPPED | OUTPATIENT
Start: 2025-03-11

## 2025-03-14 ENCOUNTER — HOSPITAL ENCOUNTER (EMERGENCY)
Age: 60
Discharge: HOME | End: 2025-03-14
Payer: MEDICARE

## 2025-03-14 VITALS
HEART RATE: 73 BPM | RESPIRATION RATE: 18 BRPM | BODY MASS INDEX: 24.84 KG/M2 | WEIGHT: 126.5 LBS | HEIGHT: 60 IN | OXYGEN SATURATION: 93 % | TEMPERATURE: 97.4 F | DIASTOLIC BLOOD PRESSURE: 107 MMHG | SYSTOLIC BLOOD PRESSURE: 140 MMHG

## 2025-03-14 DIAGNOSIS — N30.01 ACUTE CYSTITIS WITH HEMATURIA: Primary | ICD-10-CM

## 2025-03-14 LAB
ABO/RH: NORMAL
ALBUMIN SERPL-MCNC: 3.5 G/DL (ref 3.4–5)
ALBUMIN/GLOB SERPL: 1 {RATIO} (ref 1.1–2.2)
ALP SERPL-CCNC: 117 U/L (ref 40–129)
ALT SERPL-CCNC: 9 U/L (ref 10–40)
ANION GAP SERPL CALCULATED.3IONS-SCNC: 7 MMOL/L (ref 3–16)
ANTIBODY SCREEN: NORMAL
APTT BLD: 29.4 SEC (ref 22.1–36.4)
AST SERPL-CCNC: 33 U/L (ref 15–37)
BACTERIA URNS QL MICRO: ABNORMAL /HPF
BASOPHILS # BLD: 0 K/UL (ref 0–0.2)
BASOPHILS NFR BLD: 0.3 %
BILIRUB SERPL-MCNC: 0.3 MG/DL (ref 0–1)
BILIRUB UR QL STRIP.AUTO: NEGATIVE
BUN SERPL-MCNC: 24 MG/DL (ref 7–20)
CALCIUM SERPL-MCNC: 9.3 MG/DL (ref 8.3–10.6)
CHLORIDE SERPL-SCNC: 103 MMOL/L (ref 99–110)
CLARITY UR: ABNORMAL
CO2 SERPL-SCNC: 29 MMOL/L (ref 21–32)
COLOR UR: ABNORMAL
CREAT SERPL-MCNC: 0.6 MG/DL (ref 0.6–1.2)
DEPRECATED RDW RBC AUTO: 12.4 % (ref 12.4–15.4)
EOSINOPHIL # BLD: 0.1 K/UL (ref 0–0.6)
EOSINOPHIL NFR BLD: 2 %
EPI CELLS #/AREA URNS HPF: ABNORMAL /HPF (ref 0–5)
GFR SERPLBLD CREATININE-BSD FMLA CKD-EPI: >90 ML/MIN/{1.73_M2}
GLUCOSE SERPL-MCNC: 79 MG/DL (ref 70–99)
GLUCOSE UR STRIP.AUTO-MCNC: NEGATIVE MG/DL
HCT VFR BLD AUTO: 44.9 % (ref 36–48)
HGB BLD-MCNC: 14.8 G/DL (ref 12–16)
HGB UR QL STRIP.AUTO: ABNORMAL
INR PPP: 0.91 (ref 0.85–1.15)
KETONES UR STRIP.AUTO-MCNC: ABNORMAL MG/DL
LEUKOCYTE ESTERASE UR QL STRIP.AUTO: ABNORMAL
LYMPHOCYTES # BLD: 1.9 K/UL (ref 1–5.1)
LYMPHOCYTES NFR BLD: 28.9 %
MCH RBC QN AUTO: 33.9 PG (ref 26–34)
MCHC RBC AUTO-ENTMCNC: 32.9 G/DL (ref 31–36)
MCV RBC AUTO: 103 FL (ref 80–100)
MONOCYTES # BLD: 0.8 K/UL (ref 0–1.3)
MONOCYTES NFR BLD: 11.7 %
NEUTROPHILS # BLD: 3.7 K/UL (ref 1.7–7.7)
NEUTROPHILS NFR BLD: 57.1 %
NITRITE UR QL STRIP.AUTO: NEGATIVE
PH UR STRIP.AUTO: 7.5 [PH] (ref 5–8)
PLATELET # BLD AUTO: 132 K/UL (ref 135–450)
PMV BLD AUTO: 10.4 FL (ref 5–10.5)
POTASSIUM SERPL-SCNC: 4.4 MMOL/L (ref 3.5–5.1)
PROT SERPL-MCNC: 7 G/DL (ref 6.4–8.2)
PROT UR STRIP.AUTO-MCNC: 100 MG/DL
PROTHROMBIN TIME: 12.5 SEC (ref 11.9–14.9)
RBC # BLD AUTO: 4.36 M/UL (ref 4–5.2)
RBC #/AREA URNS HPF: >100 /HPF (ref 0–4)
SODIUM SERPL-SCNC: 139 MMOL/L (ref 136–145)
SP GR UR STRIP.AUTO: 1.02 (ref 1–1.03)
UA COMPLETE W REFLEX CULTURE PNL UR: YES
UA DIPSTICK W REFLEX MICRO PNL UR: YES
URN SPEC COLLECT METH UR: ABNORMAL
UROBILINOGEN UR STRIP-ACNC: 1 E.U./DL
WBC # BLD AUTO: 6.5 K/UL (ref 4–11)
WBC #/AREA URNS HPF: ABNORMAL /HPF (ref 0–5)
YEAST URNS QL MICRO: PRESENT /HPF

## 2025-03-14 PROCEDURE — 87086 URINE CULTURE/COLONY COUNT: CPT

## 2025-03-14 PROCEDURE — 99283 EMERGENCY DEPT VISIT LOW MDM: CPT

## 2025-03-14 PROCEDURE — 86900 BLOOD TYPING SEROLOGIC ABO: CPT

## 2025-03-14 PROCEDURE — 85025 COMPLETE CBC W/AUTO DIFF WBC: CPT

## 2025-03-14 PROCEDURE — 85730 THROMBOPLASTIN TIME PARTIAL: CPT

## 2025-03-14 PROCEDURE — 87186 SC STD MICRODIL/AGAR DIL: CPT

## 2025-03-14 PROCEDURE — 80053 COMPREHEN METABOLIC PANEL: CPT

## 2025-03-14 PROCEDURE — 87077 CULTURE AEROBIC IDENTIFY: CPT

## 2025-03-14 PROCEDURE — 86901 BLOOD TYPING SEROLOGIC RH(D): CPT

## 2025-03-14 PROCEDURE — 85610 PROTHROMBIN TIME: CPT

## 2025-03-14 PROCEDURE — 81001 URINALYSIS AUTO W/SCOPE: CPT

## 2025-03-14 PROCEDURE — 86850 RBC ANTIBODY SCREEN: CPT

## 2025-03-14 RX ORDER — CEPHALEXIN 500 MG/1
500 CAPSULE ORAL 2 TIMES DAILY
Qty: 10 CAPSULE | Refills: 0 | Status: SHIPPED | OUTPATIENT
Start: 2025-03-14 | End: 2025-03-19

## 2025-03-14 RX ORDER — CEPHALEXIN 500 MG/1
500 CAPSULE ORAL 2 TIMES DAILY
Qty: 10 CAPSULE | Refills: 0 | Status: SHIPPED | OUTPATIENT
Start: 2025-03-14 | End: 2025-03-14

## 2025-03-14 ASSESSMENT — PAIN - FUNCTIONAL ASSESSMENT: PAIN_FUNCTIONAL_ASSESSMENT: ADULT NONVERBAL PAIN SCALE (NPVS)

## 2025-03-14 NOTE — ED PROVIDER NOTES
Rate and Rhythm: Normal rate and regular rhythm.      Heart sounds: No murmur heard.     No friction rub. No gallop.   Pulmonary:      Effort: Pulmonary effort is normal. No respiratory distress.      Breath sounds: No stridor. No wheezing, rhonchi or rales.   Abdominal:      General: Bowel sounds are normal. There is no distension.      Palpations: Abdomen is soft. There is no mass.      Tenderness: There is no abdominal tenderness. There is no guarding or rebound.      Hernia: No hernia is present.   Genitourinary:     Comments: Rectal and vaginal exam performed with female chaperone Janet RAMÍREZ at bedside reveals no gross melena hematochezia.  There is no vaginal bleeding noted.  No large amount of blood noted in depends with some urine noted in depends at this time.  Musculoskeletal:         General: No swelling. Normal range of motion.      Cervical back: Normal range of motion.   Skin:     General: Skin is warm and dry.      Findings: No erythema or rash.   Neurological:      Mental Status: She is alert. Mental status is at baseline.      Cranial Nerves: No cranial nerve deficit.   Psychiatric:         Behavior: Behavior normal.             DIAGNOSTIC RESULTS   LABS:    Labs Reviewed   CBC WITH AUTO DIFFERENTIAL - Abnormal; Notable for the following components:       Result Value    .0 (*)     Platelets 132 (*)     All other components within normal limits   COMPREHENSIVE METABOLIC PANEL W/ REFLEX TO MG FOR LOW K - Abnormal; Notable for the following components:    BUN 24 (*)     Albumin/Globulin Ratio 1.0 (*)     ALT 9 (*)     All other components within normal limits   URINALYSIS WITH REFLEX TO CULTURE - Abnormal; Notable for the following components:    Color, UA DARK YELLOW (*)     Clarity, UA CLOUDY (*)     Ketones, Urine TRACE (*)     Blood, Urine LARGE (*)     Protein,  (*)     Leukocyte Esterase, Urine MODERATE (*)     All other components within normal limits   MICROSCOPIC URINALYSIS -  Anemia, Anorexia, Autistic disorder, Convulsions (HCC), Down syndrome, Dysphagia, G tube feedings (HCC), Hypothyroid, Malnutrition, Parkinson disease (HCC), Pneumonia of left lower lobe due to infectious organism, UTI (urinary tract infection), and Weakness.    CONSULTS: (Who and What was discussed)  None      Social Determinants Significantly Affecting Health : None    Records Reviewed (External, Source and Summary)     CC/HPI Summary, DDx, ED Course, and Reassessment: Patient presented after having 1 episode of blood in her depends yesterday.  She has history of autistic disorder, Parkinson's disease, Down syndrome and is nonverbal.  She is at her baseline per caregiver.  There is no rectal bleeding, vaginal bleeding or any obvious source of bleeding or wound noted on exam.  Hemoglobin is stable.  CBC, CMP unremarkable.  Urinalysis does reveal some hematuria with leukocytes and bacteria.  Will be started on antibiotics for UTI.  She does not have a catheter get any sort of instrumentation at facility.  Will be started on Keflex.  Urine culture was sent.  Do not believe any advanced imaging or further workup or testing is warranted at this time.  Will follow-up with her PCP and return here for any worsening of symptoms or problems known.  Caregiver agrees and patient was stable at discharge.    Disposition Considerations (tests considered but not done, Admit vs D/C, Shared Decision Making, Pt Expectation of Test or Tx.):        I am the Primary Clinician of Record.  FINAL IMPRESSION      1. Acute cystitis with hematuria          DISPOSITION/PLAN     DISPOSITION Decision To Discharge 03/14/2025 03:16:47 PM   DISPOSITION CONDITION Stable           PATIENT REFERRED TO:  Keerthi Busby,   [id number]  [facility] [address]  [phone]    Schedule an appointment as soon as possible for a visit in 3 days  For re-check    [facility]  [address] [facility] [address] [phone]    As

## 2025-03-14 NOTE — ED NOTES
Unable to obtain DC vital signs d/t patient movement, caregiver ok with DC without DC vital signs.

## 2025-03-16 LAB
BACTERIA UR CULT: ABNORMAL
ORGANISM: ABNORMAL

## 2025-03-17 NOTE — PROGRESS NOTES
[facility]   Emergency Department Culture Follow-Up       Miriam Iyer (CSN: [id number]) was seen and evaluated at [facility] Emergency Department on 3/14 by provider Herb Carbajal.    A urine culture was positive and is growing E. Faecalis. Sensitivity results: Sensitive to ampicillin.      Treatment Course:    The patient was treated and discharged with Keflex.      Recommendation:    It is recommended to discontinue Keflex and start Amoxicillin liquid 500 mg three times daily for 5 days.    This recommendation was reviewed with and agreed by ED provider Dr. Cornell.    Follow-Up:    The patient was contacted and notified of the therapy change. The new prescription was called to [facility] on date: 3/17 by: Hina Lopez. Pharmacy address: [address] [place] [address]. Pharmacy phone number: [phone].    Thank you,    Hina Lopez, PharmD  PGY-1 Pharmacy Resident

## 2025-03-18 ENCOUNTER — OFFICE VISIT (OUTPATIENT)
Dept: INTERNAL MEDICINE CLINIC | Age: 60
End: 2025-03-18
Payer: MEDICARE

## 2025-03-18 VITALS — OXYGEN SATURATION: 94 % | DIASTOLIC BLOOD PRESSURE: 72 MMHG | SYSTOLIC BLOOD PRESSURE: 120 MMHG

## 2025-03-18 DIAGNOSIS — Z93.1 GASTROSTOMY TUBE IN PLACE: ICD-10-CM

## 2025-03-18 DIAGNOSIS — G80.9 CEREBRAL PALSY, UNSPECIFIED TYPE (HCC): ICD-10-CM

## 2025-03-18 DIAGNOSIS — R56.9 SEIZURE (HCC): ICD-10-CM

## 2025-03-18 DIAGNOSIS — E03.9 HYPOTHYROIDISM, UNSPECIFIED TYPE: ICD-10-CM

## 2025-03-18 DIAGNOSIS — F31.9 BIPOLAR AFFECTIVE DISORDER, REMISSION STATUS UNSPECIFIED (HCC): ICD-10-CM

## 2025-03-18 DIAGNOSIS — G20.A1 PARKINSON'S DISEASE, UNSPECIFIED WHETHER DYSKINESIA PRESENT, UNSPECIFIED WHETHER MANIFESTATIONS FLUCTUATE (HCC): ICD-10-CM

## 2025-03-18 DIAGNOSIS — E03.9 HYPOTHYROIDISM, UNSPECIFIED TYPE: Primary | ICD-10-CM

## 2025-03-18 PROBLEM — J96.01 ACUTE RESPIRATORY FAILURE WITH HYPOXIA: Status: RESOLVED | Noted: 2024-08-27 | Resolved: 2025-03-18

## 2025-03-18 PROBLEM — J96.20 ACUTE AND CHRONIC RESPIRATORY FAILURE (ACUTE-ON-CHRONIC): Status: RESOLVED | Noted: 2024-08-27 | Resolved: 2025-03-18

## 2025-03-18 LAB — TSH SERPL DL<=0.005 MIU/L-ACNC: 0.33 UIU/ML (ref 0.27–4.2)

## 2025-03-18 PROCEDURE — 99214 OFFICE O/P EST MOD 30 MIN: CPT | Performed by: STUDENT IN AN ORGANIZED HEALTH CARE EDUCATION/TRAINING PROGRAM

## 2025-03-18 PROCEDURE — G8420 CALC BMI NORM PARAMETERS: HCPCS | Performed by: STUDENT IN AN ORGANIZED HEALTH CARE EDUCATION/TRAINING PROGRAM

## 2025-03-18 PROCEDURE — 1036F TOBACCO NON-USER: CPT | Performed by: STUDENT IN AN ORGANIZED HEALTH CARE EDUCATION/TRAINING PROGRAM

## 2025-03-18 PROCEDURE — 3017F COLORECTAL CA SCREEN DOC REV: CPT | Performed by: STUDENT IN AN ORGANIZED HEALTH CARE EDUCATION/TRAINING PROGRAM

## 2025-03-18 PROCEDURE — G8427 DOCREV CUR MEDS BY ELIG CLIN: HCPCS | Performed by: STUDENT IN AN ORGANIZED HEALTH CARE EDUCATION/TRAINING PROGRAM

## 2025-03-18 RX ORDER — AMOXICILLIN 400 MG/5ML
POWDER, FOR SUSPENSION ORAL
COMMUNITY
Start: 2025-03-17

## 2025-03-18 ASSESSMENT — PATIENT HEALTH QUESTIONNAIRE - PHQ9: DEPRESSION UNABLE TO ASSESS: PT REFUSES

## 2025-03-18 NOTE — PROGRESS NOTES
Miriam Iyer (:  1965) is a 59 y.o. female,Established patient, here for evaluation of the following chief complaint(s):  Follow-up and Congestion    Assessment/Plan:     1. Hypothyroidism, unspecified type  - Continue levothyroxine 150 mcg   - TSH reflex to FT4,FT3 ([facility] Only); Future    2. Cerebral palsy, unspecified type (HCC)  - Wheelchair bound  - Continue with care givers    3. Gastrostomy tube in place ([facility])  - C/D/I    4. Parkinson's disease, unspecified whether dyskinesia present, unspecified whether manifestations fluctuate ([facility])  - Continue carbidopa-levodopa     5. Bipolar affective disorder, remission status unspecified ([facility])  - Questionable diagnosis?  - Continue sertraline 20 mg, valproic acid     6. Seizure ([facility])  - Stable  - Continue valproic acid       Patient with significantly poor quality of life, requiring 24/7 supervision. G-tube dependent for nutrition and hydration. Nonverbal and noncommunicative. Given the severity of functional limitations and dependence on full-time care, ongoing discussions regarding goals of care, including consideration of DNR status, are warranted.    Return in about 6 months (around 2025) for thyroid.    Today's visit primarily involved shared decision-making. The benefits, risks, and alternatives of all discussed medications were thoroughly reviewed with the patient, ensuring an informed choice was made collaboratively.           Health Maintenance Due   Topic Date Due    Pneumococcal 50+ years Vaccine (1 of 2 - PCV) Never done    Breast cancer screen  Never done    Shingles vaccine (2 of 2) 06/10/2024    Flu vaccine (1) 2024    COVID-19 Vaccine (3 - - season) 2024      The 10-year ASCVD risk score (Melly RICKETTS, et al., 2019) is: 2.6%    Values used to calculate the score:      Age: 59 years      Sex: Female      Is Non- : No      Diabetic: No      Tobacco smoker: No      Systolic Blood Pressure: 120

## 2025-04-22 DIAGNOSIS — Z93.1 PEG (PERCUTANEOUS ENDOSCOPIC GASTROSTOMY) STATUS (HCC): ICD-10-CM

## 2025-04-22 DIAGNOSIS — F31.9 BIPOLAR AFFECTIVE DISORDER, REMISSION STATUS UNSPECIFIED (HCC): ICD-10-CM

## 2025-04-22 NOTE — TELEPHONE ENCOUNTER
Recent Visits  Date Type Provider Dept   03/18/25 Office Visit Keerthi Busby, DO [facility]   12/05/24 Office Visit Judith Dominguez APRN - CNP [facility]   09/17/24 Office Visit Keerthi Busby,  [facility]   07/05/24 Office Visit Keerthi Busby, DO [facility]   04/05/24 Office Visit Keerthi Busby, DO [facility]   Showing recent visits within past 540 days with a meds authorizing provider and meeting all other requirements  Future Appointments  Date Type Provider Dept   09/16/25 Appointment Keerthi Busby DO [facility]   Showing future appointments within next 150 days with a meds authorizing provider and meeting all other requirements     3/18/2025

## 2025-04-23 RX ORDER — SERTRALINE HYDROCHLORIDE 20 MG/ML
100 SOLUTION ORAL NIGHTLY
Qty: 610 ML | Refills: 0 | Status: SHIPPED | OUTPATIENT
Start: 2025-04-23 | End: 2025-08-23

## 2025-04-23 RX ORDER — M-VIT,TX,IRON,MINS/CALC/FOLIC 27MG-0.4MG
1 TABLET ORAL DAILY
Qty: 90 TABLET | Refills: 1 | Status: SHIPPED | OUTPATIENT
Start: 2025-04-23

## 2025-04-29 ENCOUNTER — HOSPITAL ENCOUNTER (EMERGENCY)
Age: 60
Discharge: HOME | End: 2025-04-29
Attending: EMERGENCY MEDICINE
Payer: MEDICARE

## 2025-04-29 ENCOUNTER — APPOINTMENT (OUTPATIENT)
Dept: GENERAL RADIOLOGY | Age: 60
End: 2025-04-29
Payer: MEDICARE

## 2025-04-29 VITALS
WEIGHT: 125 LBS | HEIGHT: 56 IN | TEMPERATURE: 98 F | SYSTOLIC BLOOD PRESSURE: 112 MMHG | OXYGEN SATURATION: 95 % | HEART RATE: 73 BPM | BODY MASS INDEX: 28.12 KG/M2 | DIASTOLIC BLOOD PRESSURE: 68 MMHG | RESPIRATION RATE: 18 BRPM

## 2025-04-29 DIAGNOSIS — K94.23 MALFUNCTION OF GASTROSTOMY TUBE (HCC): Primary | ICD-10-CM

## 2025-04-29 PROCEDURE — 99283 EMERGENCY DEPT VISIT LOW MDM: CPT

## 2025-04-29 PROCEDURE — 43762 RPLC GTUBE NO REVJ TRC: CPT

## 2025-04-29 PROCEDURE — 6360000004 HC RX CONTRAST MEDICATION

## 2025-04-29 PROCEDURE — 49465 FLUORO EXAM OF G/COLON TUBE: CPT

## 2025-04-29 RX ADMIN — IOHEXOL 50 ML: 350 INJECTION, SOLUTION INTRAVENOUS at 12:45

## 2025-04-29 ASSESSMENT — PAIN - FUNCTIONAL ASSESSMENT: PAIN_FUNCTIONAL_ASSESSMENT: WONG-BAKER FACES

## 2025-04-29 ASSESSMENT — PAIN SCALES - WONG BAKER: WONGBAKER_NUMERICALRESPONSE: NO HURT

## 2025-04-29 NOTE — ED PROVIDER NOTES
I personally saw the patient and made/approved the management plan and take responsibility for the patient management.    For further details of Miriam Iyer's emergency department encounter, please see the advanced practice provider's documentation.    I, Deonte David MD, am the primary physician provider of record.    CHIEF COMPLAINT  Chief Complaint   Patient presents with    G Tube Complications     Care givers here with Nonverbal patient and states since yesterday has had leaking around gtube stoma site.        Briefly, Miriam Iyer is a 59 y.o. female  who presents to the ED complaining of needing G-tube replacement.  Current G-tube is malfunctioning although currently it is in place.  Had some leaking around it as well.  Patient is nonverbal and unable to provide any history but has not seemed in significant distress and has had no fevers or significant excoriation around the G-tube site per caregivers who are at the bedside with the patient on arrival.    FOCUSED PHYSICAL EXAMINATION  /68   Pulse 73   Temp 98 °F (36.7 °C) (Oral)   Resp 18   Ht 1.422 m (4' 8\")   Wt 56.7 kg (125 lb)   SpO2 95%   BMI 28.02 kg/m²    Focused physical examination notable for nonverbal.  Abdomen is soft, nontender, nondistended, G-tube in place with minimal amount of tube feed around the stoma site but there is no excoriation erythema or bleeding or apparent tenderness locally to the entry site.        EKG performed in ED:  None      RADIOLOGY  Any applicable radiology studies including x-ray, CT, MRI, and/or ultrasound, were reviewed independently by me in addition to the radiologist.  I reviewed all radiology images and reports as well from this evaluation.    XR INJ CONTRAST GASTRIC TUBE PERC  Result Date: 4/29/2025  Gastrostomy tube in the lumen of the stomach.     ED COURSE/MDM  Patient seen and evaluated. Old records reviewed. Labs and imaging reviewed.      The patient's ED workup was notable for need 
breakfast) Take one capsule by peg tube everyday at 8am for acid reflux., Disp-90 capsule, R-1Normal      acetaminophen (TYLENOL) 325 MG tablet 2 tablets by PEG Tube route every 6 hours as needed for Fever or Pain (For temperature > 100 F and pain scale >5), Disp-120 tablet, R-0Normal      !! Nutritional Supplements (JEVITY 1.5 DORINDA/FIBER) LIQD 237 mLs by Jejunal Tube route 6 times daily 7am, 10am, 1pm, 4pm, 8pm, and 12am, Disp-237 mL, R-5Normal      levothyroxine (SYNTHROID) 150 MCG tablet 1 tablet by PEG Tube route Daily, Disp-90 tablet, R-2Normal      !! Nutritional Supplements (JEVITY 1.5 DORINDA/FIBER PO) Take by mouthHistorical Med      Handicap Placard [facility] Starting Tue 10/10/2023, Disp-1 each, R-0, PrintDuration- 5 years      docusate (COLACE) 50 MG/5ML liquid Take 10 mLs by mouth dailyHistorical Med       !! - Potential duplicate medications found. Please discuss with provider.          ALLERGIES     Caffeine and Iodine    FAMILYHISTORY       Family History   Family history unknown: Yes        SOCIAL HISTORY       Social History     Tobacco Use    Smoking status: Never    Smokeless tobacco: Never   Vaping Use    Vaping status: Never Used   Substance Use Topics    Alcohol use: No    Drug use: No       SCREENINGS     NIHSS:     GCS: Constance Coma Scale  Eye Opening: Spontaneous  Best Verbal Response: None  Best Motor Response: None  Constance Coma Scale Score: 6    Heart Score      PECARN Last:       CIWA: CIWA Assessment  BP: 112/68  Pulse: 73  COW Score: No data recorded   CURB 65 Last:     PORT Score:     WELL Criteria:     PERC Score:     CURB 65:      PHYSICAL EXAM  1 or more Elements     ED Triage Vitals [04/29/25 1138]   BP Systolic BP Percentile Diastolic BP Percentile Temp Temp Source Pulse Respirations SpO2   -- -- -- 98 °F (36.7 °C) Oral 73 18 95 %      Height Weight - Scale         1.422 m (4' 8\") 56.7 kg (125 lb)             Physical Exam  Vitals and nursing note reviewed.   Constitutional:

## 2025-04-30 ENCOUNTER — APPOINTMENT (OUTPATIENT)
Dept: GENERAL RADIOLOGY | Age: 60
End: 2025-04-30
Payer: MEDICARE

## 2025-04-30 ENCOUNTER — HOSPITAL ENCOUNTER (EMERGENCY)
Age: 60
Discharge: HOME | End: 2025-04-30
Attending: EMERGENCY MEDICINE
Payer: MEDICARE

## 2025-04-30 VITALS
DIASTOLIC BLOOD PRESSURE: 70 MMHG | SYSTOLIC BLOOD PRESSURE: 116 MMHG | WEIGHT: 125 LBS | TEMPERATURE: 97 F | HEART RATE: 68 BPM | HEIGHT: 56 IN | BODY MASS INDEX: 28.12 KG/M2 | RESPIRATION RATE: 18 BRPM | OXYGEN SATURATION: 95 %

## 2025-04-30 DIAGNOSIS — K94.23 MALFUNCTION OF GASTROSTOMY TUBE (HCC): Primary | ICD-10-CM

## 2025-04-30 PROCEDURE — 49465 FLUORO EXAM OF G/COLON TUBE: CPT

## 2025-04-30 PROCEDURE — 43762 RPLC GTUBE NO REVJ TRC: CPT

## 2025-04-30 PROCEDURE — 6360000004 HC RX CONTRAST MEDICATION

## 2025-04-30 PROCEDURE — 99283 EMERGENCY DEPT VISIT LOW MDM: CPT

## 2025-04-30 RX ADMIN — IOHEXOL 25 ML: 350 INJECTION, SOLUTION INTRAVENOUS at 12:52

## 2025-04-30 ASSESSMENT — PAIN SCALES - WONG BAKER: WONGBAKER_NUMERICALRESPONSE: NO HURT

## 2025-04-30 ASSESSMENT — PAIN - FUNCTIONAL ASSESSMENT: PAIN_FUNCTIONAL_ASSESSMENT: WONG-BAKER FACES

## 2025-04-30 NOTE — ED PROVIDER NOTES
has a past medical history of Anemia, Anorexia, Autistic disorder, Convulsions (HCC), Down syndrome, Dysphagia, G tube feedings (HCC), Hypothyroid, Malnutrition, Parkinson disease (HCC), Pneumonia of left lower lobe due to infectious organism, UTI (urinary tract infection), and Weakness.    CONSULTS: (Who and What was discussed)  None      Social Determinants Significantly Affecting Health : None    Records Reviewed (External, Source and Summary)     CC/HPI Summary, DDx, ED Course, and Reassessment: Briefly 59-year-old female who presents to the emergency room by her caregiver due to gastric contents coming from the stoma of the G-tube that is in place.  She has an 18 Turkish G-tube in place that was placed yesterday.    On exam she does have gastric contents coming around the stoma site at the G-tube site.  There is no other signs of skin erythema or signs of infection.  She has a soft abdomen normal bowel sounds.  The rest of her exam is otherwise unremarkable.    Believe that the G-tube is too small caliber and may need increase in size for this reason we placed a 24 Turkish G-tube.  X-ray with contrast was ordered and shows that the G-tube is in appropriate position.  Patient will be discharged to follow-up with her gastroenterologist as scheduled on Tuesday.  I discussed all these findings with the patient's caregiver and they are agreement with this plan.      I am the Primary Clinician of Record.  FINAL IMPRESSION      1. Malfunction of gastrostomy tube (HCC)          DISPOSITION/PLAN     DISPOSITION Decision To Discharge 04/30/2025 01:25:00 PM   DISPOSITION CONDITION Stable           PATIENT REFERRED TO:  [facility]  [address] [place] [address]  [phone]  Go to   For symptom re-evaluation, If symptoms worsen      DISCHARGE MEDICATIONS:  Discharge Medication List as of 4/30/2025  1:27 PM          DISCONTINUED MEDICATIONS:  Discharge Medication List as of 4/30/2025  1:27  PM                 (Please note that portions of this note were completed with a voice recognition program.  Efforts were made to edit the dictations but occasionally words are mis-transcribed.)    VICKY Sanford (electronically signed)        Wilfred Fatima PA  04/30/25 [id number]

## 2025-04-30 NOTE — ED PROVIDER NOTES
I personally saw the patient and made/approved the management plan and take responsibility for the patient management.    For further details of Miriam Iyer's emergency department encounter, please see the advanced practice provider's documentation.    I, Deonte David MD, am the primary physician provider of record.    CHIEF COMPLAINT  Chief Complaint   Patient presents with    G Tube Complications     Here yesterday. Gtube continues to leak per caregivers. Has a GI appointment next week.      Briefly, Miriam Iyer is a 59 y.o. female  who presents to the ED complaining of leaking gastrotomy tube.  It was replaced yesterday by myself and the physician assistant.  It is been functioning fine but still leaking a little around the stoma site.  No other complaints today.  History provided by caregivers at the bedside.    FOCUSED PHYSICAL EXAMINATION  /70   Pulse 68   Temp 97 °F (36.1 °C) (Temporal)   Resp 18   Ht 1.422 m (4' 8\")   Wt 56.7 kg (125 lb)   SpO2 95%   BMI 28.02 kg/m²    Focused physical examination notable for nonverbal status, abdomen is soft nontender nondistended, well-appearing stoma site with some minimal to peak leakage around the G-tube.  Chronic contractures noted.    EKG performed in ED:  None      RADIOLOGY  Any applicable radiology studies including x-ray, CT, MRI, and/or ultrasound, were reviewed independently by me in addition to the radiologist.  I reviewed all radiology images and reports as well from this evaluation.    XR INJ CONTRAST GASTRIC TUBE PERC  Result Date: 4/30/2025  Gastrostomy tube in the lumen of the stomach.     XR INJ CONTRAST GASTRIC TUBE PERC  Result Date: 4/29/2025  Gastrostomy tube in the lumen of the stomach.     ED COURSE/MDM  Patient seen and evaluated. Old records reviewed. Labs and imaging reviewed.      The patient's ED workup was notable for gastrotomy tube malfunction with some leaking around the site.  Currently had an 18 Macedonian in place.

## 2025-05-16 DIAGNOSIS — Z93.1 PEG (PERCUTANEOUS ENDOSCOPIC GASTROSTOMY) STATUS (HCC): ICD-10-CM

## 2025-05-16 DIAGNOSIS — R56.9 SEIZURE (HCC): ICD-10-CM

## 2025-05-16 NOTE — TELEPHONE ENCOUNTER
Recent Visits  Date Type Provider Dept   25 Office Visit Keerthi Busby,  [facility]   24 Office Visit Judith Dominguez APRN - CNP [facility]   24 Office Visit Keerthi Busby DO [facility]   24 Office Visit Keerthi Busby DO [facility]   24 Office Visit Keerthi Busby DO [facility]   Showing recent visits within past 540 days with a meds authorizing provider and meeting all other requirements  Future Appointments  Date Type Provider Dept   25 Appointment Keerthi Busby DO [facility]   Showing future appointments within next 150 days with a meds authorizing provider and meeting all other requirements              Requested Prescriptions     Pending Prescriptions Disp Refills    valproic acid (DEPAKENE) 250 MG/5ML SOLN oral solution 600 mL 5     Si mLs by Per G Tube route 3 times daily as needed (3 times daily prn) 250 mg in the 8 AM, 250 mg in the afternoon 2 Pm, 500 mg nightly 8 PM.

## 2025-05-19 RX ORDER — VALPROIC ACID 250 MG/5ML
250 SOLUTION ORAL 3 TIMES DAILY PRN
Qty: 600 ML | Refills: 5 | Status: SHIPPED | OUTPATIENT
Start: 2025-05-19

## 2025-06-04 RX ORDER — MUPIROCIN 20 MG/G
OINTMENT TOPICAL
Qty: 22 G | Refills: 1 | Status: SHIPPED | OUTPATIENT
Start: 2025-06-04

## 2025-06-04 NOTE — TELEPHONE ENCOUNTER
Recent Visits  Date Type Provider Dept   03/18/25 Office Visit Keerthi Busby, DO [facility]   12/05/24 Office Visit Judith Dominguez APRN - CNP [facility]   09/17/24 Office Visit Keerthi Busby,  [facility]   07/05/24 Office Visit Keerthi Busby, DO [facility]   04/05/24 Office Visit Keerthi Busby, DO [facility]   Showing recent visits within past 540 days with a meds authorizing provider and meeting all other requirements  Future Appointments  Date Type Provider Dept   09/16/25 Appointment Keerthi Busby DO [facility]   Showing future appointments within next 150 days with a meds authorizing provider and meeting all other requirements     3/18/2025

## 2025-06-17 DIAGNOSIS — G20.A1 PARKINSON'S DISEASE, UNSPECIFIED WHETHER DYSKINESIA PRESENT, UNSPECIFIED WHETHER MANIFESTATIONS FLUCTUATE (HCC): ICD-10-CM

## 2025-06-17 DIAGNOSIS — R13.10 DYSPHAGIA, UNSPECIFIED TYPE: ICD-10-CM

## 2025-06-17 DIAGNOSIS — Z93.1 PEG (PERCUTANEOUS ENDOSCOPIC GASTROSTOMY) STATUS (HCC): ICD-10-CM

## 2025-06-18 RX ORDER — CALCIUM CARBONATE 1250 MG/5ML
SUSPENSION ORAL
Qty: 450 ML | Refills: 5 | Status: SHIPPED | OUTPATIENT
Start: 2025-06-18

## 2025-06-18 RX ORDER — CARBIDOPA AND LEVODOPA 25; 100 MG/1; MG/1
TABLET ORAL
Qty: 135 TABLET | Refills: 5 | Status: SHIPPED | OUTPATIENT
Start: 2025-06-18

## 2025-06-18 RX ORDER — LACTOBACILLUS RHAMNOSUS GG 15B CELL
CAPSULE, SPRINKLE ORAL
Qty: 180 CAPSULE | Refills: 1 | Status: SHIPPED | OUTPATIENT
Start: 2025-06-18

## 2025-06-18 NOTE — TELEPHONE ENCOUNTER
Recent Visits  Date Type Provider Dept   03/18/25 Office Visit Keerthi Busby DO [facility]   12/05/24 Office Visit Judith Dominguez APRN - CNP [facility]   09/17/24 Office Visit Keerthi Busby DO [facility]   07/05/24 Office Visit Keerthi Busby DO [facility]   04/05/24 Office Visit Keerthi Busby DO [facility]   Showing recent visits within past 540 days with a meds authorizing provider and meeting all other requirements  Future Appointments  Date Type Provider Dept   09/16/25 Appointment Keetrhi Busby DO [facility]   Showing future appointments within next 150 days with a meds authorizing provider and meeting all other requirements              Requested Prescriptions     Pending Prescriptions Disp Refills    carbidopa-levodopa (SINEMET)  MG per tablet [Pharmacy Med Name: carbidopa 25 mg-levodopa 100 mg tablet] 135 tablet 5     Sig: TAKE 1 AND 1/2 TABLETS VIA PEG TUBE THREE TIMES DAILY FOR PARKINSONS    Calcium Carbonate Antacid (CALCIUM ELEMENTAL 500 MG/5ML, 1250 MG/5ML AS CARBONATE,) 1250 MG/5ML SUSP suspension [Pharmacy Med Name: calcium 500 mg/5 mL (as calcium carb 1,250 mg/5 mL) oral suspension] 450 mL 5     Sig: TAKE 12.5ml PER j-tube TWICE DAILY AT 8AM AND 8 PM FOR DYSPHAGIA    Lactobacillus Rhamnosus, GG, (CULTURELLE IMMUNITY SUPPORT) CAPS [Pharmacy Med Name: Culturelle 15 billion cell sprinkle capsule] 180 capsule 1     Sig: TAKE ONE CAPSULE VIA PEG TUBE TWICE DAILY FOR INDIGESTION AND bloating

## 2025-08-07 DIAGNOSIS — Z93.1 PEG (PERCUTANEOUS ENDOSCOPIC GASTROSTOMY) STATUS (HCC): ICD-10-CM

## 2025-08-07 DIAGNOSIS — I95.9 HYPOTENSION, UNSPECIFIED HYPOTENSION TYPE: ICD-10-CM

## 2025-08-07 RX ORDER — MIDODRINE HYDROCHLORIDE 2.5 MG/1
TABLET ORAL
Qty: 180 TABLET | Refills: 1 | Status: SHIPPED | OUTPATIENT
Start: 2025-08-07

## (undated) DEVICE — BW-412T DISP COMBO CLEANING BRUSH: Brand: SINGLE USE COMBINATION CLEANING BRUSH

## (undated) DEVICE — MOUTHPIECE ENDOSCP L CTRL OPN AND SIDE PORTS DISP

## (undated) DEVICE — KIT PEG 20FR STD PUL EN ACCS DEV ENDOVIVE

## (undated) DEVICE — AIR/WATER CLEANING ADAPTER FOR OLYMPUS® GI ENDOSCOPE: Brand: BULLDOG®

## (undated) DEVICE — SOLUTION IV IRRIG WATER 500ML POUR BRL ST 2F7113

## (undated) DEVICE — ENDOSCOPIC KIT 6X3/16 FT COLON W/ 1.1 OZ 2 GWN W/O BRSH

## (undated) DEVICE — VALVE SUCTION AIR H2O SET ORCA POD + DISP